# Patient Record
Sex: MALE | Race: WHITE | NOT HISPANIC OR LATINO | Employment: FULL TIME | URBAN - METROPOLITAN AREA
[De-identification: names, ages, dates, MRNs, and addresses within clinical notes are randomized per-mention and may not be internally consistent; named-entity substitution may affect disease eponyms.]

---

## 2017-03-21 ENCOUNTER — HOSPITAL ENCOUNTER (EMERGENCY)
Facility: HOSPITAL | Age: 62
Discharge: HOME | End: 2017-03-21
Attending: EMERGENCY MEDICINE
Payer: COMMERCIAL

## 2017-03-21 VITALS
OXYGEN SATURATION: 98 % | HEIGHT: 68 IN | TEMPERATURE: 98 F | DIASTOLIC BLOOD PRESSURE: 71 MMHG | WEIGHT: 250 LBS | HEART RATE: 74 BPM | SYSTOLIC BLOOD PRESSURE: 123 MMHG | BODY MASS INDEX: 37.89 KG/M2 | RESPIRATION RATE: 18 BRPM

## 2017-03-21 DIAGNOSIS — N20.1 RIGHT URETERAL STONE: Primary | ICD-10-CM

## 2017-03-21 LAB
ALBUMIN SERPL BCP-MCNC: 3.8 G/DL
ALP SERPL-CCNC: 89 U/L
ALT SERPL W/O P-5'-P-CCNC: 107 U/L
ANION GAP SERPL CALC-SCNC: 12 MMOL/L
AST SERPL-CCNC: 82 U/L
BACTERIA #/AREA URNS HPF: ABNORMAL /HPF
BASOPHILS # BLD AUTO: 0.03 K/UL
BASOPHILS NFR BLD: 0.6 %
BILIRUB SERPL-MCNC: 1 MG/DL
BILIRUB UR QL STRIP: NEGATIVE
BUN SERPL-MCNC: 21 MG/DL
CALCIUM SERPL-MCNC: 9.2 MG/DL
CHLORIDE SERPL-SCNC: 105 MMOL/L
CLARITY UR: CLEAR
CO2 SERPL-SCNC: 20 MMOL/L
COLOR UR: YELLOW
CREAT SERPL-MCNC: 1.1 MG/DL
DIFFERENTIAL METHOD: ABNORMAL
EOSINOPHIL # BLD AUTO: 0.1 K/UL
EOSINOPHIL NFR BLD: 1.4 %
ERYTHROCYTE [DISTWIDTH] IN BLOOD BY AUTOMATED COUNT: 13.6 %
EST. GFR  (AFRICAN AMERICAN): >60 ML/MIN/1.73 M^2
EST. GFR  (NON AFRICAN AMERICAN): >60 ML/MIN/1.73 M^2
GLUCOSE SERPL-MCNC: 134 MG/DL
GLUCOSE UR QL STRIP: NEGATIVE
HCT VFR BLD AUTO: 42.6 %
HGB BLD-MCNC: 15.1 G/DL
HGB UR QL STRIP: ABNORMAL
KETONES UR QL STRIP: NEGATIVE
LEUKOCYTE ESTERASE UR QL STRIP: NEGATIVE
LYMPHOCYTES # BLD AUTO: 1.3 K/UL
LYMPHOCYTES NFR BLD: 26.4 %
MCH RBC QN AUTO: 31.5 PG
MCHC RBC AUTO-ENTMCNC: 35.4 %
MCV RBC AUTO: 89 FL
MICROSCOPIC COMMENT: ABNORMAL
MONOCYTES # BLD AUTO: 0.4 K/UL
MONOCYTES NFR BLD: 8.6 %
NEUTROPHILS # BLD AUTO: 3.2 K/UL
NEUTROPHILS NFR BLD: 63 %
NITRITE UR QL STRIP: NEGATIVE
PH UR STRIP: 5 [PH] (ref 5–8)
PLATELET # BLD AUTO: 116 K/UL
PMV BLD AUTO: 11.2 FL
POTASSIUM SERPL-SCNC: 4 MMOL/L
PROT SERPL-MCNC: 7.1 G/DL
PROT UR QL STRIP: NEGATIVE
RBC # BLD AUTO: 4.79 M/UL
RBC #/AREA URNS HPF: 75 /HPF (ref 0–4)
SODIUM SERPL-SCNC: 137 MMOL/L
SP GR UR STRIP: 1.02 (ref 1–1.03)
URN SPEC COLLECT METH UR: ABNORMAL
UROBILINOGEN UR STRIP-ACNC: NEGATIVE EU/DL
WBC # BLD AUTO: 5 K/UL

## 2017-03-21 PROCEDURE — 96375 TX/PRO/DX INJ NEW DRUG ADDON: CPT

## 2017-03-21 PROCEDURE — 80053 COMPREHEN METABOLIC PANEL: CPT

## 2017-03-21 PROCEDURE — 63600175 PHARM REV CODE 636 W HCPCS: Performed by: EMERGENCY MEDICINE

## 2017-03-21 PROCEDURE — 96374 THER/PROPH/DIAG INJ IV PUSH: CPT

## 2017-03-21 PROCEDURE — 99284 EMERGENCY DEPT VISIT MOD MDM: CPT | Mod: 25

## 2017-03-21 PROCEDURE — 81000 URINALYSIS NONAUTO W/SCOPE: CPT

## 2017-03-21 PROCEDURE — 85025 COMPLETE CBC W/AUTO DIFF WBC: CPT

## 2017-03-21 RX ORDER — HYDROCODONE BITARTRATE AND ACETAMINOPHEN 10; 325 MG/1; MG/1
1 TABLET ORAL EVERY 4 HOURS PRN
Qty: 18 TABLET | Refills: 0 | Status: SHIPPED | OUTPATIENT
Start: 2017-03-21 | End: 2017-03-24 | Stop reason: SDUPTHER

## 2017-03-21 RX ORDER — KETOROLAC TROMETHAMINE 30 MG/ML
30 INJECTION, SOLUTION INTRAMUSCULAR; INTRAVENOUS
Status: COMPLETED | OUTPATIENT
Start: 2017-03-21 | End: 2017-03-21

## 2017-03-21 RX ORDER — MORPHINE SULFATE 4 MG/ML
4 INJECTION, SOLUTION INTRAMUSCULAR; INTRAVENOUS
Status: COMPLETED | OUTPATIENT
Start: 2017-03-21 | End: 2017-03-21

## 2017-03-21 RX ORDER — TAMSULOSIN HYDROCHLORIDE 0.4 MG/1
0.4 CAPSULE ORAL DAILY
Qty: 30 CAPSULE | Refills: 0 | Status: SHIPPED | OUTPATIENT
Start: 2017-03-21 | End: 2017-07-12

## 2017-03-21 RX ORDER — ONDANSETRON 2 MG/ML
4 INJECTION INTRAMUSCULAR; INTRAVENOUS
Status: COMPLETED | OUTPATIENT
Start: 2017-03-21 | End: 2017-03-21

## 2017-03-21 RX ORDER — ONDANSETRON 4 MG/1
4 TABLET, FILM COATED ORAL EVERY 8 HOURS PRN
Qty: 12 TABLET | Refills: 0 | Status: SHIPPED | OUTPATIENT
Start: 2017-03-21 | End: 2017-07-12

## 2017-03-21 RX ADMIN — ONDANSETRON 4 MG: 2 INJECTION INTRAMUSCULAR; INTRAVENOUS at 09:03

## 2017-03-21 RX ADMIN — KETOROLAC TROMETHAMINE 30 MG: 30 INJECTION, SOLUTION INTRAMUSCULAR at 09:03

## 2017-03-21 RX ADMIN — MORPHINE SULFATE 4 MG: 4 INJECTION, SOLUTION INTRAMUSCULAR; INTRAVENOUS at 09:03

## 2017-03-21 NOTE — ED PROVIDER NOTES
SCRIBE #1 NOTE: I, Adria Farley, am scribing for, and in the presence of, Chelsey Jang MD. I have scribed the entire note.      History      Chief Complaint   Patient presents with    Flank Pain     since last night        Review of patient's allergies indicates:  No Known Allergies     HPI   HPI    3/21/2017, 8:27 AM   History obtained from the patient and wife      History of Present Illness: Jassno Mayo is a 61 y.o. male patient who presents to the Emergency Department for right flank pain which onset gradually this morning. Sxs are constant and moderate in severity. There are no mitigating or exacerbating factors noted.  Pt denies any fever, N/V/D, dysuria, hematuria, constipation, blood in stool, abd pain, testicle pain, HA, numbness, and all other sxs at this time. No further complaints or concerns at this time.       Arrival mode: Personal vehicle     PCP: Kenneth Jon MD       Past Medical History:  Past Medical History:   Diagnosis Date    Decreased platelet count     Fatty liver     Gout     HTN (hypertension) 2000    Non-A non-B hepatitis 1990    Obesity (BMI 35.0-39.9 without comorbidity)     Prediabetes 2015       Past Surgical History:  Past Surgical History:   Procedure Laterality Date    COLONOSCOPY W/ POLYPECTOMY  10, 14 no polyp    KNEE ARTHROPLASTY  2013    Torn meniscus    LIVER BIOPSY  2010    VASECTOMY           Family History:  Family History   Problem Relation Age of Onset    COPD Mother     Heart attacks under age 50 Mother     Cancer Father      PROSTATE [place]       Social History:  Social History     Social History Main Topics    Smoking status: Never Smoker    Smokeless tobacco: Unknown    Alcohol use No    Drug use: Unknown    Sexual activity: Unknown       ROS   Review of Systems   Constitutional: Negative for fever.   HENT: Negative for sore throat.    Respiratory: Negative for shortness of breath.    Cardiovascular: Negative for chest  "pain.   Gastrointestinal: Negative for abdominal distention, abdominal pain, blood in stool, constipation, diarrhea, nausea and vomiting.   Genitourinary: Positive for flank pain (Right). Negative for dysuria, hematuria and testicular pain.   Musculoskeletal: Negative for back pain.   Skin: Negative for rash.   Neurological: Negative for weakness.   Hematological: Does not bruise/bleed easily.       Physical Exam        Initial Vitals   BP Pulse Resp Temp SpO2   03/21/17 1029 03/21/17 0826 03/21/17 0826 03/21/17 0826 03/21/17 0826   101/66 61 18 97.6 °F (36.4 °C) 98 %          Physical Exam  Nursing Notes and Vital Signs Reviewed.  Constitutional: Patient is in mild distress. Awake and alert. Well-developed and well-nourished.  Head: Atraumatic. Normocephalic.  Eyes: PERRL. EOM intact. Conjunctivae are not pale. No scleral icterus.  ENT: Mucous membranes are moist. Oropharynx is clear and symmetric.    Neck: Supple. Full ROM. No lymphadenopathy.  Cardiovascular: Regular rate. Regular rhythm. No murmurs, rubs, or gallops. Distal pulses are 2+ and symmetric.  Pulmonary/Chest: No respiratory distress. Clear to auscultation bilaterally. No wheezing, rales, or rhonchi.  Abdominal: Soft and non-distended.  There is no tenderness.  No rebound, guarding, or rigidity. Good bowel sounds.  Genitourinary: Right CVA tenderness  Musculoskeletal: Moves all extremities. No obvious deformities. No edema. No calf tenderness.  Skin: Warm and dry.  Neurological:  Alert, awake, and appropriate.  Normal speech.  No acute focal neurological deficits are appreciated.  Psychiatric: Normal affect. Good eye contact. Appropriate in content.    ED Course    Procedures  ED Vital Signs:  Vitals:    03/21/17 0826 03/21/17 1029 03/21/17 1125   BP:  101/66 123/71   Pulse: 61 70 74   Resp: 18 17 18   Temp: 97.6 °F (36.4 °C)  97.7 °F (36.5 °C)   TempSrc: Oral  Oral   SpO2: 98% 96% 98%   Weight: 113.4 kg (250 lb)     Height: 5' 8" (1.727 m)   "       Abnormal Lab Results:  Labs Reviewed   URINALYSIS - Abnormal; Notable for the following:        Result Value    Occult Blood UA 3+ (*)     All other components within normal limits   CBC W/ AUTO DIFFERENTIAL - Abnormal; Notable for the following:     MCH 31.5 (*)     Platelets 116 (*)     All other components within normal limits   COMPREHENSIVE METABOLIC PANEL - Abnormal; Notable for the following:     CO2 20 (*)     Glucose 134 (*)     AST 82 (*)      (*)     All other components within normal limits   URINALYSIS MICROSCOPIC - Abnormal; Notable for the following:     RBC, UA 75 (*)     All other components within normal limits        All Lab Results:  Results for orders placed or performed during the hospital encounter of 03/21/17   Urinalysis Clean Catch   Result Value Ref Range    Specimen UA Urine, Clean Catch     Color, UA Yellow Yellow, Straw, Allyssa    Appearance, UA Clear Clear    pH, UA 5.0 5.0 - 8.0    Specific Gravity, UA 1.025 1.005 - 1.030    Protein, UA Negative Negative    Glucose, UA Negative Negative    Ketones, UA Negative Negative    Bilirubin (UA) Negative Negative    Occult Blood UA 3+ (A) Negative    Nitrite, UA Negative Negative    Urobilinogen, UA Negative <2.0 EU/dL    Leukocytes, UA Negative Negative   CBC auto differential   Result Value Ref Range    WBC 5.00 3.90 - 12.70 K/uL    RBC 4.79 4.60 - 6.20 M/uL    Hemoglobin 15.1 14.0 - 18.0 g/dL    Hematocrit 42.6 40.0 - 54.0 %    MCV 89 82 - 98 fL    MCH 31.5 (H) 27.0 - 31.0 pg    MCHC 35.4 32.0 - 36.0 %    RDW 13.6 11.5 - 14.5 %    Platelets 116 (L) 150 - 350 K/uL    MPV 11.2 9.2 - 12.9 fL    Gran # 3.2 1.8 - 7.7 K/uL    Lymph # 1.3 1.0 - 4.8 K/uL    Mono # 0.4 0.3 - 1.0 K/uL    Eos # 0.1 0.0 - 0.5 K/uL    Baso # 0.03 0.00 - 0.20 K/uL    Gran% 63.0 38.0 - 73.0 %    Lymph% 26.4 18.0 - 48.0 %    Mono% 8.6 4.0 - 15.0 %    Eosinophil% 1.4 0.0 - 8.0 %    Basophil% 0.6 0.0 - 1.9 %    Differential Method Automated    Comprehensive  metabolic panel   Result Value Ref Range    Sodium 137 136 - 145 mmol/L    Potassium 4.0 3.5 - 5.1 mmol/L    Chloride 105 95 - 110 mmol/L    CO2 20 (L) 23 - 29 mmol/L    Glucose 134 (H) 70 - 110 mg/dL    BUN, Bld 21 8 - 23 mg/dL    Creatinine 1.1 0.5 - 1.4 mg/dL    Calcium 9.2 8.7 - 10.5 mg/dL    Total Protein 7.1 6.0 - 8.4 g/dL    Albumin 3.8 3.5 - 5.2 g/dL    Total Bilirubin 1.0 0.1 - 1.0 mg/dL    Alkaline Phosphatase 89 55 - 135 U/L    AST 82 (H) 10 - 40 U/L     (H) 10 - 44 U/L    Anion Gap 12 8 - 16 mmol/L    eGFR if African American >60 >60 mL/min/1.73 m^2    eGFR if non African American >60 >60 mL/min/1.73 m^2   Urinalysis Microscopic   Result Value Ref Range    RBC, UA 75 (H) 0 - 4 /hpf    Bacteria, UA Occasional None-Occ /hpf    Microscopic Comment SEE COMMENT          Imaging Results:  Imaging Results         CT Renal Stone Study ABD Pelvis WO (Final result) Result time:  03/21/17 10:58:09    Final result by Sugar Roche MD (03/21/17 10:58:09)    Impression:     Right hydroureteronephrosis with 4 mm distal right ureteral stone.      All CT scans at this facility use dose modulation, iterative reconstruction and/or weight based dosing when appropriate to reduce radiation dose to as low as reasonably achievable.       Electronically signed by: SUGAR ROCHE MD  Date:     03/21/17  Time:    10:58     Narrative:    CT RENAL STONE STUDY ABD PELVIS WO,     Date:  03/21/17 10:16:17    Technique: Limited noncontrast CT scan of the abdomen and pelvis.No previous for comparison    History:  right flank pain,     Findings:  The lung bases are clear.     The liver, spleen, and pancreas appear grossly normal.     The visible bowel is grossly unremarkable.    The right kidney reveals mild hydroureteronephrosis.  A distal right ureteral stone is seen proximally 3-4 mm in size.  Best seen on axial image 158.    Lumbar degenerative disc disease.                      The Emergency Provider reviewed the  vital signs and test results, which are outlined above.    ED Discussion     11:06 AM: Reassessed pt. Pt states their condition has improved at this time.  Discussed with pt all pertinent ED information and results. Discussed plan of treatment with pt. Gave pt all f/u and return to the ED instructions. All questions and concerns were addressed at this time. Pt understands and agrees to plan as discussed. Pt is stable for discharge.       ED Medication(s):  Medications   morphine injection 4 mg (4 mg Intravenous Given 3/21/17 0946)   ketorolac injection 30 mg (30 mg Intravenous Given 3/21/17 0942)   ondansetron injection 4 mg (4 mg Intravenous Given 3/21/17 0941)       Discharge Medication List as of 3/21/2017 11:07 AM      START taking these medications    Details   hydrocodone-acetaminophen 10-325mg (NORCO)  mg Tab Take 1 tablet by mouth every 4 (four) hours as needed for Pain., Starting 3/21/2017, Until Discontinued, Print      ondansetron (ZOFRAN) 4 MG tablet Take 1 tablet (4 mg total) by mouth every 8 (eight) hours as needed., Starting 3/21/2017, Until Discontinued, Print      tamsulosin (FLOMAX) 0.4 mg Cp24 Take 1 capsule (0.4 mg total) by mouth once daily., Starting 3/21/2017, Until Wed 3/21/18, Print             Follow-up Information     Follow up with Yoni Eubanks IV, MD. Schedule an appointment as soon as possible for a visit in 2 days.    Specialty:  Urology    Why:  Return to the Emergency Room, If symptoms worsen    Contact information:    [address]  [place] [address]  [phone]              Medical Decision Making    Medical Decision Making:   Clinical Tests:   Lab Tests: Reviewed and Ordered  Radiological Study: Reviewed and Ordered           Scribe Attestation:   Scribe #1: I performed the above scribed service and the documentation accurately describes the services I performed. I attest to the accuracy of the note.    Attending:   Physician Attestation Statement for Scribe #1: I,  Chelsey Jang MD, personally performed the services described in this documentation, as scribed by Adria Farley, in my presence, and it is both accurate and complete.          Clinical Impression       ICD-10-CM ICD-9-CM   1. Right ureteral stone N20.1 592.1       Disposition:   Disposition: Discharged  Condition: Stable         Chelsey Jang MD  03/22/17 0642

## 2017-03-21 NOTE — ED NOTES
Pt lying in bed in NAD,VSS,RR equal and unlabored. Bed is low, locked, and call light in reach. Side rails up x 2.

## 2017-03-21 NOTE — ED NOTES
Pt ready for dc per Dr. Jang. Pt in NAD, VSS, AAOx3. Pt educated on use of medications and urine strainer at home.

## 2017-03-21 NOTE — ED AVS SNAPSHOT
[facility] -   [address]  [place] [address]               Jasson Mayo   3/21/2017  8:20 AM   ED    Description:  Male : 1955   Department:  [facility]           Your Care was Coordinated By:     Provider Role From To    Chelsey Jang MD Attending Provider 17 0834 --      Reason for Visit     Flank Pain           Diagnoses this Visit        Comments    Right ureteral stone    -  Primary       ED Disposition     None           To Do List           Follow-up Information     Follow up with Yoni Eubanks IV, MD. Schedule an appointment as soon as possible for a visit in 2 days.    Specialty:  Urology    Why:  Return to the Emergency Room, If symptoms worsen    Contact information:    [address]  [place] [address]  [phone]         These Medications        Disp Refills Start End    hydrocodone-acetaminophen 10-325mg (NORCO)  mg Tab 18 tablet 0 3/21/2017     Take 1 tablet by mouth every 4 (four) hours as needed for Pain. - Oral    Pharmacy: [address] Ph #: [phone]       tamsulosin (FLOMAX) 0.4 mg Cp24 30 capsule 0 3/21/2017 3/21/2018    Take 1 capsule (0.4 mg total) by mouth once daily. - Oral    Pharmacy: [address] Ph #: [phone]       ondansetron (ZOFRAN) 4 MG tablet 12 tablet 0 3/21/2017     Take 1 tablet (4 mg total) by mouth every 8 (eight) hours as needed. - Oral    Pharmacy: [address] A Ph #: [phone]         [facility]     [facility] -  Assistance  Registered nurses in the [facility] provide clinical advisement, health education, appointment booking, and other advisory services.  Call for this free service at [phone].             Medications           Message regarding Medications      Verify the changes and/or additions to your medication regime listed below are the same as discussed with your clinician today.  If any of these changes or additions are incorrect, please notify your healthcare provider.        START taking these NEW medications        Refills    hydrocodone-acetaminophen 10-325mg (NORCO)  mg Tab 0    Sig: Take 1 tablet by mouth every 4 (four) hours as needed for Pain.    Class: Print    Route: Oral    tamsulosin (FLOMAX) 0.4 mg Cp24 0    Sig: Take 1 capsule (0.4 mg total) by mouth once daily.    Class: Print    Route: Oral    ondansetron (ZOFRAN) 4 MG tablet 0    Sig: Take 1 tablet (4 mg total) by mouth every 8 (eight) hours as needed.    Class: Print    Route: Oral      These medications were administered today        Dose Freq    morphine injection 4 mg 4 mg ED 1 Time    Sig: Inject 1 mL (4 mg total) into the vein ED 1 Time.    Class: Normal    Route: Intravenous    ketorolac injection 30 mg 30 mg ED 1 Time    Sig: Inject 30 mg into the vein ED 1 Time.    Class: Normal    Route: Intravenous    Non-formulary Exception Code: Defer to pharmacy    ondansetron injection 4 mg 4 mg ED 1 Time    Sig: Inject 4 mg into the vein ED 1 Time.    Class: Normal    Route: Intravenous           Verify that the below list of medications is an accurate representation of the medications you are currently taking.  If none reported, the list may be blank. If incorrect, please contact your healthcare provider. Carry this list with you in case of emergency.           Current Medications     allopurinol (ZYLOPRIM) 300 MG tablet Take 1 tablet (300 mg total) by mouth once daily.    benazepril (LOTENSIN) 20 MG tablet Take 1 tablet (20 mg total) by mouth once daily.    colchicine (COLCRYS) 0.6 mg tablet Take 1 tablet (0.6 mg total) by mouth once daily.    vitamin E 1000 UNIT capsule Take 1,000 Units by mouth once daily.    hydrocodone-acetaminophen 10-325mg (NORCO)  mg Tab Take 1 tablet by mouth  "every 4 (four) hours as needed for Pain.    ondansetron (ZOFRAN) 4 MG tablet Take 1 tablet (4 mg total) by mouth every 8 (eight) hours as needed.    tamsulosin (FLOMAX) 0.4 mg Cp24 Take 1 capsule (0.4 mg total) by mouth once daily.           Clinical Reference Information           Your Vitals Were     BP Pulse Temp Resp Height Weight    101/66 70 97.6 °F (36.4 °C) (Oral) 17 5' 8" (1.727 m) 113.4 kg (250 lb)    SpO2 BMI             96% 38.01 kg/m2         Allergies as of 3/21/2017     No Known Allergies      Immunizations Administered on Date of Encounter - 3/21/2017     None      ED Micro, Lab, POCT     Start Ordered       Status Ordering Provider    03/21/17 0926 03/21/17 0925  CBC auto differential  STAT      Final result     03/21/17 0926 03/21/17 0925  Comprehensive metabolic panel  STAT      In process     03/21/17 0835 03/21/17 0834  Urinalysis Clean Catch  STAT      Final result     03/21/17 0834 03/21/17 0834  Urinalysis Microscopic  Once      Final result       ED Imaging Orders     Start Ordered       Status Ordering Provider    03/21/17 0926 03/21/17 0925  CT Renal Stone Study ABD Pelvis WO  1 time imaging      Final result       Discharge References/Attachments     KIDNEY STONE W/ COLIC (ENGLISH)    KIDNEY STONES, TREATING: EXPECTANT THERAPY (ENGLISH)    KIDNEY STONES, TREATING: MEDICATIONS (ENGLISH)    KIDNEY STONES, PREVENTING (ENGLISH)      Your Scheduled Appointments     Jun 29, 2017  8:00 AM CDT   Non-Fasting Lab with [facility]ORATORY, [place] [facility]    [address] [address] [phone]               [facility]  complies with applicable Federal civil rights laws and does not discriminate on the basis of race, color, national origin, age, disability, or sex.        Language Assistance Services     ATTENTION: Language assistance services are available, free of charge. Please call [phone].      ATENCIÓN: Si habla " español, tiene a ferro disposición servicios gratuitos de asistencia lingüística. Llame al [phone].     NORMA Ý: N?u b?n nói Ti?ng Vi?t, có các d?ch v? h? tr? ngôn ng? mi?n phí dành cho b?n. G?i s? [phone].

## 2017-03-23 ENCOUNTER — TELEPHONE (OUTPATIENT)
Dept: UROLOGY | Facility: CLINIC | Age: 62
End: 2017-03-23

## 2017-03-23 NOTE — TELEPHONE ENCOUNTER
Patient requesting appointment with Dr. Eubanks for tomorrow because he has not passed his kidney stone and will be running out of pain medication soon. Informed patient that Dr. Eubanks will not return to the office until Monday. Scheduled patient to see Dr. Mendoza at the [facility].

## 2017-03-23 NOTE — TELEPHONE ENCOUNTER
----- Message from Goldie Blankenship sent at 3/23/2017  7:52 AM CDT -----  Contact: PT   Pt calling because he still have not passed the kidney stone,, please call pt back to decide [phone]

## 2017-03-24 ENCOUNTER — OFFICE VISIT (OUTPATIENT)
Dept: UROLOGY | Facility: CLINIC | Age: 62
End: 2017-03-24
Payer: COMMERCIAL

## 2017-03-24 VITALS
HEIGHT: 68 IN | TEMPERATURE: 99 F | WEIGHT: 252 LBS | BODY MASS INDEX: 38.19 KG/M2 | DIASTOLIC BLOOD PRESSURE: 93 MMHG | SYSTOLIC BLOOD PRESSURE: 133 MMHG | HEART RATE: 80 BPM | RESPIRATION RATE: 16 BRPM

## 2017-03-24 DIAGNOSIS — N13.2 HYDRONEPHROSIS WITH OBSTRUCTING CALCULUS: ICD-10-CM

## 2017-03-24 DIAGNOSIS — R10.9 FLANK PAIN: ICD-10-CM

## 2017-03-24 DIAGNOSIS — N20.1 URETERAL STONE: Primary | ICD-10-CM

## 2017-03-24 PROCEDURE — 99999 PR PBB SHADOW E&M-EST. PATIENT-LVL III: CPT | Mod: PBBFAC,,, | Performed by: UROLOGY

## 2017-03-24 PROCEDURE — 3075F SYST BP GE 130 - 139MM HG: CPT | Mod: S$GLB,,, | Performed by: UROLOGY

## 2017-03-24 PROCEDURE — 3080F DIAST BP >= 90 MM HG: CPT | Mod: S$GLB,,, | Performed by: UROLOGY

## 2017-03-24 PROCEDURE — 1160F RVW MEDS BY RX/DR IN RCRD: CPT | Mod: S$GLB,,, | Performed by: UROLOGY

## 2017-03-24 PROCEDURE — 99204 OFFICE O/P NEW MOD 45 MIN: CPT | Mod: S$GLB,,, | Performed by: UROLOGY

## 2017-03-24 RX ORDER — FLUOROMETHOLONE 1 MG/ML
SUSPENSION/ DROPS OPHTHALMIC
COMMUNITY
Start: 2017-03-20 | End: 2017-07-12 | Stop reason: ALTCHOICE

## 2017-03-24 RX ORDER — HYDROCODONE BITARTRATE AND ACETAMINOPHEN 10; 325 MG/1; MG/1
1 TABLET ORAL EVERY 4 HOURS PRN
Qty: 30 TABLET | Refills: 0 | Status: ON HOLD | OUTPATIENT
Start: 2017-03-24 | End: 2017-03-28

## 2017-03-24 NOTE — PROGRESS NOTES
Subjective:       Patient ID: Jasson Mayo is a 61 y.o. male.    Chief Complaint: Nephrolithiasis    HPI     61 year old with acute onset of right flank pain beginning 3 days ago.  The pain was severe.  He was seen in the emergency room.  A CT scan was obtained and I reviewed the images with him and his wife.  There is an obstructing stone in the distal right ureter.  He has no previous kidneys stones.   He denies associated nausea and no fever.  He denies associated gross hematuria.  Nothing makes the pain better except pain meds and they have limited effectiveness.  He says the pain is intermittent.  We discussed treatment options.  Continued observation vs surgery.    Impression    Right hydroureteronephrosis with 4 mm distal right ureteral stone.         Past Medical History:   Diagnosis Date    Decreased platelet count     Fatty liver     Gout     HTN (hypertension) 2000    Non-A non-B hepatitis 1990    Obesity (BMI 35.0-39.9 without comorbidity)     Prediabetes 2015     Past Surgical History:   Procedure Laterality Date    COLONOSCOPY W/ POLYPECTOMY  10, 14 no polyp    KNEE ARTHROPLASTY  2013    Torn meniscus    LIVER BIOPSY  2010    VASECTOMY         Current Outpatient Prescriptions:     allopurinol (ZYLOPRIM) 300 MG tablet, Take 1 tablet (300 mg total) by mouth once daily., Disp: 30 tablet, Rfl: 11    benazepril (LOTENSIN) 20 MG tablet, Take 1 tablet (20 mg total) by mouth once daily., Disp: 90 tablet, Rfl: 3    colchicine (COLCRYS) 0.6 mg tablet, Take 1 tablet (0.6 mg total) by mouth once daily., Disp: 30 tablet, Rfl: 11    fluorometholone 0.1% (FML) 0.1 % DrpS, , Disp: , Rfl:     hydrocodone-acetaminophen 10-325mg (NORCO)  mg Tab, Take 1 tablet by mouth every 4 (four) hours as needed for Pain., Disp: 30 tablet, Rfl: 0    ondansetron (ZOFRAN) 4 MG tablet, Take 1 tablet (4 mg total) by mouth every 8 (eight) hours as needed., Disp: 12 tablet, Rfl: 0    tamsulosin (FLOMAX) 0.4  mg Cp24, Take 1 capsule (0.4 mg total) by mouth once daily., Disp: 30 capsule, Rfl: 0    vitamin E 1000 UNIT capsule, Take 1,000 Units by mouth once daily., Disp: , Rfl:     Review of Systems   Constitutional: Negative for fever.   Eyes: Negative for visual disturbance.   Respiratory: Negative for shortness of breath.    Cardiovascular: Negative for chest pain.   Gastrointestinal: Negative for nausea.   Genitourinary: Positive for flank pain. Negative for dysuria and hematuria.   Musculoskeletal: Negative for gait problem.   Skin: Negative for rash.   Neurological: Negative for seizures.   Psychiatric/Behavioral: Negative for confusion.       Objective:      Physical Exam   Constitutional: He is oriented to person, place, and time. He appears well-developed and well-nourished.   HENT:   Head: Normocephalic and atraumatic.   Eyes: Conjunctivae are normal.   Cardiovascular: Normal rate.    Pulmonary/Chest: Effort normal.   Abdominal: Soft. He exhibits no distension and no mass. There is no tenderness. There is no CVA tenderness.   Musculoskeletal: Normal range of motion.   Neurological: He is alert and oriented to person, place, and time.   Skin: Skin is warm and dry. No rash noted.   Psychiatric: He has a normal mood and affect.   Vitals reviewed.      Assessment:       1. Ureteral stone    2. Hydronephrosis with obstructing calculus    3. Flank pain        Plan:       Ureteral stone    Hydronephrosis with obstructing calculus    Flank pain    Other orders  -     hydrocodone-acetaminophen 10-325mg (NORCO)  mg Tab; Take 1 tablet by mouth every 4 (four) hours as needed for Pain.  Dispense: 30 tablet; Refill: 0      Trial of passage through weekend.  If unable to pass, plan ureteroscopy next week.    Recommendations:   Increase hydration 2 liters fluid per day.      Strain Urine     Take pain medications as needed     Call office for severe pain or fever >101

## 2017-03-25 ENCOUNTER — NURSE TRIAGE (OUTPATIENT)
Dept: ADMINISTRATIVE | Facility: CLINIC | Age: 62
End: 2017-03-25

## 2017-03-25 NOTE — TELEPHONE ENCOUNTER
Reason for Disposition   Last bowel movement (BM) > 4 days ago    Protocols used: [facility]-A-AH

## 2017-03-26 ENCOUNTER — NURSE TRIAGE (OUTPATIENT)
Dept: ADMINISTRATIVE | Facility: CLINIC | Age: 62
End: 2017-03-26

## 2017-03-26 NOTE — TELEPHONE ENCOUNTER
Patient informed of several unsuccessful attempts to reach on-call provider Dr. Mendoza advised patient if symptoms develop to seek medical care at local [facility] of choice. Patient advised to attend scheduled appointment f/u kidney stones and to discuss constipation and decrease appetite at time of visit.

## 2017-03-26 NOTE — TELEPHONE ENCOUNTER
"  Reason for Disposition   Last bowel movement (BM) > 4 days ago     Last BM Monday 3/20/17    Answer Assessment - Initial Assessment Questions  1. STOOL PATTERN OR FREQUENCY: "How often do you pass bowel movements (BMs)?"  (Normal range: tid to q 3 days)  "When was the last BM passed?"        Every day   2. STRAINING: "Do you have to strain to have a BM?"       Yes   3. RECTAL PAIN: "Does your rectum hurt when the stool comes out?" If so, ask: "Do you have hemorrhoids? How bad is the pain?"  (Scale 1-10; or mild, moderate, severe)      No   4. STOOL COMPOSITION: "Are the stools hard?"      Unobserved   5. BLOOD ON STOOLS: "Has there been any blood on the toilet tissue or on the surface of the BM?" If so, ask: "When was the last time?"       No  6. CHRONIC CONSTIPATION: "Is this a new problem for you?"  If no, ask: How long have you had this problem?" (days, weeks, months)       No   7. CHANGES IN DIET: "Have there been any recent changes in your diet?"       No   8. MEDICATIONS: "Have you been taking any new medications?"      Norco  mg (kidney stones)   9. LAXATIVES: "Have you been using any laxatives or enemas?"  If yes, ask "What, how often, and when was the last time?"      OTC, glycerine suppository, fleet's enema   10. CAUSE: "What do you think is causing the constipation?"         Pain medications  11. OTHER SYMPTOMS: "Do you have any other symptoms?" (e.g., abdominal pain, fever, vomiting)        No   12. PREGNANCY: "Is there any chance you are pregnant?" "When was your last menstrual period?"        n/a    Protocols used: [facility]-    "

## 2017-03-26 NOTE — TELEPHONE ENCOUNTER
Call received from Dr. Mendoza \[facility] [place] [facility]\"" to advised patient to purchase/drink 8 oz OTC Magnesium Citrate (saline laxative solution) for constipation symptom relief. [facility] medical staff will call patient on Monday 3/27 to discuss kidney stone.  Patient advised to call [facility] again if symptoms persist or worsen or if home measures are ineffective; patient had no further questions at end of call.

## 2017-03-27 ENCOUNTER — TELEPHONE (OUTPATIENT)
Dept: UROLOGY | Facility: CLINIC | Age: 62
End: 2017-03-27

## 2017-03-27 DIAGNOSIS — N13.30 HYDROURETERONEPHROSIS: ICD-10-CM

## 2017-03-27 DIAGNOSIS — N20.0 KIDNEY STONE: Primary | ICD-10-CM

## 2017-03-27 NOTE — TELEPHONE ENCOUNTER
Spoke to pt's wife and the pt has not passed the kidney stone. I am going to talk to Dr. Mendoza and get his scheduled for surgery for tomorrow. I informed the pt's wife some will be calling her with the details.

## 2017-03-28 PROBLEM — N20.0 KIDNEY STONE: Status: ACTIVE | Noted: 2017-03-28

## 2017-03-29 ENCOUNTER — TELEPHONE (OUTPATIENT)
Dept: UROLOGY | Facility: CLINIC | Age: 62
End: 2017-03-29

## 2017-03-29 NOTE — TELEPHONE ENCOUNTER
Spoke to pt's wife and booked him for a post op apt. His urine is clearing to a yellow color and he has not had to take any more pain medication.

## 2017-03-29 NOTE — TELEPHONE ENCOUNTER
----- Message from Lina Dominguez sent at 3/29/2017 11:25 AM CDT -----  Contact: wife  Wife - Vonnie Mayo [phone] is calling to schedule an appt for patient for this Friday 03 31 17/patient had surgery on Tuesday 03 28 17 and was advised to followup this Friday/please call

## 2017-03-31 ENCOUNTER — OFFICE VISIT (OUTPATIENT)
Dept: UROLOGY | Facility: CLINIC | Age: 62
End: 2017-03-31
Payer: COMMERCIAL

## 2017-03-31 VITALS
HEART RATE: 79 BPM | BODY MASS INDEX: 37.76 KG/M2 | DIASTOLIC BLOOD PRESSURE: 88 MMHG | WEIGHT: 249.13 LBS | SYSTOLIC BLOOD PRESSURE: 127 MMHG | HEIGHT: 68 IN

## 2017-03-31 DIAGNOSIS — N20.1 URETERAL STONE: Primary | ICD-10-CM

## 2017-03-31 PROCEDURE — 3079F DIAST BP 80-89 MM HG: CPT | Mod: S$GLB,,, | Performed by: UROLOGY

## 2017-03-31 PROCEDURE — 99999 PR PBB SHADOW E&M-EST. PATIENT-LVL III: CPT | Mod: PBBFAC,,, | Performed by: UROLOGY

## 2017-03-31 PROCEDURE — 99213 OFFICE O/P EST LOW 20 MIN: CPT | Mod: S$GLB,,, | Performed by: UROLOGY

## 2017-03-31 PROCEDURE — 3074F SYST BP LT 130 MM HG: CPT | Mod: S$GLB,,, | Performed by: UROLOGY

## 2017-03-31 PROCEDURE — 1160F RVW MEDS BY RX/DR IN RCRD: CPT | Mod: S$GLB,,, | Performed by: UROLOGY

## 2017-03-31 NOTE — PROGRESS NOTES
Subjective:       Patient ID: Jasson Mayo is a 61 y.o. male.    Chief Complaint: Nephrolithiasis    HPI     S/p Right ureteroscopy 3 days ago.  He is doing well.  No fever.  Voiding well.  Hematuria has resolved.  Stone analysis is pending.    Stent removed intact.    Review of Systems   Constitutional: Negative for fever.   Genitourinary: Negative for dysuria and hematuria.       Objective:      Physical Exam   Constitutional: He is oriented to person, place, and time. He appears well-developed and well-nourished.   Pulmonary/Chest: Effort normal.   Abdominal: Soft.   Neurological: He is alert and oriented to person, place, and time.   Skin: No rash noted.   Psychiatric: He has a normal mood and affect.   Vitals reviewed.      Assessment:       1. Ureteral stone        Plan:       Ureteral stone      Recommendations:   Increase hydration 2 liters fluid per day.      Low Oxalate diet     Add Citrate (lemon juice daily)

## 2017-03-31 NOTE — MR AVS SNAPSHOT
[facility]  [address]  [facility] [address]  Phone: [phone]                  Jasson Mayo   3/31/2017 10:30 AM   Office Visit    Description:  Male : 1955   Provider:  THEA Mendoza MD   Department:  [facility]           Reason for Visit     Nephrolithiasis                To Do List           Future Appointments        Provider Department Dept Phone    3/31/2017 10:30 AM THEA Mendoza MD [facility] [phone]    2017 8:00 AM LABORATORY, [place] [facility] [phone]      Goals (5 Years of Data)     None      [facility]     [facility] Nurse Care Line -  Assistance  Unless otherwise directed by your provider, please contact [facility], our nurse care line that is available for  assistance.     Registered nurses in the [facility] Center provide: appointment scheduling, clinical advisement, health education, and other advisory services.  Call: [phone] (toll free)               Medications           Message regarding Medications     Verify the changes and/or additions to your medication regime listed below are the same as discussed with your clinician today.  If any of these changes or additions are incorrect, please notify your healthcare provider.             Verify that the below list of medications is an accurate representation of the medications you are currently taking.  If none reported, the list may be blank. If incorrect, please contact your healthcare provider. Carry this list with you in case of emergency.           Current Medications     allopurinol (ZYLOPRIM) 300 MG tablet Take 1 tablet (300 mg total) by mouth once daily.    benazepril (LOTENSIN) 20 MG tablet Take 1 tablet (20 mg total) by mouth once daily.    ciprofloxacin HCl (CIPRO) 500 MG tablet Take 1 tablet (500 mg total) by mouth 2 (two) times daily.    colchicine (COLCRYS) 0.6 mg tablet Take 1 tablet (0.6 mg  "total) by mouth once daily.    fluorometholone 0.1% (FML) 0.1 % DrpS     tamsulosin (FLOMAX) 0.4 mg Cp24 Take 1 capsule (0.4 mg total) by mouth once daily.    vitamin E 1000 UNIT capsule Take 1,000 Units by mouth once daily.    hydrocodone-acetaminophen 10-325mg (NORCO)  mg Tab Take 1 tablet by mouth every 4 (four) hours as needed for Pain.    ondansetron (ZOFRAN) 4 MG tablet Take 1 tablet (4 mg total) by mouth every 8 (eight) hours as needed.           Clinical Reference Information           Your Vitals Were     BP Pulse Height Weight BMI    127/88 (BP Location: Left arm) 79 5' 8" (1.727 m) 113 kg (249 lb 1.9 oz) 37.88 kg/m2      Blood Pressure          Most Recent Value    BP  127/88      Allergies as of 3/31/2017     No Known Allergies      Immunizations Administered on Date of Encounter - 3/31/2017     None      Language Assistance Services     ATTENTION: Language assistance services are available, free of charge. Please call [phone].      ATENCIÓN: Si habla español, tiene a ferro disposición servicios gratuitos de asistencia lingüística. Llame al [phone].     NORMA Ý: N?u b?n nói Ti?ng Vi?t, có các d?ch v? h? tr? ngôn ng? mi?n phí dành cho b?n. G?i s? [phone].         [facility] complies with applicable Federal civil rights laws and does not discriminate on the basis of race, color, national origin, age, disability, or sex.        "

## 2017-04-01 ENCOUNTER — NURSE TRIAGE (OUTPATIENT)
Dept: ADMINISTRATIVE | Facility: CLINIC | Age: 62
End: 2017-04-01

## 2017-04-01 NOTE — TELEPHONE ENCOUNTER
Reason for Disposition   [1] SEVERE back pain (e.g., excruciating, unable to do any normal activities) AND [2] not improved 2 hours after pain medicine   Cough, productive of sputum (phlegm)   Cough with cold symptoms (e.g., runny nose, postnasal drip, throat clearing) (all triage questions negative)    Protocols used: ST BACK PAIN-A-AH,  RESPIRATORY MULTIPLE SYMPTOMS - GUIDELINE [id number], ST COUGH - ACUTE [id number]    Patient's wife called with concerns her 's pain is not improving.  He say Dr. Mendoza yesterday. Advised wife to speak to provider to see if he thinks the patient should go to the ED. [facility]  informed Dr. Mendoza takes his own calls and the call was transferred to them.

## 2017-04-04 ENCOUNTER — TELEPHONE (OUTPATIENT)
Dept: UROLOGY | Facility: CLINIC | Age: 62
End: 2017-04-04

## 2017-04-04 NOTE — TELEPHONE ENCOUNTER
Spoke to pt and informed him of his test results. PT verbalized understanding. I am mailing him information on how to try and prevent stones.

## 2017-04-04 NOTE — TELEPHONE ENCOUNTER
----- Message from THEA Mendoza MD sent at 4/3/2017  5:45 PM CDT -----  Call with stone analysis.  Calcium oxalate.    Recommendations:   Increase hydration 2 liters fluid per day.      Low Oxalate diet     Add Citrate (lemon juice daily)

## 2017-06-29 ENCOUNTER — LAB VISIT (OUTPATIENT)
Dept: LAB | Facility: HOSPITAL | Age: 62
End: 2017-06-29
Attending: INTERNAL MEDICINE
Payer: COMMERCIAL

## 2017-06-29 DIAGNOSIS — M1A.0720 IDIOPATHIC CHRONIC GOUT OF LEFT FOOT WITHOUT TOPHUS: ICD-10-CM

## 2017-06-29 LAB
ALBUMIN SERPL BCP-MCNC: 3.8 G/DL
ALP SERPL-CCNC: 107 U/L
ALT SERPL W/O P-5'-P-CCNC: 160 U/L
ANION GAP SERPL CALC-SCNC: 6 MMOL/L
AST SERPL-CCNC: 102 U/L
BILIRUB SERPL-MCNC: 0.6 MG/DL
BUN SERPL-MCNC: 20 MG/DL
CALCIUM SERPL-MCNC: 9.7 MG/DL
CHLORIDE SERPL-SCNC: 104 MMOL/L
CO2 SERPL-SCNC: 27 MMOL/L
CREAT SERPL-MCNC: 1 MG/DL
EST. GFR  (AFRICAN AMERICAN): >60 ML/MIN/1.73 M^2
EST. GFR  (NON AFRICAN AMERICAN): >60 ML/MIN/1.73 M^2
GLUCOSE SERPL-MCNC: 115 MG/DL
POTASSIUM SERPL-SCNC: 4.6 MMOL/L
PROT SERPL-MCNC: 7.4 G/DL
SODIUM SERPL-SCNC: 137 MMOL/L
URATE SERPL-MCNC: 5.4 MG/DL

## 2017-06-29 PROCEDURE — 80053 COMPREHEN METABOLIC PANEL: CPT

## 2017-06-29 PROCEDURE — 84550 ASSAY OF BLOOD/URIC ACID: CPT

## 2017-06-29 PROCEDURE — 36415 COLL VENOUS BLD VENIPUNCTURE: CPT | Mod: PO

## 2017-07-03 ENCOUNTER — TELEPHONE (OUTPATIENT)
Dept: RHEUMATOLOGY | Facility: CLINIC | Age: 62
End: 2017-07-03

## 2017-07-03 NOTE — TELEPHONE ENCOUNTER
----- Message from Gume Curiel MD sent at 7/3/2017  3:06 PM CDT -----  Labs show stable uric acid levels, but high liver numbers. Both allopurinol and colchicine does not cause liver enzyme abnormality. Would recommend to check with PCP about the liver abnormality. Thanks.

## 2017-07-06 ENCOUNTER — TELEPHONE (OUTPATIENT)
Dept: RHEUMATOLOGY | Facility: CLINIC | Age: 62
End: 2017-07-06

## 2017-07-06 NOTE — TELEPHONE ENCOUNTER
----- Message from Christal Lebron sent at 7/6/2017 10:43 AM CDT -----  Contact: Pt   Pt called and stated he was returning a call to the nurse. He can be reached at [phone].    Thanks,  TF

## 2017-07-12 ENCOUNTER — TELEPHONE (OUTPATIENT)
Dept: INTERNAL MEDICINE | Facility: CLINIC | Age: 62
End: 2017-07-12

## 2017-07-12 ENCOUNTER — OFFICE VISIT (OUTPATIENT)
Dept: INTERNAL MEDICINE | Facility: CLINIC | Age: 62
End: 2017-07-12
Payer: COMMERCIAL

## 2017-07-12 VITALS
WEIGHT: 258.94 LBS | HEIGHT: 68 IN | OXYGEN SATURATION: 97 % | TEMPERATURE: 98 F | DIASTOLIC BLOOD PRESSURE: 83 MMHG | HEART RATE: 69 BPM | SYSTOLIC BLOOD PRESSURE: 126 MMHG | BODY MASS INDEX: 39.24 KG/M2

## 2017-07-12 DIAGNOSIS — R79.89 ELEVATED LFTS: Primary | ICD-10-CM

## 2017-07-12 DIAGNOSIS — D69.6 DECREASED PLATELET COUNT: ICD-10-CM

## 2017-07-12 DIAGNOSIS — I10 ESSENTIAL HYPERTENSION: ICD-10-CM

## 2017-07-12 PROCEDURE — 86803 HEPATITIS C AB TEST: CPT

## 2017-07-12 PROCEDURE — 87340 HEPATITIS B SURFACE AG IA: CPT

## 2017-07-12 PROCEDURE — 99999 PR PBB SHADOW E&M-EST. PATIENT-LVL III: CPT | Mod: PBBFAC,,, | Performed by: FAMILY MEDICINE

## 2017-07-12 PROCEDURE — 99213 OFFICE O/P EST LOW 20 MIN: CPT | Mod: S$GLB,,, | Performed by: FAMILY MEDICINE

## 2017-07-12 RX ORDER — COLCHICINE 0.6 MG/1
0.6 TABLET ORAL DAILY
COMMUNITY
End: 2017-08-03 | Stop reason: SDUPTHER

## 2017-07-12 NOTE — TELEPHONE ENCOUNTER
----- Message from Kat Mosher sent at 7/12/2017  2:53 PM CDT -----  Would like to speak to nurse about medication. Please call back at [phone]. thanks

## 2017-07-12 NOTE — PROGRESS NOTES
Subjective:       Patient ID: Jasson Mayo is a 61 y.o. male.    Chief Complaint: Follow-up (labs)    Follow-up elevated liver functions and hypertension.  Recent CMP was done.  Liver functions were slightly more elevated. Platelet count was decreased. He had a  liver biopsy in the past.  He was diagnosed with non-A non-B hepatitis. Also is 5 foot 8 with a weight of 258 pounds. Denies fever chills jaundice abdominal pain or edema. He does not drink alcohol.  He is avoiding NSAIDs and Tylenol.      Review of Systems   Constitutional: Negative for appetite change, chills, fatigue, fever and unexpected weight change.   Respiratory: Negative for cough, shortness of breath and wheezing.    Cardiovascular: Negative for chest pain, palpitations and leg swelling.   Gastrointestinal: Negative for abdominal distention, abdominal pain and nausea.   Genitourinary: Negative for difficulty urinating.   Neurological: Negative for headaches.       Objective:      Physical Exam   Constitutional: He is oriented to person, place, and time. He appears well-developed and well-nourished. No distress.   Neck: No thyromegaly present.   Cardiovascular: Normal rate, regular rhythm and normal heart sounds.    No murmur heard.  No edema   Pulmonary/Chest: Effort normal and breath sounds normal. No respiratory distress. He has no wheezes.   Abdominal: Soft. Bowel sounds are normal. He exhibits no distension and no mass. There is no tenderness. There is no guarding.   Lymphadenopathy:     He has no cervical adenopathy.   Neurological: He is alert and oriented to person, place, and time.       Lab Visit on 06/29/2017   Component Date Value Ref Range Status    Sodium 06/29/2017 137  136 - 145 mmol/L Final    Potassium 06/29/2017 4.6  3.5 - 5.1 mmol/L Final    Chloride 06/29/2017 104  95 - 110 mmol/L Final    CO2 06/29/2017 27  23 - 29 mmol/L Final    Glucose 06/29/2017 115* 70 - 110 mg/dL Final    BUN, Bld 06/29/2017 20  8 - 23  mg/dL Final    Creatinine 06/29/2017 1.0  0.5 - 1.4 mg/dL Final    Calcium 06/29/2017 9.7  8.7 - 10.5 mg/dL Final    Total Protein 06/29/2017 7.4  6.0 - 8.4 g/dL Final    Albumin 06/29/2017 3.8  3.5 - 5.2 g/dL Final    Total Bilirubin 06/29/2017 0.6  0.1 - 1.0 mg/dL Final    Alkaline Phosphatase 06/29/2017 107  55 - 135 U/L Final    AST 06/29/2017 102* 10 - 40 U/L Final    ALT 06/29/2017 160* 10 - 44 U/L Final    Anion Gap 06/29/2017 6* 8 - 16 mmol/L Final    eGFR if African American 06/29/2017 >60.0  >60 mL/min/1.73 m^2 Final    eGFR if non African American 06/29/2017 >60.0  >60 mL/min/1.73 m^2 Final    Uric Acid 06/29/2017 5.4  3.4 - 7.0 mg/dL Final     Assessment:       1. Elevated LFTs        Plan:     Blood pressure is normal.  Continue lisinopril.  Hep B hep C was ordered.  Ultrasound liver rule out fatty liver.  CT of the abdomen was done several months ago for renal stone  and limited liver view was negative.  Routine follow-up for annual exam is planned.consider hematology evaluation for decreased platelet count.    Elevated LFTs  -     Hepatitis C antibody  -     Hepatitis B surface antigen  -     US Abdomen Complete; Future; Expected date: 07/12/2017

## 2017-07-13 ENCOUNTER — TELEPHONE (OUTPATIENT)
Dept: RADIOLOGY | Facility: HOSPITAL | Age: 62
End: 2017-07-13

## 2017-07-13 LAB
HBV SURFACE AG SERPL QL IA: NEGATIVE
HCV AB SERPL QL IA: NEGATIVE

## 2017-07-14 ENCOUNTER — HOSPITAL ENCOUNTER (OUTPATIENT)
Dept: RADIOLOGY | Facility: HOSPITAL | Age: 62
Discharge: HOME | End: 2017-07-14
Attending: FAMILY MEDICINE
Payer: COMMERCIAL

## 2017-07-14 DIAGNOSIS — R79.89 ELEVATED LFTS: ICD-10-CM

## 2017-07-14 PROCEDURE — 76700 US EXAM ABDOM COMPLETE: CPT | Mod: 26,,, | Performed by: RADIOLOGY

## 2017-07-14 PROCEDURE — 76700 US EXAM ABDOM COMPLETE: CPT | Mod: TC,PO

## 2017-07-17 ENCOUNTER — TELEPHONE (OUTPATIENT)
Dept: INTERNAL MEDICINE | Facility: CLINIC | Age: 62
End: 2017-07-17

## 2017-07-17 NOTE — TELEPHONE ENCOUNTER
----- Message from Kenneth Jon MD sent at 7/17/2017  7:05 AM CDT -----  US with fatty liver  Hep b and hep c neg   Cont as planned and wt reduction will improve fatty liver

## 2017-07-18 ENCOUNTER — TELEPHONE (OUTPATIENT)
Dept: INTERNAL MEDICINE | Facility: CLINIC | Age: 62
End: 2017-07-18

## 2017-07-18 NOTE — TELEPHONE ENCOUNTER
----- Message from Lizbeth Christina sent at 7/18/2017  8:37 AM CDT -----  Pt wife was calling to speak to ciro. Please call at the office at [phone].pt need ultra sound results.

## 2017-07-18 NOTE — TELEPHONE ENCOUNTER
----- Message from Doris Macias sent at 7/18/2017  7:34 AM CDT -----  Contact: [phone]  Patient returning call. Please call back at [phone].       Thanks,  Doris Macias

## 2017-08-01 ENCOUNTER — TELEPHONE (OUTPATIENT)
Dept: RHEUMATOLOGY | Facility: CLINIC | Age: 62
End: 2017-08-01

## 2017-08-01 NOTE — TELEPHONE ENCOUNTER
Spoke with wife and advised her that Dr. TAO's next opening is 10.2.17, offered apt with Jacque Dean PA-C for 8.2.17 and pt declined, scheduled apt with Jacque Dean PA-C for 8.3.17 at 8.30 am. Verbalized understanding.

## 2017-08-01 NOTE — TELEPHONE ENCOUNTER
----- Message from Jackie Sherman sent at 8/1/2017 12:22 PM CDT -----  Contact: Pt's wife- Vonnie Arriaza afternoon,     Pt is requesting an appt due to pain in both hands (first available 10/02/17). Wife stated pt needs to be seen sooner.    Pt can be reached at [phone].    Thank you!

## 2017-08-03 ENCOUNTER — HOSPITAL ENCOUNTER (OUTPATIENT)
Dept: RADIOLOGY | Facility: HOSPITAL | Age: 62
Discharge: HOME | End: 2017-08-03
Attending: PHYSICIAN ASSISTANT
Payer: COMMERCIAL

## 2017-08-03 ENCOUNTER — OFFICE VISIT (OUTPATIENT)
Dept: RHEUMATOLOGY | Facility: CLINIC | Age: 62
End: 2017-08-03
Payer: COMMERCIAL

## 2017-08-03 VITALS
WEIGHT: 258.19 LBS | SYSTOLIC BLOOD PRESSURE: 128 MMHG | DIASTOLIC BLOOD PRESSURE: 83 MMHG | HEART RATE: 61 BPM | BODY MASS INDEX: 39.25 KG/M2

## 2017-08-03 DIAGNOSIS — M1A.0720 IDIOPATHIC CHRONIC GOUT OF LEFT FOOT WITHOUT TOPHUS: ICD-10-CM

## 2017-08-03 DIAGNOSIS — M15.9 PRIMARY OSTEOARTHRITIS INVOLVING MULTIPLE JOINTS: ICD-10-CM

## 2017-08-03 DIAGNOSIS — M15.9 PRIMARY OSTEOARTHRITIS INVOLVING MULTIPLE JOINTS: Primary | ICD-10-CM

## 2017-08-03 PROBLEM — M15.0 PRIMARY OSTEOARTHRITIS INVOLVING MULTIPLE JOINTS: Status: ACTIVE | Noted: 2017-08-03

## 2017-08-03 PROCEDURE — 73130 X-RAY EXAM OF HAND: CPT | Mod: 26,50,, | Performed by: RADIOLOGY

## 2017-08-03 PROCEDURE — 99214 OFFICE O/P EST MOD 30 MIN: CPT | Mod: S$GLB,,, | Performed by: PHYSICIAN ASSISTANT

## 2017-08-03 PROCEDURE — 3008F BODY MASS INDEX DOCD: CPT | Mod: S$GLB,,, | Performed by: PHYSICIAN ASSISTANT

## 2017-08-03 PROCEDURE — 99999 PR PBB SHADOW E&M-EST. PATIENT-LVL III: CPT | Mod: PBBFAC,,, | Performed by: PHYSICIAN ASSISTANT

## 2017-08-03 PROCEDURE — 73130 X-RAY EXAM OF HAND: CPT | Mod: 50,TC,PO

## 2017-08-03 RX ORDER — COLCHICINE 0.6 MG/1
0.6 TABLET ORAL 2 TIMES DAILY
Qty: 60 TABLET | Refills: 3 | Status: SHIPPED | OUTPATIENT
Start: 2017-08-03 | End: 2017-09-25 | Stop reason: SDUPTHER

## 2017-08-03 RX ORDER — MELOXICAM 15 MG/1
15 TABLET ORAL DAILY
Qty: 30 TABLET | Refills: 4 | Status: SHIPPED | OUTPATIENT
Start: 2017-08-03 | End: 2018-03-29

## 2017-08-03 NOTE — PATIENT INSTRUCTIONS
meloxicam antiinflammatory: one pill daily--do this for 2 weeks, then go to every other day or as needed    Colchicine 0.6mg: increase to twice daily, see if this helps, if no difference go back to one daily     Xray hands today     Tumeric: 1000mg/day in vitamin section

## 2017-08-03 NOTE — PROGRESS NOTES
Subjective:       Patient ID: Jasson Mayo is a 62 y.o. male.    Chief Complaint: Hand Pain    Mr. Mayo is a patient of dr. Curiel usually seen for gout in his foot.  He is here however, complaining of pain in his bilateral hands. For gout he is taking allopurinol 300mg/day and colchcine 0.6mg/day and this is well controlled, he has had no flare ups.  Today he complains of 5/10 pain in his bilateral hands mainly his 2,3 mcps. Pain is worse after working with his hands (which he does daily).  He denies significant swelling or tenderness.  No real morning stiffness.        Review of Systems   Constitutional: Negative for chills, fatigue, fever and unexpected weight change.   HENT: Negative for congestion, mouth sores and rhinorrhea.    Eyes: Negative for pain, discharge and redness.   Respiratory: Negative for cough and shortness of breath.    Cardiovascular: Negative for chest pain and leg swelling.   Gastrointestinal: Negative for abdominal pain, diarrhea, nausea and vomiting.   Endocrine: Negative for cold intolerance and heat intolerance.   Genitourinary: Negative for dysuria.   Musculoskeletal: Positive for arthralgias. Negative for joint swelling and myalgias.   Skin: Negative for rash.   Allergic/Immunologic: Negative for immunocompromised state.   Neurological: Negative for weakness and headaches.   Psychiatric/Behavioral: The patient is not nervous/anxious.          Objective:   /83   Pulse 61   Wt 117.1 kg (258 lb 2.5 oz)   BMI 39.25 kg/m²      Physical Exam   Constitutional: He is oriented to person, place, and time and well-developed, well-nourished, and in no distress.   HENT:   Head: Normocephalic and atraumatic.   Eyes: Pupils are equal, round, and reactive to light. Right eye exhibits no discharge.   Neck: Normal range of motion.   Cardiovascular: Normal rate, regular rhythm and normal heart sounds.  Exam reveals no friction rub.    Pulmonary/Chest: Effort normal and breath  sounds normal. No respiratory distress.   Abdominal: Soft. He exhibits no distension. There is no tenderness.   Lymphadenopathy:     He has no cervical adenopathy.   Neurological: He is alert and oriented to person, place, and time.   Skin: No rash noted. No erythema.     Psychiatric: Mood normal.   Musculoskeletal: Normal range of motion. He exhibits no edema or deformity.   jose wrists, mcps, pips dips no synovitis, no tender joints, some slight puffiness to his 2,3 mcps but very slight    No other swollen/tender joints           Results for ANA LEDESMA (MRN [id number]) as of 8/3/2017 08:38   Ref. Range 6/29/2017 07:20   Sodium Latest Ref Range: 136 - 145 mmol/L 137   Potassium Latest Ref Range: 3.5 - 5.1 mmol/L 4.6   Chloride Latest Ref Range: 95 - 110 mmol/L 104   CO2 Latest Ref Range: 23 - 29 mmol/L 27   Anion Gap Latest Ref Range: 8 - 16 mmol/L 6 (L)   BUN, Bld Latest Ref Range: 8 - 23 mg/dL 20   Creatinine Latest Ref Range: 0.5 - 1.4 mg/dL 1.0   eGFR if non African American Latest Ref Range: >60 mL/min/1.73 m^2 >60.0   eGFR if African American Latest Ref Range: >60 mL/min/1.73 m^2 >60.0   Glucose Latest Ref Range: 70 - 110 mg/dL 115 (H)   Calcium Latest Ref Range: 8.7 - 10.5 mg/dL 9.7   Alkaline Phosphatase Latest Ref Range: 55 - 135 U/L 107   Total Protein Latest Ref Range: 6.0 - 8.4 g/dL 7.4   Albumin Latest Ref Range: 3.5 - 5.2 g/dL 3.8   Uric Acid Latest Ref Range: 3.4 - 7.0 mg/dL 5.4   Total Bilirubin Latest Ref Range: 0.1 - 1.0 mg/dL 0.6   AST Latest Ref Range: 10 - 40 U/L 102 (H)   ALT Latest Ref Range: 10 - 44 U/L 160 (H)     Assessment:       1. Primary osteoarthritis involving multiple joints    2. Idiopathic chronic gout of left foot without tophus        Impression:  OA: jose hands, mainly affecting mcps, do not think this is inflammatory, no synovitis, nontender joints, no am stiffness, but can't rule out cppd component  Chronic gout: well controlled on allopurinol 300mg d and colchicine  0.6mg/day     Plan:       Add meloxicam 15mg/day for now, after 1-2 weeks go to as needed   Increase colchicine to bid, can always go back down to daily if no improvement from this   Xray bilateral hands today, eval for any djd or calcium deposition  rec 1000mg tumeric daily     Will see back in 8 weeks to recheck

## 2017-09-22 NOTE — PROGRESS NOTES
Subjective:       Patient ID: Jasson Mayo is a 62 y.o. male.    Chief Complaint: hand pain    Mr. Mayo is a patient of Dr. Curiel usually seen for gout in his foot.  He has had increased pain in his bilateral hands lately.  We added mobic 15 mg/day and increased colchicine to bid last visit.  For gout he is taking allopurinol 300mg/day and colchcine and this is well controlled, he has had no flare ups.      Today he complains of 3/10 pain mainly in his 2 mcps. 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  He denies significant swelling or tenderness.  No real morning stiffness.  Xrays shows loss of joint space in his jose 2 mcps and jose 1 cmcs.  No swelling or tenderness today.       Review of Systems   Constitutional: Negative for chills, fatigue, fever and unexpected weight change.   HENT: Negative for congestion, mouth sores and rhinorrhea.    Eyes: Negative for pain, discharge and redness.   Respiratory: Negative for cough and shortness of breath.    Cardiovascular: Negative for chest pain and leg swelling.   Gastrointestinal: Negative for abdominal pain, diarrhea, nausea and vomiting.   Endocrine: Negative for cold intolerance and heat intolerance.   Genitourinary: Negative for dysuria.   Musculoskeletal: Positive for arthralgias. Negative for joint swelling and myalgias.   Skin: Negative for rash.   Allergic/Immunologic: Negative for immunocompromised state.   Neurological: Negative for weakness and headaches.   Psychiatric/Behavioral: The patient is not nervous/anxious.          Objective:   There were no vitals taken for this visit.     Physical Exam   Constitutional: He is oriented to person, place, and time and well-developed, well-nourished, and in no distress.   HENT:   Head: Normocephalic and atraumatic.   Eyes: Pupils are equal, round, and reactive to light. Right eye exhibits no discharge.   Neck: Normal range of motion.    Cardiovascular: Normal rate, regular rhythm and normal heart sounds.  Exam reveals no friction rub.    Pulmonary/Chest: Effort normal and breath sounds normal. No respiratory distress.   Abdominal: Soft. He exhibits no distension. There is no tenderness.   Lymphadenopathy:     He has no cervical adenopathy.   Neurological: He is alert and oriented to person, place, and time.   Skin: No rash noted. No erythema.     Psychiatric: Mood normal.   Musculoskeletal: Normal range of motion. He exhibits no edema or deformity.   jose wrists, mcps, pips dips no synovitis, no tender joints,   Good rom to all joints  Pain in jose 2mcps with extension           Results for ANA LEDESMA (MRN [id number]) as of 8/3/2017 08:38   Ref. Range 6/29/2017 07:20   Sodium Latest Ref Range: 136 - 145 mmol/L 137   Potassium Latest Ref Range: 3.5 - 5.1 mmol/L 4.6   Chloride Latest Ref Range: 95 - 110 mmol/L 104   CO2 Latest Ref Range: 23 - 29 mmol/L 27   Anion Gap Latest Ref Range: 8 - 16 mmol/L 6 (L)   BUN, Bld Latest Ref Range: 8 - 23 mg/dL 20   Creatinine Latest Ref Range: 0.5 - 1.4 mg/dL 1.0   eGFR if non African American Latest Ref Range: >60 mL/min/1.73 m^2 >60.0   eGFR if African American Latest Ref Range: >60 mL/min/1.73 m^2 >60.0   Glucose Latest Ref Range: 70 - 110 mg/dL 115 (H)   Calcium Latest Ref Range: 8.7 - 10.5 mg/dL 9.7   Alkaline Phosphatase Latest Ref Range: 55 - 135 U/L 107   Total Protein Latest Ref Range: 6.0 - 8.4 g/dL 7.4   Albumin Latest Ref Range: 3.5 - 5.2 g/dL 3.8   Uric Acid Latest Ref Range: 3.4 - 7.0 mg/dL 5.4   Total Bilirubin Latest Ref Range: 0.1 - 1.0 mg/dL 0.6   AST Latest Ref Range: 10 - 40 U/L 102 (H)   ALT Latest Ref Range: 10 - 44 U/L 160 (H)     Xray hands 8/2017:   There is no radiographic evidence of acute osseous, articular, or soft tissue abnormality. Moderate to severe joint space loss with associated marginal productive changes in keeping with osteoarthritis noted at the bilateral 2nd MCPs,  slightly worse on the right.  There is mild associated subcortical cystic degenerative change.  Mild scattered osteoarthritis noted throughout the DIP joints bilaterally as well as the bilateral 1st CMC's.   No erosive osseous changes demonstrated.  Assessment:       1. Primary osteoarthritis involving multiple joints    2. Idiopathic chronic gout of left foot without tophus        Impression:  OA: jose hands, mainly jose 2 mcps and jose 1 cmcs, slightly better with mobic, tumeric, still pain increases after lots of manual work  Chronic gout: well controlled on allopurinol 300mg d and colchicine 0.6mg/day     Plan:       Cont mobic prn  Discussed injections into his joints if needed in the future  Discussed voltaren gel, he will hold off for now  Continue tumeric daily  Go back down to colchicine once daily--doesn't appear to be helping, doubt any cppd component    rtc in 6 mos with uric acid and cmp

## 2017-09-25 ENCOUNTER — OFFICE VISIT (OUTPATIENT)
Dept: RHEUMATOLOGY | Facility: CLINIC | Age: 62
End: 2017-09-25
Payer: COMMERCIAL

## 2017-09-25 VITALS
HEART RATE: 60 BPM | WEIGHT: 259.94 LBS | BODY MASS INDEX: 39.52 KG/M2 | DIASTOLIC BLOOD PRESSURE: 92 MMHG | SYSTOLIC BLOOD PRESSURE: 138 MMHG

## 2017-09-25 DIAGNOSIS — M15.9 PRIMARY OSTEOARTHRITIS INVOLVING MULTIPLE JOINTS: Primary | ICD-10-CM

## 2017-09-25 DIAGNOSIS — M1A.0720 IDIOPATHIC CHRONIC GOUT OF LEFT FOOT WITHOUT TOPHUS: ICD-10-CM

## 2017-09-25 PROCEDURE — 3080F DIAST BP >= 90 MM HG: CPT | Mod: S$GLB,,, | Performed by: PHYSICIAN ASSISTANT

## 2017-09-25 PROCEDURE — 3008F BODY MASS INDEX DOCD: CPT | Mod: S$GLB,,, | Performed by: PHYSICIAN ASSISTANT

## 2017-09-25 PROCEDURE — 99999 PR PBB SHADOW E&M-EST. PATIENT-LVL III: CPT | Mod: PBBFAC,,, | Performed by: PHYSICIAN ASSISTANT

## 2017-09-25 PROCEDURE — 3075F SYST BP GE 130 - 139MM HG: CPT | Mod: S$GLB,,, | Performed by: PHYSICIAN ASSISTANT

## 2017-09-25 PROCEDURE — 99214 OFFICE O/P EST MOD 30 MIN: CPT | Mod: S$GLB,,, | Performed by: PHYSICIAN ASSISTANT

## 2017-09-25 RX ORDER — COLCHICINE 0.6 MG/1
0.6 TABLET ORAL DAILY
Qty: 60 TABLET | Refills: 3
Start: 2017-09-25 | End: 2018-01-31 | Stop reason: SDUPTHER

## 2017-09-25 NOTE — PATIENT INSTRUCTIONS
meloxicam as needed     Go back to colchicine once daily    Cont tumeric for another month then can see what you think, if helping continue, if not ok to stop    Continue allpurinol

## 2017-09-29 ENCOUNTER — TELEPHONE (OUTPATIENT)
Dept: RHEUMATOLOGY | Facility: CLINIC | Age: 62
End: 2017-09-29

## 2017-09-29 NOTE — TELEPHONE ENCOUNTER
----- Message from Doris Macias sent at 9/29/2017  9:58 AM CDT -----  Contact: spouse   Patient would like to cancel request for refill. Please call back at [phone] if needed.      Thanks,  Doris Macias

## 2017-09-29 NOTE — TELEPHONE ENCOUNTER
----- Message from Miguel Cardoza sent at 9/29/2017  9:49 AM CDT -----  Contact: [phone]  Would like to consult with nurse about refill on Allopurinol 300mg 1 tab each day. Questions 60 day supply. Please call bk at [phone]. Thx. dorothy Vyas  [facility] - [place], [place] - [address]  [address]  [address] [address]  Phone: [phone] Fax: [phone]

## 2017-11-02 ENCOUNTER — OFFICE VISIT (OUTPATIENT)
Dept: INTERNAL MEDICINE | Facility: CLINIC | Age: 62
End: 2017-11-02
Payer: COMMERCIAL

## 2017-11-02 VITALS
SYSTOLIC BLOOD PRESSURE: 135 MMHG | HEART RATE: 64 BPM | DIASTOLIC BLOOD PRESSURE: 88 MMHG | OXYGEN SATURATION: 96 % | BODY MASS INDEX: 39.34 KG/M2 | WEIGHT: 259.56 LBS | HEIGHT: 68 IN | TEMPERATURE: 98 F

## 2017-11-02 DIAGNOSIS — K76.0 FATTY LIVER: ICD-10-CM

## 2017-11-02 DIAGNOSIS — I10 ESSENTIAL HYPERTENSION: ICD-10-CM

## 2017-11-02 DIAGNOSIS — D69.6 DECREASED PLATELET COUNT: ICD-10-CM

## 2017-11-02 DIAGNOSIS — R73.9 HYPERGLYCEMIA: ICD-10-CM

## 2017-11-02 DIAGNOSIS — R79.89 ELEVATED LFTS: ICD-10-CM

## 2017-11-02 DIAGNOSIS — Z28.39 IMMUNIZATION DEFICIENCY: ICD-10-CM

## 2017-11-02 DIAGNOSIS — K63.5 POLYP OF COLON, UNSPECIFIED PART OF COLON, UNSPECIFIED TYPE: ICD-10-CM

## 2017-11-02 DIAGNOSIS — Z12.5 SCREENING FOR PROSTATE CANCER: ICD-10-CM

## 2017-11-02 DIAGNOSIS — Z00.00 ANNUAL PHYSICAL EXAM: Primary | ICD-10-CM

## 2017-11-02 LAB
BASOPHILS # BLD AUTO: 0.07 K/UL
BASOPHILS NFR BLD: 1.5 %
BILIRUB UR QL STRIP: NEGATIVE
CLARITY UR REFRACT.AUTO: CLEAR
COLOR UR AUTO: YELLOW
COMPLEXED PSA SERPL-MCNC: 0.19 NG/ML
DIFFERENTIAL METHOD: ABNORMAL
EOSINOPHIL # BLD AUTO: 0.2 K/UL
EOSINOPHIL NFR BLD: 4.9 %
ERYTHROCYTE [DISTWIDTH] IN BLOOD BY AUTOMATED COUNT: 13.7 %
GLUCOSE UR QL STRIP: NEGATIVE
HCT VFR BLD AUTO: 43.1 %
HGB BLD-MCNC: 14.8 G/DL
HGB UR QL STRIP: NEGATIVE
IMM GRANULOCYTES # BLD AUTO: 0.01 K/UL
IMM GRANULOCYTES NFR BLD AUTO: 0.2 %
KETONES UR QL STRIP: NEGATIVE
LEUKOCYTE ESTERASE UR QL STRIP: NEGATIVE
LYMPHOCYTES # BLD AUTO: 1.7 K/UL
LYMPHOCYTES NFR BLD: 37.8 %
MCH RBC QN AUTO: 31.8 PG
MCHC RBC AUTO-ENTMCNC: 34.3 G/DL
MCV RBC AUTO: 93 FL
MONOCYTES # BLD AUTO: 0.6 K/UL
MONOCYTES NFR BLD: 12.2 %
NEUTROPHILS # BLD AUTO: 2 K/UL
NEUTROPHILS NFR BLD: 43.4 %
NITRITE UR QL STRIP: NEGATIVE
NRBC BLD-RTO: 0 /100 WBC
PH UR STRIP: 5 [PH] (ref 5–8)
PLATELET # BLD AUTO: 148 K/UL
PMV BLD AUTO: 11.9 FL
PROT UR QL STRIP: NEGATIVE
RBC # BLD AUTO: 4.65 M/UL
SP GR UR STRIP: 1.02 (ref 1–1.03)
TSH SERPL DL<=0.005 MIU/L-ACNC: 2.98 UIU/ML
URN SPEC COLLECT METH UR: NORMAL
UROBILINOGEN UR STRIP-ACNC: NEGATIVE EU/DL
WBC # BLD AUTO: 4.52 K/UL

## 2017-11-02 PROCEDURE — 84153 ASSAY OF PSA TOTAL: CPT

## 2017-11-02 PROCEDURE — 81003 URINALYSIS AUTO W/O SCOPE: CPT

## 2017-11-02 PROCEDURE — 99396 PREV VISIT EST AGE 40-64: CPT | Mod: 25,S$GLB,, | Performed by: FAMILY MEDICINE

## 2017-11-02 PROCEDURE — 85025 COMPLETE CBC W/AUTO DIFF WBC: CPT

## 2017-11-02 PROCEDURE — 90471 IMMUNIZATION ADMIN: CPT | Mod: S$GLB,,, | Performed by: FAMILY MEDICINE

## 2017-11-02 PROCEDURE — 99999 PR PBB SHADOW E&M-EST. PATIENT-LVL III: CPT | Mod: PBBFAC,,, | Performed by: FAMILY MEDICINE

## 2017-11-02 PROCEDURE — 84443 ASSAY THYROID STIM HORMONE: CPT

## 2017-11-02 PROCEDURE — 90686 IIV4 VACC NO PRSV 0.5 ML IM: CPT | Mod: S$GLB,,, | Performed by: FAMILY MEDICINE

## 2017-11-02 RX ORDER — BENAZEPRIL HYDROCHLORIDE 20 MG/1
20 TABLET ORAL DAILY
Qty: 90 TABLET | Refills: 3 | Status: SHIPPED | OUTPATIENT
Start: 2017-11-02 | End: 2018-11-05 | Stop reason: SDUPTHER

## 2017-11-02 NOTE — PROGRESS NOTES
Subjective:       Patient ID: Jasson Mayo is a 62 y.o. male.    Chief Complaint: Annual Exam    Annual exam.  Medical history includes hypertension thrombocytopenia hyperglycemia non-A non- B  Fatty liver hepatitis chronic gout and osteoarthritis.  He is scheduled for follow-up colonoscopy.  He had several small polyps removed 4-5 yrs ago  He denies headaches chest pain palpitations or shortness of breath  He takes an occasional meloxicam for osteoarthritis.      Review of Systems   Constitutional: Negative for activity change, appetite change, fatigue and unexpected weight change.   HENT: Negative for congestion, ear pain, hearing loss, sneezing, sore throat, tinnitus and trouble swallowing.    Eyes: Negative for pain, redness and visual disturbance.   Respiratory: Negative for cough, chest tightness, shortness of breath and wheezing.    Cardiovascular: Negative for chest pain, palpitations and leg swelling.   Gastrointestinal: Negative for abdominal distention, abdominal pain, blood in stool, constipation, diarrhea, nausea, rectal pain and vomiting.   Endocrine: Negative for polydipsia and polyuria.   Genitourinary: Negative for difficulty urinating, dysuria, frequency, hematuria and urgency.   Musculoskeletal: Positive for arthralgias. Negative for back pain, joint swelling, myalgias, neck pain and neck stiffness.   Skin: Negative for rash and wound.   Neurological: Negative for dizziness, tremors, syncope, light-headedness, numbness and headaches.   Hematological: Negative for adenopathy. Does not bruise/bleed easily.   Psychiatric/Behavioral: Negative for agitation, confusion, dysphoric mood and sleep disturbance. The patient is not nervous/anxious.        Objective:      Physical Exam   Constitutional: He is oriented to person, place, and time. He appears well-developed and well-nourished.   HENT:   Right Ear: External ear normal.   Left Ear: External ear normal.   Nose: Nose normal.   Mouth/Throat:  Oropharynx is clear and moist.   Eyes: Conjunctivae and EOM are normal. Pupils are equal, round, and reactive to light.   Neck: Normal range of motion. Neck supple. No JVD present. No thyromegaly present.   Cardiovascular: Normal rate, regular rhythm, normal heart sounds and intact distal pulses.  Exam reveals no gallop and no friction rub.    No murmur heard.  Pulmonary/Chest: Effort normal and breath sounds normal. No respiratory distress. He has no wheezes. He has no rales.   Abdominal: Soft. Bowel sounds are normal. He exhibits no distension and no mass. There is no tenderness.   Musculoskeletal: Normal range of motion. He exhibits no edema or tenderness.   Lymphadenopathy:     He has no cervical adenopathy.   Neurological: He is alert and oriented to person, place, and time. He has normal reflexes. He displays normal reflexes. No cranial nerve deficit. He exhibits normal muscle tone. Coordination normal.   Skin: Skin is warm and dry. No rash noted.   Psychiatric: He has a normal mood and affect. His behavior is normal. Judgment and thought content normal.       Office Visit on 07/12/2017   Component Date Value Ref Range Status    Hepatitis C Ab 07/13/2017 Negative   Final    Hepatitis B Surface Ag 07/13/2017 Negative   Final     Assessment:       1. Decreased platelet count    2. Essential hypertension    3. Hyperglycemia    4. Fatty liver    5. Elevated LFTs    6. Screening for prostate cancer    7. Immunization deficiency        Plan:     Blood pressure is 135/88.  Refill medication.  Lab was ordered.  Health maintenance reviewed.  Flu vaccines given today.  Follow-up 6 months.    Decreased platelet count    Essential hypertension  -     Comprehensive metabolic panel; Future; Expected date: 11/02/2017  -     Lipid panel; Future; Expected date: 11/02/2017  -     benazepril (LOTENSIN) 20 MG tablet; Take 1 tablet (20 mg total) by mouth once daily.  Dispense: 90 tablet; Refill: 3  -     CBC auto differential  -      Urinalysis  -     TSH    Hyperglycemia    Fatty liver    Elevated LFTs    Screening for prostate cancer  -     PSA, Screening    Immunization deficiency  -     Influenza - Quadrivalent (3 years & older) (PF)

## 2017-12-21 DIAGNOSIS — M1A.0720 IDIOPATHIC CHRONIC GOUT OF LEFT FOOT WITHOUT TOPHUS: ICD-10-CM

## 2017-12-21 RX ORDER — ALLOPURINOL 300 MG/1
300 TABLET ORAL DAILY
Qty: 30 TABLET | Refills: 11 | Status: SHIPPED | OUTPATIENT
Start: 2017-12-21 | End: 2018-10-04 | Stop reason: SDUPTHER

## 2018-01-31 NOTE — TELEPHONE ENCOUNTER
----- Message from Christal Lebron sent at 1/31/2018  1:27 PM CST -----  Contact: Pt  Pt called and stated he needed to speak to the nurse. He stated he needs a refill:    1. What is the name of the medication you are requesting? Colcrys  2. What is the dose? .6 mg   3. How do you take the medication? Orally, topically, etc? Orally   4. How often do you take this medication? Once a day   5. Do you need a 30 day or 90 day supply? 30 day   6. How many refills are you requesting?   7. What is your preferred pharmacy and location of the pharmacy?   [facility] - [facility] [address] [place]  [address] [place]  [address] [place] [id number]  Phone: [phone] Fax: [phone]    8. Who can we contact with further questions? He can be reached at [phone].    Thanks,  TF

## 2018-02-01 RX ORDER — COLCHICINE 0.6 MG/1
0.6 TABLET ORAL DAILY
Qty: 60 TABLET | Refills: 3 | Status: SHIPPED | OUTPATIENT
Start: 2018-02-01 | End: 2018-05-01

## 2018-03-29 ENCOUNTER — OFFICE VISIT (OUTPATIENT)
Dept: RHEUMATOLOGY | Facility: CLINIC | Age: 63
End: 2018-03-29
Payer: COMMERCIAL

## 2018-03-29 ENCOUNTER — LAB VISIT (OUTPATIENT)
Dept: LAB | Facility: HOSPITAL | Age: 63
End: 2018-03-29
Attending: PHYSICIAN ASSISTANT
Payer: COMMERCIAL

## 2018-03-29 VITALS
BODY MASS INDEX: 39.4 KG/M2 | DIASTOLIC BLOOD PRESSURE: 85 MMHG | HEART RATE: 62 BPM | WEIGHT: 259.94 LBS | HEIGHT: 68 IN | SYSTOLIC BLOOD PRESSURE: 131 MMHG

## 2018-03-29 DIAGNOSIS — M1A.0720 IDIOPATHIC CHRONIC GOUT OF LEFT FOOT WITHOUT TOPHUS: ICD-10-CM

## 2018-03-29 DIAGNOSIS — M15.9 PRIMARY OSTEOARTHRITIS INVOLVING MULTIPLE JOINTS: ICD-10-CM

## 2018-03-29 DIAGNOSIS — M19.042 PRIMARY OSTEOARTHRITIS OF BOTH HANDS: Primary | ICD-10-CM

## 2018-03-29 DIAGNOSIS — M19.041 PRIMARY OSTEOARTHRITIS OF BOTH HANDS: Primary | ICD-10-CM

## 2018-03-29 LAB
ALBUMIN SERPL BCP-MCNC: 3.6 G/DL
ALP SERPL-CCNC: 100 U/L
ALT SERPL W/O P-5'-P-CCNC: 90 U/L
ANION GAP SERPL CALC-SCNC: 8 MMOL/L
AST SERPL-CCNC: 62 U/L
BILIRUB SERPL-MCNC: 0.7 MG/DL
BUN SERPL-MCNC: 15 MG/DL
CALCIUM SERPL-MCNC: 9.5 MG/DL
CHLORIDE SERPL-SCNC: 105 MMOL/L
CO2 SERPL-SCNC: 27 MMOL/L
CREAT SERPL-MCNC: 0.9 MG/DL
EST. GFR  (AFRICAN AMERICAN): >60 ML/MIN/1.73 M^2
EST. GFR  (NON AFRICAN AMERICAN): >60 ML/MIN/1.73 M^2
GLUCOSE SERPL-MCNC: 121 MG/DL
POTASSIUM SERPL-SCNC: 4.4 MMOL/L
PROT SERPL-MCNC: 6.8 G/DL
SODIUM SERPL-SCNC: 140 MMOL/L
URATE SERPL-MCNC: 5 MG/DL

## 2018-03-29 PROCEDURE — 3075F SYST BP GE 130 - 139MM HG: CPT | Mod: CPTII,S$GLB,, | Performed by: INTERNAL MEDICINE

## 2018-03-29 PROCEDURE — 99999 PR PBB SHADOW E&M-EST. PATIENT-LVL III: CPT | Mod: PBBFAC,,, | Performed by: INTERNAL MEDICINE

## 2018-03-29 PROCEDURE — 99214 OFFICE O/P EST MOD 30 MIN: CPT | Mod: 25,S$GLB,, | Performed by: INTERNAL MEDICINE

## 2018-03-29 PROCEDURE — 3079F DIAST BP 80-89 MM HG: CPT | Mod: CPTII,S$GLB,, | Performed by: INTERNAL MEDICINE

## 2018-03-29 PROCEDURE — 36415 COLL VENOUS BLD VENIPUNCTURE: CPT | Mod: PO

## 2018-03-29 PROCEDURE — 84550 ASSAY OF BLOOD/URIC ACID: CPT | Mod: PO

## 2018-03-29 PROCEDURE — 80053 COMPREHEN METABOLIC PANEL: CPT | Mod: PO

## 2018-03-29 RX ORDER — TRIAMCINOLONE ACETONIDE 40 MG/ML
20 INJECTION, SUSPENSION INTRA-ARTICULAR; INTRAMUSCULAR ONCE
Status: DISCONTINUED | OUTPATIENT
Start: 2018-03-29 | End: 2018-09-24

## 2018-03-29 NOTE — ASSESSMENT & PLAN NOTE
He has primary osteoarthritis over bilateral second MCP joints with x-rays showing severe degenerative disease and even possible cyst/erosion in both.  He does not have any secondary osteoarthritis features but her history or laboratory investigations.  Other MCP joints are completely normal.  Examination failed to show any evidence of synovitis over dose to MCPs.  But it had tenderness.  Since he is not responding to medical therapy I will inject these to MCPs with half cc of Kenalog and watch for improvement.  If no improvement I will consider oral prednisone therapy-5 mg when necessary daily prednisone as needed.  I do not have any suspicion for rheumatoid arthritis or other systemic arthritides at this time.

## 2018-03-29 NOTE — PROGRESS NOTES
RHEUMATOLOGY CLINIC FOLLOW UP VISIT  Chief complaints:-  My fingers hurt.    HPI:-  Jasson Nesbitt a 62 y.o. pleasant male comes in for a follow up visit with above chief complaints.  He follows up in the rheumatology clinic for osteoarthritis and gout.  He reports no bowel movement SINCE last visit.  He is on allopurinol with adequate control and colchicine once daily for gout prophylaxis.  His main problems today hard pain over bilateral second MCP joints associated with stiffness without any significant swelling.  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  He denies any pain or other joints of hands or feet.  No pain over knee joints today.  No adverse effects from medications.    Review of Systems   Constitutional: Negative for chills and fever.   HENT: Negative for congestion and sore throat.    Eyes: Negative for blurred vision and redness.   Respiratory: Negative for cough and shortness of breath.    Cardiovascular: Negative for chest pain and leg swelling.   Gastrointestinal: Negative for abdominal pain.   Genitourinary: Negative for dysuria.   Musculoskeletal: Positive for back pain, joint pain, myalgias and neck pain. Negative for falls.   Skin: Negative for rash.   Neurological: Negative for headaches.   Endo/Heme/Allergies: Does not bruise/bleed easily.   Psychiatric/Behavioral: Negative for memory loss. The patient does not have insomnia.        Past Medical History:   Diagnosis Date    Decreased platelet count     Fatty liver     Gout     HTN (hypertension) 2000    Non-A non-B hepatitis 1990    Obesity (BMI 35.0-39.9 without comorbidity)     Prediabetes 2015       Past Surgical History:   Procedure Laterality Date    COLONOSCOPY W/ POLYPECTOMY  10, 14 no polyp    KNEE ARTHROSCOPY Right     Meniscus repair    LIVER BIOPSY  2010    VASECTOMY          Social History  "  Substance Use Topics    Smoking status: Never Smoker    Smokeless tobacco: Former User    Alcohol use No       Family History   Problem Relation Age of Onset    COPD Mother     Heart attacks under age 50 Mother     Cancer Father      PROSTATE CA       Review of patient's allergies indicates:  No Known Allergies        Physical examination:-    Vitals:    03/29/18 0800   BP: 131/85   Pulse: 62   Weight: 117.9 kg (259 lb 14.8 oz)   Height: 5' 8" (1.727 m)   PainSc:   2   PainLoc: Hand       Physical Exam   Constitutional: He is oriented to person, place, and time and well-developed, well-nourished, and in no distress. No distress.   HENT:   Head: Normocephalic and atraumatic.   Mouth/Throat: Oropharynx is clear and moist.   Eyes: Conjunctivae and EOM are normal. Pupils are equal, round, and reactive to light. Right eye exhibits no discharge. Left eye exhibits no discharge. No scleral icterus.   Neck: Normal range of motion. Neck supple.   Cardiovascular: Normal rate and intact distal pulses.    Pulmonary/Chest: Effort normal. No respiratory distress. He exhibits no tenderness.   Abdominal: Soft. There is no tenderness.   Musculoskeletal:   Tenderness present over the right and left second MCP joint without any significant evidence of synovitis.  No synovitis or tenderness over the small joints of hands or feet.  Good range of motion in large joints.   Lymphadenopathy:     He has no cervical adenopathy.   Neurological: He is alert and oriented to person, place, and time. Gait normal.   Skin: Skin is warm. No rash noted. He is not diaphoretic. No erythema. No pallor.   Psychiatric: Mood, memory, affect and judgment normal.     Radiographs:-  Independent visualization of images done.  Personally reviewed the x-ray images.  Both x-ray images show significant degenerative disease or bilateral first carpometacarpal joint and second MCP joints without any involvement of other MCPs or PIPs.  MCP showed a possible cyst " near the articular surface.    Medication List with Changes/Refills   Current Medications    ALLOPURINOL (ZYLOPRIM) 300 MG TABLET    Take 1 tablet (300 mg total) by mouth once daily.    BENAZEPRIL (LOTENSIN) 20 MG TABLET    Take 1 tablet (20 mg total) by mouth once daily.    COLCHICINE 0.6 MG TABLET    Take 1 tablet (0.6 mg total) by mouth once daily.    VITAMIN E 1000 UNIT CAPSULE    Take 1,000 Units by mouth once daily.   Discontinued Medications    MELOXICAM (MOBIC) 15 MG TABLET    Take 1 tablet (15 mg total) by mouth once daily.       Assessment/Plans:-  # Primary osteoarthritis of both second MCP joints:-  He has primary osteoarthritis over bilateral second MCP joints with x-rays showing severe degenerative disease and even possible cyst/erosion in both.  He does not have any secondary osteoarthritis features but her history or laboratory investigations.  Other MCP joints are completely normal.  Examination failed to show any evidence of synovitis over dose to MCPs.  But it had tenderness.  Since he is not responding to medical therapy I will inject these to MCPs with half cc of Kenalog and watch for improvement.  If no improvement I will consider oral prednisone therapy-5 mg when necessary daily prednisone as needed.  I do not have any suspicion for rheumatoid arthritis or other systemic arthritides at this time.  PROCEDURE NOTE:-  Name of the procedure: Injection of bilateral second MCP joints with kenalog .   Patient consent:   Indication, risks(including skin depigmentation, fat atrophy, infection, bleeding, nerve or tendon injury) and alternative to the procedure were explained to to the patient. Patient was given opportunity to ask questions . Then patient gave a verbal consent for the procedure.   Pertinent lab values: None indicated  Type of anesthesia: 2% Lidocaine   Description of procedure : Using sterile technique, the skin over MCP joints were cleaned with alcohol swab and then with chlorhexidine  solution. After applying cold spray , the needle was inserted into the skin and into the joint space without any resistance and then 20 mg kenalog was injected into the joint space without any resistance.   Complication: None  Estimated blood loss: None  Disposition: Patient tolerated the procedure without any complains and the site was dressed.There were no complications.  Discharge instructions of icing and stretching given.     # Follow-up in about 6 months (around 9/29/2018).      Disclaimer: This note was prepared using voice recognition system and is likely to have sound alike errors and is not proof read.  Please call me with any questions.

## 2018-04-05 ENCOUNTER — TELEPHONE (OUTPATIENT)
Dept: RHEUMATOLOGY | Facility: CLINIC | Age: 63
End: 2018-04-05

## 2018-04-05 NOTE — TELEPHONE ENCOUNTER
----- Message from Yanni Mccarthy sent at 4/5/2018  9:03 AM CDT -----  Contact: Pt   Pt request call back to give his one week update. States Dr. TAO requested one week update.  [phone] (home)

## 2018-04-18 ENCOUNTER — PATIENT OUTREACH (OUTPATIENT)
Dept: ADMINISTRATIVE | Facility: HOSPITAL | Age: 63
End: 2018-04-18

## 2018-05-01 ENCOUNTER — TELEPHONE (OUTPATIENT)
Dept: RHEUMATOLOGY | Facility: CLINIC | Age: 63
End: 2018-05-01

## 2018-05-01 RX ORDER — COLCHICINE 0.6 MG/1
0.6 CAPSULE ORAL DAILY
Qty: 90 CAPSULE | Refills: 2 | Status: SHIPPED | OUTPATIENT
Start: 2018-05-01 | End: 2018-10-04 | Stop reason: SDUPTHER

## 2018-05-01 NOTE — TELEPHONE ENCOUNTER
Patient called stating that [employer] stating that they will not cover Cholcrys 0.6 mg would not be covered. They are requesting Cholchicine instead.

## 2018-05-02 ENCOUNTER — OFFICE VISIT (OUTPATIENT)
Dept: INTERNAL MEDICINE | Facility: CLINIC | Age: 63
End: 2018-05-02
Payer: COMMERCIAL

## 2018-05-02 VITALS
HEART RATE: 59 BPM | BODY MASS INDEX: 38.5 KG/M2 | WEIGHT: 254.06 LBS | SYSTOLIC BLOOD PRESSURE: 118 MMHG | HEIGHT: 68 IN | OXYGEN SATURATION: 97 % | TEMPERATURE: 97 F | DIASTOLIC BLOOD PRESSURE: 84 MMHG

## 2018-05-02 DIAGNOSIS — R73.9 HYPERGLYCEMIA: ICD-10-CM

## 2018-05-02 DIAGNOSIS — D69.6 DECREASED PLATELET COUNT: ICD-10-CM

## 2018-05-02 DIAGNOSIS — I10 ESSENTIAL HYPERTENSION: Primary | ICD-10-CM

## 2018-05-02 LAB
BASOPHILS # BLD AUTO: 0.06 K/UL
BASOPHILS NFR BLD: 1.2 %
CHOLEST SERPL-MCNC: 142 MG/DL
CHOLEST/HDLC SERPL: 4.3 {RATIO}
DIFFERENTIAL METHOD: ABNORMAL
EOSINOPHIL # BLD AUTO: 0.2 K/UL
EOSINOPHIL NFR BLD: 4.3 %
ERYTHROCYTE [DISTWIDTH] IN BLOOD BY AUTOMATED COUNT: 13.6 %
GLUCOSE SERPL-MCNC: 110 MG/DL
HCT VFR BLD AUTO: 44 %
HDLC SERPL-MCNC: 33 MG/DL
HDLC SERPL: 23.2 %
HGB BLD-MCNC: 15 G/DL
IMM GRANULOCYTES # BLD AUTO: 0.01 K/UL
IMM GRANULOCYTES NFR BLD AUTO: 0.2 %
LDLC SERPL CALC-MCNC: 85.4 MG/DL
LYMPHOCYTES # BLD AUTO: 1.7 K/UL
LYMPHOCYTES NFR BLD: 34.3 %
MCH RBC QN AUTO: 32 PG
MCHC RBC AUTO-ENTMCNC: 34.1 G/DL
MCV RBC AUTO: 94 FL
MONOCYTES # BLD AUTO: 0.5 K/UL
MONOCYTES NFR BLD: 10.6 %
NEUTROPHILS # BLD AUTO: 2.5 K/UL
NEUTROPHILS NFR BLD: 49.4 %
NONHDLC SERPL-MCNC: 109 MG/DL
NRBC BLD-RTO: 0 /100 WBC
PLATELET # BLD AUTO: 164 K/UL
PMV BLD AUTO: 11.5 FL
RBC # BLD AUTO: 4.69 M/UL
TRIGL SERPL-MCNC: 118 MG/DL
WBC # BLD AUTO: 5.08 K/UL

## 2018-05-02 PROCEDURE — 99213 OFFICE O/P EST LOW 20 MIN: CPT | Mod: S$GLB,,, | Performed by: FAMILY MEDICINE

## 2018-05-02 PROCEDURE — 85025 COMPLETE CBC W/AUTO DIFF WBC: CPT

## 2018-05-02 PROCEDURE — 3079F DIAST BP 80-89 MM HG: CPT | Mod: CPTII,S$GLB,, | Performed by: FAMILY MEDICINE

## 2018-05-02 PROCEDURE — 99999 PR PBB SHADOW E&M-EST. PATIENT-LVL III: CPT | Mod: PBBFAC,,, | Performed by: FAMILY MEDICINE

## 2018-05-02 PROCEDURE — 80061 LIPID PANEL: CPT

## 2018-05-02 PROCEDURE — 83036 HEMOGLOBIN GLYCOSYLATED A1C: CPT

## 2018-05-02 PROCEDURE — 82947 ASSAY GLUCOSE BLOOD QUANT: CPT

## 2018-05-02 PROCEDURE — 3074F SYST BP LT 130 MM HG: CPT | Mod: CPTII,S$GLB,, | Performed by: FAMILY MEDICINE

## 2018-05-03 LAB
ESTIMATED AVG GLUCOSE: 105 MG/DL
HBA1C MFR BLD HPLC: 5.3 %

## 2018-09-24 ENCOUNTER — OFFICE VISIT (OUTPATIENT)
Dept: INTERNAL MEDICINE | Facility: CLINIC | Age: 63
End: 2018-09-24
Payer: COMMERCIAL

## 2018-09-24 ENCOUNTER — APPOINTMENT (OUTPATIENT)
Dept: RADIOLOGY | Facility: HOSPITAL | Age: 63
End: 2018-09-24
Attending: FAMILY MEDICINE
Payer: COMMERCIAL

## 2018-09-24 VITALS
DIASTOLIC BLOOD PRESSURE: 78 MMHG | TEMPERATURE: 97 F | SYSTOLIC BLOOD PRESSURE: 118 MMHG | HEIGHT: 68 IN | BODY MASS INDEX: 38.34 KG/M2 | HEART RATE: 76 BPM | OXYGEN SATURATION: 95 % | WEIGHT: 253 LBS

## 2018-09-24 DIAGNOSIS — G89.29 CHRONIC PAIN OF LEFT ANKLE: ICD-10-CM

## 2018-09-24 DIAGNOSIS — M25.572 CHRONIC PAIN OF LEFT ANKLE: ICD-10-CM

## 2018-09-24 DIAGNOSIS — R05.9 COUGH: Primary | ICD-10-CM

## 2018-09-24 DIAGNOSIS — M54.32 SCIATICA OF LEFT SIDE: ICD-10-CM

## 2018-09-24 DIAGNOSIS — I10 ESSENTIAL HYPERTENSION: ICD-10-CM

## 2018-09-24 PROCEDURE — 3074F SYST BP LT 130 MM HG: CPT | Mod: CPTII,S$GLB,, | Performed by: FAMILY MEDICINE

## 2018-09-24 PROCEDURE — 73610 X-RAY EXAM OF ANKLE: CPT | Mod: 26,LT,, | Performed by: RADIOLOGY

## 2018-09-24 PROCEDURE — 3008F BODY MASS INDEX DOCD: CPT | Mod: CPTII,S$GLB,, | Performed by: FAMILY MEDICINE

## 2018-09-24 PROCEDURE — 99214 OFFICE O/P EST MOD 30 MIN: CPT | Mod: S$GLB,,, | Performed by: FAMILY MEDICINE

## 2018-09-24 PROCEDURE — 99999 PR PBB SHADOW E&M-EST. PATIENT-LVL III: CPT | Mod: PBBFAC,,, | Performed by: FAMILY MEDICINE

## 2018-09-24 PROCEDURE — 73610 X-RAY EXAM OF ANKLE: CPT | Mod: TC,FY,PO,LT

## 2018-09-24 PROCEDURE — 3078F DIAST BP <80 MM HG: CPT | Mod: CPTII,S$GLB,, | Performed by: FAMILY MEDICINE

## 2018-09-24 RX ORDER — METHYLPREDNISOLONE 4 MG/1
TABLET ORAL
Qty: 1 PACKAGE | Refills: 0 | Status: SHIPPED | OUTPATIENT
Start: 2018-09-24 | End: 2018-10-04

## 2018-09-24 NOTE — PROGRESS NOTES
Subjective:       Patient ID: Jasson Mayo is a 63 y.o. male.    Chief Complaint: Cough and Leg Pain    Ten days duration of postnasal drip and cough.  Cough has persisted.  He denies fever chills.  Denies pleuritic chest pain. Had about 1 month duration of pain in the left lateral thigh with prolonged standing and improved with lying down.  Initially had area of numbness associated with this.  He also has hypertension on lisinopril.  Denies headache chest pain palpitations shortness of breath.  Had several months duration of left ankle pain.  Worse with prolonged standing.      Review of Systems   Constitutional: Negative for chills and fever.   HENT: Negative for congestion, postnasal drip, sinus pressure and sore throat.    Respiratory: Positive for cough. Negative for chest tightness, shortness of breath and wheezing.    Cardiovascular: Negative for chest pain, palpitations and leg swelling.        Denies lightheadedness   Musculoskeletal: Negative for back pain, gait problem and joint swelling.        He has degenerative joint disease of the hands receiving occasional cortisone injection.  He has left ankle pain.  He has pain a left thigh.       Objective:      Physical Exam   Constitutional: He appears well-developed and well-nourished. No distress.   Cardiovascular: Normal rate and regular rhythm. Exam reveals no gallop.   No murmur heard.  Pulmonary/Chest: Effort normal and breath sounds normal. No respiratory distress. He has no wheezes. He has no rales.   Musculoskeletal:   No ankle deformity or swelling. Pain with range of motion.  No lumbar tenderness.  Straight leg raise is negative.  No hip tenderness.  Range of motion hip with no discomfort   Skin: He is not diaphoretic.       Office Visit on 05/02/2018   Component Date Value Ref Range Status    WBC 05/02/2018 5.08  3.90 - 12.70 K/uL Final    RBC 05/02/2018 4.69  4.60 - 6.20 M/uL Final    Hemoglobin 05/02/2018 15.0  14.0 - 18.0 g/dL Final     Hematocrit 05/02/2018 44.0  40.0 - 54.0 % Final    MCV 05/02/2018 94  82 - 98 fL Final    MCH 05/02/2018 32.0* 27.0 - 31.0 pg Final    MCHC 05/02/2018 34.1  32.0 - 36.0 g/dL Final    RDW 05/02/2018 13.6  11.5 - 14.5 % Final    Platelets 05/02/2018 164  150 - 350 K/uL Final    MPV 05/02/2018 11.5  9.2 - 12.9 fL Final    Immature Granulocytes 05/02/2018 0.2  0.0 - 0.5 % Final    Gran # (ANC) 05/02/2018 2.5  1.8 - 7.7 K/uL Final    Immature Grans (Abs) 05/02/2018 0.01  0.00 - 0.04 K/uL Final    Lymph # 05/02/2018 1.7  1.0 - 4.8 K/uL Final    Mono # 05/02/2018 0.5  0.3 - 1.0 K/uL Final    Eos # 05/02/2018 0.2  0.0 - 0.5 K/uL Final    Baso # 05/02/2018 0.06  0.00 - 0.20 K/uL Final    nRBC 05/02/2018 0  0 /100 WBC Final    Gran% 05/02/2018 49.4  38.0 - 73.0 % Final    Lymph% 05/02/2018 34.3  18.0 - 48.0 % Final    Mono% 05/02/2018 10.6  4.0 - 15.0 % Final    Eosinophil% 05/02/2018 4.3  0.0 - 8.0 % Final    Basophil% 05/02/2018 1.2  0.0 - 1.9 % Final    Differential Method 05/02/2018 Automated   Final    Glucose, Fasting 05/02/2018 110  70 - 110 mg/dL Final    Hemoglobin A1C 05/02/2018 5.3  4.0 - 5.6 % Final    Estimated Avg Glucose 05/02/2018 105  68 - 131 mg/dL Final    Cholesterol 05/02/2018 142  120 - 199 mg/dL Final    Triglycerides 05/02/2018 118  30 - 150 mg/dL Final    HDL 05/02/2018 33* 40 - 75 mg/dL Final    LDL Cholesterol 05/02/2018 85.4  63.0 - 159.0 mg/dL Final    HDL/Chol Ratio 05/02/2018 23.2  20.0 - 50.0 % Final    Total Cholesterol/HDL Ratio 05/02/2018 4.3  2.0 - 5.0 Final    Non-HDL Cholesterol 05/02/2018 109  mg/dL Final     Assessment:       1. Chronic pain of left ankle    2. Cough        Plan:   Blood pressure is controlled continue current medications.  Cough secondary to resolving ups by toward infection.  Medrol Dosepak for possible sciatica and degenerative arthritis of the ankle as well as persisting cough.  X-ray ankle.  Follow-up in 2 weeks.  Possible meralgia  paresthetica.      Chronic pain of left ankle  -     X-Ray Ankle Complete Left; Future; Expected date: 09/24/2018    Cough    Other orders  -     methylPREDNISolone (MEDROL DOSEPACK) 4 mg tablet; use as directed  Dispense: 1 Package; Refill: 0

## 2018-09-25 ENCOUNTER — PATIENT OUTREACH (OUTPATIENT)
Dept: ADMINISTRATIVE | Facility: HOSPITAL | Age: 63
End: 2018-09-25

## 2018-09-25 NOTE — PROGRESS NOTES
Health Maintenance reviewed. [facility] COLONOSCOPY 2017   Curettage Text: The wound bed was treated with curettage after the biopsy was performed.

## 2018-10-04 ENCOUNTER — OFFICE VISIT (OUTPATIENT)
Dept: RHEUMATOLOGY | Facility: CLINIC | Age: 63
End: 2018-10-04
Payer: COMMERCIAL

## 2018-10-04 ENCOUNTER — LAB VISIT (OUTPATIENT)
Dept: LAB | Facility: HOSPITAL | Age: 63
End: 2018-10-04
Attending: INTERNAL MEDICINE
Payer: COMMERCIAL

## 2018-10-04 VITALS
DIASTOLIC BLOOD PRESSURE: 86 MMHG | WEIGHT: 222.25 LBS | SYSTOLIC BLOOD PRESSURE: 147 MMHG | HEART RATE: 65 BPM | HEIGHT: 68 IN | BODY MASS INDEX: 33.68 KG/M2

## 2018-10-04 DIAGNOSIS — M19.041 PRIMARY OSTEOARTHRITIS OF BOTH HANDS: Primary | ICD-10-CM

## 2018-10-04 DIAGNOSIS — M1A.0720 IDIOPATHIC CHRONIC GOUT OF LEFT FOOT WITHOUT TOPHUS: ICD-10-CM

## 2018-10-04 DIAGNOSIS — M19.042 PRIMARY OSTEOARTHRITIS OF BOTH HANDS: Primary | ICD-10-CM

## 2018-10-04 LAB
ALBUMIN SERPL BCP-MCNC: 3.6 G/DL
ALP SERPL-CCNC: 103 U/L
ALT SERPL W/O P-5'-P-CCNC: 101 U/L
ANION GAP SERPL CALC-SCNC: 10 MMOL/L
AST SERPL-CCNC: 65 U/L
BILIRUB SERPL-MCNC: 1 MG/DL
BUN SERPL-MCNC: 19 MG/DL
CALCIUM SERPL-MCNC: 9.4 MG/DL
CHLORIDE SERPL-SCNC: 105 MMOL/L
CO2 SERPL-SCNC: 25 MMOL/L
CREAT SERPL-MCNC: 0.9 MG/DL
EST. GFR  (AFRICAN AMERICAN): >60 ML/MIN/1.73 M^2
EST. GFR  (NON AFRICAN AMERICAN): >60 ML/MIN/1.73 M^2
GLUCOSE SERPL-MCNC: 116 MG/DL
POTASSIUM SERPL-SCNC: 4.3 MMOL/L
PROT SERPL-MCNC: 6.8 G/DL
SODIUM SERPL-SCNC: 140 MMOL/L
URATE SERPL-MCNC: 5.3 MG/DL

## 2018-10-04 PROCEDURE — 20600 DRAIN/INJ JOINT/BURSA W/O US: CPT | Mod: 50,S$GLB,, | Performed by: INTERNAL MEDICINE

## 2018-10-04 PROCEDURE — 80053 COMPREHEN METABOLIC PANEL: CPT | Mod: PO

## 2018-10-04 PROCEDURE — 3008F BODY MASS INDEX DOCD: CPT | Mod: CPTII,S$GLB,, | Performed by: INTERNAL MEDICINE

## 2018-10-04 PROCEDURE — 99999 PR PBB SHADOW E&M-EST. PATIENT-LVL III: CPT | Mod: PBBFAC,,, | Performed by: INTERNAL MEDICINE

## 2018-10-04 PROCEDURE — 99214 OFFICE O/P EST MOD 30 MIN: CPT | Mod: 25,S$GLB,, | Performed by: INTERNAL MEDICINE

## 2018-10-04 PROCEDURE — 84550 ASSAY OF BLOOD/URIC ACID: CPT | Mod: PO

## 2018-10-04 PROCEDURE — 3077F SYST BP >= 140 MM HG: CPT | Mod: CPTII,S$GLB,, | Performed by: INTERNAL MEDICINE

## 2018-10-04 PROCEDURE — 3079F DIAST BP 80-89 MM HG: CPT | Mod: CPTII,S$GLB,, | Performed by: INTERNAL MEDICINE

## 2018-10-04 PROCEDURE — 36415 COLL VENOUS BLD VENIPUNCTURE: CPT | Mod: PO

## 2018-10-04 RX ORDER — ALLOPURINOL 100 MG/1
200 TABLET ORAL DAILY
Qty: 180 TABLET | Refills: 2 | Status: SHIPPED | OUTPATIENT
Start: 2018-10-04 | End: 2019-03-26

## 2018-10-04 RX ORDER — TRIAMCINOLONE ACETONIDE 40 MG/ML
20 INJECTION, SUSPENSION INTRA-ARTICULAR; INTRAMUSCULAR ONCE
Status: COMPLETED | OUTPATIENT
Start: 2018-10-04 | End: 2018-10-04

## 2018-10-04 RX ORDER — COLCHICINE 0.6 MG/1
0.6 CAPSULE ORAL DAILY
Qty: 90 CAPSULE | Refills: 2 | Status: SHIPPED | OUTPATIENT
Start: 2018-10-04 | End: 2019-10-01 | Stop reason: SDUPTHER

## 2018-10-04 RX ORDER — TRIAMCINOLONE ACETONIDE 40 MG/ML
20 INJECTION, SUSPENSION INTRA-ARTICULAR; INTRAMUSCULAR ONCE
Status: DISCONTINUED | OUTPATIENT
Start: 2018-10-04 | End: 2018-10-04

## 2018-10-04 RX ADMIN — TRIAMCINOLONE ACETONIDE 20 MG: 40 INJECTION, SUSPENSION INTRA-ARTICULAR; INTRAMUSCULAR at 09:10

## 2018-10-04 NOTE — PROGRESS NOTES
RHEUMATOLOGY CLINIC FOLLOW UP VISIT  Chief complaints:-  My fingers hurt.    HPI:-  Jasson Nesbitt a 63 y.o. pleasant male comes in for a follow up visit with above chief complaints.  He follows up in the rheumatology clinic for osteoarthritis and gout.  He reports no gout flares  SINCE last visit.  He is on allopurinol with adequate control and colchicine once daily for gout prophylaxis.  His main problems today hard pain over bilateral second MCP joints associated with stiffness without any significant swelling.  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  He denies any pain or other joints of hands or feet.  No pain over knee joints today.  No adverse effects from medications. The injection given in last visit gave pain relief for 5 months.     Review of Systems   Constitutional: Negative for chills and fever.   HENT: Negative for congestion and sore throat.    Eyes: Negative for blurred vision and redness.   Respiratory: Negative for cough and shortness of breath.    Cardiovascular: Negative for chest pain and leg swelling.   Gastrointestinal: Negative for abdominal pain.   Genitourinary: Negative for dysuria.   Musculoskeletal: Positive for back pain, joint pain and neck pain. Negative for falls and myalgias.   Skin: Negative for rash.   Neurological: Negative for headaches.   Endo/Heme/Allergies: Does not bruise/bleed easily.   Psychiatric/Behavioral: Negative for memory loss. The patient does not have insomnia.        Past Medical History:   Diagnosis Date    Decreased platelet count     Fatty liver     Gout     HTN (hypertension) 2000    Non-A non-B hepatitis 1990    Obesity (BMI 35.0-39.9 without comorbidity)     Prediabetes 2015       Past Surgical History:   Procedure Laterality Date    COLONOSCOPY W/ POLYPECTOMY  10, 14 no polyp    CYSTOSCOPY, RETROGRADE, URETEROSCOPY,  "STENT PLACEMENT Right 3/28/2017    [facility]    KNEE ARTHROSCOPY Right     Meniscus repair    LITHOTRIPSY-LASER Right 3/28/2017    [facility]    LIVER BIOPSY  2010    VASECTOMY          Social History     Tobacco Use    Smoking status: Never Smoker    Smokeless tobacco: Former User   Substance Use Topics    Alcohol use: No    Drug use: Not on file       Family History   Problem Relation Age of Onset    COPD Mother     Heart attacks under age 50 Mother     Cancer Father         PROSTATE CA       Review of patient's allergies indicates:  No Known Allergies        Physical examination:-    Vitals:    10/04/18 0748   BP: (!) 147/86   Pulse: 65   Weight: 100.8 kg (222 lb 3.6 oz)   Height: 5' 8" (1.727 m)   PainSc:   2       Physical Exam   Constitutional: He is oriented to person, place, and time and well-developed, well-nourished, and in no distress. No distress.   HENT:   Head: Normocephalic and atraumatic.   Mouth/Throat: Oropharynx is clear and moist.   Eyes: Conjunctivae and EOM are normal. Pupils are equal, round, and reactive to light. Right eye exhibits no discharge. Left eye exhibits no discharge. No scleral icterus.   Neck: Normal range of motion. Neck supple.   Cardiovascular: Normal rate and intact distal pulses.   Pulmonary/Chest: Effort normal. No respiratory distress. He exhibits no tenderness.   Abdominal: Soft. There is no tenderness.   Musculoskeletal:   Tenderness present over the right and left second MCP joint without any significant evidence of synovitis.  No synovitis or tenderness over the small joints of hands or feet.  Good range of motion in large joints.   Lymphadenopathy:     He has no cervical adenopathy.   Neurological: He is alert and oriented to person, place, and time. Gait normal.   Skin: Skin is warm. No rash noted. He is not diaphoretic. No erythema. No pallor.   Psychiatric: Mood and affect normal. "     Radiographs:-  Independent visualization of images done.  Personally reviewed the x-ray images.  Both x-ray images show significant degenerative disease or bilateral first carpometacarpal joint and second MCP joints without any involvement of other MCPs or PIPs.  MCP showed a possible cyst near the articular surface.       Medication List           Accurate as of 10/4/18  9:38 AM. If you have any questions, ask your nurse or doctor.               CHANGE how you take these medications    allopurinol 100 MG tablet  Commonly known as:  ZYLOPRIM  Take 2 tablets (200 mg total) by mouth once daily.  What changed:    · medication strength  · how much to take  Changed by:  Gume Curiel MD        CONTINUE taking these medications    benazepril 20 MG tablet  Commonly known as:  LOTENSIN  Take 1 tablet (20 mg total) by mouth once daily.     colchicine 0.6 mg Cap  Commonly known as:  MITIGARE  Take 1 capsule (0.6 mg total) by mouth once daily.     vitamin E 1000 UNIT capsule        STOP taking these medications    methylPREDNISolone 4 mg tablet  Commonly known as:  MEDROL DOSEPACK  Stopped by:  Gume Curiel MD           Where to Get Your Medications      These medications were sent to [facility] [address]  [address] [address]    Phone:  [phone]   · allopurinol 100 MG tablet  · colchicine 0.6 mg Cap         Assessment/Plans:-  # Primary osteoarthritis of both second MCP joints:-  Primary osteoarthritis over bilateral second MCP joints with x-rays showing severe degenerative disease and even possible cyst/erosion in both.  He does not have any secondary osteoarthritis features but her history or laboratory investigations.  Other MCP joints are completely normal.  Examination failed to show any evidence of synovitis over other MCPs. Since he sahows more than 5 month of relief from the last injection, I will repeat them again  today. I do not have any suspicion for rheumatoid arthritis or other systemic arthritides at this time.  PROCEDURE NOTE:-  Name of the procedure: Injection of bilateral second MCP joints with kenalog .   Patient consent:   Indication, risks(including skin depigmentation, fat atrophy, infection, bleeding, nerve or tendon injury) and alternative to the procedure were explained to to the patient. Patient was given opportunity to ask questions . Then patient gave a verbal consent for the procedure.   Pertinent lab values: None indicated  Type of anesthesia: 2% Lidocaine   Description of procedure : Using sterile technique, the skin over MCP joints were cleaned with alcohol swab and then with chlorhexidine solution. After applying cold spray , the needle was inserted into the skin and into the joint space without any resistance and then 20 mg kenalog was injected into the joint space without any resistance.   Complication: None  Estimated blood loss: None  Disposition: Patient tolerated the procedure without any complains and the site was dressed.There were no complications.  Discharge instructions of icing and stretching given.     # Follow-up in about 6 months (around 4/4/2019).      Disclaimer: This note was prepared using voice recognition system and is likely to have sound alike errors and is not proof read.  Please call me with any questions.

## 2018-10-09 ENCOUNTER — OFFICE VISIT (OUTPATIENT)
Dept: INTERNAL MEDICINE | Facility: CLINIC | Age: 63
End: 2018-10-09
Payer: COMMERCIAL

## 2018-10-09 ENCOUNTER — APPOINTMENT (OUTPATIENT)
Dept: RADIOLOGY | Facility: HOSPITAL | Age: 63
End: 2018-10-09
Attending: FAMILY MEDICINE
Payer: COMMERCIAL

## 2018-10-09 VITALS
BODY MASS INDEX: 37.89 KG/M2 | OXYGEN SATURATION: 98 % | HEIGHT: 68 IN | TEMPERATURE: 97 F | WEIGHT: 250 LBS | SYSTOLIC BLOOD PRESSURE: 140 MMHG | HEART RATE: 56 BPM | DIASTOLIC BLOOD PRESSURE: 84 MMHG

## 2018-10-09 DIAGNOSIS — G57.12 MERALGIA PARESTHETICA OF LEFT SIDE: Primary | ICD-10-CM

## 2018-10-09 DIAGNOSIS — R05.9 COUGH: ICD-10-CM

## 2018-10-09 DIAGNOSIS — M19.072 OSTEOARTHRITIS OF LEFT ANKLE AND FOOT: ICD-10-CM

## 2018-10-09 DIAGNOSIS — M54.50 CHRONIC MIDLINE LOW BACK PAIN WITHOUT SCIATICA: ICD-10-CM

## 2018-10-09 DIAGNOSIS — I10 ESSENTIAL HYPERTENSION: ICD-10-CM

## 2018-10-09 DIAGNOSIS — G89.29 CHRONIC MIDLINE LOW BACK PAIN WITHOUT SCIATICA: ICD-10-CM

## 2018-10-09 PROCEDURE — 3008F BODY MASS INDEX DOCD: CPT | Mod: CPTII,S$GLB,, | Performed by: FAMILY MEDICINE

## 2018-10-09 PROCEDURE — 71046 X-RAY EXAM CHEST 2 VIEWS: CPT | Mod: 26,,, | Performed by: RADIOLOGY

## 2018-10-09 PROCEDURE — 3079F DIAST BP 80-89 MM HG: CPT | Mod: CPTII,S$GLB,, | Performed by: FAMILY MEDICINE

## 2018-10-09 PROCEDURE — 90471 IMMUNIZATION ADMIN: CPT | Mod: S$GLB,,, | Performed by: FAMILY MEDICINE

## 2018-10-09 PROCEDURE — 90686 IIV4 VACC NO PRSV 0.5 ML IM: CPT | Mod: S$GLB,,, | Performed by: FAMILY MEDICINE

## 2018-10-09 PROCEDURE — 3077F SYST BP >= 140 MM HG: CPT | Mod: CPTII,S$GLB,, | Performed by: FAMILY MEDICINE

## 2018-10-09 PROCEDURE — 71046 X-RAY EXAM CHEST 2 VIEWS: CPT | Mod: TC,FY,PO

## 2018-10-09 PROCEDURE — 99999 PR PBB SHADOW E&M-EST. PATIENT-LVL V: CPT | Mod: PBBFAC,,, | Performed by: FAMILY MEDICINE

## 2018-10-09 PROCEDURE — 99214 OFFICE O/P EST MOD 30 MIN: CPT | Mod: 25,S$GLB,, | Performed by: FAMILY MEDICINE

## 2018-10-09 NOTE — PROGRESS NOTES
Subjective:       Patient ID: Jasson Mayo is a 63 y.o. male.    Chief Complaint: Follow-up    HPI  Review of Systems    Objective:      Physical Exam    Lab Visit on 10/04/2018   Component Date Value Ref Range Status    Sodium 10/04/2018 140  136 - 145 mmol/L Final    Potassium 10/04/2018 4.3  3.5 - 5.1 mmol/L Final    Chloride 10/04/2018 105  95 - 110 mmol/L Final    CO2 10/04/2018 25  23 - 29 mmol/L Final    Glucose 10/04/2018 116* 70 - 110 mg/dL Final    BUN, Bld 10/04/2018 19  8 - 23 mg/dL Final    Creatinine 10/04/2018 0.9  0.5 - 1.4 mg/dL Final    Calcium 10/04/2018 9.4  8.7 - 10.5 mg/dL Final    Total Protein 10/04/2018 6.8  6.0 - 8.4 g/dL Final    Albumin 10/04/2018 3.6  3.5 - 5.2 g/dL Final    Total Bilirubin 10/04/2018 1.0  0.1 - 1.0 mg/dL Final    Alkaline Phosphatase 10/04/2018 103  55 - 135 U/L Final    AST 10/04/2018 65* 10 - 40 U/L Final    ALT 10/04/2018 101* 10 - 44 U/L Final    Anion Gap 10/04/2018 10  8 - 16 mmol/L Final    eGFR if African American 10/04/2018 >60  >60 mL/min/1.73 m^2 Final    eGFR if non African American 10/04/2018 >60  >60 mL/min/1.73 m^2 Final    Uric Acid 10/04/2018 5.3  3.4 - 7.0 mg/dL Final     Assessment:       1. Meralgia paresthetica of left side    2. Osteoarthritis of left ankle and foot    3. Cough    4. Essential hypertension    5. Chronic midline low back pain without sciatica        Plan:         Meralgia paresthetica of left side  -     MRI Lumbar Spine Without Contrast; Future; Expected date: 10/09/2018    Osteoarthritis of left ankle and foot  -     Ambulatory referral to Podiatry    Cough  -     X-Ray Chest PA And Lateral; Future; Expected date: 10/09/2018    Essential hypertension    Chronic midline low back pain without sciatica    Other orders  -     Influenza - Quadrivalent (3 years & older) (PF)

## 2018-10-09 NOTE — PROGRESS NOTES
Subjective:       Patient ID: Jasson Mayo is a 63 y.o. male.    Chief Complaint: Follow-up    Follow-up low back pain, left ankle pain , numbness left thigh, hypertension, persistent cough.  Low back pain has been present for several years.  It is worse with prolonged sitting.  He more recently has had numbness of left thigh intermittently.  He has ongoing left ankle pain worse with increased activity.  He recently had injections of his fingers due to degenerative joint disease. He denies headache chest pain palpitations shortness of breath.  Cough initially started with postnasal drip is improved.  Cough has lessened but not resolved.  Denies fever chills or pleuritic component      Review of Systems   Constitutional: Negative for chills, fatigue, fever and unexpected weight change.   HENT: Negative for congestion, postnasal drip, sinus pressure and sore throat.    Respiratory: Positive for cough. Negative for chest tightness, shortness of breath and wheezing.    Cardiovascular: Negative for chest pain, palpitations and leg swelling.        Denies lightheadedness   Musculoskeletal: Positive for arthralgias and back pain.   Neurological: Positive for numbness. Negative for weakness.       Objective:      Physical Exam   Constitutional: He is oriented to person, place, and time. He appears well-developed and well-nourished. No distress.   Cardiovascular: Normal rate and regular rhythm. Exam reveals no gallop.   No murmur heard.  Pulmonary/Chest: Effort normal. No respiratory distress. He has no wheezes. He has no rales.   Musculoskeletal:   No lumbar tenderness.  Straight leg raise is negative   Neurological: He is alert and oriented to person, place, and time. He exhibits normal muscle tone. Coordination normal.   Skin: He is not diaphoretic.       Lab Visit on 10/04/2018   Component Date Value Ref Range Status    Sodium 10/04/2018 140  136 - 145 mmol/L Final    Potassium 10/04/2018 4.3  3.5 - 5.1  mmol/L Final    Chloride 10/04/2018 105  95 - 110 mmol/L Final    CO2 10/04/2018 25  23 - 29 mmol/L Final    Glucose 10/04/2018 116* 70 - 110 mg/dL Final    BUN, Bld 10/04/2018 19  8 - 23 mg/dL Final    Creatinine 10/04/2018 0.9  0.5 - 1.4 mg/dL Final    Calcium 10/04/2018 9.4  8.7 - 10.5 mg/dL Final    Total Protein 10/04/2018 6.8  6.0 - 8.4 g/dL Final    Albumin 10/04/2018 3.6  3.5 - 5.2 g/dL Final    Total Bilirubin 10/04/2018 1.0  0.1 - 1.0 mg/dL Final    Alkaline Phosphatase 10/04/2018 103  55 - 135 U/L Final    AST 10/04/2018 65* 10 - 40 U/L Final    ALT 10/04/2018 101* 10 - 44 U/L Final    Anion Gap 10/04/2018 10  8 - 16 mmol/L Final    eGFR if African American 10/04/2018 >60  >60 mL/min/1.73 m^2 Final    eGFR if non African American 10/04/2018 >60  >60 mL/min/1.73 m^2 Final    Uric Acid 10/04/2018 5.3  3.4 - 7.0 mg/dL Final     Assessment:       1. Meralgia paresthetica of left side    2. Osteoarthritis of left ankle and foot    3. Cough    4. Essential hypertension    5. Chronic midline low back pain without sciatica        Plan:   MRI of the lumbar spine, but dried referral for ankle pain, chest x-ray for persisting cough, blood pressure 140/84 in continue current medications.  Follow up 1 month      Meralgia paresthetica of left side  -     MRI Lumbar Spine Without Contrast; Future; Expected date: 10/09/2018    Osteoarthritis of left ankle and foot  -     Ambulatory referral to Podiatry    Cough  -     X-Ray Chest PA And Lateral; Future; Expected date: 10/09/2018    Essential hypertension    Chronic midline low back pain without sciatica    Other orders  -     Influenza - Quadrivalent (3 years & older) (PF)

## 2018-10-15 ENCOUNTER — OFFICE VISIT (OUTPATIENT)
Dept: PODIATRY | Facility: CLINIC | Age: 63
End: 2018-10-15
Payer: COMMERCIAL

## 2018-10-15 VITALS
DIASTOLIC BLOOD PRESSURE: 75 MMHG | WEIGHT: 247.81 LBS | HEIGHT: 68 IN | SYSTOLIC BLOOD PRESSURE: 122 MMHG | BODY MASS INDEX: 37.56 KG/M2 | RESPIRATION RATE: 16 BRPM | HEART RATE: 65 BPM

## 2018-10-15 DIAGNOSIS — M25.572 PAIN IN LEFT ANKLE AND JOINTS OF LEFT FOOT: ICD-10-CM

## 2018-10-15 DIAGNOSIS — M19.072 OSTEOARTHRITIS OF LEFT ANKLE AND FOOT: ICD-10-CM

## 2018-10-15 DIAGNOSIS — M24.572 CONTRACTURE, LEFT ANKLE: ICD-10-CM

## 2018-10-15 DIAGNOSIS — M67.88 PERONEAL TENDINOSIS, LEFT: Primary | ICD-10-CM

## 2018-10-15 PROCEDURE — 99999 PR PBB SHADOW E&M-EST. PATIENT-LVL III: CPT | Mod: PBBFAC,,, | Performed by: PODIATRIST

## 2018-10-15 PROCEDURE — 3074F SYST BP LT 130 MM HG: CPT | Mod: CPTII,S$GLB,, | Performed by: PODIATRIST

## 2018-10-15 PROCEDURE — 99214 OFFICE O/P EST MOD 30 MIN: CPT | Mod: S$GLB,,, | Performed by: PODIATRIST

## 2018-10-15 PROCEDURE — 3008F BODY MASS INDEX DOCD: CPT | Mod: CPTII,S$GLB,, | Performed by: PODIATRIST

## 2018-10-15 PROCEDURE — 3078F DIAST BP <80 MM HG: CPT | Mod: CPTII,S$GLB,, | Performed by: PODIATRIST

## 2018-10-15 RX ORDER — NABUMETONE 750 MG/1
750 TABLET, FILM COATED ORAL 2 TIMES DAILY
Qty: 60 TABLET | Refills: 1 | Status: SHIPPED | OUTPATIENT
Start: 2018-10-15 | End: 2018-11-14

## 2018-10-15 NOTE — LETTER
October 15, 2018      Kenneth Jon MD  [address] [place] [address] [address] [address]           [facility]  [place] [address]  Phone: [phone]  Fax: [phone]          Patient: Jasson Mayo   MR Number: [id number]   YOB: 1955   Date of Visit: 10/15/2018       Dear Dr. Kenneth Jon:    Thank you for referring Jasson Mayo to me for evaluation. Attached you will find relevant portions of my assessment and plan of care.    If you have questions, please do not hesitate to call me. I look forward to following Jasson Mayo along with you.    Sincerely,    Jae Mathew, BAILEY    Enclosure  CC:  No Recipients    If you would like to receive this communication electronically, please contact [email or web address] or [phone] to request more information on [facility] access.    For providers and/or their staff who would like to refer a patient to [facility], please contact us through our one-stop-shop provider referral line, [facility] , at [phone].    If you feel you have received this communication in error or would no longer like to receive these types of communications, please e-mail [email or web address]

## 2018-10-15 NOTE — PATIENT INSTRUCTIONS
Please take the prescribed medication as directed, for 7 days, then after day 7, then twice a day, only as needed.

## 2018-10-15 NOTE — PROGRESS NOTES
Subjective:       Patient ID: Jasson Mayo is a 63 y.o. male.    Chief Complaint: Ankle Pain (c/o left ankle pain while walking, pain level 4/10, non-diabetic Pt, wears tennis shoes with socks, PCP Dr. Jon.)    HPI: Jasson Maoy presents to the office today with complaints of moderate pains at the lateral aspect of the left foot.  Patient rates the pain at approximately 7/10.  States severely antalgic gait pattern.  States minimal edema. States weakness with gait. The patient states the pain does radiate up the outside of the ankle/lower leg at times. States no recent trauma. States occasional NSAID type medications for alleviation. States prior xray evaluation.  The pains been present now for the past several the several weeks. Kenneth Jon MD is the primary care provider.  Prolonged walking and standing does exacerbate the symptoms.    Review of patient's allergies indicates:  No Known Allergies    Past Medical History:   Diagnosis Date    Decreased platelet count     Fatty liver     Gout     HTN (hypertension) 2000    Non-A non-B hepatitis 1990    Obesity (BMI 35.0-39.9 without comorbidity)     Prediabetes 2015       Family History   Problem Relation Age of Onset    COPD Mother     Heart attacks under age 50 Mother     Cancer Father         PROSTATE CA       Social History     Socioeconomic History    Marital status:      Spouse name: Not on file    Number of children: 1    Years of education: Not on file    Highest education level: Not on file   Social Needs    Financial resource strain: Not on file    Food insecurity - worry: Not on file    Food insecurity - inability: Not on file    Transportation needs - medical: Not on file    Transportation needs - non-medical: Not on file   Occupational History    Occupation: [employer]     Employer: [employer]     Comment: Jimmy [employer]   Tobacco Use    Smoking status: Never Smoker    Smokeless tobacco: Former  "User   Substance and Sexual Activity    Alcohol use: No    Drug use: Not on file    Sexual activity: Not on file   Other Topics Concern    Not on file   Social History Narrative    Not on file       Past Surgical History:   Procedure Laterality Date    COLONOSCOPY W/ POLYPECTOMY  10, 14 no polyp    CYSTOSCOPY, RETROGRADE, URETEROSCOPY, STENT PLACEMENT Right 3/28/2017    [facility] at [facility]    KNEE ARTHROSCOPY Right     Meniscus repair    LITHOTRIPSY-LASER Right 3/28/2017    [facility] at [facility] OR    LIVER BIOPSY  2010    VASECTOMY         Review of Systems   Constitutional: Negative for chills, fatigue and fever.   HENT: Negative for hearing loss.    Eyes: Negative for photophobia and visual disturbance.   Respiratory: Negative for cough, chest tightness, shortness of breath and wheezing.    Cardiovascular: Negative for chest pain and palpitations.   Gastrointestinal: Negative for constipation, diarrhea, nausea and vomiting.   Endocrine: Negative for cold intolerance and heat intolerance.   Genitourinary: Negative for flank pain.   Musculoskeletal: Positive for arthralgias, back pain and gait problem. Negative for neck pain and neck stiffness.   Skin: Negative for wound.   Neurological: Negative for light-headedness, numbness and headaches.   Psychiatric/Behavioral: Negative for sleep disturbance.          Objective:   /75 (BP Location: Right arm, Patient Position: Sitting, BP Method: Large (Automatic))   Pulse 65   Resp 16   Ht 5' 8" (1.727 m)   Wt 112.4 kg (247 lb 12.8 oz)   BMI 37.68 kg/m²      X-Ray Ankle Complete Left   Order: [id number]   Status:  Final result   Visible to patient:  Yes (Patient Portal) Next appt:  10/29/2018 at 10:30 AM in Podiatry (Jae Mathew DPM) Dx:  Chronic pain of left ankle   Details     Reading Physician Reading Date Result Priority   Renzo Bob MD 9/24/2018       Narrative     EXAMINATION:  XR ANKLE " COMPLETE 3 VIEW LEFT    CLINICAL HISTORY:  Pain in left ankle and joints of left foot    TECHNIQUE:  AP, lateral and oblique views of the left ankle were performed.    COMPARISON:  None    FINDINGS:  No acute fracture.  Ankle mortise intact.  Degenerative changes of the tibiotalar and midfoot are noted.  Dorsal and plantar calcaneal enthesophyte formation noted.  There is thickening of the Achilles tendon approximately 3.5 cm proximal to it's insertion.  Correlate for Achilles tendinosis/tendinitis.      Impression       As above      Electronically signed by: Renzo Bob MD  Date: 09/24/2018  Time: 16:02             Last Resulted: 09/24/18 16:02               LOWER EXTREMITY PHYSICAL EXAMINATION  ORTHOPEDIC:  Moderate discomfort along the course of the left peroneal tendon. The minimal edema is noted along the course. no retromalleolar edema noted. No subluxing peroneals are noted. Mild discomfort to palpation lateral malleolus.  Mild discomfort palpation of the lateral ankle ligaments.  No pain to palpation of the 5th metatarsal base.  There is no discomfort to palpation of 5th metatarsal proximal metaphysis and diaphysis.  There is no discomfort to the sinus tarsi. There is no to the ATFL and no pain to the CFL. Gait pattern is antalgic. MMT with isolation of peroneal tendon, is weak as compared to contralateral.    DERMATOLOGY: Skin is supple, dry and intact. No ecchymosis is noted. No hypertrophic skin formation. No erythema or cellulitis is noted.    NEUROLOGY: Sensation to light touch is intact. Proprioception is intact. Sensation to pin prick is intact. Deep tendon reflexes of the lower extremity are WNL.    VASCULAR: On the left foot, the dorsalis pedis pulse is 2/4 and the posterior tibial pulse is 1/4. Capillary refill time is less than 3 seconds. Hair growth is sparse, but present on the dorsum of the foot and at the digits. No rubor is present. Proximal to distal temperature is warm to warm.       Physical Exam    Assessment:     1. Peroneal tendinosis, left    2. Contracture, left ankle    3. Osteoarthritis of left ankle and foot    4. Pain in left ankle and joints of left foot        Plan:     Peroneal tendinosis, left  -     nabumetone (RELAFEN) 750 MG tablet; Take 1 tablet (750 mg total) by mouth 2 (two) times daily.  Dispense: 60 tablet; Refill: 1    Contracture, left ankle    Osteoarthritis of left ankle and foot  -     nabumetone (RELAFEN) 750 MG tablet; Take 1 tablet (750 mg total) by mouth 2 (two) times daily.  Dispense: 60 tablet; Refill: 1    Pain in left ankle and joints of left foot  -     nabumetone (RELAFEN) 750 MG tablet; Take 1 tablet (750 mg total) by mouth 2 (two) times daily.  Dispense: 60 tablet; Refill: 1        Thorough discussion is had with the patient today, concerning the diagnosis, its etiology, and the treatment algorithm at present.  XRAYS are reviewed in detail with the patient. All questions and concerns regarding findings and its/their implications are outlined and discussed.  Orders are written for appropriate NSAID.  Please take for 7-10 days, then only as needed.  Did discuss proper and supportive shoe gear in detail and at length with the patient.  These are shoes with firm and robust arch support; medial counter.  Shoes which only bend at the metatarsophalangeal joint and which are rigid in the midfoot and hindfoot. Patient urged to purchase running type or cross training type shoes gear which are designed for pronation control.    CAM Walker (Walking Boot), short/tall is dispensed to the patient. The CAM Walker is appropriately fitted and customized to the patient's lower extremity physique by the LPN/MA. Patient to ambulate with the CAM Walker at all times. The patient should not sleep with the device or shower with the device, or drive with the device (if dispensed for right ankle/foot pathology).            Future Appointments   Date Time Provider Department Center    11/5/2018  7:00 AM Kenneth Jon MD [facility] PRIMARY [place]   4/4/2019  7:45 AM LABORATORY, [place] [facility] LAB [place]   4/4/2019  8:15 AM Gume Curiel MD [facility] RHEUM [place]

## 2018-10-17 PROBLEM — Z86.010 HISTORY OF COLON POLYPS: Status: ACTIVE | Noted: 2017-11-28

## 2018-10-17 PROBLEM — Z86.0100 HISTORY OF COLON POLYPS: Status: ACTIVE | Noted: 2017-11-28

## 2018-10-29 ENCOUNTER — OFFICE VISIT (OUTPATIENT)
Dept: PODIATRY | Facility: CLINIC | Age: 63
End: 2018-10-29
Payer: COMMERCIAL

## 2018-10-29 VITALS
HEIGHT: 68 IN | SYSTOLIC BLOOD PRESSURE: 132 MMHG | HEART RATE: 70 BPM | RESPIRATION RATE: 16 BRPM | DIASTOLIC BLOOD PRESSURE: 94 MMHG | BODY MASS INDEX: 37.21 KG/M2 | WEIGHT: 245.56 LBS

## 2018-10-29 DIAGNOSIS — M24.572 CONTRACTURE, LEFT ANKLE: ICD-10-CM

## 2018-10-29 DIAGNOSIS — M19.072 OSTEOARTHRITIS OF LEFT ANKLE AND FOOT: ICD-10-CM

## 2018-10-29 DIAGNOSIS — M67.88 PERONEAL TENDINOSIS, LEFT: Primary | ICD-10-CM

## 2018-10-29 DIAGNOSIS — M25.572 PAIN IN LEFT ANKLE AND JOINTS OF LEFT FOOT: ICD-10-CM

## 2018-10-29 PROCEDURE — 3075F SYST BP GE 130 - 139MM HG: CPT | Mod: CPTII,S$GLB,, | Performed by: PODIATRIST

## 2018-10-29 PROCEDURE — 3080F DIAST BP >= 90 MM HG: CPT | Mod: CPTII,S$GLB,, | Performed by: PODIATRIST

## 2018-10-29 PROCEDURE — 99999 PR PBB SHADOW E&M-EST. PATIENT-LVL III: CPT | Mod: PBBFAC,,, | Performed by: PODIATRIST

## 2018-10-29 PROCEDURE — 99213 OFFICE O/P EST LOW 20 MIN: CPT | Mod: S$GLB,,, | Performed by: PODIATRIST

## 2018-10-29 PROCEDURE — 3008F BODY MASS INDEX DOCD: CPT | Mod: CPTII,S$GLB,, | Performed by: PODIATRIST

## 2018-10-29 NOTE — PROGRESS NOTES
Subjective:       Patient ID: Jasson Mayo is a 63 y.o. male.    Chief Complaint: Foot Pain (no c/o pain, wears tennis shoes with socks, non-diabetic Pt, PCP Dr. Jon.)      HPI: Jasson Mayo presents to the office today, for follow-up concerning left posterior heel/foot. At the last evaluation, the patient was prescribed an oral NSAID. At that time, pains were approx. 8/10 and were described as sharp and stabbing-like in nature. Pains to the posterior aspect of the ankle joint, were mostly with walking and standing. Today, pains are approx. 1/10. States resolved post-static dyskinesia to this area and the plantar heel as well. Does not limp with gait. States walking and standing causes and/or exacerbates the symptoms. Patient's Primary Care Provider is Kenneth Jon MD.  He states that he stopped wearing the walking boot several days ago, due to his pains being resolved.    Review of patient's allergies indicates:  No Known Allergies    Past Medical History:   Diagnosis Date    Decreased platelet count     Fatty liver     Gout     HTN (hypertension) 2000    Non-A non-B hepatitis 1990    Obesity (BMI 35.0-39.9 without comorbidity)     Prediabetes 2015       Family History   Problem Relation Age of Onset    COPD Mother     Heart attacks under age 50 Mother     Cancer Father         PROSTATE [place]       Social History     Socioeconomic History    Marital status:      Spouse name: Not on file    Number of children: 1    Years of education: Not on file    Highest education level: Not on file   Social Needs    Financial resource strain: Not on file    Food insecurity - worry: Not on file    Food insecurity - inability: Not on file    Transportation needs - medical: Not on file    Transportation needs - non-medical: Not on file   Occupational History    Occupation: [employer]     Employer: [employer]     Comment: Jimmy pandey   Tobacco Use    Smoking status: Never  "Smoker    Smokeless tobacco: Former User   Substance and Sexual Activity    Alcohol use: No    Drug use: Not on file    Sexual activity: Not on file   Other Topics Concern    Not on file   Social History Narrative    Not on file       Past Surgical History:   Procedure Laterality Date    COLONOSCOPY W/ POLYPECTOMY  10, 14 no polyp    CYSTOSCOPY, RETROGRADE, URETEROSCOPY, STENT PLACEMENT Right 3/28/2017    [facility]    KNEE ARTHROSCOPY Right     Meniscus repair    LITHOTRIPSY-LASER Right 3/28/2017    [facility]    LIVER BIOPSY  2010    VASECTOMY         Review of Systems   Constitutional: Negative for chills, fatigue and fever.   HENT: Negative for hearing loss.    Eyes: Negative for photophobia and visual disturbance.   Respiratory: Negative for cough, chest tightness, shortness of breath and wheezing.    Cardiovascular: Negative for chest pain and palpitations.   Gastrointestinal: Negative for constipation, diarrhea, nausea and vomiting.   Endocrine: Negative for cold intolerance and heat intolerance.   Genitourinary: Negative for flank pain.   Musculoskeletal: Negative for gait problem, neck pain and neck stiffness.   Skin: Negative for wound.   Neurological: Negative for light-headedness, numbness and headaches.   Psychiatric/Behavioral: Negative for sleep disturbance.          Objective:   BP (!) 132/94 (BP Location: Right arm, Patient Position: Sitting, BP Method: Large (Automatic))   Pulse 70   Resp 16   Ht 5' 8" (1.727 m)   Wt 111.4 kg (245 lb 9.5 oz)   BMI 37.34 kg/m²     X-Ray Chest PA And Lateral  Narrative: EXAMINATION:  XR CHEST PA AND LATERAL    CLINICAL HISTORY:  Cough    TECHNIQUE:  PA and lateral views of the chest were performed.    COMPARISON:  None    FINDINGS:  Cardiac silhouette and mediastinal contours are normal.  Lungs are clear.  Osseous structures are intact.  Impression: No acute cardiopulmonary " process.    Electronically signed by: Renzo Bob MD  Date:    10/09/2018  Time:    08:04       LOWER EXTREMITY PHYSICAL EXAMINATION  ORTHOPEDIC: There is a moderately sized palpable retro-calcaneal spur and/or a Martinez's deformity noted on the left foot. There is no tenderness to palpation of the achilles tendon insertion on to the posterior superior calcaneus. Upon medial to lateral compression of the heel bone at the distal most insertion of the achilles tendon, there is no discomfort. There is no tenderness to palpation of the plantar medial calcaneal tubercle at the origin of the plantar fascia. Upon palpation of the achilles tendon, there are no defects and/or fusiform swelling noted. There is no tenderness to palpation along the course of the achilles insertion to its insertion on the calcaneus. MMT in the sagittal plane with dorsiflexion and plantarflexion is 5/5, as compared to prior. Ankle ROM is not painful and/or creptiant. Equinus is noted. Slight discomfort palpation the sinus tarsi and anterolateral ankle gutter.  No discomfort along the peroneal tendon.  Gait pattern is improved at present.     DERMATOLOGY: There is no noted erythema or cellulitis noted. No ecchymosis is noted. Skin is supple, dry and intact. There is no palpable bursa noted at the achilles tendon insertion.  Skin is moist.  No ulcerations.  No calluses.     NEUROLOGY: Proprioception is intact, bilateral. Sensation to light touch is intact. Negative Tinel's Sign and negative Valleix sign. No neurological sensations with compression of the area of Johansen's Nerve in the area of the Abductor Hallucis muscle belly.    VASCULAR: On the left foot, the dorsalis pedis pulse is 2/4 and the posterior tibial pulse is 1/4. Capillary refill time is less than 3 seconds. Hair growth is present on the dorsum of the foot and at the digits. No rubor is present. Proximal to distal temperature is warm to warm.    Physical Exam    Assessment:      1. Peroneal tendinosis, left    2. Contracture, left ankle    3. Osteoarthritis of left ankle and foot    4. Pain in left ankle and joints of left foot          Plan:     Peroneal tendinosis, left    Contracture, left ankle    Osteoarthritis of left ankle and foot    Pain in left ankle and joints of left foot        Thorough discussion is had with the patient today, concerning the diagnosis, its etiology, and the treatment algorithm at present.  Improved left Achilles tendinitis and retrocalcaneal bursitis post NSAID therapy and immobilization for the past 2 weeks with walking boot.  He has since discontinued the walking boot.  He may stop the NSAIDs take them only as needed going forward.  Did discuss proper and supportive shoe gear in detail and at length with the patient.  These are shoes with firm and robust arch support; medial counter.  Shoes which only bend at the metatarsophalangeal joint and which are rigid in the midfoot and hindfoot. Patient urged to purchase running type or cross training type shoes gear which are designed for pronation control.  Follow up as needed or in the event of symptomology.          Future Appointments   Date Time Provider Department Center   10/29/2018 10:30 AM Jae Mathew DPM [facility] POD [facility]   11/5/2018  7:00 AM Kenneth Jon MD [facility] PRIMARY [place]   4/4/2019  7:45 AM LABORATORY, [facility] LAB [facility]   4/4/2019  8:15 AM Gume Curiel MD [facility] RHEUM [facility]

## 2018-11-05 ENCOUNTER — TELEPHONE (OUTPATIENT)
Dept: INTERNAL MEDICINE | Facility: CLINIC | Age: 63
End: 2018-11-05

## 2018-11-05 ENCOUNTER — OFFICE VISIT (OUTPATIENT)
Dept: INTERNAL MEDICINE | Facility: CLINIC | Age: 63
End: 2018-11-05
Payer: COMMERCIAL

## 2018-11-05 VITALS
SYSTOLIC BLOOD PRESSURE: 122 MMHG | BODY MASS INDEX: 37.21 KG/M2 | DIASTOLIC BLOOD PRESSURE: 84 MMHG | WEIGHT: 244.69 LBS | OXYGEN SATURATION: 97 % | TEMPERATURE: 99 F | HEART RATE: 67 BPM

## 2018-11-05 DIAGNOSIS — M25.572 CHRONIC PAIN OF LEFT ANKLE: ICD-10-CM

## 2018-11-05 DIAGNOSIS — Z00.00 ANNUAL PHYSICAL EXAM: Primary | ICD-10-CM

## 2018-11-05 DIAGNOSIS — I10 ESSENTIAL HYPERTENSION: ICD-10-CM

## 2018-11-05 DIAGNOSIS — M54.32 SCIATICA OF LEFT SIDE: ICD-10-CM

## 2018-11-05 DIAGNOSIS — Z12.5 SCREENING FOR PROSTATE CANCER: ICD-10-CM

## 2018-11-05 DIAGNOSIS — G89.29 CHRONIC PAIN OF LEFT ANKLE: ICD-10-CM

## 2018-11-05 DIAGNOSIS — M1A.0720 IDIOPATHIC CHRONIC GOUT OF LEFT FOOT WITHOUT TOPHUS: ICD-10-CM

## 2018-11-05 DIAGNOSIS — M1A.9XX0 CHRONIC GOUT WITHOUT TOPHUS, UNSPECIFIED CAUSE, UNSPECIFIED SITE: ICD-10-CM

## 2018-11-05 PROCEDURE — 3079F DIAST BP 80-89 MM HG: CPT | Mod: CPTII,S$GLB,, | Performed by: FAMILY MEDICINE

## 2018-11-05 PROCEDURE — 80061 LIPID PANEL: CPT

## 2018-11-05 PROCEDURE — 84153 ASSAY OF PSA TOTAL: CPT

## 2018-11-05 PROCEDURE — 84443 ASSAY THYROID STIM HORMONE: CPT

## 2018-11-05 PROCEDURE — 3074F SYST BP LT 130 MM HG: CPT | Mod: CPTII,S$GLB,, | Performed by: FAMILY MEDICINE

## 2018-11-05 PROCEDURE — 99999 PR PBB SHADOW E&M-EST. PATIENT-LVL III: CPT | Mod: PBBFAC,,, | Performed by: FAMILY MEDICINE

## 2018-11-05 PROCEDURE — 84550 ASSAY OF BLOOD/URIC ACID: CPT

## 2018-11-05 PROCEDURE — 81003 URINALYSIS AUTO W/O SCOPE: CPT

## 2018-11-05 PROCEDURE — 36415 COLL VENOUS BLD VENIPUNCTURE: CPT

## 2018-11-05 PROCEDURE — 99214 OFFICE O/P EST MOD 30 MIN: CPT | Mod: 25,S$GLB,, | Performed by: FAMILY MEDICINE

## 2018-11-05 PROCEDURE — 80053 COMPREHEN METABOLIC PANEL: CPT

## 2018-11-05 PROCEDURE — 3008F BODY MASS INDEX DOCD: CPT | Mod: CPTII,S$GLB,, | Performed by: FAMILY MEDICINE

## 2018-11-05 RX ORDER — BENAZEPRIL HYDROCHLORIDE 20 MG/1
20 TABLET ORAL DAILY
Qty: 90 TABLET | Refills: 3 | Status: SHIPPED | OUTPATIENT
Start: 2018-11-05 | End: 2019-11-06 | Stop reason: SDUPTHER

## 2018-11-05 NOTE — PROGRESS NOTES
Subjective:       Patient ID: Jasson Mayo is a 63 y.o. male.    Chief Complaint: Annual Exam    Annual exam.  Follow-up hypertension low back pain left leg sciatica.  Podiatry evaluation for ankle pain was done and he is improved.  Is also followed by Rheumatology regards gout.  He denies headache chest pain palpitations shortness of breath or edema. He would like external referral for MRI of the lumbar spine      Review of Systems   Constitutional: Negative for activity change, appetite change, fatigue and unexpected weight change.   HENT: Negative for congestion, ear pain, hearing loss, sneezing, sore throat, tinnitus and trouble swallowing.    Eyes: Negative for pain, redness and visual disturbance.   Respiratory: Negative for cough, chest tightness, shortness of breath and wheezing.    Cardiovascular: Negative for chest pain, palpitations and leg swelling.   Gastrointestinal: Negative for abdominal distention, abdominal pain, blood in stool, constipation, diarrhea, nausea, rectal pain and vomiting.   Endocrine: Negative for polydipsia and polyuria.   Genitourinary: Negative for difficulty urinating, dysuria, frequency, hematuria and urgency.   Musculoskeletal: Positive for back pain. Negative for arthralgias, joint swelling, myalgias, neck pain and neck stiffness.        Occasional muscle cramps especially the left leg.   Skin: Negative for rash and wound.   Neurological: Negative for dizziness, tremors, syncope, light-headedness, numbness and headaches.        Left leg sciatica.   Hematological: Negative for adenopathy. Does not bruise/bleed easily.   Psychiatric/Behavioral: Negative for agitation, confusion, dysphoric mood and sleep disturbance. The patient is not nervous/anxious.        Objective:      Physical Exam   Constitutional: He is oriented to person, place, and time. He appears well-developed and well-nourished.   HENT:   Right Ear: External ear normal.   Left Ear: External ear normal.    Nose: Nose normal.   Mouth/Throat: Oropharynx is clear and moist.   Eyes: Conjunctivae and EOM are normal. Pupils are equal, round, and reactive to light.   Neck: Normal range of motion. Neck supple. No JVD present. No thyromegaly present.   Cardiovascular: Normal rate, regular rhythm, normal heart sounds and intact distal pulses. Exam reveals no gallop and no friction rub.   No murmur heard.  2+carotid femoral popliteal pulses   Pulmonary/Chest: Effort normal and breath sounds normal. No respiratory distress. He has no wheezes. He has no rales.   Abdominal: Soft. Bowel sounds are normal. He exhibits no distension and no mass. There is no tenderness.   Musculoskeletal: Normal range of motion. He exhibits no edema or tenderness.   Lymphadenopathy:     He has no cervical adenopathy.   Neurological: He is alert and oriented to person, place, and time. He has normal reflexes. He displays normal reflexes. No cranial nerve deficit. He exhibits normal muscle tone. Coordination normal.   Straight leg raise is negative bilaterally   Skin: Skin is warm and dry. No rash noted.   Psychiatric: He has a normal mood and affect. His behavior is normal. Judgment and thought content normal.       Lab Visit on 10/04/2018   Component Date Value Ref Range Status    Sodium 10/04/2018 140  136 - 145 mmol/L Final    Potassium 10/04/2018 4.3  3.5 - 5.1 mmol/L Final    Chloride 10/04/2018 105  95 - 110 mmol/L Final    CO2 10/04/2018 25  23 - 29 mmol/L Final    Glucose 10/04/2018 116* 70 - 110 mg/dL Final    BUN, Bld 10/04/2018 19  8 - 23 mg/dL Final    Creatinine 10/04/2018 0.9  0.5 - 1.4 mg/dL Final    Calcium 10/04/2018 9.4  8.7 - 10.5 mg/dL Final    Total Protein 10/04/2018 6.8  6.0 - 8.4 g/dL Final    Albumin 10/04/2018 3.6  3.5 - 5.2 g/dL Final    Total Bilirubin 10/04/2018 1.0  0.1 - 1.0 mg/dL Final    Alkaline Phosphatase 10/04/2018 103  55 - 135 U/L Final    AST 10/04/2018 65* 10 - 40 U/L Final    ALT 10/04/2018 101*  10 - 44 U/L Final    Anion Gap 10/04/2018 10  8 - 16 mmol/L Final    eGFR if African American 10/04/2018 >60  >60 mL/min/1.73 m^2 Final    eGFR if non African American 10/04/2018 >60  >60 mL/min/1.73 m^2 Final    Uric Acid 10/04/2018 5.3  3.4 - 7.0 mg/dL Final     Assessment:       1. Sciatica of left side    2. Essential hypertension    3. Idiopathic chronic gout of left foot without tophus    4. Annual physical exam    5. Screening for prostate cancer    6. Chronic pain of left ankle    7. Chronic gout without tophus, unspecified cause, unspecified site        Plan:   Blood pressure is controlled 122/84.  Lab was ordered.  MRI of the lumbar spine.  Refill benazepril.  Disposition regards back pain pending MRI.  Routine follow-up in 6 months.  Immunizations up-to-date.      Sciatica of left side  -     MRI Lumbar Spine Without Contrast; Future; Expected date: 11/05/2018    Essential hypertension  -     benazepril (LOTENSIN) 20 MG tablet; Take 1 tablet (20 mg total) by mouth once daily.  Dispense: 90 tablet; Refill: 3  -     CBC auto differential  -     Urinalysis  -     Comprehensive metabolic panel  -     Lipid panel  -     TSH    Idiopathic chronic gout of left foot without tophus    Annual physical exam    Screening for prostate cancer  -     PSA, Screening    Chronic pain of left ankle    Chronic gout without tophus, unspecified cause, unspecified site  -     Uric acid

## 2018-11-05 NOTE — TELEPHONE ENCOUNTER
----- Message from Samantha Wolf sent at 11/5/2018 10:53 AM CST -----  Contact: [facility]  Requesting call back regarding getting an order for pt. Fax number is [phone]. Please call back at [phone].    Thanks,  Samantha Wolf

## 2018-11-05 NOTE — TELEPHONE ENCOUNTER
Mac from [facility] called in regards to getting the patient MRI orders faxed over. Mac was advised that I would fax over the orders on today. She verbally understood the information given.

## 2018-11-06 LAB
ALBUMIN SERPL BCP-MCNC: 3.9 G/DL
ALP SERPL-CCNC: 107 U/L
ALT SERPL W/O P-5'-P-CCNC: 114 U/L
ANION GAP SERPL CALC-SCNC: 8 MMOL/L
AST SERPL-CCNC: 85 U/L
BILIRUB SERPL-MCNC: 0.4 MG/DL
BILIRUB UR QL STRIP: NEGATIVE
BUN SERPL-MCNC: 13 MG/DL
CALCIUM SERPL-MCNC: 9.9 MG/DL
CHLORIDE SERPL-SCNC: 103 MMOL/L
CHOLEST SERPL-MCNC: 167 MG/DL
CHOLEST/HDLC SERPL: 3.8 {RATIO}
CLARITY UR REFRACT.AUTO: CLEAR
CO2 SERPL-SCNC: 26 MMOL/L
COLOR UR AUTO: YELLOW
COMPLEXED PSA SERPL-MCNC: 0.22 NG/ML
CREAT SERPL-MCNC: 0.8 MG/DL
EST. GFR  (AFRICAN AMERICAN): >60 ML/MIN/1.73 M^2
EST. GFR  (NON AFRICAN AMERICAN): >60 ML/MIN/1.73 M^2
GLUCOSE SERPL-MCNC: 111 MG/DL
GLUCOSE UR QL STRIP: NEGATIVE
HDLC SERPL-MCNC: 44 MG/DL
HDLC SERPL: 26.3 %
HGB UR QL STRIP: NEGATIVE
KETONES UR QL STRIP: NEGATIVE
LDLC SERPL CALC-MCNC: 102.4 MG/DL
LEUKOCYTE ESTERASE UR QL STRIP: NEGATIVE
NITRITE UR QL STRIP: NEGATIVE
NONHDLC SERPL-MCNC: 123 MG/DL
PH UR STRIP: 5 [PH] (ref 5–8)
POTASSIUM SERPL-SCNC: 4.2 MMOL/L
PROT SERPL-MCNC: 7.5 G/DL
PROT UR QL STRIP: NEGATIVE
SODIUM SERPL-SCNC: 137 MMOL/L
SP GR UR STRIP: 1.01 (ref 1–1.03)
TRIGL SERPL-MCNC: 103 MG/DL
TSH SERPL DL<=0.005 MIU/L-ACNC: 2.38 UIU/ML
URATE SERPL-MCNC: 3.6 MG/DL
URN SPEC COLLECT METH UR: NORMAL

## 2018-11-08 ENCOUNTER — HOSPITAL ENCOUNTER (OUTPATIENT)
Dept: RADIOLOGY | Facility: HOSPITAL | Age: 63
Discharge: HOME | End: 2018-11-08
Attending: FAMILY MEDICINE
Payer: COMMERCIAL

## 2018-11-08 PROCEDURE — 72148 MRI LUMBAR SPINE W/O DYE: CPT | Mod: TC,PO

## 2018-11-08 PROCEDURE — 72148 MRI LUMBAR SPINE W/O DYE: CPT | Mod: 26,,, | Performed by: RADIOLOGY

## 2018-11-13 DIAGNOSIS — M54.32 SCIATICA OF LEFT SIDE: Primary | ICD-10-CM

## 2018-12-20 ENCOUNTER — OFFICE VISIT (OUTPATIENT)
Dept: PAIN MEDICINE | Facility: CLINIC | Age: 63
End: 2018-12-20
Payer: COMMERCIAL

## 2018-12-20 VITALS
HEART RATE: 65 BPM | RESPIRATION RATE: 18 BRPM | BODY MASS INDEX: 36.98 KG/M2 | HEIGHT: 68 IN | SYSTOLIC BLOOD PRESSURE: 114 MMHG | DIASTOLIC BLOOD PRESSURE: 81 MMHG | WEIGHT: 244 LBS

## 2018-12-20 DIAGNOSIS — M54.16 LUMBAR RADICULOPATHY: Primary | ICD-10-CM

## 2018-12-20 PROCEDURE — 3008F BODY MASS INDEX DOCD: CPT | Mod: CPTII,S$GLB,, | Performed by: PAIN MEDICINE

## 2018-12-20 PROCEDURE — 99999 PR PBB SHADOW E&M-EST. PATIENT-LVL III: CPT | Mod: PBBFAC,,, | Performed by: PAIN MEDICINE

## 2018-12-20 PROCEDURE — 3079F DIAST BP 80-89 MM HG: CPT | Mod: CPTII,S$GLB,, | Performed by: PAIN MEDICINE

## 2018-12-20 PROCEDURE — 99204 OFFICE O/P NEW MOD 45 MIN: CPT | Mod: S$GLB,,, | Performed by: PAIN MEDICINE

## 2018-12-20 PROCEDURE — 3074F SYST BP LT 130 MM HG: CPT | Mod: CPTII,S$GLB,, | Performed by: PAIN MEDICINE

## 2018-12-20 RX ORDER — GABAPENTIN 300 MG/1
CAPSULE ORAL
Qty: 90 CAPSULE | Refills: 3 | Status: SHIPPED | OUTPATIENT
Start: 2018-12-20 | End: 2019-04-01 | Stop reason: SDUPTHER

## 2018-12-20 NOTE — PATIENT INSTRUCTIONS
- start gabapentin using the following schedule: take 300mg at bedtime for 1 week, then take 300mg at breakfast and bedtime for 1 week, then take 3 times per day  - start physical therapy

## 2018-12-20 NOTE — PROGRESS NOTES
Chief Pain Complaint:  Low-back Pain      History of Present Illness:   This patient is a 63 y.o. male who presents today complaining of the above noted pain/s. The patient describes the pain as follows.  Mr. Mayo has been having low back pain and left leg pain for approximately last 6 months which has been intermittent.  He denies having an inciting event and has never had surgery on his low back.  Currently rates his pain as a 2/10.  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  He will then have a burning sensation in the left lateral thigh which does not go below the knee.  If he sits down this relieves of symptoms.  Symptoms are aggravated by standing for long periods of time. He denies having weakness however he does report numbness in that left lateral thigh.  He denies having bowel bladder difficulties.  He has not participate in physical therapy nor has he had surgery on hisLumbar spine.  He has seen by Rheumatology and he receives steroid injections into his hands.    Previous Therapy:  Medications:None  Injections: None  Surgeries: None  Physical Therapy: None    Past Surgical History:   Procedure Laterality Date    COLONOSCOPY W/ POLYPECTOMY  10, 14 no polyp    CYSTOSCOPY, RETROGRADE, URETEROSCOPY, STENT PLACEMENT Right 3/28/2017    [facility]    KNEE ARTHROSCOPY Right     Meniscus repair    LITHOTRIPSY-LASER Right 3/28/2017    [facility] OR    LIVER BIOPSY  2010    VASECTOMY         Imaging / Labs / Studies (reviewed on 12/20/2018):    Results for orders placed in visit on 11/05/18   MRI Lumbar Spine Without Contrast    Narrative EXAMINATION:  MRI LUMBAR SPINE WITHOUT CONTRAST    CLINICAL HISTORY:  Low back pain, >6wks conservative tx, persistent-progressive sx, surgical candidate; Sciatica, left side    TECHNIQUE:  Multiplanar, multisequence MR images were acquired from the  thoracolumbar junction to the sacrum without the administration of contrast.    COMPARISON:  None.    FINDINGS:  Alignment: Normal.    Vertebrae: Degenerative endplate changes noted surrounding L4-5 and L5-S1.  Vertebral body heights are well maintained.  No evidence of fracture or marrow replacement process.    Discs: Moderate disc height loss noted at L4-5.  There is mild disc height loss at L3-4 and L5-S1.    Cord: Normal.  Conus terminates at L1-2    Degenerative findings:    T12-L1:  No spinal canal or neuroforaminal stenosis.    L1-L2:  No spinal canal or neuroforaminal stenosis.    L2-L3: Mild generalized disc bulge.  Mild-to-moderate left neural foraminal narrowing.  No spinal canal stenosis.    L3-L4: Mild generalized disc bulge.  Mild bilateral facet arthropathy.  Small annular fissure noted in the right paracentral region.  Mild right worse than left neural foraminal narrowing.  Mild crowding of the right lateral recess, otherwise no definite spinal canal stenosis.    L4-L5: Generalized disc bulge with mild bilateral facet hypertrophy and ligamentum flavum thickening.  Mild spinal canal stenosis with moderate bilateral neural foraminal narrowing.    L5-S1: Mild generalized disc bulge, slightly asymmetric to left.  Moderate bilateral facet hypertrophy.  Moderate bilateral neural foraminal narrowing.  No spinal canal stenosis.    Paraspinal muscles & soft tissues: Unremarkable.      Impression Multilevel degenerative disc disease and facet arthropathy contributing to multilevel neural foraminal narrowing as above.  There is also mild spinal canal stenosis at L4-5.     Review of Systems:  Review of Systems   Constitutional: Negative for fever.   Eyes: Negative for blurred vision.   Respiratory: Negative for cough and wheezing.    Cardiovascular: Negative for chest pain and orthopnea.   Gastrointestinal: Negative for constipation, diarrhea, nausea and vomiting.   Genitourinary: Negative for dysuria.  "  Musculoskeletal: Positive for back pain.        Left leg pain   Skin: Negative for itching and rash.   Neurological: Negative for focal weakness.   Endo/Heme/Allergies: Does not bruise/bleed easily.       Physical Exam:  /81 (BP Location: Right arm, Patient Position: Sitting, BP Method: Medium (Automatic))   Pulse 65   Resp 18   Ht 5' 8" (1.727 m)   Wt 110.7 kg (244 lb)   BMI 37.10 kg/m²  (reviewed on 12/20/2018)\  General    Constitutional: He is oriented to person, place, and time. He appears well-developed and well-nourished.   HENT:   Head: Normocephalic and atraumatic.   Eyes: EOM are normal.   Neck: Neck supple.   Pulmonary/Chest: Effort normal.   Abdominal: He exhibits no distension.   Neurological: He is alert and oriented to person, place, and time. No cranial nerve deficit.   Psychiatric: He has a normal mood and affect.     General Musculoskeletal Exam   Gait: normal     Back (L-Spine & T-Spine) / Neck (C-Spine) Exam     Back (L-Spine & T-Spine) Range of Motion   Lateral bend right: normal   Lateral bend left: normal   Rotation right: normal   Rotation left: normal     Spinal Sensation   Right Side Sensation  L-Spine Level: normal  Left Side Sensation  L-Spine Level: normal    Comments:  Negative straight leg raise bilaterally for radicular symptoms, negative Rhoda's bilaterally negative PSIS tenderness      Muscle Strength   Right Lower Extremity   Hip Flexion: 5/5   Quadriceps:  5/5   Anterior tibial:  5/5/5  Left Lower Extremity   Hip Flexion: 5/5   Quadriceps:  5/5   Anterior tibial:  5/5/5     Reflexes     Left Side  Ankle Clonus:  absent    Right Side   Ankle Clonus:  absent      Assessment  Lumbar Radiculopathy  Lumbar Spondylosis    1. 63 y.o. year old patient with PMH of   Past Medical History:   Diagnosis Date    Decreased platelet count     Fatty liver     Gout     HTN (hypertension) 2000    Non-A non-B hepatitis 1990    Obesity (BMI 35.0-39.9 without comorbidity)     " Prediabetes 2015      presenting with pain located lumbar spine, left lateral thigh  2. Pain Generators / Etiology: Lumbar Radiculopathy and Lumbar Spondylosis  3. Failed Meds (E- Effective, NE- Not Effective)  4. Physical Therapy - None  5. Psychological comorbidities - None  6. Anticoagulants / Antiplatelets: None     PLAN:  1. Medications:  Prescribed gabapentin 300 mg with titration schedule to reach 3 times daily dosing  2. PT - provide a referral for physical therapy  3. Psychological - none  4. Labs - obtain  none; reviewed labs from 11/05/2018, creatinine 0.8  5. Imaging - obtain  none; reviewed lumbar MRI with patient and wife today in clinic  6. Interventions - schedule none  7. Referrals - none  8. Records - none  9. Follow up visit - follow up in clinic in 6 weeks  10. Patient Questions - answered all of the patient's questions regarding diagnosis, therapy, and treatment    JOSE ELIAS Elkins MD  Interventional Pain  [facility] [place]

## 2018-12-20 NOTE — LETTER
December 20, 2018      Kenneth Jon MD  [address] [place] [address] [address] [place] [id number]           [facility]  [id number] [place] [address]  [address] [place] [address]  Phone: [phone]  Fax: [phone]          Patient: Jasson Mayo   MR Number: [id number]   YOB: 1955   Date of Visit: 12/20/2018       Dear Dr. Kenneth Jon:    Thank you for referring Jasson Mayo to me for evaluation. Attached you will find relevant portions of my assessment and plan of care.    If you have questions, please do not hesitate to call me. I look forward to following Jasson Mayo along with you.    Sincerely,    Dennis Elkins MD    Enclosure  CC:  No Recipients    If you would like to receive this communication electronically, please contact [email or web address] or [phone] to request more information on [employer] Link access.    For providers and/or their staff who would like to refer a patient to [facility], please contact us through our one-stop-shop provider referral line, [facility], at [phone].    If you feel you have received this communication in error or would no longer like to receive these types of communications, please e-mail [email or web address]

## 2018-12-21 ENCOUNTER — TELEPHONE (OUTPATIENT)
Dept: PAIN MEDICINE | Facility: CLINIC | Age: 63
End: 2018-12-21

## 2018-12-21 NOTE — TELEPHONE ENCOUNTER
----- Message from Daniela Boyce sent at 12/21/2018  3:29 PM CST -----  Contact: [facility]   States she's calling to get the providers NPI # and can be reached at [phone]//thanks/dbw

## 2018-12-21 NOTE — TELEPHONE ENCOUNTER
----- Message from Cathi Flaherty sent at 12/21/2018 12:39 PM CST -----  Contact: pt   Call pt regarding getting physical therapy in [place] instead of [place].    [phone] ([place])

## 2018-12-21 NOTE — TELEPHONE ENCOUNTER
----- Message from Daniela Boyce sent at 12/21/2018  3:29 PM CST -----  Contact: [facility]   States she's calling to get the providers NPI # and can be reached at [phone]//thanks/dbw

## 2018-12-21 NOTE — TELEPHONE ENCOUNTER
----- Message from Daniela Boyce sent at 12/21/2018  3:29 PM CST -----  Contact: [facility]   States she's calling to get the providers NPI # and can be reached at [phone]//thanks/dbw

## 2018-12-26 NOTE — TELEPHONE ENCOUNTER
Contacted patient; patient would like PT orders faxed to [facility] in [place]. Will fax orders once Dr. Elkins returns on Friday 12/28/18.

## 2019-02-07 ENCOUNTER — OFFICE VISIT (OUTPATIENT)
Dept: PAIN MEDICINE | Facility: CLINIC | Age: 64
End: 2019-02-07
Payer: COMMERCIAL

## 2019-02-07 VITALS
SYSTOLIC BLOOD PRESSURE: 131 MMHG | BODY MASS INDEX: 36.99 KG/M2 | HEART RATE: 57 BPM | RESPIRATION RATE: 16 BRPM | HEIGHT: 68 IN | WEIGHT: 244.06 LBS | DIASTOLIC BLOOD PRESSURE: 87 MMHG

## 2019-02-07 DIAGNOSIS — M54.16 LUMBAR RADICULOPATHY: Primary | ICD-10-CM

## 2019-02-07 PROCEDURE — 3075F SYST BP GE 130 - 139MM HG: CPT | Mod: CPTII,S$GLB,, | Performed by: PAIN MEDICINE

## 2019-02-07 PROCEDURE — 3008F PR BODY MASS INDEX (BMI) DOCUMENTED: ICD-10-PCS | Mod: CPTII,S$GLB,, | Performed by: PAIN MEDICINE

## 2019-02-07 PROCEDURE — 3008F BODY MASS INDEX DOCD: CPT | Mod: CPTII,S$GLB,, | Performed by: PAIN MEDICINE

## 2019-02-07 PROCEDURE — 3075F PR MOST RECENT SYSTOLIC BLOOD PRESS GE 130-139MM HG: ICD-10-PCS | Mod: CPTII,S$GLB,, | Performed by: PAIN MEDICINE

## 2019-02-07 PROCEDURE — 99999 PR PBB SHADOW E&M-EST. PATIENT-LVL IV: CPT | Mod: PBBFAC,,, | Performed by: PAIN MEDICINE

## 2019-02-07 PROCEDURE — 3079F DIAST BP 80-89 MM HG: CPT | Mod: CPTII,S$GLB,, | Performed by: PAIN MEDICINE

## 2019-02-07 PROCEDURE — 99999 PR PBB SHADOW E&M-EST. PATIENT-LVL IV: ICD-10-PCS | Mod: PBBFAC,,, | Performed by: PAIN MEDICINE

## 2019-02-07 PROCEDURE — 3079F PR MOST RECENT DIASTOLIC BLOOD PRESSURE 80-89 MM HG: ICD-10-PCS | Mod: CPTII,S$GLB,, | Performed by: PAIN MEDICINE

## 2019-02-07 PROCEDURE — 99213 OFFICE O/P EST LOW 20 MIN: CPT | Mod: S$GLB,,, | Performed by: PAIN MEDICINE

## 2019-02-07 PROCEDURE — 99213 PR OFFICE/OUTPT VISIT, EST, LEVL III, 20-29 MIN: ICD-10-PCS | Mod: S$GLB,,, | Performed by: PAIN MEDICINE

## 2019-02-07 RX ORDER — NABUMETONE 750 MG/1
TABLET, FILM COATED ORAL
COMMUNITY
Start: 2018-11-21 | End: 2019-05-06

## 2019-02-07 RX ORDER — COLCHICINE 0.6 MG/1
TABLET ORAL
COMMUNITY
Start: 2017-05-10 | End: 2019-10-01 | Stop reason: SDUPTHER

## 2019-02-07 NOTE — H&P (VIEW-ONLY)
Chief Pain Complaint:  Chief Complaint   Patient presents with    Back Pain     f/u back pain      History of Present Illness:   Mr. Mayo return to clinic for follow-up.  He was last seen on December 20, 2018 and at that time he was started on gabapentin in given a prescription for physical therapy.  Since then he does not feel like the gabapentin physical therapy have done much for him.  He continues to exercise at the gym and therefore he has stopped attending physical therapy as this has been fairly expensive.  He reports that if he stands or sits for long time his symptoms are worse with radiation into the left lateral and anterior thigh.  He reports that when he works for long periods of time his symptoms also worsen.  When he is able to sit down her be off of his feet his symptoms seemed to subside.  Currently his pain is rated 3/10 and is described as an aching sensation is back which radiates into his leg.  He denies having bowel bladder difficulty.    Initial HPI:  This patient is a 63 y.o. male who presents today complaining of the above noted pain/s. The patient describes the pain as follows.  Mr. Mayo has been having low back pain and left leg pain for approximately last 6 months which has been intermittent.  He denies having an inciting event and has never had surgery on his low back.  Currently rates his pain as a 2/10.  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  He will then have a burning sensation in the left lateral thigh which does not go below the knee.  If he sits down this relieves of symptoms.  Symptoms are aggravated by standing for long periods of time. He denies having weakness however he does report numbness in that left lateral thigh.  He denies having bowel bladder difficulties.  He has not participate in physical therapy nor has he had surgery on hisLumbar spine.  He has seen by Rheumatology and he receives steroid  injections into his hands.    Previous Therapy:  Medications:  Gabapentin  Injections: None  Surgeries: None  Physical Therapy:  Recently complete    Past Surgical History:   Procedure Laterality Date    COLONOSCOPY W/ POLYPECTOMY  10, 14 no polyp    CYSTOSCOPY, RETROGRADE, URETEROSCOPY, STENT PLACEMENT Right 3/28/2017    [facility]    KNEE ARTHROSCOPY Right     Meniscus repair    LITHOTRIPSY-LASER Right 3/28/2017    [facility] OR    LIVER BIOPSY  2010    VASECTOMY         Imaging / Labs / Studies (reviewed on 2/7/2019):    Results for orders placed in visit on 11/05/18   MRI Lumbar Spine Without Contrast    Narrative EXAMINATION:  MRI LUMBAR SPINE WITHOUT CONTRAST    CLINICAL HISTORY:  Low back pain, >6wks conservative tx, persistent-progressive sx, surgical candidate; Sciatica, left side    TECHNIQUE:  Multiplanar, multisequence MR images were acquired from the thoracolumbar junction to the sacrum without the administration of contrast.    COMPARISON:  None.    FINDINGS:  Alignment: Normal.    Vertebrae: Degenerative endplate changes noted surrounding L4-5 and L5-S1.  Vertebral body heights are well maintained.  No evidence of fracture or marrow replacement process.    Discs: Moderate disc height loss noted at L4-5.  There is mild disc height loss at L3-4 and L5-S1.    Cord: Normal.  Conus terminates at L1-2    Degenerative findings:    T12-L1:  No spinal canal or neuroforaminal stenosis.    L1-L2:  No spinal canal or neuroforaminal stenosis.    L2-L3: Mild generalized disc bulge.  Mild-to-moderate left neural foraminal narrowing.  No spinal canal stenosis.    L3-L4: Mild generalized disc bulge.  Mild bilateral facet arthropathy.  Small annular fissure noted in the right paracentral region.  Mild right worse than left neural foraminal narrowing.  Mild crowding of the right lateral recess, otherwise no definite spinal canal stenosis.    L4-L5:  Generalized disc bulge with mild bilateral facet hypertrophy and ligamentum flavum thickening.  Mild spinal canal stenosis with moderate bilateral neural foraminal narrowing.    L5-S1: Mild generalized disc bulge, slightly asymmetric to left.  Moderate bilateral facet hypertrophy.  Moderate bilateral neural foraminal narrowing.  No spinal canal stenosis.    Paraspinal muscles & soft tissues: Unremarkable.      Impression Multilevel degenerative disc disease and facet arthropathy contributing to multilevel neural foraminal narrowing as above.  There is also mild spinal canal stenosis at L4-5.     Review of Systems:  Review of Systems   Constitutional: Negative for fever.   Eyes: Negative for blurred vision.   Respiratory: Negative for cough and wheezing.    Cardiovascular: Negative for chest pain and orthopnea.   Gastrointestinal: Negative for constipation, diarrhea, nausea and vomiting.   Genitourinary: Negative for dysuria.   Musculoskeletal: Positive for back pain.        Left leg pain   Skin: Negative for itching and rash.   Neurological: Negative for focal weakness.   Endo/Heme/Allergies: Does not bruise/bleed easily.       Physical Exam:  There were no vitals taken for this visit. (reviewed on 2/7/2019)\  General    Constitutional: He is oriented to person, place, and time. He appears well-developed and well-nourished.   HENT:   Head: Normocephalic and atraumatic.   Eyes: EOM are normal.   Neck: Neck supple.   Pulmonary/Chest: Effort normal.   Abdominal: He exhibits no distension.   Neurological: He is alert and oriented to person, place, and time. No cranial nerve deficit.   Psychiatric: He has a normal mood and affect.     General Musculoskeletal Exam   Gait: normal     Back (L-Spine & T-Spine) / Neck (C-Spine) Exam     Back (L-Spine & T-Spine) Range of Motion   Lateral bend right: normal   Lateral bend left: normal   Rotation right: normal   Rotation left: normal     Spinal Sensation   Right Side  Sensation  L-Spine Level: normal  Left Side Sensation  L-Spine Level: normal    Comments:  Negative straight leg raise bilaterally for radicular symptoms      Muscle Strength   Right Lower Extremity   Hip Flexion: 5/5   Quadriceps:  5/5   Anterior tibial:  5/5/5  Left Lower Extremity   Hip Flexion: 5/5   Quadriceps:  5/5   Anterior tibial:  5/5/5     Reflexes     Left Side  Ankle Clonus:  absent    Right Side   Ankle Clonus:  absent      Assessment  Lumbar Radiculopathy  Lumbar Spondylosis    1. 63 y.o. year old patient with PMH of   Past Medical History:   Diagnosis Date    Decreased platelet count     Fatty liver     Gout     HTN (hypertension) 2000    Non-A non-B hepatitis 1990    Obesity (BMI 35.0-39.9 without comorbidity)     Prediabetes 2015      presenting with pain located lumbar spine, left lateral thigh  2. Pain Generators / Etiology: Lumbar Radiculopathy and Lumbar Spondylosis  3. Failed Meds (E- Effective, NE- Not Effective): gabapentin - mildly effective  4. Physical Therapy - Recently Completed and None  5. Psychological comorbidities - None  6. Anticoagulants / Antiplatelets: None     PLAN:  1. Medications:  Will increase gabapentin from 300 mg 3 times daily to 600 mg 3 times daily  2. PT - the patient has completed 6 weeks physical therapy with minimal benefit; he continues to exercise at the gym  3. Psychological - none  4. Labs - obtain  none; reviewed labs from 11/05/2018, creatinine 0.8  5. Imaging - obtain  none; reviewed lumbar MRI with patient  6. Interventions - schedule L5/S1 interlaminar epidural steroid injection  7. Referrals - none  8. Records - none  9. Follow up visit - follow up in clinic in 6 weeks  10. Patient Questions - answered all of the patient's questions regarding diagnosis, therapy, and treatment    JOSE ELIAS Elkins MD  Interventional Pain  [facility] [place]

## 2019-02-07 NOTE — PATIENT INSTRUCTIONS
- will increase gabapentin to 600mg three times daily using the following schedule: take 300mg at breakfast, 300mg at lunch and 600mg at bedtime for 1 week; then take 600mg at breakfast, 300mg at lunch and 600mg at bedtime for 1 week; then take 600mg three times daily  -continue exercising at the gym  -will schedule for L5/S1 interlaminar epidural steroid injection  -follow up in clinic in 6 weeks

## 2019-02-07 NOTE — PROGRESS NOTES
Chief Pain Complaint:  Chief Complaint   Patient presents with    Back Pain     f/u back pain      History of Present Illness:   Mr. Mayo return to clinic for follow-up.  He was last seen on December 20, 2018 and at that time he was started on gabapentin in given a prescription for physical therapy.  Since then he does not feel like the gabapentin physical therapy have done much for him.  He continues to exercise at the gym and therefore he has stopped attending physical therapy as this has been fairly expensive.  He reports that if he stands or sits for long time his symptoms are worse with radiation into the left lateral and anterior thigh.  He reports that when he works for long periods of time his symptoms also worsen.  When he is able to sit down her be off of his feet his symptoms seemed to subside.  Currently his pain is rated 3/10 and is described as an aching sensation is back which radiates into his leg.  He denies having bowel bladder difficulty.    Initial HPI:  This patient is a 63 y.o. male who presents today complaining of the above noted pain/s. The patient describes the pain as follows.  Mr. Mayo has been having low back pain and left leg pain for approximately last 6 months which has been intermittent.  He denies having an inciting event and has never had surgery on his low back.  Currently rates his pain as a 2/10.  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  He will then have a burning sensation in the left lateral thigh which does not go below the knee.  If he sits down this relieves of symptoms.  Symptoms are aggravated by standing for long periods of time. He denies having weakness however he does report numbness in that left lateral thigh.  He denies having bowel bladder difficulties.  He has not participate in physical therapy nor has he had surgery on hisLumbar spine.  He has seen by Rheumatology and he receives steroid  injections into his hands.    Previous Therapy:  Medications:  Gabapentin  Injections: None  Surgeries: None  Physical Therapy:  Recently complete    Past Surgical History:   Procedure Laterality Date    COLONOSCOPY W/ POLYPECTOMY  10, 14 no polyp    CYSTOSCOPY, RETROGRADE, URETEROSCOPY, STENT PLACEMENT Right 3/28/2017    [facility] at [facility] OR    KNEE ARTHROSCOPY Right     Meniscus repair    LITHOTRIPSY-LASER Right 3/28/2017    [facility] at [facility] OR    LIVER BIOPSY  2010    VASECTOMY         Imaging / Labs / Studies (reviewed on 2/7/2019):    Results for orders placed in visit on 11/05/18   MRI Lumbar Spine Without Contrast    Narrative EXAMINATION:  MRI LUMBAR SPINE WITHOUT CONTRAST    CLINICAL HISTORY:  Low back pain, >6wks conservative tx, persistent-progressive sx, surgical candidate; Sciatica, left side    TECHNIQUE:  Multiplanar, multisequence MR images were acquired from the thoracolumbar junction to the sacrum without the administration of contrast.    COMPARISON:  None.    FINDINGS:  Alignment: Normal.    Vertebrae: Degenerative endplate changes noted surrounding L4-5 and L5-S1.  Vertebral body heights are well maintained.  No evidence of fracture or marrow replacement process.    Discs: Moderate disc height loss noted at L4-5.  There is mild disc height loss at L3-4 and L5-S1.    Cord: Normal.  Conus terminates at L1-2    Degenerative findings:    T12-L1:  No spinal canal or neuroforaminal stenosis.    L1-L2:  No spinal canal or neuroforaminal stenosis.    L2-L3: Mild generalized disc bulge.  Mild-to-moderate left neural foraminal narrowing.  No spinal canal stenosis.    L3-L4: Mild generalized disc bulge.  Mild bilateral facet arthropathy.  Small annular fissure noted in the right paracentral region.  Mild right worse than left neural foraminal narrowing.  Mild crowding of the right lateral recess, otherwise no definite spinal canal stenosis.    L4-L5:  Generalized disc bulge with mild bilateral facet hypertrophy and ligamentum flavum thickening.  Mild spinal canal stenosis with moderate bilateral neural foraminal narrowing.    L5-S1: Mild generalized disc bulge, slightly asymmetric to left.  Moderate bilateral facet hypertrophy.  Moderate bilateral neural foraminal narrowing.  No spinal canal stenosis.    Paraspinal muscles & soft tissues: Unremarkable.      Impression Multilevel degenerative disc disease and facet arthropathy contributing to multilevel neural foraminal narrowing as above.  There is also mild spinal canal stenosis at L4-5.     Review of Systems:  Review of Systems   Constitutional: Negative for fever.   Eyes: Negative for blurred vision.   Respiratory: Negative for cough and wheezing.    Cardiovascular: Negative for chest pain and orthopnea.   Gastrointestinal: Negative for constipation, diarrhea, nausea and vomiting.   Genitourinary: Negative for dysuria.   Musculoskeletal: Positive for back pain.        Left leg pain   Skin: Negative for itching and rash.   Neurological: Negative for focal weakness.   Endo/Heme/Allergies: Does not bruise/bleed easily.       Physical Exam:  There were no vitals taken for this visit. (reviewed on 2/7/2019)\  General    Constitutional: He is oriented to person, place, and time. He appears well-developed and well-nourished.   HENT:   Head: Normocephalic and atraumatic.   Eyes: EOM are normal.   Neck: Neck supple.   Pulmonary/Chest: Effort normal.   Abdominal: He exhibits no distension.   Neurological: He is alert and oriented to person, place, and time. No cranial nerve deficit.   Psychiatric: He has a normal mood and affect.     General Musculoskeletal Exam   Gait: normal     Back (L-Spine & T-Spine) / Neck (C-Spine) Exam     Back (L-Spine & T-Spine) Range of Motion   Lateral bend right: normal   Lateral bend left: normal   Rotation right: normal   Rotation left: normal     Spinal Sensation   Right Side  Sensation  L-Spine Level: normal  Left Side Sensation  L-Spine Level: normal    Comments:  Negative straight leg raise bilaterally for radicular symptoms      Muscle Strength   Right Lower Extremity   Hip Flexion: 5/5   Quadriceps:  5/5   Anterior tibial:  5/5/5  Left Lower Extremity   Hip Flexion: 5/5   Quadriceps:  5/5   Anterior tibial:  5/5/5     Reflexes     Left Side  Ankle Clonus:  absent    Right Side   Ankle Clonus:  absent      Assessment  Lumbar Radiculopathy  Lumbar Spondylosis    1. 63 y.o. year old patient with PMH of   Past Medical History:   Diagnosis Date    Decreased platelet count     Fatty liver     Gout     HTN (hypertension) 2000    Non-A non-B hepatitis 1990    Obesity (BMI 35.0-39.9 without comorbidity)     Prediabetes 2015      presenting with pain located lumbar spine, left lateral thigh  2. Pain Generators / Etiology: Lumbar Radiculopathy and Lumbar Spondylosis  3. Failed Meds (E- Effective, NE- Not Effective): gabapentin - mildly effective  4. Physical Therapy - Recently Completed and None  5. Psychological comorbidities - None  6. Anticoagulants / Antiplatelets: None     PLAN:  1. Medications:  Will increase gabapentin from 300 mg 3 times daily to 600 mg 3 times daily  2. PT - the patient has completed 6 weeks physical therapy with minimal benefit; he continues to exercise at the gym  3. Psychological - none  4. Labs - obtain  none; reviewed labs from 11/05/2018, creatinine 0.8  5. Imaging - obtain  none; reviewed lumbar MRI with patient  6. Interventions - schedule L5/S1 interlaminar epidural steroid injection  7. Referrals - none  8. Records - none  9. Follow up visit - follow up in clinic in 6 weeks  10. Patient Questions - answered all of the patient's questions regarding diagnosis, therapy, and treatment    JOSE ELIAS Elkins MD  Interventional Pain  [facility] [place]

## 2019-02-15 ENCOUNTER — HOSPITAL ENCOUNTER (OUTPATIENT)
Facility: HOSPITAL | Age: 64
Discharge: HOME | End: 2019-02-15
Attending: PAIN MEDICINE | Admitting: PAIN MEDICINE
Payer: COMMERCIAL

## 2019-02-15 VITALS
BODY MASS INDEX: 35.46 KG/M2 | HEIGHT: 68 IN | OXYGEN SATURATION: 95 % | RESPIRATION RATE: 16 BRPM | DIASTOLIC BLOOD PRESSURE: 86 MMHG | HEART RATE: 70 BPM | TEMPERATURE: 98 F | SYSTOLIC BLOOD PRESSURE: 118 MMHG | WEIGHT: 234 LBS

## 2019-02-15 DIAGNOSIS — M54.50 CHRONIC MIDLINE LOW BACK PAIN WITHOUT SCIATICA: Primary | ICD-10-CM

## 2019-02-15 DIAGNOSIS — M54.16 LUMBAR RADICULOPATHY: ICD-10-CM

## 2019-02-15 DIAGNOSIS — G89.29 CHRONIC MIDLINE LOW BACK PAIN WITHOUT SCIATICA: Primary | ICD-10-CM

## 2019-02-15 PROCEDURE — 63600175 PHARM REV CODE 636 W HCPCS: Performed by: PAIN MEDICINE

## 2019-02-15 PROCEDURE — 25000003 PHARM REV CODE 250

## 2019-02-15 PROCEDURE — 62323 NJX INTERLAMINAR LMBR/SAC: CPT | Mod: ,,, | Performed by: PAIN MEDICINE

## 2019-02-15 PROCEDURE — 62323 PR INJ LUMBAR/SACRAL, W/IMAGING GUIDANCE: ICD-10-PCS | Mod: ,,, | Performed by: PAIN MEDICINE

## 2019-02-15 PROCEDURE — 63600175 PHARM REV CODE 636 W HCPCS

## 2019-02-15 RX ORDER — DEXAMETHASONE SODIUM PHOSPHATE 10 MG/ML
INJECTION INTRAMUSCULAR; INTRAVENOUS
Status: DISCONTINUED | OUTPATIENT
Start: 2019-02-15 | End: 2019-02-15 | Stop reason: HOSPADM

## 2019-02-15 NOTE — OP NOTE
PROCEDURE: Lumbar epidural steroid injection under fluoroscopic guidance    LEVEL: L5/S1  SIDE: Midline     PROCEDURE DATE: 2/15/2019    PREOPERATIVE DIAGNOSIS: Lumbar radiculopathy  POSTOPERATIVE DIAGNOSIS: Lumbar radiculopathy    PROVIDER: JOSE ELIAS Elkins MD  Assistant(s): None    ANESTHESIA: Local, No Sedation    >> 0 mg of VERSED    >> 0 mcg of FENTANYL    INDICATION: The patient has low back pain and radiculopathy symptoms unresponsive to conservative treatments. Fluoroscopy was used to optimize visualization of needle placement and to maximize safety.        PROCEDURE DESCRIPTION / TECHNIQUE:   The patient was seen and identified in the preoperative area. Risks, benefits, complications, and alternatives were discussed with the patient. The patient agreed to proceed with the procedure and signed the consent. The site and side of the procedure was identified and marked. An IV was not placed for this procedure.    The patient was taken to the procedural suite. The patient was positioned in prone orientation on procedure table and a pillow was placed under the abdomen to reduce lumbar lordosis. A time out was performed prior to any intervention. The procedure, site, side, and allergies were stated and agreed to by all present. The lumbosacral area was widely prepped with ChloraPrep. The procedural site was draped in usual sterile fashion. Vital signs were closely monitored throughout this procedure. Conscious sedation was not used for this procedure.    Using anterior-posterior fluoroscopy, the above noted INTERLAMINAR SPACE was identified and the skin over this site of intended entry was marked and then infiltrated with 3-4 mL of PF 1% LIDOCAINE subcutaneously using a 1.5 inch 27 gauge needle. A 20-gauge Tuohy epidural needle was then inserted and advanced toward the epidural space incrementally under fluoroscopic guidance. A Loss of Resistance technique was used as the epidural needle was advanced. Once loss  of resistance was realized and after negative aspiration of blood and spinal fluid, correct needle position within the epidural space was verified with injection of 0.5 to 1 mL of contrast dye (Omnipaque 240). Appropriate epidural spread was seen on imaging. Again, after negative aspiration for blood and spinal fluid a 5 mL mixture containing 1 mL of Dexamethasone (10 mg/mL) and 2 mL of preservative free Normal Saline and 2ml of 1% preservative free lidocaine was then injected. No pain or paresthesias were noted with injection. There was low resistance during the injection. Washout of epidurogram was seen on fluoroscopy following injection of the above solution. The stylet was replaced and the Tuohy needle was withdrawn intact. The skin was cleansed, and bandages were applied.    Description of Findings: Not applicable    Prosthetic devices, grafts, tissues, or devices implanted: None    Specimen Removed: No    Estimated Blood Loss: minimal    COMPLICATIONS: None    DISPOSITION / PLANS: The patient was transferred to the recovery area in a stable condition for observation. The patient was reexamined prior to discharge. There was no evidence of acute neurologic injury following the procedure.  Patient was discharged from the recovery room after meeting discharge criteria. Home discharge instructions were given to the patient by the staff.

## 2019-02-15 NOTE — INTERVAL H&P NOTE
The patient has been examined and the H&P has been reviewed:    I concur with the findings and no changes have occurred since H&P was written.    Anesthesia/Surgery risks, benefits and alternative options discussed and understood by patient/family.          Active Hospital Problems    Diagnosis  POA    Lumbar radiculopathy [M54.16]  Yes     Priority: High      Resolved Hospital Problems   No resolved problems to display.

## 2019-02-15 NOTE — PLAN OF CARE
Problem: Adult Inpatient Plan of Care  Goal: Plan of Care Review  Outcome: Outcome(s) achieved Date Met: 02/15/19  Patient discharged home in stable condition via wheelchair with ride. Verbalized understanding of discharge instructions. Patient voiced no complaints at this time. Patient stood at side of bed, walked steps with no new motor or sensory deficits. Neurologically intact.

## 2019-03-12 ENCOUNTER — TELEPHONE (OUTPATIENT)
Dept: PAIN MEDICINE | Facility: CLINIC | Age: 64
End: 2019-03-12

## 2019-03-12 NOTE — TELEPHONE ENCOUNTER
Contacted patient; patient would like to know if he should continue taking gabapentin as scheduled. Informed patient that per Dr. Elkins notes, he should be taken gabapentin 600mg TID. Pt verbalized understanding.     ----- Message from Ewa Noel sent at 3/12/2019 10:50 AM CDT -----  Contact: pateint  Type:  Needs Medical Advice    Who Called: Patient  Symptoms (please be specific):    How long has patient had these symptoms:    Pharmacy name and phone #:    Would the patient rather a call back or a response via [facility]? call  Best Call Back Number: [phone]  Additional Information: Patient has a question regarding  Gabapentin.

## 2019-03-25 ENCOUNTER — LAB VISIT (OUTPATIENT)
Dept: LAB | Facility: HOSPITAL | Age: 64
End: 2019-03-25
Attending: INTERNAL MEDICINE
Payer: COMMERCIAL

## 2019-03-25 DIAGNOSIS — M1A.0720 IDIOPATHIC CHRONIC GOUT OF LEFT FOOT WITHOUT TOPHUS: ICD-10-CM

## 2019-03-25 LAB
ALBUMIN SERPL BCP-MCNC: 3.3 G/DL (ref 3.5–5.2)
ALP SERPL-CCNC: 109 U/L (ref 55–135)
ALT SERPL W/O P-5'-P-CCNC: 37 U/L (ref 10–44)
ANION GAP SERPL CALC-SCNC: 9 MMOL/L (ref 8–16)
AST SERPL-CCNC: 35 U/L (ref 10–40)
BILIRUB SERPL-MCNC: 0.2 MG/DL (ref 0.1–1)
BUN SERPL-MCNC: 23 MG/DL (ref 8–23)
CALCIUM SERPL-MCNC: 9.4 MG/DL (ref 8.7–10.5)
CHLORIDE SERPL-SCNC: 106 MMOL/L (ref 95–110)
CO2 SERPL-SCNC: 26 MMOL/L (ref 23–29)
CREAT SERPL-MCNC: 0.9 MG/DL (ref 0.5–1.4)
EST. GFR  (AFRICAN AMERICAN): >60 ML/MIN/1.73 M^2
EST. GFR  (NON AFRICAN AMERICAN): >60 ML/MIN/1.73 M^2
GLUCOSE SERPL-MCNC: 112 MG/DL (ref 70–110)
POTASSIUM SERPL-SCNC: 4.1 MMOL/L (ref 3.5–5.1)
PROT SERPL-MCNC: 6.8 G/DL (ref 6–8.4)
SODIUM SERPL-SCNC: 141 MMOL/L (ref 136–145)
URATE SERPL-MCNC: 5.3 MG/DL (ref 3.4–7)

## 2019-03-25 PROCEDURE — 84550 ASSAY OF BLOOD/URIC ACID: CPT

## 2019-03-25 PROCEDURE — 36415 COLL VENOUS BLD VENIPUNCTURE: CPT | Mod: PO

## 2019-03-25 PROCEDURE — 80053 COMPREHEN METABOLIC PANEL: CPT

## 2019-03-26 ENCOUNTER — OFFICE VISIT (OUTPATIENT)
Dept: RHEUMATOLOGY | Facility: CLINIC | Age: 64
End: 2019-03-26
Payer: COMMERCIAL

## 2019-03-26 VITALS
SYSTOLIC BLOOD PRESSURE: 124 MMHG | DIASTOLIC BLOOD PRESSURE: 81 MMHG | WEIGHT: 241.88 LBS | HEIGHT: 68 IN | HEART RATE: 57 BPM | BODY MASS INDEX: 36.66 KG/M2

## 2019-03-26 DIAGNOSIS — M19.042 PRIMARY OSTEOARTHRITIS OF BOTH HANDS: Primary | ICD-10-CM

## 2019-03-26 DIAGNOSIS — M1A.0720 IDIOPATHIC CHRONIC GOUT OF LEFT FOOT WITHOUT TOPHUS: ICD-10-CM

## 2019-03-26 DIAGNOSIS — M19.041 PRIMARY OSTEOARTHRITIS OF BOTH HANDS: Primary | ICD-10-CM

## 2019-03-26 PROCEDURE — 3079F PR MOST RECENT DIASTOLIC BLOOD PRESSURE 80-89 MM HG: ICD-10-PCS | Mod: CPTII,S$GLB,, | Performed by: INTERNAL MEDICINE

## 2019-03-26 PROCEDURE — 99999 PR PBB SHADOW E&M-EST. PATIENT-LVL III: ICD-10-PCS | Mod: PBBFAC,,, | Performed by: INTERNAL MEDICINE

## 2019-03-26 PROCEDURE — 99214 PR OFFICE/OUTPT VISIT, EST, LEVL IV, 30-39 MIN: ICD-10-PCS | Mod: 25,S$GLB,, | Performed by: INTERNAL MEDICINE

## 2019-03-26 PROCEDURE — 3074F SYST BP LT 130 MM HG: CPT | Mod: CPTII,S$GLB,, | Performed by: INTERNAL MEDICINE

## 2019-03-26 PROCEDURE — 3008F BODY MASS INDEX DOCD: CPT | Mod: CPTII,S$GLB,, | Performed by: INTERNAL MEDICINE

## 2019-03-26 PROCEDURE — 20600 PR DRAIN/INJECT SMALL JOINT/BURSA: ICD-10-PCS | Mod: RT,S$GLB,, | Performed by: INTERNAL MEDICINE

## 2019-03-26 PROCEDURE — 3079F DIAST BP 80-89 MM HG: CPT | Mod: CPTII,S$GLB,, | Performed by: INTERNAL MEDICINE

## 2019-03-26 PROCEDURE — 99214 OFFICE O/P EST MOD 30 MIN: CPT | Mod: 25,S$GLB,, | Performed by: INTERNAL MEDICINE

## 2019-03-26 PROCEDURE — 3074F PR MOST RECENT SYSTOLIC BLOOD PRESSURE < 130 MM HG: ICD-10-PCS | Mod: CPTII,S$GLB,, | Performed by: INTERNAL MEDICINE

## 2019-03-26 PROCEDURE — 20600 DRAIN/INJ JOINT/BURSA W/O US: CPT | Mod: RT,S$GLB,, | Performed by: INTERNAL MEDICINE

## 2019-03-26 PROCEDURE — 3008F PR BODY MASS INDEX (BMI) DOCUMENTED: ICD-10-PCS | Mod: CPTII,S$GLB,, | Performed by: INTERNAL MEDICINE

## 2019-03-26 PROCEDURE — 99999 PR PBB SHADOW E&M-EST. PATIENT-LVL III: CPT | Mod: PBBFAC,,, | Performed by: INTERNAL MEDICINE

## 2019-03-26 RX ORDER — ALLOPURINOL 100 MG/1
100 TABLET ORAL DAILY
Qty: 90 TABLET | Refills: 2 | Status: SHIPPED | OUTPATIENT
Start: 2019-03-26 | End: 2019-10-01 | Stop reason: SDUPTHER

## 2019-03-26 RX ORDER — TRIAMCINOLONE ACETONIDE 40 MG/ML
20 INJECTION, SUSPENSION INTRA-ARTICULAR; INTRAMUSCULAR ONCE
Status: COMPLETED | OUTPATIENT
Start: 2019-03-26 | End: 2019-03-26

## 2019-03-26 RX ORDER — LOTEPREDNOL ETABONATE AND TOBRAMYCIN 5; 3 MG/ML; MG/ML
SUSPENSION/ DROPS OPHTHALMIC
COMMUNITY
Start: 2019-03-19 | End: 2019-05-06

## 2019-03-26 RX ADMIN — TRIAMCINOLONE ACETONIDE 20 MG: 40 INJECTION, SUSPENSION INTRA-ARTICULAR; INTRAMUSCULAR at 09:03

## 2019-03-26 NOTE — PROGRESS NOTES
RHEUMATOLOGY CLINIC FOLLOW UP VISIT  Chief complaints:-  My right finger hurts.    HPI:-  Jasson Nesbitt a 63 y.o. pleasant male comes in for a follow up visit with above chief complaints.  He follows up in the rheumatology clinic for osteoarthritis and gout.  He reports no gout flares  SINCE last visit.  He is on allopurinol  And daily colchicine with adequate control.  His main problems today hard pain over right second MCP joint associated with stiffness without any significant swelling.  Left second MCP does not hurt today. He works with the [employer] where he does squeezing and twisting motion all day long.  He denies any pain or other joints of hands or feet.  No pain over knee joints today.  No adverse effects from medications. The injection given in last visit gave pain relief for 5 months.     Review of Systems   Constitutional: Negative for chills and fever.   HENT: Negative for congestion and sore throat.    Eyes: Negative for blurred vision and redness.   Respiratory: Negative for cough and shortness of breath.    Cardiovascular: Negative for chest pain and leg swelling.   Gastrointestinal: Negative for abdominal pain.   Genitourinary: Negative for dysuria.   Musculoskeletal: Positive for back pain, joint pain and neck pain. Negative for falls and myalgias.   Skin: Negative for rash.   Neurological: Negative for headaches.   Endo/Heme/Allergies: Does not bruise/bleed easily.   Psychiatric/Behavioral: Negative for memory loss. The patient does not have insomnia.        Past Medical History:   Diagnosis Date    Decreased platelet count     Fatty liver     Gout     HTN (hypertension) 2000    Non-A non-B hepatitis 1990    Obesity (BMI 35.0-39.9 without comorbidity)     Prediabetes 2015       Past Surgical History:   Procedure Laterality Date    COLONOSCOPY W/ POLYPECTOMY  10, 14 no polyp    CYSTOSCOPY, RETROGRADE,  "URETEROSCOPY, STENT PLACEMENT Right 3/28/2017    Performed by [facility] Mendoza [facility]    KNEE ARTHROSCOPY Right     Meniscus repair    LITHOTRIPSY-LASER Right 3/28/2017    [facility]    LIVER BIOPSY  2010    VASECTOMY          Social History     Tobacco Use    Smoking status: Never Smoker    Smokeless tobacco: Former User   Substance Use Topics    Alcohol use: No    Drug use: Not on file       Family History   Problem Relation Age of Onset    COPD Mother     Heart attacks under age 50 Mother     Cancer Father         PROSTATE CA       Review of patient's allergies indicates:  No Known Allergies        Physical examination:-    Vitals:    03/26/19 0801   BP: 124/81   Pulse: (!) 57   Weight: 109.7 kg (241 lb 13.5 oz)   Height: 5' 8" (1.727 m)   PainSc:   2   PainLoc: Finger       Physical Exam   Constitutional: He is oriented to person, place, and time and well-developed, well-nourished, and in no distress. No distress.   HENT:   Head: Normocephalic and atraumatic.   Mouth/Throat: Oropharynx is clear and moist.   Eyes: Pupils are equal, round, and reactive to light. Conjunctivae and EOM are normal. Right eye exhibits no discharge. Left eye exhibits no discharge. No scleral icterus.   Neck: Normal range of motion. Neck supple.   Cardiovascular: Normal rate and intact distal pulses.   Pulmonary/Chest: Effort normal. No respiratory distress. He exhibits no tenderness.   Abdominal: Soft. There is no tenderness.   Musculoskeletal:   Tenderness present over the right second MCP joint without any significant evidence of synovitis.  No synovitis or tenderness over the small joints of hands or feet.  Good range of motion in large joints.   Lymphadenopathy:     He has no cervical adenopathy.   Neurological: He is alert and oriented to person, place, and time. Gait normal.   Skin: Skin is warm. No rash noted. He is not diaphoretic. No erythema. No pallor.   Psychiatric: Mood " and affect normal.   Nursing note and vitals reviewed.    Radiographs:-  Independent visualization of images done.  Personally reviewed the x-ray images.  Both x-ray images show significant degenerative disease or bilateral first carpometacarpal joint and second MCP joints without any involvement of other MCPs or PIPs.  MCP showed a possible cyst near the articular surface.    Medication List with Changes/Refills   Current Medications    ALLOPURINOL (ZYLOPRIM) 100 MG TABLET    Take 2 tablets (200 mg total) by mouth once daily.    BENAZEPRIL (LOTENSIN) 20 MG TABLET    Take 1 tablet (20 mg total) by mouth once daily.    COLCHICINE (COLCRYS) 0.6 MG TABLET        COLCHICINE (MITIGARE) 0.6 MG CAP    Take 1 capsule (0.6 mg total) by mouth once daily.    GABAPENTIN (NEURONTIN) 300 MG CAPSULE    Take 1 capsule at bedtime for one week, then take 1 capsule at breakfast and bedtime for 1 week, then take 1 capsule three times daily.    NABUMETONE (RELAFEN) 750 MG TABLET        VITAMIN E 1000 UNIT CAPSULE    Take 1,000 Units by mouth once daily.    ZYLET 0.3-0.5 % DRPS           Assessment/Plans:-  # Primary osteoarthritis of second MCP joint:-  Primary osteoarthritis over bilateral second MCP joints with x-rays showing severe degenerative disease and even possible cyst/erosion in both.  He does not have any secondary osteoarthritis features but her history or laboratory investigations.  Other MCP joints are completely normal.  Examination failed to show any evidence of synovitis over other MCPs. Since he shows more than 5 month of relief from the last injection, I will repeat it again today. I do not have any suspicion for rheumatoid arthritis or other systemic arthritides at this time.    # Idiopathic chronic gout of left foot without tophus:-  Stable without any flares. Uric acid at goal. Reduce allopurinol further to 100 mg once daily. Continue daily colchicine since it also helps with his osteoarthritis.   .   PROCEDURE  NOTE:-  Name of the procedure: Injection of right second MCP joint with kenalog .   Patient consent:   Indication, risks(including skin depigmentation, fat atrophy, infection, bleeding, nerve or tendon injury) and alternative to the procedure were explained to to the patient. Patient was given opportunity to ask questions . Then patient gave a verbal consent for the procedure.   Pertinent lab values: None indicated  Type of anesthesia: 2% Lidocaine   Description of procedure : Using sterile technique, the skin over right second MCP joint was cleaned with alcohol swab and then with chlorhexidine solution. After applying cold spray , the needle was inserted into the skin and into the joint space without any resistance and then 20 mg kenalog was injected into the joint space without any resistance.   Complication: None  Estimated blood loss: None  Disposition: Patient tolerated the procedure without any complains and the site was dressed.There were no complications.  Discharge instructions of icing and stretching given.     # Follow up in about 6 months (around 9/26/2019).      Disclaimer: This note was prepared using voice recognition system and is likely to have sound alike errors and is not proof read.  Please call me with any questions.

## 2019-04-01 ENCOUNTER — TELEPHONE (OUTPATIENT)
Dept: PAIN MEDICINE | Facility: CLINIC | Age: 64
End: 2019-04-01

## 2019-04-01 RX ORDER — GABAPENTIN 300 MG/1
CAPSULE ORAL
Qty: 90 CAPSULE | Refills: 0 | Status: SHIPPED | OUTPATIENT
Start: 2019-04-01 | End: 2019-07-23 | Stop reason: DRUGHIGH

## 2019-04-01 NOTE — TELEPHONE ENCOUNTER
Contacted patient; spoke to patient's wife. Informed her that refill was sent to [facility] however this is only a one time fill for 30 days. All other refills will come from Dr. Elkins.     ----- Message from Maribeth Davis MD sent at 4/1/2019 11:31 AM CDT -----  Contact: pt wife  Done. Please let the patient know one refill only from me. All others from Dr Elkins.   ----- Message -----  From: Teresita Gupta LPN  Sent: 4/1/2019  11:16 AM  To: MD Carla Young!     ' patient is completely out of his gabapentin! Can you please refill this medication?     Patient is currently taking 600mg TID    Preferred pharmacy: [facility] [address] [place]      ----- Message -----  From: Jazmyne Arzate  Sent: 4/1/2019  10:28 AM  To: Brittni Collins Staff    Type:  RX Refill Request    Who Called: ANA LEDESMA  Refill or New Rx: refill   RX Name and Strength:  gabapentin 300 MG   How is the patient currently taking it? (ex. 1XDay): 2 tab 3 times daily   Is this a 30 day or 90 day RX:  30 day   Preferred Pharmacy with phone number:     [facility] [address] [place]  [address] [place]  [address] [place] [address]  Phone: [phone] Fax: [phone]    Local or Mail Order: local   Ordering Provider: burns   Would the patient rather a call back or a response via [facility]? Call   Best Call Back Number: [phone]  Additional Information: caller is requesting a call back from the nurse in regards to the pt being out of his med gabapentin (NEURONTIN) 300 MG and them know if the Dr wants the pt to keep thanking this me the pt has an apt on 04/04/2019

## 2019-04-01 NOTE — TELEPHONE ENCOUNTER
Contacted patient; patient is requesting refill on gabapentin. Informed patient that Dr. Elkins is out of clinic until Thursday however, I will reach out to  to see if she is able to write prescription. Patient verbalized understanding.     ----- Message from Jazmyne Arzate sent at 4/1/2019 10:28 AM CDT -----  Contact: pt wife  Type:  RX Refill Request    Who Called: ANA LEDESMA  Refill or New Rx: refill   RX Name and Strength:  gabapentin 300 MG   How is the patient currently taking it? (ex. 1XDay): 2 tab 3 times daily   Is this a 30 day or 90 day RX:  30 day   Preferred Pharmacy with phone number:     [facility] - [facility] [address] [place]  [address] [place]  [address] [place] [address]  Phone: [phone] Fax: [phone]    Local or Mail Order: local   Ordering Provider: burns   Would the patient rather a call back or a response via [facility]? Call   Best Call Back Number: [phone]  Additional Information: caller is requesting a call back from the nurse in regards to the pt being out of his med gabapentin (NEURONTIN) 300 MG and them know if the Dr wants the pt to keep thanking this me the pt has an apt on 04/04/2019

## 2019-04-04 ENCOUNTER — OFFICE VISIT (OUTPATIENT)
Dept: PAIN MEDICINE | Facility: CLINIC | Age: 64
End: 2019-04-04
Payer: COMMERCIAL

## 2019-04-04 VITALS
SYSTOLIC BLOOD PRESSURE: 120 MMHG | HEIGHT: 68 IN | DIASTOLIC BLOOD PRESSURE: 82 MMHG | BODY MASS INDEX: 36.53 KG/M2 | RESPIRATION RATE: 16 BRPM | HEART RATE: 57 BPM | WEIGHT: 241 LBS

## 2019-04-04 DIAGNOSIS — M54.50 CHRONIC MIDLINE LOW BACK PAIN WITHOUT SCIATICA: Primary | ICD-10-CM

## 2019-04-04 DIAGNOSIS — G89.29 CHRONIC MIDLINE LOW BACK PAIN WITHOUT SCIATICA: Primary | ICD-10-CM

## 2019-04-04 DIAGNOSIS — M54.32 SCIATICA OF LEFT SIDE: ICD-10-CM

## 2019-04-04 PROCEDURE — 99213 OFFICE O/P EST LOW 20 MIN: CPT | Mod: S$GLB,,, | Performed by: PAIN MEDICINE

## 2019-04-04 PROCEDURE — 99213 PR OFFICE/OUTPT VISIT, EST, LEVL III, 20-29 MIN: ICD-10-PCS | Mod: S$GLB,,, | Performed by: PAIN MEDICINE

## 2019-04-04 PROCEDURE — 3079F PR MOST RECENT DIASTOLIC BLOOD PRESSURE 80-89 MM HG: ICD-10-PCS | Mod: CPTII,S$GLB,, | Performed by: PAIN MEDICINE

## 2019-04-04 PROCEDURE — 3008F PR BODY MASS INDEX (BMI) DOCUMENTED: ICD-10-PCS | Mod: CPTII,S$GLB,, | Performed by: PAIN MEDICINE

## 2019-04-04 PROCEDURE — 3074F PR MOST RECENT SYSTOLIC BLOOD PRESSURE < 130 MM HG: ICD-10-PCS | Mod: CPTII,S$GLB,, | Performed by: PAIN MEDICINE

## 2019-04-04 PROCEDURE — 3074F SYST BP LT 130 MM HG: CPT | Mod: CPTII,S$GLB,, | Performed by: PAIN MEDICINE

## 2019-04-04 PROCEDURE — 3008F BODY MASS INDEX DOCD: CPT | Mod: CPTII,S$GLB,, | Performed by: PAIN MEDICINE

## 2019-04-04 PROCEDURE — 99999 PR PBB SHADOW E&M-EST. PATIENT-LVL III: CPT | Mod: PBBFAC,,, | Performed by: PAIN MEDICINE

## 2019-04-04 PROCEDURE — 3079F DIAST BP 80-89 MM HG: CPT | Mod: CPTII,S$GLB,, | Performed by: PAIN MEDICINE

## 2019-04-04 PROCEDURE — 99999 PR PBB SHADOW E&M-EST. PATIENT-LVL III: ICD-10-PCS | Mod: PBBFAC,,, | Performed by: PAIN MEDICINE

## 2019-04-04 RX ORDER — GABAPENTIN 600 MG/1
600 TABLET ORAL 3 TIMES DAILY
Qty: 90 TABLET | Refills: 3 | Status: SHIPPED | OUTPATIENT
Start: 2019-04-04 | End: 2019-05-04

## 2019-04-04 NOTE — PATIENT INSTRUCTIONS
-refill gabapentin 600 mg 3 times daily  -call clinic in 3 weeks to see how symptoms are  -follow up in clinic in 8 weeks

## 2019-04-04 NOTE — PROGRESS NOTES
Chief Pain Complaint:  Chief Complaint   Patient presents with    Low-back Pain     History of Present Illness:   Mr. Mayo return to clinic for follow-up.  He was last seen on February 15, 2020 underwent L5/S1 interlaminar epidural steroid injection. Since then he reports having 75% symptomatic relief and had excellent relief for approximately 3 weeks.  He occasionally has shooting pains in the left lateral thigh in the L3 distribution however he reports the symptoms have gotten better since the injection.  He has been taking gabapentin and increase to 600 3 times daily however he ran out of this medication 2-3 days ago.  We will refill this prescription for him today.  He denies having weakness in the legs just occasional numbness in that left lateral thigh.  He denies having bowel bladder difficulties.  He reports his symptoms are worse when he 1st wakes up in the morning and improves throughout the day and can be worsened by standing for long periods.    Initial HPI:  This patient is a 63 y.o. male who presents today complaining of the above noted pain/s. The patient describes the pain as follows.  Mr. Mayo has been having low back pain and left leg pain for approximately last 6 months which has been intermittent.  He denies having an inciting event and has never had surgery on his low back.  Currently rates his pain as a 2/10.  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  He will then have a burning sensation in the left lateral thigh which does not go below the knee.  If he sits down this relieves of symptoms.  Symptoms are aggravated by standing for long periods of time. He denies having weakness however he does report numbness in that left lateral thigh.  He denies having bowel bladder difficulties.  He has not participate in physical therapy nor has he had surgery on hisLumbar spine.  He has seen by Rheumatology and he receives steroid injections  into his hands.    Previous Therapy:  Medications:  Gabapentin  Injections: Lumbar ADRIANNA  Surgeries: None  Physical Therapy:  Recently complete    Past Surgical History:   Procedure Laterality Date    COLONOSCOPY W/ POLYPECTOMY  10, 14 no polyp    CYSTOSCOPY, RETROGRADE, URETEROSCOPY, STENT PLACEMENT Right 3/28/2017    [facility]    KNEE ARTHROSCOPY Right     Meniscus repair    LITHOTRIPSY-LASER Right 3/28/2017    [facility] OR    LIVER BIOPSY  2010    VASECTOMY         Imaging / Labs / Studies (reviewed on 4/4/2019):    Results for orders placed in visit on 11/05/18   MRI Lumbar Spine Without Contrast    Narrative EXAMINATION:  MRI LUMBAR SPINE WITHOUT CONTRAST    CLINICAL HISTORY:  Low back pain, >6wks conservative tx, persistent-progressive sx, surgical candidate; Sciatica, left side    TECHNIQUE:  Multiplanar, multisequence MR images were acquired from the thoracolumbar junction to the sacrum without the administration of contrast.    COMPARISON:  None.    FINDINGS:  Alignment: Normal.    Vertebrae: Degenerative endplate changes noted surrounding L4-5 and L5-S1.  Vertebral body heights are well maintained.  No evidence of fracture or marrow replacement process.    Discs: Moderate disc height loss noted at L4-5.  There is mild disc height loss at L3-4 and L5-S1.    Cord: Normal.  Conus terminates at L1-2    Degenerative findings:    T12-L1:  No spinal canal or neuroforaminal stenosis.    L1-L2:  No spinal canal or neuroforaminal stenosis.    L2-L3: Mild generalized disc bulge.  Mild-to-moderate left neural foraminal narrowing.  No spinal canal stenosis.    L3-L4: Mild generalized disc bulge.  Mild bilateral facet arthropathy.  Small annular fissure noted in the right paracentral region.  Mild right worse than left neural foraminal narrowing.  Mild crowding of the right lateral recess, otherwise no definite spinal canal stenosis.    L4-L5:  "Generalized disc bulge with mild bilateral facet hypertrophy and ligamentum flavum thickening.  Mild spinal canal stenosis with moderate bilateral neural foraminal narrowing.    L5-S1: Mild generalized disc bulge, slightly asymmetric to left.  Moderate bilateral facet hypertrophy.  Moderate bilateral neural foraminal narrowing.  No spinal canal stenosis.    Paraspinal muscles & soft tissues: Unremarkable.      Impression Multilevel degenerative disc disease and facet arthropathy contributing to multilevel neural foraminal narrowing as above.  There is also mild spinal canal stenosis at L4-5.     Review of Systems:  Review of Systems   Constitutional: Negative for fever.   Eyes: Negative for blurred vision.   Respiratory: Negative for cough and wheezing.    Cardiovascular: Negative for chest pain and orthopnea.   Gastrointestinal: Negative for constipation, diarrhea, nausea and vomiting.   Genitourinary: Negative for dysuria.   Musculoskeletal: Positive for back pain.        Left leg pain   Skin: Negative for itching and rash.   Neurological: Negative for focal weakness.   Endo/Heme/Allergies: Does not bruise/bleed easily.       Physical Exam:  /82 (BP Location: Right arm, Patient Position: Sitting, BP Method: Medium (Automatic))   Pulse (!) 57   Resp 16   Ht 5' 8" (1.727 m)   Wt 109.3 kg (241 lb)   BMI 36.64 kg/m²  (reviewed on 4/4/2019)\  General    Constitutional: He is oriented to person, place, and time. He appears well-developed and well-nourished.   HENT:   Head: Normocephalic and atraumatic.   Eyes: EOM are normal.   Neck: Neck supple.   Pulmonary/Chest: Effort normal.   Abdominal: He exhibits no distension.   Neurological: He is alert and oriented to person, place, and time. No cranial nerve deficit.   Psychiatric: He has a normal mood and affect.     General Musculoskeletal Exam   Gait: normal     Back (L-Spine & T-Spine) / Neck (C-Spine) Exam     Back (L-Spine & T-Spine) Range of Motion "   Lateral bend right: normal   Lateral bend left: normal   Rotation right: normal   Rotation left: normal     Spinal Sensation   Right Side Sensation  L-Spine Level: normal  Left Side Sensation  L-Spine Level: normal    Comments:  Negative straight leg raise bilaterally for radicular symptoms      Muscle Strength   Right Lower Extremity   Hip Flexion: 5/5   Quadriceps:  5/5   Hamstrin/5   Anterior tibial:  5/5/5  Left Lower Extremity   Hip Flexion: 5/5   Quadriceps:  5/5   Hamstrin/5   Anterior tibial:  5/5/5     Reflexes     Left Side  Ankle Clonus:  absent    Right Side   Ankle Clonus:  absent      Assessment  Lumbar Radiculopathy  Lumbar Spondylosis    1. 63 y.o. year old patient with PMH of   Past Medical History:   Diagnosis Date    Decreased platelet count     Fatty liver     Gout     HTN (hypertension)     Non-A non-B hepatitis     Obesity (BMI 35.0-39.9 without comorbidity)     Prediabetes       presenting with pain located lumbar spine, left lateral thigh  2. Pain Generators / Etiology: Lumbar Radiculopathy and Lumbar Spondylosis  3. Failed Meds (E- Effective, NE- Not Effective): gabapentin - mildly effective  4. Physical Therapy - Recently Completed and None  5. Psychological comorbidities - None  6. Anticoagulants / Antiplatelets: None     PLAN:  1. Medications:  The refilled gabapentin today 600 mg 3 times daily  2. PT - the patient has completed 6 weeks physical therapy with minimal benefit; he continues to exercise at the gym  3. Psychological - none  4. Labs - obtain  none; reviewed labs from 2018, creatinine 0.8  5. Imaging - obtain  none; reviewed lumbar MRI with patient  6. Interventions - schedule none; consider left L3/4 TFESI in the future  7. Referrals - none  8. Records - none  9. Follow up visit - follow up in clinic in 8 weeks  10. Patient Questions - answered all of the patient's questions regarding diagnosis, therapy, and treatment    JOSE ELIAS Elkins  MD  Interventional Pain  [facility] [place]

## 2019-04-22 ENCOUNTER — PATIENT OUTREACH (OUTPATIENT)
Dept: ADMINISTRATIVE | Facility: HOSPITAL | Age: 64
End: 2019-04-22

## 2019-04-23 ENCOUNTER — TELEPHONE (OUTPATIENT)
Dept: RHEUMATOLOGY | Facility: CLINIC | Age: 64
End: 2019-04-23

## 2019-04-23 NOTE — TELEPHONE ENCOUNTER
Called patient regarding appointment on 9.26.19 at 4.45 pm needing to be rescheduled due to Dr. Curiel being out of clinic that day. Rescheduled appointment to 10.2.19 at 8.45 am. Pt verbalized understanding. Will mail apt slip.

## 2019-05-06 ENCOUNTER — OFFICE VISIT (OUTPATIENT)
Dept: INTERNAL MEDICINE | Facility: CLINIC | Age: 64
End: 2019-05-06
Payer: COMMERCIAL

## 2019-05-06 VITALS
SYSTOLIC BLOOD PRESSURE: 128 MMHG | OXYGEN SATURATION: 97 % | HEART RATE: 61 BPM | BODY MASS INDEX: 36.97 KG/M2 | TEMPERATURE: 98 F | DIASTOLIC BLOOD PRESSURE: 84 MMHG | WEIGHT: 243.19 LBS

## 2019-05-06 DIAGNOSIS — D69.6 THROMBOCYTOPENIA: ICD-10-CM

## 2019-05-06 DIAGNOSIS — I10 ESSENTIAL HYPERTENSION: Primary | ICD-10-CM

## 2019-05-06 DIAGNOSIS — R73.9 HYPERGLYCEMIA: ICD-10-CM

## 2019-05-06 LAB
BASOPHILS # BLD AUTO: 0.05 K/UL (ref 0–0.2)
BASOPHILS NFR BLD: 0.8 % (ref 0–1.9)
DIFFERENTIAL METHOD: ABNORMAL
EOSINOPHIL # BLD AUTO: 0.3 K/UL (ref 0–0.5)
EOSINOPHIL NFR BLD: 5 % (ref 0–8)
ERYTHROCYTE [DISTWIDTH] IN BLOOD BY AUTOMATED COUNT: 13.4 % (ref 11.5–14.5)
ESTIMATED AVG GLUCOSE: 111 MG/DL (ref 68–131)
HBA1C MFR BLD HPLC: 5.5 % (ref 4–5.6)
HCT VFR BLD AUTO: 44 % (ref 40–54)
HGB BLD-MCNC: 14.9 G/DL (ref 14–18)
IMM GRANULOCYTES # BLD AUTO: 0.01 K/UL (ref 0–0.04)
IMM GRANULOCYTES NFR BLD AUTO: 0.2 % (ref 0–0.5)
LYMPHOCYTES # BLD AUTO: 2.1 K/UL (ref 1–4.8)
LYMPHOCYTES NFR BLD: 34.1 % (ref 18–48)
MCH RBC QN AUTO: 32.3 PG (ref 27–31)
MCHC RBC AUTO-ENTMCNC: 33.9 G/DL (ref 32–36)
MCV RBC AUTO: 95 FL (ref 82–98)
MONOCYTES # BLD AUTO: 0.7 K/UL (ref 0.3–1)
MONOCYTES NFR BLD: 10.8 % (ref 4–15)
NEUTROPHILS # BLD AUTO: 3 K/UL (ref 1.8–7.7)
NEUTROPHILS NFR BLD: 49.1 % (ref 38–73)
NRBC BLD-RTO: 0 /100 WBC
PLATELET # BLD AUTO: 113 K/UL (ref 150–350)
PMV BLD AUTO: 12 FL (ref 9.2–12.9)
RBC # BLD AUTO: 4.62 M/UL (ref 4.6–6.2)
WBC # BLD AUTO: 6.01 K/UL (ref 3.9–12.7)

## 2019-05-06 PROCEDURE — 99213 OFFICE O/P EST LOW 20 MIN: CPT | Mod: S$GLB,,, | Performed by: FAMILY MEDICINE

## 2019-05-06 PROCEDURE — 3074F SYST BP LT 130 MM HG: CPT | Mod: CPTII,S$GLB,, | Performed by: FAMILY MEDICINE

## 2019-05-06 PROCEDURE — 3079F DIAST BP 80-89 MM HG: CPT | Mod: CPTII,S$GLB,, | Performed by: FAMILY MEDICINE

## 2019-05-06 PROCEDURE — 99499 UNLISTED E&M SERVICE: CPT | Mod: S$GLB,,, | Performed by: FAMILY MEDICINE

## 2019-05-06 PROCEDURE — 3008F PR BODY MASS INDEX (BMI) DOCUMENTED: ICD-10-PCS | Mod: CPTII,S$GLB,, | Performed by: FAMILY MEDICINE

## 2019-05-06 PROCEDURE — 85025 COMPLETE CBC W/AUTO DIFF WBC: CPT

## 2019-05-06 PROCEDURE — 83036 HEMOGLOBIN GLYCOSYLATED A1C: CPT

## 2019-05-06 PROCEDURE — 3008F BODY MASS INDEX DOCD: CPT | Mod: CPTII,S$GLB,, | Performed by: FAMILY MEDICINE

## 2019-05-06 PROCEDURE — 3074F PR MOST RECENT SYSTOLIC BLOOD PRESSURE < 130 MM HG: ICD-10-PCS | Mod: CPTII,S$GLB,, | Performed by: FAMILY MEDICINE

## 2019-05-06 PROCEDURE — 3079F PR MOST RECENT DIASTOLIC BLOOD PRESSURE 80-89 MM HG: ICD-10-PCS | Mod: CPTII,S$GLB,, | Performed by: FAMILY MEDICINE

## 2019-05-06 PROCEDURE — 99999 PR PBB SHADOW E&M-EST. PATIENT-LVL III: CPT | Mod: PBBFAC,,, | Performed by: FAMILY MEDICINE

## 2019-05-06 PROCEDURE — 99999 PR PBB SHADOW E&M-EST. PATIENT-LVL III: ICD-10-PCS | Mod: PBBFAC,,, | Performed by: FAMILY MEDICINE

## 2019-05-06 PROCEDURE — 99499 RISK ADDL DX/OHS AUDIT: ICD-10-PCS | Mod: S$GLB,,, | Performed by: FAMILY MEDICINE

## 2019-05-06 PROCEDURE — 99213 PR OFFICE/OUTPT VISIT, EST, LEVL III, 20-29 MIN: ICD-10-PCS | Mod: S$GLB,,, | Performed by: FAMILY MEDICINE

## 2019-05-06 NOTE — PROGRESS NOTES
Subjective:       Patient ID: Jasson Mayo is a 63 y.o. male.    Chief Complaint: 6 mth check up    Follow-up hypertension thrombocytopenia hyperglycemia elevated liver functions.  He is followed by Rheumatology regards scalp.  He is followed by pain management regards low back pain. Currently is on gabapentin 3 times a day.  He denies headache chest pain palpitations shortness of breath edema. Recent CMP was done.  Glucose mildly elevated and liver functions were now normal.    Review of Systems   Constitutional: Negative for appetite change, fatigue and unexpected weight change.   Respiratory: Negative for cough, shortness of breath and wheezing.    Cardiovascular: Negative for chest pain, palpitations and leg swelling.   Gastrointestinal: Negative for abdominal pain.   Genitourinary: Negative for difficulty urinating.   Musculoskeletal: Positive for back pain.   Neurological: Negative for headaches.       Objective:      Physical Exam   Constitutional: He is oriented to person, place, and time. He appears well-developed and well-nourished. No distress.   Neck: Neck supple. No thyromegaly present.   Cardiovascular: Normal rate, regular rhythm and normal heart sounds.   No murmur heard.  Pulmonary/Chest: Effort normal and breath sounds normal. No respiratory distress. He has no wheezes.   Abdominal: Soft. Bowel sounds are normal. He exhibits no mass. There is no tenderness.   Lymphadenopathy:     He has no cervical adenopathy.   Neurological: He is alert and oriented to person, place, and time.       Lab Visit on 03/25/2019   Component Date Value Ref Range Status    Sodium 03/25/2019 141  136 - 145 mmol/L Final    Potassium 03/25/2019 4.1  3.5 - 5.1 mmol/L Final    Chloride 03/25/2019 106  95 - 110 mmol/L Final    CO2 03/25/2019 26  23 - 29 mmol/L Final    Glucose 03/25/2019 112* 70 - 110 mg/dL Final    BUN, Bld 03/25/2019 23  8 - 23 mg/dL Final    Creatinine 03/25/2019 0.9  0.5 - 1.4 mg/dL Final     Calcium 03/25/2019 9.4  8.7 - 10.5 mg/dL Final    Total Protein 03/25/2019 6.8  6.0 - 8.4 g/dL Final    Albumin 03/25/2019 3.3* 3.5 - 5.2 g/dL Final    Total Bilirubin 03/25/2019 0.2  0.1 - 1.0 mg/dL Final    Alkaline Phosphatase 03/25/2019 109  55 - 135 U/L Final    AST 03/25/2019 35  10 - 40 U/L Final    ALT 03/25/2019 37  10 - 44 U/L Final    Anion Gap 03/25/2019 9  8 - 16 mmol/L Final    eGFR if African American 03/25/2019 >60.0  >60 mL/min/1.73 m^2 Final    eGFR if non African American 03/25/2019 >60.0  >60 mL/min/1.73 m^2 Final    Uric Acid 03/25/2019 5.3  3.4 - 7.0 mg/dL Final     Assessment:       1. Hyperglycemia    2. Thrombocytopenia        Plan:     Blood pressure is controlled 128/84.  Continue medications.  CBC A1c ordered.  Discuss avoiding sugar and decreased white starches.  Follow-up in 6 months.  Will need pneumococcal immunization on return.  Discussed need for shingles immunization at the pharmacy  Hyperglycemia  -     Hemoglobin A1c    Thrombocytopenia  -     CBC auto differential

## 2019-06-06 ENCOUNTER — OFFICE VISIT (OUTPATIENT)
Dept: PAIN MEDICINE | Facility: CLINIC | Age: 64
End: 2019-06-06
Payer: COMMERCIAL

## 2019-06-06 VITALS
WEIGHT: 244.94 LBS | SYSTOLIC BLOOD PRESSURE: 129 MMHG | HEIGHT: 68 IN | BODY MASS INDEX: 37.12 KG/M2 | HEART RATE: 73 BPM | DIASTOLIC BLOOD PRESSURE: 87 MMHG

## 2019-06-06 DIAGNOSIS — M54.16 LUMBAR RADICULOPATHY: Primary | ICD-10-CM

## 2019-06-06 PROCEDURE — 3074F PR MOST RECENT SYSTOLIC BLOOD PRESSURE < 130 MM HG: ICD-10-PCS | Mod: CPTII,S$GLB,, | Performed by: PAIN MEDICINE

## 2019-06-06 PROCEDURE — 99999 PR PBB SHADOW E&M-EST. PATIENT-LVL III: ICD-10-PCS | Mod: PBBFAC,,, | Performed by: PAIN MEDICINE

## 2019-06-06 PROCEDURE — 99999 PR PBB SHADOW E&M-EST. PATIENT-LVL III: CPT | Mod: PBBFAC,,, | Performed by: PAIN MEDICINE

## 2019-06-06 PROCEDURE — 3008F PR BODY MASS INDEX (BMI) DOCUMENTED: ICD-10-PCS | Mod: CPTII,S$GLB,, | Performed by: PAIN MEDICINE

## 2019-06-06 PROCEDURE — 3008F BODY MASS INDEX DOCD: CPT | Mod: CPTII,S$GLB,, | Performed by: PAIN MEDICINE

## 2019-06-06 PROCEDURE — 99214 PR OFFICE/OUTPT VISIT, EST, LEVL IV, 30-39 MIN: ICD-10-PCS | Mod: S$GLB,,, | Performed by: PAIN MEDICINE

## 2019-06-06 PROCEDURE — 3079F DIAST BP 80-89 MM HG: CPT | Mod: CPTII,S$GLB,, | Performed by: PAIN MEDICINE

## 2019-06-06 PROCEDURE — 3074F SYST BP LT 130 MM HG: CPT | Mod: CPTII,S$GLB,, | Performed by: PAIN MEDICINE

## 2019-06-06 PROCEDURE — 3079F PR MOST RECENT DIASTOLIC BLOOD PRESSURE 80-89 MM HG: ICD-10-PCS | Mod: CPTII,S$GLB,, | Performed by: PAIN MEDICINE

## 2019-06-06 PROCEDURE — 99214 OFFICE O/P EST MOD 30 MIN: CPT | Mod: S$GLB,,, | Performed by: PAIN MEDICINE

## 2019-06-06 RX ORDER — GABAPENTIN 300 MG/1
300 CAPSULE ORAL 3 TIMES DAILY
Qty: 90 CAPSULE | Refills: 3 | Status: SHIPPED | OUTPATIENT
Start: 2019-06-06 | End: 2019-07-06

## 2019-06-06 NOTE — PROGRESS NOTES
Chief Pain Complaint:  Low back and left leg pain    History of Present Illness:   Mr. Mayo return to clinic for follow-up.  He underwent interlaminar epidural steroid injection in February 2019 reports having 95% ongoing relief however occasionally does have burning sensation is left lateral lower extremity.  Today rates his pain as a 2/10 and reports that his pain fluctuates throughout the day.  He continues to take gabapentin 300 mg 3 times daily which does provide some benefit.  He denies having numbness and weakness in the lower extremity.  He reports that his sister had a spinal cord stimulator trial recently which provided great benefit for her some much of our visit today was spent discussing possible spinal cord stimulator trial for him.  His symptoms are worse mostly with activity such as leaning forward and are improved with rest.  He denies having bowel bladder difficulty    Initial HPI:  This patient is a 63 y.o. male who presents today complaining of the above noted pain/s. The patient describes the pain as follows.  Mr. Mayo has been having low back pain and left leg pain for approximately last 6 months which has been intermittent.  He denies having an inciting event and has never had surgery on his low back.  Currently rates his pain as a 2/10.  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  He will then have a burning sensation in the left lateral thigh which does not go below the knee.  If he sits down this relieves of symptoms.  Symptoms are aggravated by standing for long periods of time. He denies having weakness however he does report numbness in that left lateral thigh.  He denies having bowel bladder difficulties.  He has not participate in physical therapy nor has he had surgery on hisLumbar spine.  He has seen by Rheumatology and he receives steroid injections into his hands.    Previous Therapy:  Medications:   Gabapentin  Injections: Lumbar ADRIANNA  Surgeries: None  Physical Therapy:  Completed in the past    Past Surgical History:   Procedure Laterality Date    COLONOSCOPY W/ POLYPECTOMY  10, 14 no polyp    CYSTOSCOPY, RETROGRADE, URETEROSCOPY, STENT PLACEMENT Right 3/28/2017    Per[facility]    KNEE ARTHROSCOPY Right     Meniscus repair    LITHOTRIPSY-LASER Right 3/28/2017    [facility] at [facility] OR    LIVER BIOPSY  2010    VASECTOMY         Imaging / Labs / Studies (reviewed on 6/6/2019):    Results for orders placed in visit on 11/05/18   MRI Lumbar Spine Without Contrast    Narrative EXAMINATION:  MRI LUMBAR SPINE WITHOUT CONTRAST    CLINICAL HISTORY:  Low back pain, >6wks conservative tx, persistent-progressive sx, surgical candidate; Sciatica, left side    TECHNIQUE:  Multiplanar, multisequence MR images were acquired from the thoracolumbar junction to the sacrum without the administration of contrast.    COMPARISON:  None.    FINDINGS:  Alignment: Normal.    Vertebrae: Degenerative endplate changes noted surrounding L4-5 and L5-S1.  Vertebral body heights are well maintained.  No evidence of fracture or marrow replacement process.    Discs: Moderate disc height loss noted at L4-5.  There is mild disc height loss at L3-4 and L5-S1.    Cord: Normal.  Conus terminates at L1-2    Degenerative findings:    T12-L1:  No spinal canal or neuroforaminal stenosis.    L1-L2:  No spinal canal or neuroforaminal stenosis.    L2-L3: Mild generalized disc bulge.  Mild-to-moderate left neural foraminal narrowing.  No spinal canal stenosis.    L3-L4: Mild generalized disc bulge.  Mild bilateral facet arthropathy.  Small annular fissure noted in the right paracentral region.  Mild right worse than left neural foraminal narrowing.  Mild crowding of the right lateral recess, otherwise no definite spinal canal stenosis.    L4-L5: Generalized disc bulge with mild bilateral facet  "hypertrophy and ligamentum flavum thickening.  Mild spinal canal stenosis with moderate bilateral neural foraminal narrowing.    L5-S1: Mild generalized disc bulge, slightly asymmetric to left.  Moderate bilateral facet hypertrophy.  Moderate bilateral neural foraminal narrowing.  No spinal canal stenosis.    Paraspinal muscles & soft tissues: Unremarkable.      Impression Multilevel degenerative disc disease and facet arthropathy contributing to multilevel neural foraminal narrowing as above.  There is also mild spinal canal stenosis at L4-5.     Review of Systems:  Review of Systems   Constitutional: Negative for fever.   Eyes: Negative for blurred vision.   Respiratory: Negative for cough and wheezing.    Cardiovascular: Negative for chest pain and orthopnea.   Gastrointestinal: Negative for constipation, diarrhea, nausea and vomiting.   Genitourinary: Negative for dysuria.   Musculoskeletal: Positive for back pain.        Left leg pain   Skin: Negative for itching and rash.   Neurological: Negative for focal weakness.   Endo/Heme/Allergies: Does not bruise/bleed easily.       Physical Exam:  /87 (BP Location: Left arm, Patient Position: Sitting, BP Method: Large (Automatic))   Pulse 73   Ht 5' 8" (1.727 m)   Wt 111.1 kg (244 lb 14.9 oz)   BMI 37.24 kg/m²  (reviewed on 6/6/2019)\  General    Constitutional: He is oriented to person, place, and time. He appears well-developed and well-nourished.   HENT:   Head: Normocephalic and atraumatic.   Eyes: EOM are normal.   Neck: Neck supple.   Pulmonary/Chest: Effort normal.   Abdominal: He exhibits no distension.   Neurological: He is alert and oriented to person, place, and time. No cranial nerve deficit.   Psychiatric: He has a normal mood and affect.     General Musculoskeletal Exam   Gait: normal     Back (L-Spine & T-Spine) / Neck (C-Spine) Exam     Back (L-Spine & T-Spine) Range of Motion   Lateral bend right: normal   Lateral bend left: normal "   Rotation right: normal   Rotation left: normal     Spinal Sensation   Right Side Sensation  L-Spine Level: normal  Left Side Sensation  L-Spine Level: normal    Comments:  Negative straight leg raise bilaterally for radicular symptoms      Muscle Strength   Right Lower Extremity   Hip Flexion: 5/5   Quadriceps:  5/5   Hamstrin/5   Anterior tibial:  5/5/5  Left Lower Extremity   Hip Flexion: 5/5   Quadriceps:  5/5   Hamstrin/5   Anterior tibial:  5/5/5     Reflexes     Left Side  Ankle Clonus:  absent    Right Side   Ankle Clonus:  absent      Assessment  Lumbar Radiculopathy  Lumbar Spondylosis    1. 63 y.o. year old patient with PMH of   Past Medical History:   Diagnosis Date    Decreased platelet count     Fatty liver     Gout     HTN (hypertension)     Non-A non-B hepatitis     Obesity (BMI 35.0-39.9 without comorbidity)     Prediabetes       presenting with pain located lumbar spine, left lateral thigh  2. Pain Generators / Etiology: Lumbar Radiculopathy and Lumbar Spondylosis  3. Failed Meds (E- Effective, NE- Not Effective): gabapentin - mildly effective  4. Physical Therapy - Recently Completed and None  5. Psychological comorbidities - None  6. Anticoagulants / Antiplatelets: None     PLAN:  1. Medications:  refilled gabapentin today 300 mg 3 times daily  2. PT - the patient has completed 6 weeks physical therapy with minimal benefit; he continues to exercise at the gym  3. Psychological - none  4. Labs - obtain  none; reviewed labs from 2018, creatinine 0.8  5. Imaging - obtain  none; reviewed lumbar MRI with patient  6. Interventions - schedule none; consider spinal cord stimulator trial in the future  7. Referrals - none  8. Records - none  9. Follow up visit - follow up in clinic in 4 weeks  10. Patient Questions - answered all of the patient's questions regarding diagnosis, therapy, and treatment    JOSE ELIAS Elkins MD  Interventional Pain  [facility] [place]  [place]

## 2019-06-06 NOTE — PATIENT INSTRUCTIONS
-refilled gabapentin today  -will have a representative about spinal cord stimulation reach IN TCU  -follow up in clinic in 4 weeks

## 2019-07-23 ENCOUNTER — OFFICE VISIT (OUTPATIENT)
Dept: PAIN MEDICINE | Facility: CLINIC | Age: 64
End: 2019-07-23
Payer: MEDICARE

## 2019-07-23 VITALS
DIASTOLIC BLOOD PRESSURE: 84 MMHG | WEIGHT: 244 LBS | RESPIRATION RATE: 18 BRPM | SYSTOLIC BLOOD PRESSURE: 126 MMHG | HEIGHT: 68 IN | BODY MASS INDEX: 36.98 KG/M2 | HEART RATE: 62 BPM

## 2019-07-23 DIAGNOSIS — G89.29 CHRONIC MIDLINE LOW BACK PAIN WITHOUT SCIATICA: ICD-10-CM

## 2019-07-23 DIAGNOSIS — M51.36 DDD (DEGENERATIVE DISC DISEASE), LUMBAR: ICD-10-CM

## 2019-07-23 DIAGNOSIS — M54.50 CHRONIC MIDLINE LOW BACK PAIN WITHOUT SCIATICA: ICD-10-CM

## 2019-07-23 DIAGNOSIS — M54.16 LUMBAR RADICULOPATHY: Primary | ICD-10-CM

## 2019-07-23 DIAGNOSIS — M54.32 SCIATICA OF LEFT SIDE: ICD-10-CM

## 2019-07-23 PROCEDURE — 3079F PR MOST RECENT DIASTOLIC BLOOD PRESSURE 80-89 MM HG: ICD-10-PCS | Mod: CPTII,S$GLB,, | Performed by: PHYSICIAN ASSISTANT

## 2019-07-23 PROCEDURE — 99999 PR PBB SHADOW E&M-EST. PATIENT-LVL III: CPT | Mod: PBBFAC,,, | Performed by: PHYSICIAN ASSISTANT

## 2019-07-23 PROCEDURE — 3008F PR BODY MASS INDEX (BMI) DOCUMENTED: ICD-10-PCS | Mod: CPTII,S$GLB,, | Performed by: PHYSICIAN ASSISTANT

## 2019-07-23 PROCEDURE — 3079F DIAST BP 80-89 MM HG: CPT | Mod: CPTII,S$GLB,, | Performed by: PHYSICIAN ASSISTANT

## 2019-07-23 PROCEDURE — 99214 OFFICE O/P EST MOD 30 MIN: CPT | Mod: S$GLB,,, | Performed by: PHYSICIAN ASSISTANT

## 2019-07-23 PROCEDURE — 99214 PR OFFICE/OUTPT VISIT, EST, LEVL IV, 30-39 MIN: ICD-10-PCS | Mod: S$GLB,,, | Performed by: PHYSICIAN ASSISTANT

## 2019-07-23 PROCEDURE — 3008F BODY MASS INDEX DOCD: CPT | Mod: CPTII,S$GLB,, | Performed by: PHYSICIAN ASSISTANT

## 2019-07-23 PROCEDURE — 3074F PR MOST RECENT SYSTOLIC BLOOD PRESSURE < 130 MM HG: ICD-10-PCS | Mod: CPTII,S$GLB,, | Performed by: PHYSICIAN ASSISTANT

## 2019-07-23 PROCEDURE — 99999 PR PBB SHADOW E&M-EST. PATIENT-LVL III: ICD-10-PCS | Mod: PBBFAC,,, | Performed by: PHYSICIAN ASSISTANT

## 2019-07-23 PROCEDURE — 3074F SYST BP LT 130 MM HG: CPT | Mod: CPTII,S$GLB,, | Performed by: PHYSICIAN ASSISTANT

## 2019-07-23 RX ORDER — GABAPENTIN 400 MG/1
400 CAPSULE ORAL 3 TIMES DAILY
Qty: 90 CAPSULE | Refills: 1 | Status: SHIPPED | OUTPATIENT
Start: 2019-07-23 | End: 2019-09-19 | Stop reason: SDUPTHER

## 2019-07-23 NOTE — PROGRESS NOTES
Chief Pain Complaint:  Low back and left leg pain    Interval History: Since last visit on 6/6/2019, no major changes.  He reports pain 4/10 today. He feels the injection only lasted a few weeks, then pain returned suddenly.    Interval History: Mr. Mayo return to clinic for follow-up.  He underwent interlaminar epidural steroid injection in February 2019 reports having 95% ongoing relief however occasionally does have burning sensation is left lateral lower extremity.  Today rates his pain as a 2/10 and reports that his pain fluctuates throughout the day.  He continues to take gabapentin 300 mg 3 times daily which does provide some benefit.  He denies having numbness and weakness in the lower extremity.  He reports that his sister had a spinal cord stimulator trial recently which provided great benefit for her some much of our visit today was spent discussing possible spinal cord stimulator trial for him.  His symptoms are worse mostly with activity such as leaning forward and are improved with rest.  He denies having bowel bladder difficulty     History of Present Illness:   This patient is a 63 y.o. male who presents today complaining of the above noted pain/s. The patient describes the pain as follows.  Mr. Mayo has been having low back pain and left leg pain for approximately last 6 months which has been intermittent.  He denies having an inciting event and has never had surgery on his low back.  Currently rates his pain as a 2/10.  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  He will then have a burning sensation in the left lateral thigh which does not go below the knee.  If he sits down this relieves of symptoms.  Symptoms are aggravated by standing for long periods of time. He denies having weakness however he does report numbness in that left lateral thigh.  He denies having bowel bladder difficulties.  He has not participate in physical therapy  nor has he had surgery on hisLumbar spine.  He has seen by Rheumatology and he receives steroid injections into his hands.    Previous Therapy:  Medications:  Gabapentin  Injections: L5/S1 IL ADRIANNA on 2-15-19 with ~95% pain relief with about 3-4 weeks relief with quickly returning pain  Surgeries: No spinal surgeries  Physical Therapy:  Completed in the past    Past Surgical History:   Procedure Laterality Date    COLONOSCOPY W/ POLYPECTOMY  10, 14 no polyp    CYSTOSCOPY, RETROGRADE, URETEROSCOPY, STENT PLACEMENT Right 3/28/2017    Performed by [facility] at [facility] OR    KNEE ARTHROSCOPY Right     Meniscus repair    LITHOTRIPSY-LASER Right 3/28/2017    Per[facility] at [facility] OR    LIVER BIOPSY  2010    VASECTOMY         Imaging / Labs / Studies (reviewed on 7/23/2019):    Results for orders placed in visit on 11/05/18   MRI Lumbar Spine Without Contrast    Narrative COMPARISON:  None.  FINDINGS:  Alignment: Normal.  Vertebrae: Degenerative endplate changes noted surrounding L4-5 and L5-S1.  Vertebral body heights are well maintained.  No evidence of fracture or marrow replacement process.  Discs: Moderate disc height loss noted at L4-5.  There is mild disc height loss at L3-4 and L5-S1.  Cord: Normal.  Conus terminates at L1-2  Degenerative findings:  T12-L1:  No spinal canal or neuroforaminal stenosis.  L1-L2:  No spinal canal or neuroforaminal stenosis.  L2-L3: Mild generalized disc bulge.  Mild-to-moderate left neural foraminal narrowing.  No spinal canal stenosis.  L3-L4: Mild generalized disc bulge.  Mild bilateral facet arthropathy.  Small annular fissure noted in the right paracentral region.  Mild right worse than left neural foraminal narrowing.  Mild crowding of the right lateral recess, otherwise no definite spinal canal stenosis.  L4-L5: Generalized disc bulge with mild bilateral facet hypertrophy and ligamentum flavum thickening.  Mild spinal canal stenosis with moderate  "bilateral neural foraminal narrowing.  L5-S1: Mild generalized disc bulge, slightly asymmetric to left.  Moderate bilateral facet hypertrophy.  Moderate bilateral neural foraminal narrowing.  No spinal canal stenosis.  Paraspinal muscles & soft tissues: Unremarkable.    Impression Multilevel degenerative disc disease and facet arthropathy contributing to multilevel neural foraminal narrowing as above.  There is also mild spinal canal stenosis at L4-5.       Review of Systems:  Review of Systems   Constitutional: Negative for fever.   Eyes: Negative for blurred vision.   Respiratory: Negative for cough and wheezing.    Cardiovascular: Negative for chest pain and orthopnea.   Gastrointestinal: Negative for constipation, diarrhea, nausea and vomiting.   Genitourinary: Negative for dysuria.   Musculoskeletal: Positive for back pain.        Left leg pain   Skin: Negative for itching and rash.   Neurological: Negative for focal weakness.   Endo/Heme/Allergies: Does not bruise/bleed easily.       Physical Exam:  Vitals:  /84 (BP Location: Right arm, Patient Position: Sitting, BP Method: Medium (Automatic))   Pulse 62   Resp 18   Ht 5' 8" (1.727 m)   Wt 110.7 kg (244 lb)   BMI 37.10 kg/m²   (reviewed on 7/23/2019)    General: alert and oriented, in no apparent distress.  Gait: normal gait.  Skin: no rashes, no discoloration, no obvious lesions  HEENT: normocephalic, atraumatic. Pupils equal and round.  Cardiovascular: no significant peripheral edema present.  Respiratory: without use of accessory muscles of respiration.    Musculoskeletal - Lumbar Spine:  - Pain on flexion of lumbar spine: Present  - Pain on extension of lumbar spine: Absent         - Lumbar facet loading: Absent   - TTP over the lumbar facet joints: Absent  - TTP over the lumbar paraspinals: Absent  - Straight Leg Raise: Negative  Neuro - Lower Extremities:  - RLE Strength:     >> 5/5 strength with right hip flexion/ extension    >> 5/5 strength " with right knee flexion/ extension    >> 5/5 strength in right ankle with plantar and dorsiflexion  - LLE Strength:     >> 5/5 strength with left hip flexion/ extension    >> 5/5 strength with knee flexion extension on the left     >> 5/5 strength in left ankle with plantar and dorsiflexion  - Ankle Clonus:  Absent bilaterally      Psych:  Mood and affect is appropriate      Assessment  1. 63 y.o. year old patient with PMH of   Past Medical History:   Diagnosis Date    Decreased platelet count     Fatty liver     Gout     HTN (hypertension) 2000    Non-A non-B hepatitis 1990    Obesity (BMI 35.0-39.9 without comorbidity)     Prediabetes 2015      presenting with pain located lumbar spine, left lateral thigh. Diagnoses include:    ICD-10-CM ICD-9-CM   1. Lumbar radiculopathy M54.16 724.4   2. Chronic midline low back pain without sciatica M54.5 724.2    G89.29 338.29   3. Sciatica of left side M54.32 724.3   4. DDD (degenerative disc disease), lumbar M51.36 722.52      2. Pain Generators / Etiology: Lumbar Radiculopathy and Lumbar Spondylosis  3. Failed Meds (E- Effective, NE- Not Effective): gabapentin - mildly effective  4. Physical Therapy - Recently Completed and None  5. Psychological comorbidities - None  6. Anticoagulants / Antiplatelets: None       Plan:  1. Interventional:   - Consider spinal cord stimulator trial.  He will discuss with his wife and send message on [facility].   - Consider repeat lumbar ADRIANNA.  - Consider referral to [facility] [place] for surgical opinion.     2. Pharmacologic: Increase gabapentin 300 mg to 400mg TID.    3. Rehabilitative: Encouraged regular exercise; he continues to exercise at the gym. Patient has completed 6 weeks physical therapy with minimal benefit.    4. Diagnostic: None for now.    5. Follow up: PRN    - I discussed the risks, benefits, and alternatives to potential treatment options. All questions and concerns were fully addressed today in clinic. Dr. Elkins was consulted  regarding the patient plan and agrees.

## 2019-08-23 ENCOUNTER — TELEPHONE (OUTPATIENT)
Dept: PAIN MEDICINE | Facility: CLINIC | Age: 64
End: 2019-08-23

## 2019-08-23 NOTE — TELEPHONE ENCOUNTER
Scheduled appt for PA-C on next Friday at the [facility] per his request. No further concerns.

## 2019-08-30 ENCOUNTER — OFFICE VISIT (OUTPATIENT)
Dept: PAIN MEDICINE | Facility: CLINIC | Age: 64
End: 2019-08-30
Payer: MEDICARE

## 2019-08-30 VITALS
BODY MASS INDEX: 37.89 KG/M2 | DIASTOLIC BLOOD PRESSURE: 71 MMHG | HEART RATE: 62 BPM | RESPIRATION RATE: 18 BRPM | HEIGHT: 68 IN | WEIGHT: 250 LBS | SYSTOLIC BLOOD PRESSURE: 103 MMHG

## 2019-08-30 DIAGNOSIS — M54.16 LUMBAR RADICULOPATHY: Primary | ICD-10-CM

## 2019-08-30 DIAGNOSIS — M51.36 DDD (DEGENERATIVE DISC DISEASE), LUMBAR: ICD-10-CM

## 2019-08-30 DIAGNOSIS — M54.14 THORACIC RADICULOPATHY: ICD-10-CM

## 2019-08-30 DIAGNOSIS — M54.32 SCIATICA OF LEFT SIDE: ICD-10-CM

## 2019-08-30 DIAGNOSIS — M54.50 CHRONIC MIDLINE LOW BACK PAIN WITHOUT SCIATICA: ICD-10-CM

## 2019-08-30 DIAGNOSIS — G89.29 CHRONIC MIDLINE LOW BACK PAIN WITHOUT SCIATICA: ICD-10-CM

## 2019-08-30 PROCEDURE — 3008F BODY MASS INDEX DOCD: CPT | Mod: CPTII,S$GLB,, | Performed by: PHYSICIAN ASSISTANT

## 2019-08-30 PROCEDURE — 3074F SYST BP LT 130 MM HG: CPT | Mod: CPTII,S$GLB,, | Performed by: PHYSICIAN ASSISTANT

## 2019-08-30 PROCEDURE — 1101F PR PT FALLS ASSESS DOC 0-1 FALLS W/OUT INJ PAST YR: ICD-10-PCS | Mod: CPTII,S$GLB,, | Performed by: PHYSICIAN ASSISTANT

## 2019-08-30 PROCEDURE — 3008F PR BODY MASS INDEX (BMI) DOCUMENTED: ICD-10-PCS | Mod: CPTII,S$GLB,, | Performed by: PHYSICIAN ASSISTANT

## 2019-08-30 PROCEDURE — 1101F PT FALLS ASSESS-DOCD LE1/YR: CPT | Mod: CPTII,S$GLB,, | Performed by: PHYSICIAN ASSISTANT

## 2019-08-30 PROCEDURE — 99214 PR OFFICE/OUTPT VISIT, EST, LEVL IV, 30-39 MIN: ICD-10-PCS | Mod: S$GLB,,, | Performed by: PHYSICIAN ASSISTANT

## 2019-08-30 PROCEDURE — 99999 PR PBB SHADOW E&M-EST. PATIENT-LVL IV: ICD-10-PCS | Mod: PBBFAC,,, | Performed by: PHYSICIAN ASSISTANT

## 2019-08-30 PROCEDURE — 3074F PR MOST RECENT SYSTOLIC BLOOD PRESSURE < 130 MM HG: ICD-10-PCS | Mod: CPTII,S$GLB,, | Performed by: PHYSICIAN ASSISTANT

## 2019-08-30 PROCEDURE — 99214 OFFICE O/P EST MOD 30 MIN: CPT | Mod: S$GLB,,, | Performed by: PHYSICIAN ASSISTANT

## 2019-08-30 PROCEDURE — 99999 PR PBB SHADOW E&M-EST. PATIENT-LVL IV: CPT | Mod: PBBFAC,,, | Performed by: PHYSICIAN ASSISTANT

## 2019-08-30 PROCEDURE — 3078F PR MOST RECENT DIASTOLIC BLOOD PRESSURE < 80 MM HG: ICD-10-PCS | Mod: CPTII,S$GLB,, | Performed by: PHYSICIAN ASSISTANT

## 2019-08-30 PROCEDURE — 3078F DIAST BP <80 MM HG: CPT | Mod: CPTII,S$GLB,, | Performed by: PHYSICIAN ASSISTANT

## 2019-08-30 NOTE — PROGRESS NOTES
Chief Pain Complaint:  Low back and left leg pain    Interval History: Since last visit on 7/23/2019, he has decided he wants to move forward with SCS trial.  His pain is consistent.     Interval History: Since last visit on 6/6/2019, no major changes.  He reports pain 4/10 today. He feels the injection only lasted a few weeks, then pain returned suddenly.    Interval History: Mr. Mayo return to clinic for follow-up.  He underwent interlaminar epidural steroid injection in February 2019 reports having 95% ongoing relief however occasionally does have burning sensation is left lateral lower extremity.  Today rates his pain as a 2/10 and reports that his pain fluctuates throughout the day.  He continues to take gabapentin 300 mg 3 times daily which does provide some benefit.  He denies having numbness and weakness in the lower extremity.  He reports that his sister had a spinal cord stimulator trial recently which provided great benefit for her some much of our visit today was spent discussing possible spinal cord stimulator trial for him.  His symptoms are worse mostly with activity such as leaning forward and are improved with rest.  He denies having bowel bladder difficulty    History of Present Illness:   This patient is a 64 y.o. male who presents today complaining of the above noted pain/s. The patient describes the pain as follows.  Mr. Mayo has been having low back pain and left leg pain for approximately last 6 months which has been intermittent.  He denies having an inciting event and has never had surgery on his low back.  Currently rates his pain as a 2/10.  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  He will then have a burning sensation in the left lateral thigh which does not go below the knee.  If he sits down this relieves of symptoms.  Symptoms are aggravated by standing for long periods of time. He denies having weakness however he does  report numbness in that left lateral thigh.  He denies having bowel bladder difficulties.  He has not participate in physical therapy nor has he had surgery on hisLumbar spine.  He has seen by Rheumatology and he receives steroid injections into his hands.    Previous Therapy:  Medications:  Gabapentin  Injections: L5/S1 IL ADRIANNA on 2-15-19 with ~95% pain relief with about 3-4 weeks relief with quickly returning pain  Surgeries: No spinal surgeries  Physical Therapy:  Completed in the past    Past Surgical History:   Procedure Laterality Date    COLONOSCOPY W/ POLYPECTOMY  10, 14 no polyp    CYSTOSCOPY, RETROGRADE, URETEROSCOPY, STENT PLACEMENT Right 3/28/2017    Performed [facility] at [facility]    KNEE ARTHROSCOPY Right     Meniscus repair    LITHOTRIPSY-LASER Right 3/28/2017    Per[facility] at [facility] OR    LIVER BIOPSY  2010    VASECTOMY         Imaging / Labs / Studies (reviewed on 8/30/2019):    Results for orders placed in visit on 11/05/18   MRI Lumbar Spine Without Contrast    Narrative COMPARISON:  None.  FINDINGS:  Alignment: Normal.  Vertebrae: Degenerative endplate changes noted surrounding L4-5 and L5-S1.  Vertebral body heights are well maintained.  No evidence of fracture or marrow replacement process.  Discs: Moderate disc height loss noted at L4-5.  There is mild disc height loss at L3-4 and L5-S1.  Cord: Normal.  Conus terminates at L1-2  Degenerative findings:  T12-L1:  No spinal canal or neuroforaminal stenosis.  L1-L2:  No spinal canal or neuroforaminal stenosis.  L2-L3: Mild generalized disc bulge.  Mild-to-moderate left neural foraminal narrowing.  No spinal canal stenosis.  L3-L4: Mild generalized disc bulge.  Mild bilateral facet arthropathy.  Small annular fissure noted in the right paracentral region.  Mild right worse than left neural foraminal narrowing.  Mild crowding of the right lateral recess, otherwise no definite spinal canal stenosis.  L4-L5:  "Generalized disc bulge with mild bilateral facet hypertrophy and ligamentum flavum thickening.  Mild spinal canal stenosis with moderate bilateral neural foraminal narrowing.  L5-S1: Mild generalized disc bulge, slightly asymmetric to left.  Moderate bilateral facet hypertrophy.  Moderate bilateral neural foraminal narrowing.  No spinal canal stenosis.  Paraspinal muscles & soft tissues: Unremarkable.    Impression Multilevel degenerative disc disease and facet arthropathy contributing to multilevel neural foraminal narrowing as above.  There is also mild spinal canal stenosis at L4-5.       Review of Systems:  Constitutional: Negative for fever.   Eyes: Negative for blurred vision.   Respiratory: Negative for cough and wheezing.    Cardiovascular: Negative for chest pain and orthopnea.   Gastrointestinal: Negative for constipation, diarrhea, nausea and vomiting.   Genitourinary: Negative for dysuria.   Musculoskeletal: Positive for back pain.        Left leg pain   Skin: Negative for itching and rash.   Neurological: Negative for focal weakness.   Endo/Heme/Allergies: Does not bruise/bleed easily.         Physical Exam:  Vitals:  /71 (BP Location: Left arm, Patient Position: Sitting, BP Method: Medium (Automatic))   Pulse 62   Resp 18   Ht 5' 8" (1.727 m)   Wt 113.4 kg (250 lb)   BMI 38.01 kg/m²   (reviewed on 8/30/2019)    General: alert and oriented, in no apparent distress.  Gait: normal gait.  Skin: no rashes, no discoloration, no obvious lesions  HEENT: normocephalic, atraumatic. Pupils equal and round.  Cardiovascular: no significant peripheral edema present.  Respiratory: without use of accessory muscles of respiration.    Musculoskeletal - Lumbar Spine:  - Pain on flexion of lumbar spine: Present  - Pain on extension of lumbar spine: Absent         - Lumbar facet loading: Absent   - TTP over the lumbar facet joints: Absent  - TTP over the lumbar paraspinals: Absent  - Straight Leg Raise: " Equivocal    Neuro - Lower Extremities:  - RLE Strength:     >> 5/5 strength with right hip flexion/ extension    >> 5/5 strength with right knee flexion/ extension    >> 5/5 strength in right ankle with plantar and dorsiflexion  - LLE Strength:     >> 5/5 strength with left hip flexion/ extension    >> 5/5 strength with knee flexion extension on the left     >> 5/5 strength in left ankle with plantar and dorsiflexion  - Ankle Clonus:  Absent bilaterally      Psych:  Mood and affect is appropriate        Assessment  1. 64 y.o. year old patient with PMH of   Past Medical History:   Diagnosis Date    Decreased platelet count     Fatty liver     Gout     HTN (hypertension) 2000    Non-A non-B hepatitis 1990    Obesity (BMI 35.0-39.9 without comorbidity)     Prediabetes 2015      presenting with pain located lumbar spine, left lateral thigh. Diagnoses include:    ICD-10-CM ICD-9-CM   1. Lumbar radiculopathy M54.16 724.4   2. Chronic midline low back pain without sciatica M54.5 724.2    G89.29 338.29   3. Sciatica of left side M54.32 724.3   4. DDD (degenerative disc disease), lumbar M51.36 722.52      2. Pain Generators / Etiology: Lumbar Radiculopathy and Lumbar Spondylosis  3. Failed Meds (E- Effective, NE- Not Effective): gabapentin - mildly effective  4. Physical Therapy - Recently Completed and None  5. Psychological comorbidities - None  6. Anticoagulants / Antiplatelets: None       Plan:  1. Interventional:   - Schedule spinal cord stimulator trial.  Will order thoracic MRI and send for psych eval to [facility] for clearance.   - Consider referral to [facility] for surgical opinion if no relief with trial.     2. Pharmacologic: Continue gabapentin 400mg TID.    3. Rehabilitative: Encouraged regular exercise; he continues to exercise at the gym. Patient has completed 6 weeks physical therapy with minimal benefit.    4. Diagnostic: Order thoracic MRI for SCS trial.     5. Follow up: PRN    - I discussed the  risks, benefits, and alternatives to potential treatment options. All questions and concerns were fully addressed today in clinic. Dr. Elkins was consulted regarding the patient plan and agrees.

## 2019-09-06 ENCOUNTER — HOSPITAL ENCOUNTER (OUTPATIENT)
Dept: RADIOLOGY | Facility: HOSPITAL | Age: 64
Discharge: HOME | End: 2019-09-06
Attending: PHYSICIAN ASSISTANT
Payer: MEDICARE

## 2019-09-06 DIAGNOSIS — M54.14 THORACIC RADICULOPATHY: ICD-10-CM

## 2019-09-06 PROCEDURE — 72146 MRI CHEST SPINE W/O DYE: CPT | Mod: TC

## 2019-09-20 RX ORDER — GABAPENTIN 400 MG/1
400 CAPSULE ORAL 3 TIMES DAILY
Qty: 90 CAPSULE | Refills: 1 | Status: SHIPPED | OUTPATIENT
Start: 2019-09-20 | End: 2019-11-18 | Stop reason: SDUPTHER

## 2019-09-26 DIAGNOSIS — M54.16 LUMBAR RADICULOPATHY: Primary | ICD-10-CM

## 2019-09-30 ENCOUNTER — TELEPHONE (OUTPATIENT)
Dept: PAIN MEDICINE | Facility: CLINIC | Age: 64
End: 2019-09-30

## 2019-09-30 DIAGNOSIS — M54.14 THORACIC RADICULOPATHY: ICD-10-CM

## 2019-09-30 DIAGNOSIS — M54.50 CHRONIC MIDLINE LOW BACK PAIN WITHOUT SCIATICA: ICD-10-CM

## 2019-09-30 DIAGNOSIS — M54.16 LUMBAR RADICULOPATHY: Primary | ICD-10-CM

## 2019-09-30 DIAGNOSIS — M54.32 SCIATICA OF LEFT SIDE: ICD-10-CM

## 2019-09-30 DIAGNOSIS — G89.29 CHRONIC MIDLINE LOW BACK PAIN WITHOUT SCIATICA: ICD-10-CM

## 2019-09-30 DIAGNOSIS — M51.36 DDD (DEGENERATIVE DISC DISEASE), LUMBAR: ICD-10-CM

## 2019-09-30 DIAGNOSIS — Z01.818 PRE-OP EVALUATION: ICD-10-CM

## 2019-09-30 DIAGNOSIS — I10 ESSENTIAL HYPERTENSION: ICD-10-CM

## 2019-09-30 RX ORDER — CEPHALEXIN 500 MG/1
500 CAPSULE ORAL EVERY 12 HOURS
Qty: 10 CAPSULE | Refills: 0 | Status: SHIPPED | OUTPATIENT
Start: 2019-09-30 | End: 2019-09-30 | Stop reason: SDUPTHER

## 2019-09-30 RX ORDER — CEPHALEXIN 500 MG/1
500 CAPSULE ORAL EVERY 12 HOURS
Qty: 12 CAPSULE | Refills: 0 | Status: SHIPPED | OUTPATIENT
Start: 2019-09-30 | End: 2019-10-03

## 2019-09-30 NOTE — TELEPHONE ENCOUNTER
Contacted patient; spinal cord stimulator trial scheduled with Dr. Elkins.  Instructions given verbally and also mailed to listed address.  Patient also scheduled with PCP and Hematology for clearance.  All tests (labwork, urinalyses, EKG, X-Ray)  have been scheduled as well

## 2019-10-01 ENCOUNTER — OFFICE VISIT (OUTPATIENT)
Dept: INTERNAL MEDICINE | Facility: CLINIC | Age: 64
End: 2019-10-01
Payer: MEDICARE

## 2019-10-01 VITALS
SYSTOLIC BLOOD PRESSURE: 108 MMHG | DIASTOLIC BLOOD PRESSURE: 78 MMHG | WEIGHT: 252.44 LBS | OXYGEN SATURATION: 95 % | TEMPERATURE: 98 F | HEART RATE: 72 BPM | HEIGHT: 68 IN | BODY MASS INDEX: 38.26 KG/M2

## 2019-10-01 DIAGNOSIS — G57.12 MERALGIA PARESTHETICA OF LEFT SIDE: ICD-10-CM

## 2019-10-01 DIAGNOSIS — M54.32 SCIATICA OF LEFT SIDE: Primary | ICD-10-CM

## 2019-10-01 DIAGNOSIS — M54.16 LUMBAR RADICULOPATHY: ICD-10-CM

## 2019-10-01 DIAGNOSIS — M1A.0720 IDIOPATHIC CHRONIC GOUT OF LEFT FOOT WITHOUT TOPHUS: ICD-10-CM

## 2019-10-01 PROCEDURE — 99999 PR PBB SHADOW E&M-EST. PATIENT-LVL III: ICD-10-PCS | Mod: PBBFAC,,, | Performed by: FAMILY MEDICINE

## 2019-10-01 PROCEDURE — 3008F PR BODY MASS INDEX (BMI) DOCUMENTED: ICD-10-PCS | Mod: CPTII,S$GLB,, | Performed by: FAMILY MEDICINE

## 2019-10-01 PROCEDURE — 99213 OFFICE O/P EST LOW 20 MIN: CPT | Mod: S$GLB,,, | Performed by: FAMILY MEDICINE

## 2019-10-01 PROCEDURE — 1101F PT FALLS ASSESS-DOCD LE1/YR: CPT | Mod: CPTII,S$GLB,, | Performed by: FAMILY MEDICINE

## 2019-10-01 PROCEDURE — 99213 PR OFFICE/OUTPT VISIT, EST, LEVL III, 20-29 MIN: ICD-10-PCS | Mod: S$GLB,,, | Performed by: FAMILY MEDICINE

## 2019-10-01 PROCEDURE — 3008F BODY MASS INDEX DOCD: CPT | Mod: CPTII,S$GLB,, | Performed by: FAMILY MEDICINE

## 2019-10-01 PROCEDURE — 3078F DIAST BP <80 MM HG: CPT | Mod: CPTII,S$GLB,, | Performed by: FAMILY MEDICINE

## 2019-10-01 PROCEDURE — 3078F PR MOST RECENT DIASTOLIC BLOOD PRESSURE < 80 MM HG: ICD-10-PCS | Mod: CPTII,S$GLB,, | Performed by: FAMILY MEDICINE

## 2019-10-01 PROCEDURE — 1101F PR PT FALLS ASSESS DOC 0-1 FALLS W/OUT INJ PAST YR: ICD-10-PCS | Mod: CPTII,S$GLB,, | Performed by: FAMILY MEDICINE

## 2019-10-01 PROCEDURE — 99999 PR PBB SHADOW E&M-EST. PATIENT-LVL III: CPT | Mod: PBBFAC,,, | Performed by: FAMILY MEDICINE

## 2019-10-01 PROCEDURE — 3074F SYST BP LT 130 MM HG: CPT | Mod: CPTII,S$GLB,, | Performed by: FAMILY MEDICINE

## 2019-10-01 PROCEDURE — 3074F PR MOST RECENT SYSTOLIC BLOOD PRESSURE < 130 MM HG: ICD-10-PCS | Mod: CPTII,S$GLB,, | Performed by: FAMILY MEDICINE

## 2019-10-01 RX ORDER — COLCHICINE 0.6 MG/1
0.6 CAPSULE ORAL DAILY
Qty: 90 CAPSULE | Refills: 2 | Status: SHIPPED | OUTPATIENT
Start: 2019-10-01 | End: 2020-03-27

## 2019-10-01 RX ORDER — ALLOPURINOL 100 MG/1
100 TABLET ORAL DAILY
Qty: 90 TABLET | Refills: 2 | Status: SHIPPED | OUTPATIENT
Start: 2019-10-01 | End: 2020-06-02

## 2019-10-01 NOTE — PROGRESS NOTES
Subjective:       Patient ID: Jasson Mayo is a 64 y.o. male.    Chief Complaint: Pre-op Exam    He is scheduled for a spinal cord stimulator.  He has left leg sciatica with left meralgia paresthetica.  MRI was done.  He is getting preop lab including EKG chest x-ray.  He is scheduled to see Hematology for thrombocytopenia.  Scheduled to see Rheumatology.  He reports he had a psychiatry interview.  He has requested neurosurgical evaluation in addition.    Review of Systems   Musculoskeletal: Positive for back pain.   Neurological:        Left leg sciatica with burning left upper outer thigh.  He has intermittent swelling of the left ankle.  He denies bowel or bladder incontinence.       Objective:      Physical Exam   Constitutional: He appears well-developed and well-nourished. No distress.   Neurological:   He is walking  with a slight limp of the left leg.  He reports pain left hip with lifting his leg.  His straight leg raise is negative.       Office Visit on 05/06/2019   Component Date Value Ref Range Status    WBC 05/06/2019 6.01  3.90 - 12.70 K/uL Final    RBC 05/06/2019 4.62  4.60 - 6.20 M/uL Final    Hemoglobin 05/06/2019 14.9  14.0 - 18.0 g/dL Final    Hematocrit 05/06/2019 44.0  40.0 - 54.0 % Final    Mean Corpuscular Volume 05/06/2019 95  82 - 98 fL Final    Mean Corpuscular Hemoglobin 05/06/2019 32.3* 27.0 - 31.0 pg Final    Mean Corpuscular Hemoglobin Conc 05/06/2019 33.9  32.0 - 36.0 g/dL Final    RDW 05/06/2019 13.4  11.5 - 14.5 % Final    Platelets 05/06/2019 113* 150 - 350 K/uL Final    MPV 05/06/2019 12.0  9.2 - 12.9 fL Final    Immature Granulocytes 05/06/2019 0.2  0.0 - 0.5 % Final    Gran # (ANC) 05/06/2019 3.0  1.8 - 7.7 K/uL Final    Immature Grans (Abs) 05/06/2019 0.01  0.00 - 0.04 K/uL Final    Lymph # 05/06/2019 2.1  1.0 - 4.8 K/uL Final    Mono # 05/06/2019 0.7  0.3 - 1.0 K/uL Final    Eos # 05/06/2019 0.3  0.0 - 0.5 K/uL Final    Baso # 05/06/2019 0.05  0.00 -  0.20 K/uL Final    nRBC 05/06/2019 0  0 /100 WBC Final    Gran% 05/06/2019 49.1  38.0 - 73.0 % Final    Lymph% 05/06/2019 34.1  18.0 - 48.0 % Final    Mono% 05/06/2019 10.8  4.0 - 15.0 % Final    Eosinophil% 05/06/2019 5.0  0.0 - 8.0 % Final    Basophil% 05/06/2019 0.8  0.0 - 1.9 % Final    Differential Method 05/06/2019 Automated   Final    Hemoglobin A1C 05/06/2019 5.5  4.0 - 5.6 % Final    Estimated Avg Glucose 05/06/2019 111  68 - 131 mg/dL Final     Assessment:       1. Sciatica of left side        Plan:   Recommend proceeding with preop evaluation.  External referral as he requested for neurosurgery evaluation.      Sciatica of left side  -     Ambulatory referral to Neurosurgery

## 2019-10-02 ENCOUNTER — HOSPITAL ENCOUNTER (OUTPATIENT)
Dept: RADIOLOGY | Facility: HOSPITAL | Age: 64
Discharge: HOME | End: 2019-10-02
Attending: PAIN MEDICINE
Payer: MEDICARE

## 2019-10-02 ENCOUNTER — OFFICE VISIT (OUTPATIENT)
Dept: RHEUMATOLOGY | Facility: CLINIC | Age: 64
End: 2019-10-02
Payer: MEDICARE

## 2019-10-02 ENCOUNTER — CLINICAL SUPPORT (OUTPATIENT)
Dept: CARDIOLOGY | Facility: CLINIC | Age: 64
End: 2019-10-02
Payer: MEDICARE

## 2019-10-02 VITALS
BODY MASS INDEX: 38.22 KG/M2 | SYSTOLIC BLOOD PRESSURE: 132 MMHG | HEART RATE: 62 BPM | WEIGHT: 252.19 LBS | DIASTOLIC BLOOD PRESSURE: 88 MMHG | HEIGHT: 68 IN

## 2019-10-02 DIAGNOSIS — M54.32 SCIATICA OF LEFT SIDE: ICD-10-CM

## 2019-10-02 DIAGNOSIS — G89.29 CHRONIC LEFT-SIDED LOW BACK PAIN WITH LEFT-SIDED SCIATICA: ICD-10-CM

## 2019-10-02 DIAGNOSIS — I10 ESSENTIAL HYPERTENSION: ICD-10-CM

## 2019-10-02 DIAGNOSIS — Z01.818 PRE-OP EVALUATION: ICD-10-CM

## 2019-10-02 DIAGNOSIS — M54.50 CHRONIC MIDLINE LOW BACK PAIN WITHOUT SCIATICA: ICD-10-CM

## 2019-10-02 DIAGNOSIS — M54.16 LUMBAR RADICULOPATHY: ICD-10-CM

## 2019-10-02 DIAGNOSIS — M19.041 PRIMARY OSTEOARTHRITIS OF BOTH HANDS: ICD-10-CM

## 2019-10-02 DIAGNOSIS — M19.072 PRIMARY OSTEOARTHRITIS OF LEFT ANKLE: ICD-10-CM

## 2019-10-02 DIAGNOSIS — G89.29 CHRONIC MIDLINE LOW BACK PAIN WITHOUT SCIATICA: ICD-10-CM

## 2019-10-02 DIAGNOSIS — M19.042 PRIMARY OSTEOARTHRITIS OF BOTH HANDS: ICD-10-CM

## 2019-10-02 DIAGNOSIS — M1A.0720 IDIOPATHIC CHRONIC GOUT OF LEFT FOOT WITHOUT TOPHUS: Primary | ICD-10-CM

## 2019-10-02 DIAGNOSIS — M54.42 CHRONIC LEFT-SIDED LOW BACK PAIN WITH LEFT-SIDED SCIATICA: ICD-10-CM

## 2019-10-02 DIAGNOSIS — M51.36 DDD (DEGENERATIVE DISC DISEASE), LUMBAR: ICD-10-CM

## 2019-10-02 DIAGNOSIS — M54.14 THORACIC RADICULOPATHY: ICD-10-CM

## 2019-10-02 PROCEDURE — 3008F PR BODY MASS INDEX (BMI) DOCUMENTED: ICD-10-PCS | Mod: CPTII,S$GLB,, | Performed by: INTERNAL MEDICINE

## 2019-10-02 PROCEDURE — 3079F PR MOST RECENT DIASTOLIC BLOOD PRESSURE 80-89 MM HG: ICD-10-PCS | Mod: CPTII,S$GLB,, | Performed by: INTERNAL MEDICINE

## 2019-10-02 PROCEDURE — 99214 PR OFFICE/OUTPT VISIT, EST, LEVL IV, 30-39 MIN: ICD-10-PCS | Mod: 25,S$GLB,, | Performed by: INTERNAL MEDICINE

## 2019-10-02 PROCEDURE — 20600 DRAIN/INJ JOINT/BURSA W/O US: CPT | Mod: 50,S$GLB,, | Performed by: INTERNAL MEDICINE

## 2019-10-02 PROCEDURE — 3008F BODY MASS INDEX DOCD: CPT | Mod: CPTII,S$GLB,, | Performed by: INTERNAL MEDICINE

## 2019-10-02 PROCEDURE — 71046 X-RAY EXAM CHEST 2 VIEWS: CPT | Mod: TC

## 2019-10-02 PROCEDURE — 3079F DIAST BP 80-89 MM HG: CPT | Mod: CPTII,S$GLB,, | Performed by: INTERNAL MEDICINE

## 2019-10-02 PROCEDURE — 99214 OFFICE O/P EST MOD 30 MIN: CPT | Mod: 25,S$GLB,, | Performed by: INTERNAL MEDICINE

## 2019-10-02 PROCEDURE — 99999 PR PBB SHADOW E&M-EST. PATIENT-LVL III: ICD-10-PCS | Mod: PBBFAC,,, | Performed by: INTERNAL MEDICINE

## 2019-10-02 PROCEDURE — 71046 XR CHEST PA AND LATERAL PRE-OP: ICD-10-PCS | Mod: 26,,, | Performed by: RADIOLOGY

## 2019-10-02 PROCEDURE — 1101F PR PT FALLS ASSESS DOC 0-1 FALLS W/OUT INJ PAST YR: ICD-10-PCS | Mod: CPTII,S$GLB,, | Performed by: INTERNAL MEDICINE

## 2019-10-02 PROCEDURE — 1101F PT FALLS ASSESS-DOCD LE1/YR: CPT | Mod: CPTII,S$GLB,, | Performed by: INTERNAL MEDICINE

## 2019-10-02 PROCEDURE — 99999 PR PBB SHADOW E&M-EST. PATIENT-LVL III: CPT | Mod: PBBFAC,,, | Performed by: INTERNAL MEDICINE

## 2019-10-02 PROCEDURE — 71046 X-RAY EXAM CHEST 2 VIEWS: CPT | Mod: 26,,, | Performed by: RADIOLOGY

## 2019-10-02 PROCEDURE — 3075F PR MOST RECENT SYSTOLIC BLOOD PRESS GE 130-139MM HG: ICD-10-PCS | Mod: CPTII,S$GLB,, | Performed by: INTERNAL MEDICINE

## 2019-10-02 PROCEDURE — 93010 ELECTROCARDIOGRAM REPORT: CPT | Mod: S$GLB,,, | Performed by: INTERNAL MEDICINE

## 2019-10-02 PROCEDURE — 93005 EKG 12-LEAD: ICD-10-PCS | Mod: S$GLB,,, | Performed by: PAIN MEDICINE

## 2019-10-02 PROCEDURE — 93010 EKG 12-LEAD: ICD-10-PCS | Mod: S$GLB,,, | Performed by: INTERNAL MEDICINE

## 2019-10-02 PROCEDURE — 3075F SYST BP GE 130 - 139MM HG: CPT | Mod: CPTII,S$GLB,, | Performed by: INTERNAL MEDICINE

## 2019-10-02 PROCEDURE — 20600 PR DRAIN/INJECT SMALL JOINT/BURSA: ICD-10-PCS | Mod: 50,S$GLB,, | Performed by: INTERNAL MEDICINE

## 2019-10-02 PROCEDURE — 93005 ELECTROCARDIOGRAM TRACING: CPT | Mod: S$GLB,,, | Performed by: PAIN MEDICINE

## 2019-10-02 RX ORDER — TRIAMCINOLONE ACETONIDE 40 MG/ML
20 INJECTION, SUSPENSION INTRA-ARTICULAR; INTRAMUSCULAR ONCE
Status: COMPLETED | OUTPATIENT
Start: 2019-10-02 | End: 2019-10-02

## 2019-10-02 RX ADMIN — TRIAMCINOLONE ACETONIDE 20 MG: 40 INJECTION, SUSPENSION INTRA-ARTICULAR; INTRAMUSCULAR at 08:10

## 2019-10-02 NOTE — PROGRESS NOTES
RHEUMATOLOGY CLINIC FOLLOW UP VISIT  Chief complaints:-  My fingers hurt.    HPI:-  Jasson Nesbitt a 64 y.o. pleasant male comes in for a follow up visit with above chief complaints.  He follows up in the rheumatology clinic for osteoarthritis and gout.  He reports no gout flares  SINCE last visit.  He is on allopurinol  And daily colchicine with adequate control.  He complains of recurrence of pain over bilateral 2nd MCP joints in the last couple of weeks.  The pain is worsened by activity and does not relieve with resting.  Morning stiffness over those to joints lasts for an hour.  He denies any pain over any other small joint in his hands.  He also has similar activity-related pain over left ankle.  Right ankle does not have any problem.  He has similar activity-related pain in the left hip joint.  He also has severe lumbago and left-sided sciatica.  He has been advised to undergo spinal cord stimulator trial.  He is planning to discuss with the neurosurgeon before undergoing the procedure.   He denies any pain or other joints of hands or feet.  No pain over knee joints today.  No adverse effects from medications. The injection given in last visit gave pain relief for 5 months.     Review of Systems   Constitutional: Negative for chills and fever.   HENT: Negative for congestion and sore throat.    Eyes: Negative for blurred vision and redness.   Respiratory: Negative for cough and shortness of breath.    Cardiovascular: Negative for chest pain and leg swelling.   Gastrointestinal: Negative for abdominal pain.   Genitourinary: Negative for dysuria.   Musculoskeletal: Positive for back pain, joint pain and neck pain. Negative for falls and myalgias.   Skin: Negative for rash.   Neurological: Negative for headaches.   Endo/Heme/Allergies: Does not bruise/bleed easily.   Psychiatric/Behavioral: Negative for memory loss. The patient does not have  "insomnia.        Past Medical History:   Diagnosis Date    Decreased platelet count     Fatty liver     Gout     HTN (hypertension) 2000    Non-A non-B hepatitis 1990    Obesity (BMI 35.0-39.9 without comorbidity)     Prediabetes 2015       Past Surgical History:   Procedure Laterality Date    COLONOSCOPY W/ POLYPECTOMY  10, 14 no polyp    KNEE ARTHROSCOPY Right     Meniscus repair    LIVER BIOPSY  2010    VASECTOMY          Social History     Tobacco Use    Smoking status: Never Smoker    Smokeless tobacco: Former User   Substance Use Topics    Alcohol use: No    Drug use: Not on file       Family History   Problem Relation Age of Onset    COPD Mother     Heart attacks under age 50 Mother     Cancer Father         PROSTATE CA       Review of patient's allergies indicates:  No Known Allergies        Physical examination:-    Vitals:    10/02/19 0805   BP: 132/88   Pulse: 62   Weight: 114.4 kg (252 lb 3.3 oz)   Height: 5' 8" (1.727 m)   PainSc:   5       Physical Exam   Constitutional: He is oriented to person, place, and time and well-developed, well-nourished, and in no distress. No distress.   HENT:   Head: Normocephalic and atraumatic.   Mouth/Throat: Oropharynx is clear and moist.   Eyes: Pupils are equal, round, and reactive to light. Conjunctivae and EOM are normal. Right eye exhibits no discharge. Left eye exhibits no discharge. No scleral icterus.   Neck: Normal range of motion. Neck supple.   Cardiovascular: Normal rate and intact distal pulses.   Pulmonary/Chest: Effort normal. No respiratory distress. He exhibits no tenderness.   Abdominal: Soft. There is no tenderness.   Musculoskeletal:   Tenderness present over the right second MCP joint without any significant evidence of synovitis.  No synovitis or tenderness over the small joints of hands or feet.  Good range of motion in large joints.   Lymphadenopathy:     He has no cervical adenopathy.   Neurological: He is alert and oriented to " person, place, and time. Gait normal.   Skin: Skin is warm. No rash noted. He is not diaphoretic. No erythema. No pallor.   Psychiatric: Mood and affect normal.   Nursing note and vitals reviewed.    Radiographs:-  Independent visualization of images done.  Personally reviewed the x-ray images.  Both x-ray images show significant degenerative disease or bilateral first carpometacarpal joint and second MCP joints without any involvement of other MCPs or PIPs.  MCP showed a possible cyst near the articular surface.    Medication List with Changes/Refills   Current Medications    ALLOPURINOL (ZYLOPRIM) 100 MG TABLET    Take 1 tablet (100 mg total) by mouth once daily.    BENAZEPRIL (LOTENSIN) 20 MG TABLET    Take 1 tablet (20 mg total) by mouth once daily.    CEPHALEXIN (KEFLEX) 500 MG CAPSULE    Take 1 capsule (500 mg total) by mouth every 12 (twelve) hours. for 6 days    COLCHICINE (MITIGARE) 0.6 MG CAP    Take 1 capsule (0.6 mg total) by mouth once daily.    GABAPENTIN (NEURONTIN) 400 MG CAPSULE    TAKE 1 CAPSULE (400 MG TOTAL) BY MOUTH 3 (THREE) TIMES DAILY.    VITAMIN E 1000 UNIT CAPSULE    Take 1,000 Units by mouth once daily.     Assessment/Plans:-  1. Idiopathic chronic gout of left foot without tophus    2. Primary osteoarthritis of both hands    3. Chronic left-sided low back pain with left-sided sciatica    4. Primary osteoarthritis of left ankle      Problem List Items Addressed This Visit        Orthopedic    Idiopathic chronic gout of left foot without tophus - Primary    Current Assessment & Plan     No flare-up since last visit.  Stable.  Uric acid at goal.  Continue allopurinol.         Primary osteoarthritis of both second MCP joints    Current Assessment & Plan     He has primary osteoarthritis over bilateral second MCP joints with x-rays showing severe degenerative disease and even possible cyst/erosion in both.  He does not have any secondary osteoarthritis features but her history or laboratory  investigations.  Other MCP joints are completely normal.  Examination failed to show any evidence of synovitis over dose to MCPs.  But it had tenderness.   Inject BILATERAL 2ND MCP JOINTS WITH half cc of Kenalog and watch for improvement.  If no improvement I will consider oral prednisone therapy-5 mg when necessary daily prednisone as needed.  I do not have any suspicion for rheumatoid arthritis or other systemic arthritides at this time.         Relevant Medications    triamcinolone acetonide injection 20 mg (Completed)    triamcinolone acetonide injection 20 mg (Completed)    Chronic left-sided low back pain with left-sided sciatica    Current Assessment & Plan     Worsening lumbago with sciatica.  Advised to follow up with pain specialist.         Primary osteoarthritis of left ankle    Current Assessment & Plan     Mild.  No synovitis.  Inject in future if needed.               PROCEDURE NOTE:-  Name of the procedure: Injection of bilateral second MCP joint with kenalog .   Patient consent:   Indication, risks(including skin depigmentation, fat atrophy, infection, bleeding, nerve or tendon injury) and alternative to the procedure were explained to to the patient. Patient was given opportunity to ask questions . Then patient gave a verbal consent for the procedure.   Pertinent lab values: None indicated  Type of anesthesia: 2% Lidocaine   Description of procedure : Using sterile technique, the skin over bilateral second MCP joints were cleaned with alcohol swab and then with chlorhexidine solution. After applying cold spray , the needle was inserted into the skin and into the joint space without any resistance and then 20 mg kenalog was injected into the joint space without any resistance.   Complication: None  Estimated blood loss: None  Disposition: Patient tolerated the procedure without any complains and the site was dressed.There were no complications.  Discharge instructions of icing and stretching  given.     # Follow up in about 6 months (around 4/2/2020).  Disclaimer: This note was prepared using voice recognition system and is likely to have sound alike errors .  Please call me with any questions  .

## 2019-10-03 ENCOUNTER — OFFICE VISIT (OUTPATIENT)
Dept: HEMATOLOGY/ONCOLOGY | Facility: CLINIC | Age: 64
End: 2019-10-03
Payer: MEDICARE

## 2019-10-03 VITALS
TEMPERATURE: 98 F | HEART RATE: 67 BPM | OXYGEN SATURATION: 96 % | HEIGHT: 68 IN | WEIGHT: 252.19 LBS | RESPIRATION RATE: 18 BRPM | BODY MASS INDEX: 38.22 KG/M2 | DIASTOLIC BLOOD PRESSURE: 85 MMHG | SYSTOLIC BLOOD PRESSURE: 138 MMHG

## 2019-10-03 DIAGNOSIS — D69.6 THROMBOCYTOPENIA: Primary | ICD-10-CM

## 2019-10-03 PROCEDURE — 1101F PR PT FALLS ASSESS DOC 0-1 FALLS W/OUT INJ PAST YR: ICD-10-PCS | Mod: CPTII,S$GLB,, | Performed by: INTERNAL MEDICINE

## 2019-10-03 PROCEDURE — 1101F PT FALLS ASSESS-DOCD LE1/YR: CPT | Mod: CPTII,S$GLB,, | Performed by: INTERNAL MEDICINE

## 2019-10-03 PROCEDURE — 99204 PR OFFICE/OUTPT VISIT, NEW, LEVL IV, 45-59 MIN: ICD-10-PCS | Mod: S$GLB,,, | Performed by: INTERNAL MEDICINE

## 2019-10-03 PROCEDURE — 3008F BODY MASS INDEX DOCD: CPT | Mod: CPTII,S$GLB,, | Performed by: INTERNAL MEDICINE

## 2019-10-03 PROCEDURE — 99999 PR PBB SHADOW E&M-EST. PATIENT-LVL III: ICD-10-PCS | Mod: PBBFAC,,, | Performed by: INTERNAL MEDICINE

## 2019-10-03 PROCEDURE — 3008F PR BODY MASS INDEX (BMI) DOCUMENTED: ICD-10-PCS | Mod: CPTII,S$GLB,, | Performed by: INTERNAL MEDICINE

## 2019-10-03 PROCEDURE — 3075F PR MOST RECENT SYSTOLIC BLOOD PRESS GE 130-139MM HG: ICD-10-PCS | Mod: CPTII,S$GLB,, | Performed by: INTERNAL MEDICINE

## 2019-10-03 PROCEDURE — 99204 OFFICE O/P NEW MOD 45 MIN: CPT | Mod: S$GLB,,, | Performed by: INTERNAL MEDICINE

## 2019-10-03 PROCEDURE — 3079F PR MOST RECENT DIASTOLIC BLOOD PRESSURE 80-89 MM HG: ICD-10-PCS | Mod: CPTII,S$GLB,, | Performed by: INTERNAL MEDICINE

## 2019-10-03 PROCEDURE — 3075F SYST BP GE 130 - 139MM HG: CPT | Mod: CPTII,S$GLB,, | Performed by: INTERNAL MEDICINE

## 2019-10-03 PROCEDURE — 3079F DIAST BP 80-89 MM HG: CPT | Mod: CPTII,S$GLB,, | Performed by: INTERNAL MEDICINE

## 2019-10-03 PROCEDURE — 99999 PR PBB SHADOW E&M-EST. PATIENT-LVL III: CPT | Mod: PBBFAC,,, | Performed by: INTERNAL MEDICINE

## 2019-10-03 NOTE — ASSESSMENT & PLAN NOTE
He has primary osteoarthritis over bilateral second MCP joints with x-rays showing severe degenerative disease and even possible cyst/erosion in both.  He does not have any secondary osteoarthritis features but her history or laboratory investigations.  Other MCP joints are completely normal.  Examination failed to show any evidence of synovitis over dose to MCPs.  But it had tenderness.   Inject BILATERAL 2ND MCP JOINTS WITH half cc of Kenalog and watch for improvement.  If no improvement I will consider oral prednisone therapy-5 mg when necessary daily prednisone as needed.  I do not have any suspicion for rheumatoid arthritis or other systemic arthritides at this time.

## 2019-10-03 NOTE — LETTER
October 3, 2019      Dennis Elkins MD  [address] [place] [address] [address] [place] [id number]           [facility]  [address] [address]  [place] [address]  Phone: [phone]  Fax: [phone]          Patient: Jasson Mayo   MR Number: [id number]   YOB: 1955   Date of Visit: 10/3/2019       Dear Dr. Dennis Elkins:    Thank you for referring Jasson Mayo to me for evaluation. Attached you will find relevant portions of my assessment and plan of care.    If you have questions, please do not hesitate to call me. I look forward to following Jasson Mayo along with you.    Sincerely,    Frank Rinaldi MD    Enclosure  CC:  No Recipients    If you would like to receive this communication electronically, please contact [email or web address] or [phone] to request more information on [employer] access.    For providers and/or their staff who would like to refer a patient to [facility], please contact us through our one-stop-shop provider referral line, [facility], at [phone].    If you feel you have received this communication in error or would no longer like to receive these types of communications, please e-mail [email or web address]

## 2019-10-03 NOTE — PROGRESS NOTES
Subjective:       Patient ID: Jasson Mayo is a 64 y.o. male.    Chief Complaint: Thrombocytopenia [D69.6]  HPI: We have an opportunity to see Mr. Jasson Mayo in Hematology Oncology clinic at [facility] on 10/03/2019.  Mr. Jasson Mayo is a 64 y.o. gentleman with diffuse fatty liver, degenerative arthritis with radiculopathy, gout on colchicine and allopurinol presents for evaluation of thrombocytopenia.  Denies having had problems with bleeding or bruising.     No history exists.     Past Medical History:   Diagnosis Date    Decreased platelet count     Fatty liver     Gout     HTN (hypertension) 2000    Non-A non-B hepatitis 1990    Obesity (BMI 35.0-39.9 without comorbidity)     Prediabetes 2015     Family History   Problem Relation Age of Onset    COPD Mother     Heart attacks under age 50 Mother     Cancer Father         PROSTATE CA     Social History     Socioeconomic History    Marital status:      Spouse name: Not on file    Number of children: 1    Years of education: Not on file    Highest education level: Not on file   Occupational History    Occupation: [employer]     Employer: [employer]     Comment: [employer]   Social Needs    Financial resource strain: Not on file    Food insecurity:     Worry: Not on file     Inability: Not on file    Transportation needs:     Medical: Not on file     Non-medical: Not on file   Tobacco Use    Smoking status: Never Smoker    Smokeless tobacco: Former User   Substance and Sexual Activity    Alcohol use: No    Drug use: Not on file    Sexual activity: Not on file   Lifestyle    Physical activity:     Days per week: Not on file     Minutes per session: Not on file    Stress: Not on file   Relationships    Social connections:     Talks on phone: Not on file     Gets together: Not on file     Attends Hoahaoism service: Not on file     Active member of club or organization: Not on file      Attends meetings of clubs or organizations: Not on file     Relationship status: Not on file   Other Topics Concern    Not on file   Social History Narrative    Not on file     Past Surgical History:   Procedure Laterality Date    COLONOSCOPY W/ POLYPECTOMY  10, 14 no polyp    KNEE ARTHROSCOPY Right     Meniscus repair    LIVER BIOPSY  2010    VASECTOMY       Current Outpatient Medications   Medication Sig Dispense Refill    allopurinol (ZYLOPRIM) 100 MG tablet Take 1 tablet (100 mg total) by mouth once daily. 90 tablet 2    benazepril (LOTENSIN) 20 MG tablet Take 1 tablet (20 mg total) by mouth once daily. 90 tablet 3    colchicine (MITIGARE) 0.6 mg Cap Take 1 capsule (0.6 mg total) by mouth once daily. 90 capsule 2    gabapentin (NEURONTIN) 400 MG capsule TAKE 1 CAPSULE (400 MG TOTAL) BY MOUTH 3 (THREE) TIMES DAILY. 90 capsule 1    vitamin E 1000 UNIT capsule Take 1,000 Units by mouth once daily.       No current facility-administered medications for this visit.        Labs:  Lab Results   Component Value Date    WBC 4.69 10/02/2019    HGB 15.3 10/02/2019    HCT 44.3 10/02/2019    MCV 90 10/02/2019     (L) 10/02/2019     BMP  Lab Results   Component Value Date     10/02/2019    K 4.5 10/02/2019     10/02/2019    CO2 25 10/02/2019    BUN 20 10/02/2019    CREATININE 0.8 10/02/2019    CALCIUM 9.6 10/02/2019    ANIONGAP 8 10/02/2019    ESTGFRAFRICA >60 10/02/2019    EGFRNONAA >60 10/02/2019     Lab Results   Component Value Date     (H) 10/02/2019    AST 81 (H) 10/02/2019    ALKPHOS 99 10/02/2019    BILITOT 0.9 10/02/2019       No results found for: IRON, TIBC, FERRITIN, SATURATEDIRO  No results found for: OWIMWKFR30  No results found for: FOLATE  Lab Results   Component Value Date    TSH 2.385 11/05/2018       I have reviewed the radiology reports and examined the scan/xray images.    Review of Systems   Constitutional: Negative.    HENT: Negative.    Eyes: Negative.     Respiratory: Negative.    Cardiovascular: Negative.    Gastrointestinal: Negative.    Endocrine: Negative.    Genitourinary: Negative.    Musculoskeletal: Negative.    Skin: Negative.    Allergic/Immunologic: Negative.    Neurological: Negative.    Hematological: Negative.    Psychiatric/Behavioral: Negative.      ECOG SCORE    0 - Fully active-able to carry on all pre-disease performance without restriction            Objective:     Vitals:    10/03/19 1003   BP: 138/85   Pulse: 67   Resp: 18   Temp: 98.1 °F (36.7 °C)   Body mass index is 38.35 kg/m².  Physical Exam   Constitutional: He is oriented to person, place, and time. He appears well-developed and well-nourished.   HENT:   Head: Normocephalic and atraumatic.   Eyes: Conjunctivae and EOM are normal.   Neck: Normal range of motion. Neck supple.   Cardiovascular: Normal rate and regular rhythm.   Pulmonary/Chest: Effort normal and breath sounds normal.   Abdominal: Soft. Bowel sounds are normal.   Musculoskeletal: Normal range of motion.   Neurological: He is alert and oriented to person, place, and time.   Skin: Skin is warm and dry.   Psychiatric: He has a normal mood and affect. His behavior is normal. Judgment and thought content normal.   Nursing note and vitals reviewed.        Assessment:      1. Thrombocytopenia           Plan:     Thrombocytopenia    Likely 2nd to fatty liver and colchicine.    Thrombocytopenia is mild.  Can undergo procedures.    Will monitor every 3 months.  Check SPEP/DAVIN.  If thrombocytopenia worsens, will evaluate with bone marrow biopsy.

## 2019-10-07 ENCOUNTER — TELEPHONE (OUTPATIENT)
Dept: PAIN MEDICINE | Facility: CLINIC | Age: 64
End: 2019-10-07

## 2019-10-07 NOTE — TELEPHONE ENCOUNTER
Contacted pt. Pt states he is having a test done this Thursday and have a second opinion consult with another doctor. Instructed pt to keep me updated to cancel if needed. Pt states he will call back this Thursday afternoon. All questions answered.///lp

## 2019-10-07 NOTE — TELEPHONE ENCOUNTER
----- Message from Cathi Flaherty sent at 10/7/2019  2:35 PM CDT -----  Contact: pt   Type:  Patient Returning Call    Who Called:pt   Who Left Message for Patient: Ludy   Does the patient know what this is regarding?:  Yes   Would the patient rather a call back or a response via [facility]? Call back   Best Call Back Number: [phone] (home) [phone] (work)  Additional Information:

## 2019-10-31 NOTE — PROGRESS NOTES
Attempted to contact patient about scheduling a Colonoscopy. Left patient a message to give office a call back.   Subjective:       Patient ID: Jasson Mayo is a 62 y.o. male.    Chief Complaint: Follow-up    Follow-up hypertension hyperglycemia thrombocytopenia.  Recent rheumatology evaluation was reviewed.  He denies polyuria polydipsia.  He denies bruising.  He denies headache chest pain palpitations or shortness of breath.      Review of Systems   Constitutional: Negative for appetite change, fatigue and unexpected weight change.   Respiratory: Negative for cough, shortness of breath and wheezing.    Cardiovascular: Negative for chest pain, palpitations and leg swelling.   Gastrointestinal: Negative for abdominal pain.   Genitourinary: Negative for difficulty urinating.   Musculoskeletal: Positive for arthralgias.        Degenerative arthritis of his hands.  Improvement symptomatically after rheumatological injections   Neurological: Negative for headaches.       Objective:      Physical Exam   Constitutional: He is oriented to person, place, and time. He appears well-developed and well-nourished. No distress.   Neck: Neck supple. No thyromegaly present.   Cardiovascular: Normal rate, regular rhythm and normal heart sounds.    No murmur heard.  Pulmonary/Chest: Effort normal and breath sounds normal. No respiratory distress. He has no wheezes.   Abdominal: Soft. Bowel sounds are normal. He exhibits no mass. There is no tenderness.   Lymphadenopathy:     He has no cervical adenopathy.   Neurological: He is alert and oriented to person, place, and time.       Lab Visit on 03/29/2018   Component Date Value Ref Range Status    Sodium 03/29/2018 140  136 - 145 mmol/L Final    Potassium 03/29/2018 4.4  3.5 - 5.1 mmol/L Final    Chloride 03/29/2018 105  95 - 110 mmol/L Final    CO2 03/29/2018 27  23 - 29 mmol/L Final    Glucose 03/29/2018 121* 70 - 110 mg/dL Final    BUN, Bld 03/29/2018 15  8 - 23 mg/dL Final    Creatinine 03/29/2018 0.9  0.5 - 1.4 mg/dL Final    Calcium 03/29/2018 9.5  8.7 - 10.5 mg/dL Final     Total Protein 03/29/2018 6.8  6.0 - 8.4 g/dL Final    Albumin 03/29/2018 3.6  3.5 - 5.2 g/dL Final    Total Bilirubin 03/29/2018 0.7  0.1 - 1.0 mg/dL Final    Alkaline Phosphatase 03/29/2018 100  55 - 135 U/L Final    AST 03/29/2018 62* 10 - 40 U/L Final    ALT 03/29/2018 90* 10 - 44 U/L Final    Anion Gap 03/29/2018 8  8 - 16 mmol/L Final    eGFR if African American 03/29/2018 >60  >60 mL/min/1.73 m^2 Final    eGFR if non African American 03/29/2018 >60  >60 mL/min/1.73 m^2 Final    Uric Acid 03/29/2018 5.0  3.4 - 7.0 mg/dL Final     Assessment:       1. Essential hypertension    2. Decreased platelet count    3. Hyperglycemia        Plan:     Blood pressures controlled on medication.  Discussed diet and exercise regards hyperglycemia.  Lab was repeated.  Follow-up in 6 months with annual exam    Essential hypertension    Decreased platelet count  -     CBC auto differential    Hyperglycemia  -     Glucose, fasting  -     Hemoglobin A1c  -     Lipid panel

## 2019-11-06 ENCOUNTER — OFFICE VISIT (OUTPATIENT)
Dept: INTERNAL MEDICINE | Facility: CLINIC | Age: 64
End: 2019-11-06
Payer: MEDICARE

## 2019-11-06 VITALS
SYSTOLIC BLOOD PRESSURE: 114 MMHG | BODY MASS INDEX: 37.62 KG/M2 | WEIGHT: 248.25 LBS | DIASTOLIC BLOOD PRESSURE: 80 MMHG | HEART RATE: 60 BPM | TEMPERATURE: 98 F | OXYGEN SATURATION: 96 % | HEIGHT: 68 IN

## 2019-11-06 DIAGNOSIS — M54.16 LUMBAR RADICULOPATHY: ICD-10-CM

## 2019-11-06 DIAGNOSIS — R73.9 HYPERGLYCEMIA: ICD-10-CM

## 2019-11-06 DIAGNOSIS — Z28.39 IMMUNIZATION DEFICIENCY: ICD-10-CM

## 2019-11-06 DIAGNOSIS — Z12.5 SCREENING FOR PROSTATE CANCER: ICD-10-CM

## 2019-11-06 DIAGNOSIS — I10 ESSENTIAL HYPERTENSION: Primary | ICD-10-CM

## 2019-11-06 LAB
ALBUMIN SERPL BCP-MCNC: 3.9 G/DL (ref 3.5–5.2)
ALP SERPL-CCNC: 92 U/L (ref 55–135)
ALT SERPL W/O P-5'-P-CCNC: 74 U/L (ref 10–44)
ANION GAP SERPL CALC-SCNC: 7 MMOL/L (ref 8–16)
AST SERPL-CCNC: 56 U/L (ref 10–40)
BILIRUB SERPL-MCNC: 1.1 MG/DL (ref 0.1–1)
BUN SERPL-MCNC: 19 MG/DL (ref 8–23)
CALCIUM SERPL-MCNC: 9.5 MG/DL (ref 8.7–10.5)
CHLORIDE SERPL-SCNC: 103 MMOL/L (ref 95–110)
CHOLEST SERPL-MCNC: 159 MG/DL (ref 120–199)
CHOLEST/HDLC SERPL: 4.2 {RATIO} (ref 2–5)
CO2 SERPL-SCNC: 28 MMOL/L (ref 23–29)
CREAT SERPL-MCNC: 0.9 MG/DL (ref 0.5–1.4)
EST. GFR  (AFRICAN AMERICAN): >60 ML/MIN/1.73 M^2
EST. GFR  (NON AFRICAN AMERICAN): >60 ML/MIN/1.73 M^2
GLUCOSE SERPL-MCNC: 105 MG/DL (ref 70–110)
HDLC SERPL-MCNC: 38 MG/DL (ref 40–75)
HDLC SERPL: 23.9 % (ref 20–50)
LDLC SERPL CALC-MCNC: 102 MG/DL (ref 63–159)
NONHDLC SERPL-MCNC: 121 MG/DL
POTASSIUM SERPL-SCNC: 4.3 MMOL/L (ref 3.5–5.1)
PROT SERPL-MCNC: 7.1 G/DL (ref 6–8.4)
SODIUM SERPL-SCNC: 138 MMOL/L (ref 136–145)
TRIGL SERPL-MCNC: 95 MG/DL (ref 30–150)

## 2019-11-06 PROCEDURE — 80053 COMPREHEN METABOLIC PANEL: CPT

## 2019-11-06 PROCEDURE — G0009 PNEUMOCOCCAL CONJUGATE VACCINE 13-VALENT LESS THAN 5YO & GREATER THAN: ICD-10-PCS | Mod: S$GLB,,, | Performed by: FAMILY MEDICINE

## 2019-11-06 PROCEDURE — 3008F BODY MASS INDEX DOCD: CPT | Mod: CPTII,S$GLB,, | Performed by: FAMILY MEDICINE

## 2019-11-06 PROCEDURE — G0008 ADMIN INFLUENZA VIRUS VAC: HCPCS | Mod: S$GLB,,, | Performed by: FAMILY MEDICINE

## 2019-11-06 PROCEDURE — 3079F DIAST BP 80-89 MM HG: CPT | Mod: CPTII,S$GLB,, | Performed by: FAMILY MEDICINE

## 2019-11-06 PROCEDURE — 83036 HEMOGLOBIN GLYCOSYLATED A1C: CPT

## 2019-11-06 PROCEDURE — 3074F PR MOST RECENT SYSTOLIC BLOOD PRESSURE < 130 MM HG: ICD-10-PCS | Mod: CPTII,S$GLB,, | Performed by: FAMILY MEDICINE

## 2019-11-06 PROCEDURE — 99214 PR OFFICE/OUTPT VISIT, EST, LEVL IV, 30-39 MIN: ICD-10-PCS | Mod: 25,S$GLB,, | Performed by: FAMILY MEDICINE

## 2019-11-06 PROCEDURE — 99999 PR PBB SHADOW E&M-EST. PATIENT-LVL III: ICD-10-PCS | Mod: PBBFAC,,, | Performed by: FAMILY MEDICINE

## 2019-11-06 PROCEDURE — 90662 IIV NO PRSV INCREASED AG IM: CPT | Mod: S$GLB,,, | Performed by: FAMILY MEDICINE

## 2019-11-06 PROCEDURE — 3079F PR MOST RECENT DIASTOLIC BLOOD PRESSURE 80-89 MM HG: ICD-10-PCS | Mod: CPTII,S$GLB,, | Performed by: FAMILY MEDICINE

## 2019-11-06 PROCEDURE — 90670 PCV13 VACCINE IM: CPT | Mod: S$GLB,,, | Performed by: FAMILY MEDICINE

## 2019-11-06 PROCEDURE — 1101F PR PT FALLS ASSESS DOC 0-1 FALLS W/OUT INJ PAST YR: ICD-10-PCS | Mod: CPTII,S$GLB,, | Performed by: FAMILY MEDICINE

## 2019-11-06 PROCEDURE — G0009 ADMIN PNEUMOCOCCAL VACCINE: HCPCS | Mod: S$GLB,,, | Performed by: FAMILY MEDICINE

## 2019-11-06 PROCEDURE — 1101F PT FALLS ASSESS-DOCD LE1/YR: CPT | Mod: CPTII,S$GLB,, | Performed by: FAMILY MEDICINE

## 2019-11-06 PROCEDURE — G0008 FLU VACCINE - HIGH DOSE (65+) PRESERVATIVE FREE IM: ICD-10-PCS | Mod: S$GLB,,, | Performed by: FAMILY MEDICINE

## 2019-11-06 PROCEDURE — 90670 PNEUMOCOCCAL CONJUGATE VACCINE 13-VALENT LESS THAN 5YO & GREATER THAN: ICD-10-PCS | Mod: S$GLB,,, | Performed by: FAMILY MEDICINE

## 2019-11-06 PROCEDURE — 99499 RISK ADDL DX/OHS AUDIT: ICD-10-PCS | Mod: S$GLB,,, | Performed by: FAMILY MEDICINE

## 2019-11-06 PROCEDURE — 99499 UNLISTED E&M SERVICE: CPT | Mod: S$GLB,,, | Performed by: FAMILY MEDICINE

## 2019-11-06 PROCEDURE — 84153 ASSAY OF PSA TOTAL: CPT

## 2019-11-06 PROCEDURE — 3074F SYST BP LT 130 MM HG: CPT | Mod: CPTII,S$GLB,, | Performed by: FAMILY MEDICINE

## 2019-11-06 PROCEDURE — 90662 FLU VACCINE - HIGH DOSE (65+) PRESERVATIVE FREE IM: ICD-10-PCS | Mod: S$GLB,,, | Performed by: FAMILY MEDICINE

## 2019-11-06 PROCEDURE — 99214 OFFICE O/P EST MOD 30 MIN: CPT | Mod: 25,S$GLB,, | Performed by: FAMILY MEDICINE

## 2019-11-06 PROCEDURE — 80061 LIPID PANEL: CPT

## 2019-11-06 PROCEDURE — 3008F PR BODY MASS INDEX (BMI) DOCUMENTED: ICD-10-PCS | Mod: CPTII,S$GLB,, | Performed by: FAMILY MEDICINE

## 2019-11-06 PROCEDURE — 99999 PR PBB SHADOW E&M-EST. PATIENT-LVL III: CPT | Mod: PBBFAC,,, | Performed by: FAMILY MEDICINE

## 2019-11-06 RX ORDER — BENAZEPRIL HYDROCHLORIDE 20 MG/1
20 TABLET ORAL DAILY
Qty: 90 TABLET | Refills: 3 | Status: SHIPPED | OUTPATIENT
Start: 2019-11-06 | End: 2020-09-29 | Stop reason: SDUPTHER

## 2019-11-06 NOTE — PROGRESS NOTES
Patient, Jasson Mayo (MRN #[id number]), presented with a recorded BMI of 37.74 kg/m^2 and a documented comorbidity(s):  - Hypertension  to which the severe obesity is a contributing factor. This is consistent with the definition of severe obesity (BMI 35.0-39.9) with comorbidity (ICD-10 E66.01, Z68.35). The patient's severe obesity was monitored, evaluated, addressed and/or treated. This addendum to the medical record is made on 11/06/2019.

## 2019-11-06 NOTE — PROGRESS NOTES
Subjective:       Patient ID: Jasson Mayo is a 64 y.o. male.    Chief Complaint: Follow-up    Follow-up hypertension hyperglycemia elevated liver functions thrombocytopenia.  He has a history fatty liver with non a non b hepatitis.  He has chronic low back pain with left-sided sciatica.  He had a recent neurosurgical evaluation.  Surgery was not indicated.  He is not yet interested in a nerve stimulator.  He denies headache chest pain palpitations shortness of breath or edema.    Review of Systems   Constitutional: Negative for activity change, appetite change, fatigue and unexpected weight change.   Respiratory: Negative for cough, chest tightness, shortness of breath and wheezing.    Cardiovascular: Negative for chest pain, palpitations and leg swelling.        Denies lightheadedness   Gastrointestinal: Negative for abdominal pain.        Denies bowel incontinence   Genitourinary: Negative for difficulty urinating.        Denies bladder incontinence   Musculoskeletal: Positive for back pain.   Neurological: Positive for numbness. Negative for facial asymmetry, weakness, light-headedness and headaches.        Intermittent numbness left thigh       Objective:      Physical Exam   Constitutional: He is oriented to person, place, and time. He appears well-developed and well-nourished. No distress.   Neck: Neck supple. No JVD present. No tracheal deviation present. No thyromegaly present.   Cardiovascular: Normal rate, regular rhythm and normal heart sounds. Exam reveals no gallop.   No murmur heard.  Pulmonary/Chest: Effort normal and breath sounds normal. No respiratory distress. He has no wheezes. He has no rales.   Abdominal: Soft. Bowel sounds are normal. He exhibits no mass. There is no tenderness.   Lymphadenopathy:     He has no cervical adenopathy.   Neurological: He is alert and oriented to person, place, and time.   Skin: Skin is warm and dry. He is not diaphoretic.       Lab Visit on 10/02/2019    Component Date Value Ref Range Status    WBC 10/02/2019 4.69  3.90 - 12.70 K/uL Final    RBC 10/02/2019 4.91  4.60 - 6.20 M/uL Final    Hemoglobin 10/02/2019 15.3  14.0 - 18.0 g/dL Final    Hematocrit 10/02/2019 44.3  40.0 - 54.0 % Final    Mean Corpuscular Volume 10/02/2019 90  82 - 98 fL Final    Mean Corpuscular Hemoglobin 10/02/2019 31.2* 27.0 - 31.0 pg Final    Mean Corpuscular Hemoglobin Conc 10/02/2019 34.5  32.0 - 36.0 g/dL Final    RDW 10/02/2019 13.3  11.5 - 14.5 % Final    Platelets 10/02/2019 128* 150 - 350 K/uL Final    MPV 10/02/2019 10.3  9.2 - 12.9 fL Final    Immature Granulocytes 10/02/2019 0.2  0.0 - 0.5 % Final    Gran # (ANC) 10/02/2019 2.2  1.8 - 7.7 K/uL Final    Immature Grans (Abs) 10/02/2019 0.01  0.00 - 0.04 K/uL Final    Lymph # 10/02/2019 1.7  1.0 - 4.8 K/uL Final    Mono # 10/02/2019 0.5  0.3 - 1.0 K/uL Final    Eos # 10/02/2019 0.2  0.0 - 0.5 K/uL Final    Baso # 10/02/2019 0.05  0.00 - 0.20 K/uL Final    nRBC 10/02/2019 0  0 /100 WBC Final    Gran% 10/02/2019 46.9  38.0 - 73.0 % Final    Lymph% 10/02/2019 36.0  18.0 - 48.0 % Final    Mono% 10/02/2019 11.1  4.0 - 15.0 % Final    Eosinophil% 10/02/2019 4.9  0.0 - 8.0 % Final    Basophil% 10/02/2019 1.1  0.0 - 1.9 % Final    Differential Method 10/02/2019 Automated   Final    Sodium 10/02/2019 138  136 - 145 mmol/L Final    Potassium 10/02/2019 4.5  3.5 - 5.1 mmol/L Final    Chloride 10/02/2019 105  95 - 110 mmol/L Final    CO2 10/02/2019 25  23 - 29 mmol/L Final    Glucose 10/02/2019 117* 70 - 110 mg/dL Final    BUN, Bld 10/02/2019 20  8 - 23 mg/dL Final    Creatinine 10/02/2019 0.8  0.5 - 1.4 mg/dL Final    Calcium 10/02/2019 9.6  8.7 - 10.5 mg/dL Final    Total Protein 10/02/2019 7.3  6.0 - 8.4 g/dL Final    Albumin 10/02/2019 3.7  3.5 - 5.2 g/dL Final    Total Bilirubin 10/02/2019 0.9  0.1 - 1.0 mg/dL Final    Alkaline Phosphatase 10/02/2019 99  55 - 135 U/L Final    AST 10/02/2019 81* 10 - 40  U/L Final    ALT 10/02/2019 127* 10 - 44 U/L Final    Anion Gap 10/02/2019 8  8 - 16 mmol/L Final    eGFR if African American 10/02/2019 >60  >60 mL/min/1.73 m^2 Final    eGFR if non African American 10/02/2019 >60  >60 mL/min/1.73 m^2 Final     Assessment:       1. Hyperglycemia    2. Essential hypertension    3. Screening for prostate cancer        Plan:     Lab was ordered to include CMP aic lipids PSA.  Recent CBC reviewed with stable thrombocytopenia.  Blood pressure is controlled 114/80.  Refill lisinopril.  Health maintenance reviewed.  Prevnar 13 flu VAX given today.  Recommend shingles immunization after 2 weeks at the pharmacy.  Discussed weight reduction.  Follow-up in 3 months    Hyperglycemia  -     Comprehensive metabolic panel  -     Hemoglobin A1c    Essential hypertension  -     Lipid panel  -     benazepril (LOTENSIN) 20 MG tablet; Take 1 tablet (20 mg total) by mouth once daily.  Dispense: 90 tablet; Refill: 3    Screening for prostate cancer  -     PSA, Screening    Other orders  -     Influenza - High Dose (65+) (PF) (IM)  -     (In Office Administered) Pneumococcal Conjugate Vaccine (13 Valent) (IM)

## 2019-11-07 LAB
COMPLEXED PSA SERPL-MCNC: 0.19 NG/ML (ref 0–4)
ESTIMATED AVG GLUCOSE: 111 MG/DL (ref 68–131)
HBA1C MFR BLD HPLC: 5.5 % (ref 4–5.6)

## 2019-11-19 RX ORDER — GABAPENTIN 400 MG/1
400 CAPSULE ORAL 3 TIMES DAILY
Qty: 90 CAPSULE | Refills: 1 | Status: SHIPPED | OUTPATIENT
Start: 2019-11-19 | End: 2020-05-27

## 2019-12-02 ENCOUNTER — TELEPHONE (OUTPATIENT)
Dept: FAMILY MEDICINE | Facility: CLINIC | Age: 64
End: 2019-12-02

## 2019-12-02 ENCOUNTER — OFFICE VISIT (OUTPATIENT)
Dept: FAMILY MEDICINE | Facility: CLINIC | Age: 64
End: 2019-12-02
Payer: MEDICARE

## 2019-12-02 ENCOUNTER — HOSPITAL ENCOUNTER (OUTPATIENT)
Dept: RADIOLOGY | Facility: HOSPITAL | Age: 64
Discharge: HOME | End: 2019-12-02
Attending: NURSE PRACTITIONER
Payer: MEDICARE

## 2019-12-02 VITALS
SYSTOLIC BLOOD PRESSURE: 114 MMHG | BODY MASS INDEX: 38.65 KG/M2 | WEIGHT: 255 LBS | TEMPERATURE: 98 F | HEART RATE: 84 BPM | HEIGHT: 68 IN | DIASTOLIC BLOOD PRESSURE: 79 MMHG

## 2019-12-02 DIAGNOSIS — R05.9 COUGH: ICD-10-CM

## 2019-12-02 DIAGNOSIS — J06.9 UPPER RESPIRATORY TRACT INFECTION, UNSPECIFIED TYPE: Primary | ICD-10-CM

## 2019-12-02 PROCEDURE — 3078F DIAST BP <80 MM HG: CPT | Mod: CPTII,S$GLB,, | Performed by: NURSE PRACTITIONER

## 2019-12-02 PROCEDURE — 1101F PT FALLS ASSESS-DOCD LE1/YR: CPT | Mod: CPTII,S$GLB,, | Performed by: NURSE PRACTITIONER

## 2019-12-02 PROCEDURE — 3074F SYST BP LT 130 MM HG: CPT | Mod: CPTII,S$GLB,, | Performed by: NURSE PRACTITIONER

## 2019-12-02 PROCEDURE — 71046 X-RAY EXAM CHEST 2 VIEWS: CPT | Mod: 26,,, | Performed by: RADIOLOGY

## 2019-12-02 PROCEDURE — 71046 X-RAY EXAM CHEST 2 VIEWS: CPT | Mod: TC,PO

## 2019-12-02 PROCEDURE — 99999 PR PBB SHADOW E&M-EST. PATIENT-LVL IV: CPT | Mod: PBBFAC,,, | Performed by: NURSE PRACTITIONER

## 2019-12-02 PROCEDURE — 3008F BODY MASS INDEX DOCD: CPT | Mod: CPTII,S$GLB,, | Performed by: NURSE PRACTITIONER

## 2019-12-02 PROCEDURE — 3008F PR BODY MASS INDEX (BMI) DOCUMENTED: ICD-10-PCS | Mod: CPTII,S$GLB,, | Performed by: NURSE PRACTITIONER

## 2019-12-02 PROCEDURE — 99213 PR OFFICE/OUTPT VISIT, EST, LEVL III, 20-29 MIN: ICD-10-PCS | Mod: S$GLB,,, | Performed by: NURSE PRACTITIONER

## 2019-12-02 PROCEDURE — 99999 PR PBB SHADOW E&M-EST. PATIENT-LVL IV: ICD-10-PCS | Mod: PBBFAC,,, | Performed by: NURSE PRACTITIONER

## 2019-12-02 PROCEDURE — 3078F PR MOST RECENT DIASTOLIC BLOOD PRESSURE < 80 MM HG: ICD-10-PCS | Mod: CPTII,S$GLB,, | Performed by: NURSE PRACTITIONER

## 2019-12-02 PROCEDURE — 3074F PR MOST RECENT SYSTOLIC BLOOD PRESSURE < 130 MM HG: ICD-10-PCS | Mod: CPTII,S$GLB,, | Performed by: NURSE PRACTITIONER

## 2019-12-02 PROCEDURE — 1101F PR PT FALLS ASSESS DOC 0-1 FALLS W/OUT INJ PAST YR: ICD-10-PCS | Mod: CPTII,S$GLB,, | Performed by: NURSE PRACTITIONER

## 2019-12-02 PROCEDURE — 71046 XR CHEST PA AND LATERAL: ICD-10-PCS | Mod: 26,,, | Performed by: RADIOLOGY

## 2019-12-02 PROCEDURE — 99213 OFFICE O/P EST LOW 20 MIN: CPT | Mod: S$GLB,,, | Performed by: NURSE PRACTITIONER

## 2019-12-02 RX ORDER — AZITHROMYCIN 250 MG/1
TABLET, FILM COATED ORAL
Qty: 6 TABLET | Refills: 0 | Status: SHIPPED | OUTPATIENT
Start: 2019-12-02 | End: 2019-12-07

## 2019-12-02 RX ORDER — PROMETHAZINE HYDROCHLORIDE AND DEXTROMETHORPHAN HYDROBROMIDE 6.25; 15 MG/5ML; MG/5ML
5 SYRUP ORAL 2 TIMES DAILY PRN
Qty: 118 ML | Refills: 0 | Status: SHIPPED | OUTPATIENT
Start: 2019-12-02 | End: 2019-12-12

## 2019-12-02 RX ORDER — FLUTICASONE PROPIONATE 50 MCG
SPRAY, SUSPENSION (ML) NASAL
COMMUNITY
Start: 2019-11-29 | End: 2020-05-27

## 2019-12-02 NOTE — PROGRESS NOTES
Subjective:       Patient ID: Jasson Mayo is a 64 y.o. male.    Chief Complaint: Cough (soreness)    Cough   This is a new problem. The current episode started 1 to 4 weeks ago. The problem has been unchanged. The problem occurs every few minutes. The cough is productive of sputum. Associated symptoms include nasal congestion and postnasal drip. Pertinent negatives include no chest pain, chills, ear congestion, ear pain, fever, headaches, heartburn, hemoptysis, myalgias, rash, rhinorrhea, sore throat, shortness of breath, sweats, weight loss or wheezing. Nothing aggravates the symptoms. Treatments tried: Went to Urgent care on 11/29/19; prescribed flonase, zyrtec D, steroid inj. The treatment provided no relief. There is no history of asthma, bronchiectasis, bronchitis, COPD, emphysema, environmental allergies or pneumonia.     Past Medical History:   Diagnosis Date    Decreased platelet count     Fatty liver     Gout     HTN (hypertension) 2000    Non-A non-B hepatitis 1990    Obesity (BMI 35.0-39.9 without comorbidity)     Prediabetes 2015     Social History     Socioeconomic History    Marital status:      Spouse name: Not on file    Number of children: 1    Years of education: Not on file    Highest education level: Not on file   Occupational History    Occupation: [employer]     Employer: [employer]     Comment: [employer]   Social Needs    Financial resource strain: Not on file    Food insecurity:     Worry: Not on file     Inability: Not on file    Transportation needs:     Medical: Not on file     Non-medical: Not on file   Tobacco Use    Smoking status: Never Smoker    Smokeless tobacco: Former User   Substance and Sexual Activity    Alcohol use: No    Drug use: Not on file    Sexual activity: Not on file   Lifestyle    Physical activity:     Days per week: Not on file     Minutes per session: Not on file    Stress: Not on file   Relationships    Social  connections:     Talks on phone: Not on file     Gets together: Not on file     Attends Shinto service: Not on file     Active member of club or organization: Not on file     Attends meetings of clubs or organizations: Not on file     Relationship status: Not on file   Other Topics Concern    Not on file   Social History Narrative    Not on file     Past Surgical History:   Procedure Laterality Date    COLONOSCOPY W/ POLYPECTOMY  10, 14 no polyp    KNEE ARTHROSCOPY Right     Meniscus repair    LIVER BIOPSY  2010    VASECTOMY         Review of Systems   Constitutional: Negative.  Negative for chills, fever and weight loss.   HENT: Positive for postnasal drip and sinus pressure. Negative for ear pain, rhinorrhea and sore throat.    Eyes: Negative.    Respiratory: Positive for cough. Negative for hemoptysis, shortness of breath and wheezing.    Cardiovascular: Negative.  Negative for chest pain.   Gastrointestinal: Negative.  Negative for heartburn.   Endocrine: Negative.    Genitourinary: Negative.    Musculoskeletal: Negative.  Negative for myalgias.   Skin: Negative.  Negative for rash.   Allergic/Immunologic: Negative.  Negative for environmental allergies.   Neurological: Negative.  Negative for headaches.   Psychiatric/Behavioral: Negative.        Objective:      Physical Exam   Constitutional: He is oriented to person, place, and time. He appears well-developed and well-nourished.   HENT:   Head: Normocephalic.   Right Ear: External ear normal.   Left Ear: External ear normal.   Mouth/Throat: Oropharynx is clear and moist.   Eyes: Pupils are equal, round, and reactive to light. Conjunctivae are normal.   Neck: Normal range of motion. Neck supple.   Cardiovascular: Normal rate, regular rhythm and normal heart sounds.   Pulmonary/Chest: Effort normal and breath sounds normal.   Abdominal: Soft. Bowel sounds are normal.   Musculoskeletal: Normal range of motion.   Neurological: He is alert and oriented to  person, place, and time.   Skin: Skin is warm and dry. Capillary refill takes 2 to 3 seconds.   Psychiatric: He has a normal mood and affect. His behavior is normal. Judgment and thought content normal.   Nursing note and vitals reviewed.      Assessment:       1. Upper respiratory tract infection, unspecified type   2.      Cough   Plan:           Jasson was seen today for cough.    Diagnoses and all orders for this visit:    Upper respiratory tract infection, unspecified type  Cough  -     X-Ray Chest PA And Lateral; Future  -     promethazine-dextromethorphan (PROMETHAZINE-DM) 6.25-15 mg/5 mL Syrp; Take 5 mLs by mouth 2 (two) times daily as needed.  -     azithromycin (Z-ALONDRA) 250 MG tablet; Take 2 tablets by mouth on day 1; Take 1 tablet by mouth on days 2-5  Report to ER if symptoms worsen

## 2019-12-02 NOTE — TELEPHONE ENCOUNTER
----- Message from Samantha Wolf sent at 12/2/2019  4:14 PM CST -----  Contact: spouse-ladarius  Requesting call back due to pt medication is not at pharmacy. Pt has a bad cough and is requesting prescription before end of business day. Please call back at [phone].      Thanks,  Samantha Wolf

## 2019-12-02 NOTE — TELEPHONE ENCOUNTER
----- Message from Mayra Gaivria sent at 12/2/2019  3:25 PM CST -----  Type:  Pharmacy Calling to Clarify an RX    Name of Caller:  Angela  Pharmacy Name:  [facility]  Prescription Name:   Promethazine DM  What do they need to clarify?:  Pt is at their pharmacy//they have not received the script  Best Call Back Number:  [phone]  Additional Information:  Pt saw Ms Garcia this afternoon and they have not received his script yet//pt is waiting at the pharmacy//please call//thanks/tone

## 2020-01-15 ENCOUNTER — LAB VISIT (OUTPATIENT)
Dept: LAB | Facility: HOSPITAL | Age: 65
End: 2020-01-15
Attending: INTERNAL MEDICINE
Payer: MEDICARE

## 2020-01-15 ENCOUNTER — OFFICE VISIT (OUTPATIENT)
Dept: HEMATOLOGY/ONCOLOGY | Facility: CLINIC | Age: 65
End: 2020-01-15
Payer: MEDICARE

## 2020-01-15 VITALS
TEMPERATURE: 98 F | WEIGHT: 252.63 LBS | SYSTOLIC BLOOD PRESSURE: 115 MMHG | DIASTOLIC BLOOD PRESSURE: 84 MMHG | OXYGEN SATURATION: 95 % | HEART RATE: 67 BPM | BODY MASS INDEX: 38.41 KG/M2

## 2020-01-15 DIAGNOSIS — D69.6 THROMBOCYTOPENIA: Primary | ICD-10-CM

## 2020-01-15 DIAGNOSIS — D69.6 THROMBOCYTOPENIA: ICD-10-CM

## 2020-01-15 LAB
BASOPHILS # BLD AUTO: ABNORMAL K/UL (ref 0–0.2)
BASOPHILS NFR BLD: 2 % (ref 0–1.9)
DIFFERENTIAL METHOD: ABNORMAL
EOSINOPHIL # BLD AUTO: ABNORMAL K/UL (ref 0–0.5)
EOSINOPHIL NFR BLD: 4 % (ref 0–8)
ERYTHROCYTE [DISTWIDTH] IN BLOOD BY AUTOMATED COUNT: 13 % (ref 11.5–14.5)
HCT VFR BLD AUTO: 45.1 % (ref 40–54)
HGB BLD-MCNC: 15.5 G/DL (ref 14–18)
LYMPHOCYTES # BLD AUTO: ABNORMAL K/UL (ref 1–4.8)
LYMPHOCYTES NFR BLD: 34 % (ref 18–48)
MCH RBC QN AUTO: 31.3 PG (ref 27–31)
MCHC RBC AUTO-ENTMCNC: 34.4 G/DL (ref 32–36)
MCV RBC AUTO: 91 FL (ref 82–98)
MONOCYTES # BLD AUTO: ABNORMAL K/UL (ref 0.3–1)
MONOCYTES NFR BLD: 10 % (ref 4–15)
NEUTROPHILS NFR BLD: 49 % (ref 38–73)
NEUTS BAND NFR BLD MANUAL: 1 %
PLATELET # BLD AUTO: 132 K/UL (ref 150–350)
PLATELET BLD QL SMEAR: ABNORMAL
PMV BLD AUTO: 10.7 FL (ref 9.2–12.9)
RBC # BLD AUTO: 4.96 M/UL (ref 4.6–6.2)
WBC # BLD AUTO: 5.7 K/UL (ref 3.9–12.7)

## 2020-01-15 PROCEDURE — 99214 PR OFFICE/OUTPT VISIT, EST, LEVL IV, 30-39 MIN: ICD-10-PCS | Mod: S$GLB,,, | Performed by: INTERNAL MEDICINE

## 2020-01-15 PROCEDURE — 86334 IMMUNOFIX E-PHORESIS SERUM: CPT | Mod: 26,,, | Performed by: PATHOLOGY

## 2020-01-15 PROCEDURE — 86334 IMMUNOFIX E-PHORESIS SERUM: CPT

## 2020-01-15 PROCEDURE — 36415 COLL VENOUS BLD VENIPUNCTURE: CPT | Mod: PO

## 2020-01-15 PROCEDURE — 3074F PR MOST RECENT SYSTOLIC BLOOD PRESSURE < 130 MM HG: ICD-10-PCS | Mod: CPTII,S$GLB,, | Performed by: INTERNAL MEDICINE

## 2020-01-15 PROCEDURE — 99999 PR PBB SHADOW E&M-EST. PATIENT-LVL II: CPT | Mod: PBBFAC,,, | Performed by: INTERNAL MEDICINE

## 2020-01-15 PROCEDURE — 85007 BL SMEAR W/DIFF WBC COUNT: CPT | Mod: PO

## 2020-01-15 PROCEDURE — 3008F BODY MASS INDEX DOCD: CPT | Mod: CPTII,S$GLB,, | Performed by: INTERNAL MEDICINE

## 2020-01-15 PROCEDURE — 85027 COMPLETE CBC AUTOMATED: CPT | Mod: PO

## 2020-01-15 PROCEDURE — 83520 IMMUNOASSAY QUANT NOS NONAB: CPT | Mod: 59

## 2020-01-15 PROCEDURE — 99214 OFFICE O/P EST MOD 30 MIN: CPT | Mod: S$GLB,,, | Performed by: INTERNAL MEDICINE

## 2020-01-15 PROCEDURE — 84165 PROTEIN E-PHORESIS SERUM: CPT | Mod: 26,,, | Performed by: PATHOLOGY

## 2020-01-15 PROCEDURE — 99999 PR PBB SHADOW E&M-EST. PATIENT-LVL II: ICD-10-PCS | Mod: PBBFAC,,, | Performed by: INTERNAL MEDICINE

## 2020-01-15 PROCEDURE — 3074F SYST BP LT 130 MM HG: CPT | Mod: CPTII,S$GLB,, | Performed by: INTERNAL MEDICINE

## 2020-01-15 PROCEDURE — 3079F PR MOST RECENT DIASTOLIC BLOOD PRESSURE 80-89 MM HG: ICD-10-PCS | Mod: CPTII,S$GLB,, | Performed by: INTERNAL MEDICINE

## 2020-01-15 PROCEDURE — 3008F PR BODY MASS INDEX (BMI) DOCUMENTED: ICD-10-PCS | Mod: CPTII,S$GLB,, | Performed by: INTERNAL MEDICINE

## 2020-01-15 PROCEDURE — 84165 PROTEIN E-PHORESIS SERUM: CPT

## 2020-01-15 PROCEDURE — 86334 PATHOLOGIST INTERPRETATION IFE: ICD-10-PCS | Mod: 26,,, | Performed by: PATHOLOGY

## 2020-01-15 PROCEDURE — 84165 PATHOLOGIST INTERPRETATION SPE: ICD-10-PCS | Mod: 26,,, | Performed by: PATHOLOGY

## 2020-01-15 PROCEDURE — 3079F DIAST BP 80-89 MM HG: CPT | Mod: CPTII,S$GLB,, | Performed by: INTERNAL MEDICINE

## 2020-01-15 PROCEDURE — 1101F PR PT FALLS ASSESS DOC 0-1 FALLS W/OUT INJ PAST YR: ICD-10-PCS | Mod: CPTII,S$GLB,, | Performed by: INTERNAL MEDICINE

## 2020-01-15 PROCEDURE — 1101F PT FALLS ASSESS-DOCD LE1/YR: CPT | Mod: CPTII,S$GLB,, | Performed by: INTERNAL MEDICINE

## 2020-01-15 PROCEDURE — 80053 COMPREHEN METABOLIC PANEL: CPT

## 2020-01-15 NOTE — PROGRESS NOTES
Subjective:       Patient ID: Jasson Mayo is a 64 y.o. male.    Chief Complaint: Thrombocytopenia [D69.6]  HPI: We have an opportunity to see Mr. Jasson Mayo in Hematology Oncology clinic at [facility] on 01/14/2020.  Mr. Jasson Mayo is a 64 y.o. gentleman with diffuse fatty liver, degenerative arthritis with radiculopathy, gout on colchicine and allopurinol presents for evaluation of thrombocytopenia.  Denies having had problems with bleeding or bruising.     No history exists.     Past Medical History:   Diagnosis Date    Decreased platelet count     Fatty liver     Gout     HTN (hypertension) 2000    Non-A non-B hepatitis 1990    Obesity (BMI 35.0-39.9 without comorbidity)     Prediabetes 2015     Family History   Problem Relation Age of Onset    COPD Mother     Heart attacks under age 50 Mother     Cancer Father         PROSTATE CA     Social History     Socioeconomic History    Marital status:      Spouse name: Not on file    Number of children: 1    Years of education: Not on file    Highest education level: Not on file   Occupational History    Occupation: [employer]     Employer: [employer]     Comment: Jimmy pandey   Social Needs    Financial resource strain: Not on file    Food insecurity:     Worry: Not on file     Inability: Not on file    Transportation needs:     Medical: Not on file     Non-medical: Not on file   Tobacco Use    Smoking status: Never Smoker    Smokeless tobacco: Former User   Substance and Sexual Activity    Alcohol use: No    Drug use: Not on file    Sexual activity: Not on file   Lifestyle    Physical activity:     Days per week: Not on file     Minutes per session: Not on file    Stress: Not on file   Relationships    Social connections:     Talks on phone: Not on file     Gets together: Not on file     Attends Congregational service: Not on file     Active member of club or organization: Not on file      Attends meetings of clubs or organizations: Not on file     Relationship status: Not on file   Other Topics Concern    Not on file   Social History Narrative    Not on file     Past Surgical History:   Procedure Laterality Date    COLONOSCOPY W/ POLYPECTOMY  10, 14 no polyp    KNEE ARTHROSCOPY Right     Meniscus repair    LIVER BIOPSY  2010    VASECTOMY       Current Outpatient Medications   Medication Sig Dispense Refill    allopurinol (ZYLOPRIM) 100 MG tablet Take 1 tablet (100 mg total) by mouth once daily. 90 tablet 2    benazepril (LOTENSIN) 20 MG tablet Take 1 tablet (20 mg total) by mouth once daily. 90 tablet 3    colchicine (MITIGARE) 0.6 mg Cap Take 1 capsule (0.6 mg total) by mouth once daily. 90 capsule 2    fluticasone propionate (FLONASE) 50 mcg/actuation nasal spray       gabapentin (NEURONTIN) 400 MG capsule TAKE 1 CAPSULE (400 MG TOTAL) BY MOUTH 3 (THREE) TIMES DAILY. 90 capsule 1    vitamin E 1000 UNIT capsule Take 1,000 Units by mouth once daily.       No current facility-administered medications for this visit.        Labs:  Lab Results   Component Value Date    WBC 4.69 10/02/2019    HGB 15.3 10/02/2019    HCT 44.3 10/02/2019    MCV 90 10/02/2019     (L) 10/02/2019     BMP  Lab Results   Component Value Date     11/06/2019    K 4.3 11/06/2019     11/06/2019    CO2 28 11/06/2019    BUN 19 11/06/2019    CREATININE 0.9 11/06/2019    CALCIUM 9.5 11/06/2019    ANIONGAP 7 (L) 11/06/2019    ESTGFRAFRICA >60.0 11/06/2019    EGFRNONAA >60.0 11/06/2019     Lab Results   Component Value Date    ALT 74 (H) 11/06/2019    AST 56 (H) 11/06/2019    ALKPHOS 92 11/06/2019    BILITOT 1.1 (H) 11/06/2019       No results found for: IRON, TIBC, FERRITIN, SATURATEDIRO  No results found for: [id number]  No results found for: FOLATE  Lab Results   Component Value Date    TSH 2.385 11/05/2018       I have reviewed the radiology reports and examined the scan/xray images.    Review of  Systems   Constitutional: Negative.    HENT: Negative.    Eyes: Negative.    Respiratory: Negative.    Cardiovascular: Negative.    Gastrointestinal: Negative.    Endocrine: Negative.    Genitourinary: Negative.    Musculoskeletal: Negative.    Skin: Negative.    Allergic/Immunologic: Negative.    Neurological: Negative.    Hematological: Negative.    Psychiatric/Behavioral: Negative.      ECOG SCORE    0 - Fully active-able to carry on all pre-disease performance without restriction            Objective:     Vitals:    01/15/20 1316   BP: 115/84   Pulse: 67   Temp: 98.1 °F (36.7 °C)   Body mass index is 38.41 kg/m².  Physical Exam   Constitutional: He is oriented to person, place, and time. He appears well-developed and well-nourished.   HENT:   Head: Normocephalic and atraumatic.   Eyes: Conjunctivae and EOM are normal.   Neck: Normal range of motion. Neck supple.   Cardiovascular: Normal rate and regular rhythm.   Pulmonary/Chest: Effort normal and breath sounds normal.   Abdominal: Soft. Bowel sounds are normal.   Musculoskeletal: Normal range of motion.   Neurological: He is alert and oriented to person, place, and time.   Skin: Skin is warm and dry.   Psychiatric: He has a normal mood and affect. His behavior is normal. Judgment and thought content normal.   Nursing note and vitals reviewed.        Assessment:      1. Thrombocytopenia           Plan:     Thrombocytopenia  Likely due to diffuse fatty liver.  Advised weight loss.  -     CBC auto differential; Future; Expected date: 07/15/2020  -     Comprehensive metabolic panel; Future; Expected date: 07/15/2020

## 2020-01-16 LAB
ALBUMIN SERPL BCP-MCNC: 4 G/DL (ref 3.5–5.2)
ALBUMIN SERPL ELPH-MCNC: 4.14 G/DL (ref 3.35–5.55)
ALP SERPL-CCNC: 95 U/L (ref 55–135)
ALPHA1 GLOB SERPL ELPH-MCNC: 0.2 G/DL (ref 0.17–0.41)
ALPHA2 GLOB SERPL ELPH-MCNC: 0.71 G/DL (ref 0.43–0.99)
ALT SERPL W/O P-5'-P-CCNC: 165 U/L (ref 10–44)
ANION GAP SERPL CALC-SCNC: 5 MMOL/L (ref 8–16)
AST SERPL-CCNC: 128 U/L (ref 10–40)
B-GLOBULIN SERPL ELPH-MCNC: 0.8 G/DL (ref 0.5–1.1)
BILIRUB SERPL-MCNC: 0.6 MG/DL (ref 0.1–1)
BUN SERPL-MCNC: 21 MG/DL (ref 8–23)
CALCIUM SERPL-MCNC: 9.9 MG/DL (ref 8.7–10.5)
CHLORIDE SERPL-SCNC: 103 MMOL/L (ref 95–110)
CO2 SERPL-SCNC: 28 MMOL/L (ref 23–29)
CREAT SERPL-MCNC: 0.9 MG/DL (ref 0.5–1.4)
EST. GFR  (AFRICAN AMERICAN): >60 ML/MIN/1.73 M^2
EST. GFR  (NON AFRICAN AMERICAN): >60 ML/MIN/1.73 M^2
GAMMA GLOB SERPL ELPH-MCNC: 1.16 G/DL (ref 0.67–1.58)
GLUCOSE SERPL-MCNC: 99 MG/DL (ref 70–110)
INTERPRETATION SERPL IFE-IMP: NORMAL
KAPPA LC SER QL IA: 2.12 MG/DL (ref 0.33–1.94)
KAPPA LC/LAMBDA SER IA: 0.97 (ref 0.26–1.65)
LAMBDA LC SER QL IA: 2.18 MG/DL (ref 0.57–2.63)
PATHOLOGIST INTERPRETATION IFE: NORMAL
PATHOLOGIST INTERPRETATION SPE: NORMAL
POTASSIUM SERPL-SCNC: 4.5 MMOL/L (ref 3.5–5.1)
PROT SERPL-MCNC: 7 G/DL (ref 6–8.4)
PROT SERPL-MCNC: 7.2 G/DL (ref 6–8.4)
SODIUM SERPL-SCNC: 136 MMOL/L (ref 136–145)

## 2020-01-23 ENCOUNTER — PATIENT OUTREACH (OUTPATIENT)
Dept: ADMINISTRATIVE | Facility: HOSPITAL | Age: 65
End: 2020-01-23

## 2020-02-06 ENCOUNTER — OFFICE VISIT (OUTPATIENT)
Dept: INTERNAL MEDICINE | Facility: CLINIC | Age: 65
End: 2020-02-06
Payer: MEDICARE

## 2020-02-06 VITALS
HEART RATE: 62 BPM | BODY MASS INDEX: 37.97 KG/M2 | SYSTOLIC BLOOD PRESSURE: 116 MMHG | OXYGEN SATURATION: 96 % | WEIGHT: 250.56 LBS | TEMPERATURE: 98 F | DIASTOLIC BLOOD PRESSURE: 70 MMHG | HEIGHT: 68 IN

## 2020-02-06 DIAGNOSIS — Z28.39 IMMUNIZATION DEFICIENCY: ICD-10-CM

## 2020-02-06 DIAGNOSIS — I10 ESSENTIAL HYPERTENSION: Primary | ICD-10-CM

## 2020-02-06 DIAGNOSIS — M54.16 LUMBAR RADICULOPATHY: ICD-10-CM

## 2020-02-06 DIAGNOSIS — D69.6 THROMBOCYTOPENIA: ICD-10-CM

## 2020-02-06 DIAGNOSIS — Z11.4 ENCOUNTER FOR SCREENING FOR HIV: ICD-10-CM

## 2020-02-06 LAB
BASOPHILS # BLD AUTO: 0.05 K/UL (ref 0–0.2)
BASOPHILS NFR BLD: 0.9 % (ref 0–1.9)
DIFFERENTIAL METHOD: ABNORMAL
EOSINOPHIL # BLD AUTO: 0.3 K/UL (ref 0–0.5)
EOSINOPHIL NFR BLD: 5.6 % (ref 0–8)
ERYTHROCYTE [DISTWIDTH] IN BLOOD BY AUTOMATED COUNT: 13.2 % (ref 11.5–14.5)
HCT VFR BLD AUTO: 44.9 % (ref 40–54)
HGB BLD-MCNC: 15.1 G/DL (ref 14–18)
IMM GRANULOCYTES # BLD AUTO: 0.01 K/UL (ref 0–0.04)
IMM GRANULOCYTES NFR BLD AUTO: 0.2 % (ref 0–0.5)
LYMPHOCYTES # BLD AUTO: 1.4 K/UL (ref 1–4.8)
LYMPHOCYTES NFR BLD: 25 % (ref 18–48)
MCH RBC QN AUTO: 32.1 PG (ref 27–31)
MCHC RBC AUTO-ENTMCNC: 33.6 G/DL (ref 32–36)
MCV RBC AUTO: 95 FL (ref 82–98)
MONOCYTES # BLD AUTO: 0.7 K/UL (ref 0.3–1)
MONOCYTES NFR BLD: 12.1 % (ref 4–15)
NEUTROPHILS # BLD AUTO: 3.2 K/UL (ref 1.8–7.7)
NEUTROPHILS NFR BLD: 56.2 % (ref 38–73)
NRBC BLD-RTO: 0 /100 WBC
PLATELET # BLD AUTO: 118 K/UL (ref 150–350)
PMV BLD AUTO: 12.3 FL (ref 9.2–12.9)
RBC # BLD AUTO: 4.71 M/UL (ref 4.6–6.2)
WBC # BLD AUTO: 5.69 K/UL (ref 3.9–12.7)

## 2020-02-06 PROCEDURE — 99499 RISK ADDL DX/OHS AUDIT: ICD-10-PCS | Mod: S$GLB,,, | Performed by: FAMILY MEDICINE

## 2020-02-06 PROCEDURE — 85025 COMPLETE CBC W/AUTO DIFF WBC: CPT

## 2020-02-06 PROCEDURE — 1101F PT FALLS ASSESS-DOCD LE1/YR: CPT | Mod: CPTII,S$GLB,, | Performed by: FAMILY MEDICINE

## 2020-02-06 PROCEDURE — 99214 OFFICE O/P EST MOD 30 MIN: CPT | Mod: S$GLB,,, | Performed by: FAMILY MEDICINE

## 2020-02-06 PROCEDURE — 99214 PR OFFICE/OUTPT VISIT, EST, LEVL IV, 30-39 MIN: ICD-10-PCS | Mod: S$GLB,,, | Performed by: FAMILY MEDICINE

## 2020-02-06 PROCEDURE — 99999 PR PBB SHADOW E&M-EST. PATIENT-LVL III: CPT | Mod: PBBFAC,,, | Performed by: FAMILY MEDICINE

## 2020-02-06 PROCEDURE — 86703 HIV-1/HIV-2 1 RESULT ANTBDY: CPT

## 2020-02-06 PROCEDURE — 99999 PR PBB SHADOW E&M-EST. PATIENT-LVL III: ICD-10-PCS | Mod: PBBFAC,,, | Performed by: FAMILY MEDICINE

## 2020-02-06 PROCEDURE — 1101F PR PT FALLS ASSESS DOC 0-1 FALLS W/OUT INJ PAST YR: ICD-10-PCS | Mod: CPTII,S$GLB,, | Performed by: FAMILY MEDICINE

## 2020-02-06 PROCEDURE — 3074F PR MOST RECENT SYSTOLIC BLOOD PRESSURE < 130 MM HG: ICD-10-PCS | Mod: CPTII,S$GLB,, | Performed by: FAMILY MEDICINE

## 2020-02-06 PROCEDURE — 3078F PR MOST RECENT DIASTOLIC BLOOD PRESSURE < 80 MM HG: ICD-10-PCS | Mod: CPTII,S$GLB,, | Performed by: FAMILY MEDICINE

## 2020-02-06 PROCEDURE — 3074F SYST BP LT 130 MM HG: CPT | Mod: CPTII,S$GLB,, | Performed by: FAMILY MEDICINE

## 2020-02-06 PROCEDURE — 3008F BODY MASS INDEX DOCD: CPT | Mod: CPTII,S$GLB,, | Performed by: FAMILY MEDICINE

## 2020-02-06 PROCEDURE — 3008F PR BODY MASS INDEX (BMI) DOCUMENTED: ICD-10-PCS | Mod: CPTII,S$GLB,, | Performed by: FAMILY MEDICINE

## 2020-02-06 PROCEDURE — 99499 UNLISTED E&M SERVICE: CPT | Mod: S$GLB,,, | Performed by: FAMILY MEDICINE

## 2020-02-06 PROCEDURE — 3078F DIAST BP <80 MM HG: CPT | Mod: CPTII,S$GLB,, | Performed by: FAMILY MEDICINE

## 2020-02-06 NOTE — PROGRESS NOTES
Patient, Jasson Mayo (MRN #[id number]), presented with a recorded BMI of 38.1 kg/m^2 and a documented comorbidity(s):  - Hypertension  to which the severe obesity is a contributing factor. This is consistent with the definition of severe obesity (BMI 35.0-39.9) with comorbidity (ICD-10 E66.01, Z68.35). The patient's severe obesity was monitored, evaluated, addressed and/or treated. This addendum to the medical record is made on 02/06/2020.

## 2020-02-06 NOTE — PROGRESS NOTES
Subjective:       Patient ID: Jasson Mayo is a 64 y.o. male.    Chief Complaint: Follow-up    Follow-up hypertension thrombocytopenia elevated BMI sciatica.  Denies headache chest pain palpitations shortness of breath or edema.  Hematology evaluation for thrombocytopenia was reviewed and appreciated.  He has changed his diet try and lose weight.  Unfortunately he gained about 1 lb of weight since last visit.  Sciatica has improved since he retired.  He reports when he starts having some leg discomfort he stops when he is doing and symptoms improve    Review of Systems   Constitutional: Negative for activity change, appetite change, fatigue and unexpected weight change.   Respiratory: Negative for cough, chest tightness, shortness of breath and wheezing.    Cardiovascular: Negative for chest pain, palpitations and leg swelling.        Denies lightheadedness   Gastrointestinal: Negative for abdominal pain.   Genitourinary: Negative for difficulty urinating.   Musculoskeletal:        Intermittent sciatica  gout followed by Rheumatology   Neurological: Negative for dizziness, weakness, light-headedness, numbness and headaches.       Objective:      Physical Exam   Constitutional: He is oriented to person, place, and time. He appears well-developed and well-nourished. No distress.   Neck: Neck supple. No JVD present. No tracheal deviation present. No thyromegaly present.   Cardiovascular: Normal rate, regular rhythm and normal heart sounds.   No murmur heard.  Pulmonary/Chest: Effort normal and breath sounds normal. No respiratory distress. He has no wheezes. He has no rales.   Abdominal: Soft. Bowel sounds are normal. He exhibits no mass. There is no tenderness.   Lymphadenopathy:     He has no cervical adenopathy.   Neurological: He is alert and oriented to person, place, and time.   Skin: Skin is warm and dry. He is not diaphoretic.       Lab Visit on 01/15/2020   Component Date Value Ref Range Status     WBC 01/15/2020 5.70  3.90 - 12.70 K/uL Final    RBC 01/15/2020 4.96  4.60 - 6.20 M/uL Final    Hemoglobin 01/15/2020 15.5  14.0 - 18.0 g/dL Final    Hematocrit 01/15/2020 45.1  40.0 - 54.0 % Final    Mean Corpuscular Volume 01/15/2020 91  82 - 98 fL Final    Mean Corpuscular Hemoglobin 01/15/2020 31.3* 27.0 - 31.0 pg Final    Mean Corpuscular Hemoglobin Conc 01/15/2020 34.4  32.0 - 36.0 g/dL Final    RDW 01/15/2020 13.0  11.5 - 14.5 % Final    Platelets 01/15/2020 132* 150 - 350 K/uL Final    MPV 01/15/2020 10.7  9.2 - 12.9 fL Final    Lymph # 01/15/2020 CANCELED  1.0 - 4.8 K/uL Final    Mono # 01/15/2020 CANCELED  0.3 - 1.0 K/uL Final    Eos # 01/15/2020 CANCELED  0.0 - 0.5 K/uL Final    Baso # 01/15/2020 CANCELED  0.00 - 0.20 K/uL Final    Gran% 01/15/2020 49.0  38.0 - 73.0 % Final    Lymph% 01/15/2020 34.0  18.0 - 48.0 % Final    Mono% 01/15/2020 10.0  4.0 - 15.0 % Final    Eosinophil% 01/15/2020 4.0  0.0 - 8.0 % Final    Basophil% 01/15/2020 2.0* 0.0 - 1.9 % Final    Bands 01/15/2020 1.0  % Final    Platelet Estimate 01/15/2020 Appears normal   Final    Differential Method 01/15/2020 Manual   Corrected    Sodium 01/15/2020 136  136 - 145 mmol/L Final    Potassium 01/15/2020 4.5  3.5 - 5.1 mmol/L Final    Chloride 01/15/2020 103  95 - 110 mmol/L Final    CO2 01/15/2020 28  23 - 29 mmol/L Final    Glucose 01/15/2020 99  70 - 110 mg/dL Final    BUN, Bld 01/15/2020 21  8 - 23 mg/dL Final    Creatinine 01/15/2020 0.9  0.5 - 1.4 mg/dL Final    Calcium 01/15/2020 9.9  8.7 - 10.5 mg/dL Final    Total Protein 01/15/2020 7.2  6.0 - 8.4 g/dL Final    Albumin 01/15/2020 4.0  3.5 - 5.2 g/dL Final    Total Bilirubin 01/15/2020 0.6  0.1 - 1.0 mg/dL Final    Alkaline Phosphatase 01/15/2020 95  55 - 135 U/L Final    AST 01/15/2020 128* 10 - 40 U/L Final    ALT 01/15/2020 165* 10 - 44 U/L Final    Anion Gap 01/15/2020 5* 8 - 16 mmol/L Final    eGFR if African American 01/15/2020 >60.0  >60  mL/min/1.73 m^2 Final    eGFR if non African American 01/15/2020 >60.0  >60 mL/min/1.73 m^2 Final    Protein, Serum 01/15/2020 7.0  6.0 - 8.4 g/dL Final    Albumin grams/dl 01/15/2020 4.14  3.35 - 5.55 g/dL Final    Alpha-1 grams/dl 01/15/2020 0.20  0.17 - 0.41 g/dL Final    Alpha-2 grams/dl 01/15/2020 0.71  0.43 - 0.99 g/dL Final    Beta grams/dl 01/15/2020 0.80  0.50 - 1.10 g/dL Final    Gamma grams/dl 01/15/2020 1.16  0.67 - 1.58 g/dL Final    Immunofix Interp. 01/15/2020 SEE COMMENT   Final    Kappa Free Light Chains 01/15/2020 2.12* 0.33 - 1.94 mg/dL Final    Lambda Free Light Chains 01/15/2020 2.18  0.57 - 2.63 mg/dL Final    Kappa/Lambda FLC Ratio 01/15/2020 0.97  0.26 - 1.65 Final    Pathologist Interpretation DAVIN 01/15/2020 REVIEWED   Final    Pathologist Interpretation SPE 01/15/2020 REVIEWED   Final     Assessment:       1. Thrombocytopenia    2. Encounter for screening for HIV        Plan:     Blood pressure controlled 116/70.  BMI remains elevated.  Discussed diet.  Recheck CBC for thrombocytopenia.  He increased HIV screening.  Discussed need for shingles immunization pharmacy.  Follow-up in 6 months.    Thrombocytopenia  -     CBC auto differential    Encounter for screening for HIV  -     HIV 1/2 Ag/Ab (4th Gen)

## 2020-02-07 LAB — HIV 1+2 AB+HIV1 P24 AG SERPL QL IA: NEGATIVE

## 2020-02-12 ENCOUNTER — PATIENT MESSAGE (OUTPATIENT)
Dept: INTERNAL MEDICINE | Facility: CLINIC | Age: 65
End: 2020-02-12

## 2020-03-27 ENCOUNTER — TELEPHONE (OUTPATIENT)
Dept: RHEUMATOLOGY | Facility: CLINIC | Age: 65
End: 2020-03-27

## 2020-03-27 DIAGNOSIS — M1A.0720 IDIOPATHIC CHRONIC GOUT OF LEFT FOOT WITHOUT TOPHUS: Primary | ICD-10-CM

## 2020-03-27 RX ORDER — PROBENECID AND COLCHICINE .5; 5 MG/1; MG/1
1 TABLET ORAL DAILY
Qty: 30 TABLET | Refills: 11 | Status: SHIPPED | OUTPATIENT
Start: 2020-03-27 | End: 2021-02-25

## 2020-03-27 NOTE — TELEPHONE ENCOUNTER
Declined video visit on 4-2-20 at 9:15am, requesting info on when to call back and reschedule joint injection. Also would like alternative to colchicine rx that's more affordable, paying $45/mo now.

## 2020-04-15 DIAGNOSIS — D69.6 THROMBOCYTOPENIA: Primary | ICD-10-CM

## 2020-05-26 ENCOUNTER — TELEPHONE (OUTPATIENT)
Dept: INTERNAL MEDICINE | Facility: CLINIC | Age: 65
End: 2020-05-26

## 2020-05-26 NOTE — TELEPHONE ENCOUNTER
Spoke with wife, states that they want to know if he needs to be seen, with having double vision. Went to the eye doctor because they told him to come on in. Informed me that if they need to see Dr. Jon they will call back and give us an update as well.

## 2020-05-26 NOTE — TELEPHONE ENCOUNTER
Pt wife states that the double vision was caused by the capillaries were having a mini stroke in the vessel- his BP was high today. He is to come back within a week. Was told he needed to see PCP. Apt made.

## 2020-05-26 NOTE — TELEPHONE ENCOUNTER
----- Message from Lucia Mccarthy sent at 5/26/2020 11:07 AM CDT -----  Contact:  Sang mullen   Please call back regarding results of eye doctor and follow up with primary care,  [phone]

## 2020-05-26 NOTE — TELEPHONE ENCOUNTER
----- Message from Nereida Anand sent at 5/26/2020  9:10 AM CDT -----  Contact: [phone]-self  Patient is requesting a call back concerning pt is having Double Vision and the pt's left eye does not feel right. Please call

## 2020-05-27 ENCOUNTER — OFFICE VISIT (OUTPATIENT)
Dept: INTERNAL MEDICINE | Facility: CLINIC | Age: 65
End: 2020-05-27
Payer: MEDICARE

## 2020-05-27 ENCOUNTER — LAB VISIT (OUTPATIENT)
Dept: LAB | Facility: HOSPITAL | Age: 65
End: 2020-05-27
Attending: FAMILY MEDICINE
Payer: MEDICARE

## 2020-05-27 VITALS
WEIGHT: 246.94 LBS | BODY MASS INDEX: 37.43 KG/M2 | SYSTOLIC BLOOD PRESSURE: 116 MMHG | TEMPERATURE: 96 F | DIASTOLIC BLOOD PRESSURE: 76 MMHG | HEART RATE: 70 BPM | HEIGHT: 68 IN | OXYGEN SATURATION: 95 %

## 2020-05-27 DIAGNOSIS — R73.9 HYPERGLYCEMIA: ICD-10-CM

## 2020-05-27 DIAGNOSIS — I10 ESSENTIAL HYPERTENSION: Primary | ICD-10-CM

## 2020-05-27 DIAGNOSIS — R79.89 ELEVATED LFTS: ICD-10-CM

## 2020-05-27 DIAGNOSIS — D69.6 THROMBOCYTOPENIA: ICD-10-CM

## 2020-05-27 DIAGNOSIS — I10 ESSENTIAL HYPERTENSION: ICD-10-CM

## 2020-05-27 DIAGNOSIS — H49.22 SIXTH NERVE PALSY OF LEFT EYE: ICD-10-CM

## 2020-05-27 LAB
ALBUMIN SERPL BCP-MCNC: 3.6 G/DL (ref 3.5–5.2)
ALP SERPL-CCNC: 96 U/L (ref 55–135)
ALT SERPL W/O P-5'-P-CCNC: 66 U/L (ref 10–44)
ANION GAP SERPL CALC-SCNC: 5 MMOL/L (ref 8–16)
AST SERPL-CCNC: 50 U/L (ref 10–40)
BASOPHILS # BLD AUTO: 0.05 K/UL (ref 0–0.2)
BASOPHILS NFR BLD: 1.1 % (ref 0–1.9)
BILIRUB SERPL-MCNC: 0.8 MG/DL (ref 0.1–1)
BUN SERPL-MCNC: 23 MG/DL (ref 8–23)
CALCIUM SERPL-MCNC: 9.3 MG/DL (ref 8.7–10.5)
CHLORIDE SERPL-SCNC: 106 MMOL/L (ref 95–110)
CO2 SERPL-SCNC: 26 MMOL/L (ref 23–29)
CREAT SERPL-MCNC: 0.9 MG/DL (ref 0.5–1.4)
DIFFERENTIAL METHOD: ABNORMAL
EOSINOPHIL # BLD AUTO: 0.3 K/UL (ref 0–0.5)
EOSINOPHIL NFR BLD: 5.6 % (ref 0–8)
ERYTHROCYTE [DISTWIDTH] IN BLOOD BY AUTOMATED COUNT: 13.4 % (ref 11.5–14.5)
EST. GFR  (AFRICAN AMERICAN): >60 ML/MIN/1.73 M^2
EST. GFR  (NON AFRICAN AMERICAN): >60 ML/MIN/1.73 M^2
GLUCOSE SERPL-MCNC: 95 MG/DL (ref 70–110)
HCT VFR BLD AUTO: 47.4 % (ref 40–54)
HGB BLD-MCNC: 15.6 G/DL (ref 14–18)
IMM GRANULOCYTES # BLD AUTO: 0.01 K/UL (ref 0–0.04)
IMM GRANULOCYTES NFR BLD AUTO: 0.2 % (ref 0–0.5)
LYMPHOCYTES # BLD AUTO: 1.5 K/UL (ref 1–4.8)
LYMPHOCYTES NFR BLD: 31.3 % (ref 18–48)
MCH RBC QN AUTO: 30.9 PG (ref 27–31)
MCHC RBC AUTO-ENTMCNC: 32.9 G/DL (ref 32–36)
MCV RBC AUTO: 94 FL (ref 82–98)
MONOCYTES # BLD AUTO: 0.5 K/UL (ref 0.3–1)
MONOCYTES NFR BLD: 10.6 % (ref 4–15)
NEUTROPHILS # BLD AUTO: 2.4 K/UL (ref 1.8–7.7)
NEUTROPHILS NFR BLD: 51.2 % (ref 38–73)
NRBC BLD-RTO: 0 /100 WBC
PLATELET # BLD AUTO: 130 K/UL (ref 150–350)
PMV BLD AUTO: 11.5 FL (ref 9.2–12.9)
POTASSIUM SERPL-SCNC: 4.3 MMOL/L (ref 3.5–5.1)
PROT SERPL-MCNC: 7.1 G/DL (ref 6–8.4)
RBC # BLD AUTO: 5.05 M/UL (ref 4.6–6.2)
SODIUM SERPL-SCNC: 137 MMOL/L (ref 136–145)
TSH SERPL DL<=0.005 MIU/L-ACNC: 2.46 UIU/ML (ref 0.4–4)
WBC # BLD AUTO: 4.64 K/UL (ref 3.9–12.7)

## 2020-05-27 PROCEDURE — 36415 COLL VENOUS BLD VENIPUNCTURE: CPT

## 2020-05-27 PROCEDURE — 85025 COMPLETE CBC W/AUTO DIFF WBC: CPT

## 2020-05-27 PROCEDURE — 3078F PR MOST RECENT DIASTOLIC BLOOD PRESSURE < 80 MM HG: ICD-10-PCS | Mod: CPTII,S$GLB,, | Performed by: FAMILY MEDICINE

## 2020-05-27 PROCEDURE — 3074F SYST BP LT 130 MM HG: CPT | Mod: CPTII,S$GLB,, | Performed by: FAMILY MEDICINE

## 2020-05-27 PROCEDURE — 3008F PR BODY MASS INDEX (BMI) DOCUMENTED: ICD-10-PCS | Mod: CPTII,S$GLB,, | Performed by: FAMILY MEDICINE

## 2020-05-27 PROCEDURE — 1101F PT FALLS ASSESS-DOCD LE1/YR: CPT | Mod: CPTII,S$GLB,, | Performed by: FAMILY MEDICINE

## 2020-05-27 PROCEDURE — 99999 PR PBB SHADOW E&M-EST. PATIENT-LVL III: ICD-10-PCS | Mod: PBBFAC,,, | Performed by: FAMILY MEDICINE

## 2020-05-27 PROCEDURE — 99214 PR OFFICE/OUTPT VISIT, EST, LEVL IV, 30-39 MIN: ICD-10-PCS | Mod: S$GLB,,, | Performed by: FAMILY MEDICINE

## 2020-05-27 PROCEDURE — 99214 OFFICE O/P EST MOD 30 MIN: CPT | Mod: S$GLB,,, | Performed by: FAMILY MEDICINE

## 2020-05-27 PROCEDURE — 3074F PR MOST RECENT SYSTOLIC BLOOD PRESSURE < 130 MM HG: ICD-10-PCS | Mod: CPTII,S$GLB,, | Performed by: FAMILY MEDICINE

## 2020-05-27 PROCEDURE — 1101F PR PT FALLS ASSESS DOC 0-1 FALLS W/OUT INJ PAST YR: ICD-10-PCS | Mod: CPTII,S$GLB,, | Performed by: FAMILY MEDICINE

## 2020-05-27 PROCEDURE — 3008F BODY MASS INDEX DOCD: CPT | Mod: CPTII,S$GLB,, | Performed by: FAMILY MEDICINE

## 2020-05-27 PROCEDURE — 3078F DIAST BP <80 MM HG: CPT | Mod: CPTII,S$GLB,, | Performed by: FAMILY MEDICINE

## 2020-05-27 PROCEDURE — 99999 PR PBB SHADOW E&M-EST. PATIENT-LVL III: CPT | Mod: PBBFAC,,, | Performed by: FAMILY MEDICINE

## 2020-05-27 PROCEDURE — 80053 COMPREHEN METABOLIC PANEL: CPT

## 2020-05-27 PROCEDURE — 84443 ASSAY THYROID STIM HORMONE: CPT

## 2020-05-27 NOTE — PROGRESS NOTES
Subjective:       Patient ID: Jasson Mayo is a 64 y.o. male.    Chief Complaint: Hypertension (pt complains of double vision x1)    He developed double vision yesterday.  He reports seeing  double would things [id number].  Ophthalmology evaluation was done yesterday.  He is scheduled for ophthalmology follow-up in 1 week.  He was told that ophthalmology was considering doing an MRI.  He denies headache chest pain palpitations shortness of breath or edema.  He has had no trauma.  He has had no fever chills.  He denies polyuria polydipsia hypoglycemic symptoms.  Home blood pressures have been normal.    Review of Systems   Constitutional: Negative for activity change, appetite change, chills, fatigue, fever and unexpected weight change.   HENT: Negative for hearing loss, rhinorrhea and trouble swallowing.    Eyes: Positive for visual disturbance. Negative for discharge.   Respiratory: Negative for cough, chest tightness, shortness of breath and wheezing.    Cardiovascular: Negative for chest pain, palpitations and leg swelling.   Gastrointestinal: Negative for abdominal pain, blood in stool, constipation, diarrhea and vomiting.   Endocrine: Negative for polydipsia and polyuria.   Genitourinary: Negative for difficulty urinating, hematuria and urgency.   Musculoskeletal: Negative for arthralgias, joint swelling and neck pain.   Neurological: Negative for dizziness, syncope, facial asymmetry, speech difficulty, weakness, light-headedness, numbness and headaches.   Psychiatric/Behavioral: Negative for confusion and dysphoric mood.       Objective:      Physical Exam   Constitutional: He appears well-developed and well-nourished.   HENT:   Right Ear: External ear normal.   Left Ear: External ear normal.   Nose: Nose normal.   Mouth/Throat: Oropharynx is clear and moist.   Eyes: Pupils are equal, round, and reactive to light. Conjunctivae and EOM are normal.   No eye deviation noted   Neck: Neck supple. No JVD  present. No tracheal deviation present. No thyromegaly present.   Cardiovascular: Normal rate, regular rhythm and normal heart sounds.   Pulmonary/Chest: Effort normal and breath sounds normal.   Abdominal: Soft. Bowel sounds are normal. He exhibits no mass. There is no tenderness.   Lymphadenopathy:     He has no cervical adenopathy.   Neurological: He is alert. He displays normal reflexes. No cranial nerve deficit. He exhibits normal muscle tone. Coordination normal.   Skin: Skin is warm and dry. He is not diaphoretic.       Office Visit on 02/06/2020   Component Date Value Ref Range Status    WBC 02/06/2020 5.69  3.90 - 12.70 K/uL Final    RBC 02/06/2020 4.71  4.60 - 6.20 M/uL Final    Hemoglobin 02/06/2020 15.1  14.0 - 18.0 g/dL Final    Hematocrit 02/06/2020 44.9  40.0 - 54.0 % Final    Mean Corpuscular Volume 02/06/2020 95  82 - 98 fL Final    Mean Corpuscular Hemoglobin 02/06/2020 32.1* 27.0 - 31.0 pg Final    Mean Corpuscular Hemoglobin Conc 02/06/2020 33.6  32.0 - 36.0 g/dL Final    RDW 02/06/2020 13.2  11.5 - 14.5 % Final    Platelets 02/06/2020 118* 150 - 350 K/uL Final    MPV 02/06/2020 12.3  9.2 - 12.9 fL Final    Immature Granulocytes 02/06/2020 0.2  0.0 - 0.5 % Final    Gran # (ANC) 02/06/2020 3.2  1.8 - 7.7 K/uL Final    Immature Grans (Abs) 02/06/2020 0.01  0.00 - 0.04 K/uL Final    Lymph # 02/06/2020 1.4  1.0 - 4.8 K/uL Final    Mono # 02/06/2020 0.7  0.3 - 1.0 K/uL Final    Eos # 02/06/2020 0.3  0.0 - 0.5 K/uL Final    Baso # 02/06/2020 0.05  0.00 - 0.20 K/uL Final    nRBC 02/06/2020 0  0 /100 WBC Final    Gran% 02/06/2020 56.2  38.0 - 73.0 % Final    Lymph% 02/06/2020 25.0  18.0 - 48.0 % Final    Mono% 02/06/2020 12.1  4.0 - 15.0 % Final    Eosinophil% 02/06/2020 5.6  0.0 - 8.0 % Final    Basophil% 02/06/2020 0.9  0.0 - 1.9 % Final    Differential Method 02/06/2020 Automated   Final    HIV 1/2 Ag/Ab 02/06/2020 Negative  Negative Final     Assessment:       1. Essential  hypertension        Plan:     Blood pressure controlled 116/76.  Medication reviewed.  Lab was ordered.  Reviewed previous lab.  He has had elevated liver functions with negative hepatitis-B hepatitis C and ultrasound of liver showing fatty liver.  However recommend GI evaluation as well.  He wants to defer the time being.  Potential etiologies 6 nerve palsy includes idiopathic CVA tumor.  As a precaution regarding CVA will start Ecotrin 81 mg a day.  He needs an MRI.  Will await Ophthalmology follow-up.  Follow-up with me in 1 month.     will start Ecotrin 81 mg a day.    Essential hypertension  -     CBC auto differential; Future; Expected date: 05/27/2020  -     Comprehensive metabolic panel; Future; Expected date: 05/27/2020  -     TSH; Future; Expected date: 05/27/2020

## 2020-05-28 ENCOUNTER — TELEPHONE (OUTPATIENT)
Dept: INTERNAL MEDICINE | Facility: CLINIC | Age: 65
End: 2020-05-28

## 2020-05-28 NOTE — TELEPHONE ENCOUNTER
Pt states that he won't see an eye doctor until 2 weeks from now. Wanted to know if that was still okay or should he see someone else sooner.

## 2020-05-28 NOTE — TELEPHONE ENCOUNTER
----- Message from Felisa Pringle sent at 5/28/2020  8:38 AM CDT -----  Contact: Wife  States the pt can't see his eye doctor for two weeks and wants to know if  needs to order any additional test, can be reached at [phone] or [phone]///[id number]

## 2020-05-30 DIAGNOSIS — R79.89 ELEVATED LFTS: Primary | ICD-10-CM

## 2020-06-04 ENCOUNTER — TELEPHONE (OUTPATIENT)
Dept: INTERNAL MEDICINE | Facility: CLINIC | Age: 65
End: 2020-06-04

## 2020-06-04 NOTE — TELEPHONE ENCOUNTER
----- Message from Gerda Bruno sent at 6/4/2020  9:19 AM CDT -----  Contact: Wife Vonnie [phone]  Patient's wife is requesting to speak with nurse regarding GI referral. Please advise.

## 2020-06-15 ENCOUNTER — TELEPHONE (OUTPATIENT)
Dept: RHEUMATOLOGY | Facility: CLINIC | Age: 65
End: 2020-06-15

## 2020-06-15 NOTE — TELEPHONE ENCOUNTER
Spoke with the patient would like an earlier appointment due to still have severe pain in hands and knuckles originally scheduled on 6/29/20.  Rate his pain at 8 please advise

## 2020-06-15 NOTE — TELEPHONE ENCOUNTER
----- Message from Alyson Temple sent at 6/15/2020  2:03 PM CDT -----  Patient called to speak with a nurse. Gave no further details.    He would like a callback at [phone]    Thanks  KB

## 2020-06-15 NOTE — TELEPHONE ENCOUNTER
Spoke with pt and scheduled appt with Dr. TAO for 6.16.20 at 2.15 pm. Pt verbalized understanding

## 2020-06-15 NOTE — TELEPHONE ENCOUNTER
----- Message from Alyson Temple sent at 6/15/2020  2:03 PM CDT -----  Patient called to speak with a nurse. Gave no further details.    He would like a callback at [phone]    Thanks  KB

## 2020-06-16 ENCOUNTER — PATIENT OUTREACH (OUTPATIENT)
Dept: ADMINISTRATIVE | Facility: OTHER | Age: 65
End: 2020-06-16

## 2020-06-16 ENCOUNTER — OFFICE VISIT (OUTPATIENT)
Dept: RHEUMATOLOGY | Facility: CLINIC | Age: 65
End: 2020-06-16
Payer: MEDICARE

## 2020-06-16 VITALS
HEART RATE: 71 BPM | HEIGHT: 68 IN | DIASTOLIC BLOOD PRESSURE: 89 MMHG | BODY MASS INDEX: 37.49 KG/M2 | WEIGHT: 247.38 LBS | SYSTOLIC BLOOD PRESSURE: 130 MMHG

## 2020-06-16 DIAGNOSIS — M19.042 PRIMARY OSTEOARTHRITIS OF BOTH HANDS: Primary | ICD-10-CM

## 2020-06-16 DIAGNOSIS — K76.0 NAFLD (NONALCOHOLIC FATTY LIVER DISEASE): ICD-10-CM

## 2020-06-16 DIAGNOSIS — M1A.0720 IDIOPATHIC CHRONIC GOUT OF LEFT FOOT WITHOUT TOPHUS: ICD-10-CM

## 2020-06-16 DIAGNOSIS — M19.041 PRIMARY OSTEOARTHRITIS OF BOTH HANDS: Primary | ICD-10-CM

## 2020-06-16 PROCEDURE — 20600 SMALL JOINT ASPIRATION/INJECTION: R INDEX MCP, L INDEX MCP: ICD-10-PCS | Mod: 50,S$GLB,, | Performed by: INTERNAL MEDICINE

## 2020-06-16 PROCEDURE — 3075F PR MOST RECENT SYSTOLIC BLOOD PRESS GE 130-139MM HG: ICD-10-PCS | Mod: CPTII,S$GLB,, | Performed by: INTERNAL MEDICINE

## 2020-06-16 PROCEDURE — 99214 PR OFFICE/OUTPT VISIT, EST, LEVL IV, 30-39 MIN: ICD-10-PCS | Mod: 25,S$GLB,, | Performed by: INTERNAL MEDICINE

## 2020-06-16 PROCEDURE — 3008F BODY MASS INDEX DOCD: CPT | Mod: CPTII,S$GLB,, | Performed by: INTERNAL MEDICINE

## 2020-06-16 PROCEDURE — 1101F PR PT FALLS ASSESS DOC 0-1 FALLS W/OUT INJ PAST YR: ICD-10-PCS | Mod: CPTII,S$GLB,, | Performed by: INTERNAL MEDICINE

## 2020-06-16 PROCEDURE — 1101F PT FALLS ASSESS-DOCD LE1/YR: CPT | Mod: CPTII,S$GLB,, | Performed by: INTERNAL MEDICINE

## 2020-06-16 PROCEDURE — 3008F PR BODY MASS INDEX (BMI) DOCUMENTED: ICD-10-PCS | Mod: CPTII,S$GLB,, | Performed by: INTERNAL MEDICINE

## 2020-06-16 PROCEDURE — 99999 PR PBB SHADOW E&M-EST. PATIENT-LVL III: CPT | Mod: PBBFAC,,, | Performed by: INTERNAL MEDICINE

## 2020-06-16 PROCEDURE — 20600 DRAIN/INJ JOINT/BURSA W/O US: CPT | Mod: 50,S$GLB,, | Performed by: INTERNAL MEDICINE

## 2020-06-16 PROCEDURE — 3079F DIAST BP 80-89 MM HG: CPT | Mod: CPTII,S$GLB,, | Performed by: INTERNAL MEDICINE

## 2020-06-16 PROCEDURE — 3079F PR MOST RECENT DIASTOLIC BLOOD PRESSURE 80-89 MM HG: ICD-10-PCS | Mod: CPTII,S$GLB,, | Performed by: INTERNAL MEDICINE

## 2020-06-16 PROCEDURE — 99214 OFFICE O/P EST MOD 30 MIN: CPT | Mod: 25,S$GLB,, | Performed by: INTERNAL MEDICINE

## 2020-06-16 PROCEDURE — 3075F SYST BP GE 130 - 139MM HG: CPT | Mod: CPTII,S$GLB,, | Performed by: INTERNAL MEDICINE

## 2020-06-16 PROCEDURE — 99999 PR PBB SHADOW E&M-EST. PATIENT-LVL III: ICD-10-PCS | Mod: PBBFAC,,, | Performed by: INTERNAL MEDICINE

## 2020-06-16 RX ORDER — TRIAMCINOLONE ACETONIDE 40 MG/ML
40 INJECTION, SUSPENSION INTRA-ARTICULAR; INTRAMUSCULAR
Status: DISCONTINUED | OUTPATIENT
Start: 2020-06-16 | End: 2020-06-16 | Stop reason: HOSPADM

## 2020-06-16 RX ORDER — PROBENECID AND COLCHICINE .5; 5 MG/1; MG/1
TABLET ORAL
COMMUNITY
Start: 2020-06-02 | End: 2020-06-16 | Stop reason: SDUPTHER

## 2020-06-16 RX ADMIN — TRIAMCINOLONE ACETONIDE 40 MG: 40 INJECTION, SUSPENSION INTRA-ARTICULAR; INTRAMUSCULAR at 02:06

## 2020-06-16 NOTE — PROGRESS NOTES
RHEUMATOLOGY CLINIC FOLLOW UP VISIT  Chief complaints:-  My fingers hurt.    HPI:-  Jasson Nesbitt a 64 y.o. pleasant male comes in for a follow up visit with above chief complaints.  He follows up in the rheumatology clinic for osteoarthritis and gout.  He reports no gout flares  SINCE last visit.  He is on co-benemid.   He complains of recurrence of pain over bilateral 2nd MCP joints in the last couple of weeks.  The pain is worsened by activity and does not relieve with resting.  Morning stiffness over those to joints lasts for an hour.  He denies any pain over any other small joint in his hands.  He denies any pain or other joints of hands or feet.  No pain over knee joints today.  No adverse effects from medications. The injection given in last visit gave pain relief for 5 months.     Review of Systems   Constitutional: Negative for chills and fever.   HENT: Negative for congestion and sore throat.    Eyes: Negative for blurred vision and redness.   Respiratory: Negative for cough and shortness of breath.    Cardiovascular: Negative for chest pain and leg swelling.   Gastrointestinal: Negative for abdominal pain.   Genitourinary: Negative for dysuria.   Musculoskeletal: Positive for back pain, joint pain and neck pain. Negative for falls and myalgias.   Skin: Negative for rash.   Neurological: Negative for headaches.   Endo/Heme/Allergies: Does not bruise/bleed easily.   Psychiatric/Behavioral: Negative for memory loss. The patient does not have insomnia.        Past Medical History:   Diagnosis Date    Decreased platelet count     Fatty liver     Gout     HTN (hypertension) 2000    Non-A non-B hepatitis 1990    Obesity (BMI 35.0-39.9 without comorbidity)     Prediabetes 2015       Past Surgical History:   Procedure Laterality Date    COLONOSCOPY W/ POLYPECTOMY  10, 14 no polyp    KNEE ARTHROSCOPY Right     Meniscus repair    LIVER  "BIOPSY  2010    VASECTOMY          Social History     Tobacco Use    Smoking status: Never Smoker    Smokeless tobacco: Former User   Substance Use Topics    Alcohol use: No     Frequency: Monthly or less     Drinks per session: 1 or 2     Binge frequency: Never    Drug use: Not on file       Family History   Problem Relation Age of Onset    COPD Mother     Heart attacks under age 50 Mother     Cancer Father         PROSTATE CA       Review of patient's allergies indicates:  No Known Allergies        Physical examination:-    Vitals:    06/16/20 1406   BP: 130/89   Pulse: 71   Weight: 112.2 kg (247 lb 5.7 oz)   Height: 5' 8" (1.727 m)   PainSc: 10-Worst pain ever   PainLoc: Hand       Physical Exam   Constitutional: He is oriented to person, place, and time and well-developed, well-nourished, and in no distress. No distress.   HENT:   Head: Normocephalic and atraumatic.   Mouth/Throat: Oropharynx is clear and moist.   Eyes: Pupils are equal, round, and reactive to light. Conjunctivae and EOM are normal. Right eye exhibits no discharge. Left eye exhibits no discharge. No scleral icterus.   Neck: Normal range of motion. Neck supple.   Cardiovascular: Normal rate and intact distal pulses.   Pulmonary/Chest: Effort normal. No respiratory distress. He exhibits no tenderness.   Abdominal: Soft. There is no abdominal tenderness.   Musculoskeletal:      Comments: Tenderness present over the right second MCP joint without any significant evidence of synovitis.  No synovitis or tenderness over the small joints of hands or feet.  Good range of motion in large joints.   Lymphadenopathy:     He has no cervical adenopathy.   Neurological: He is alert and oriented to person, place, and time. Gait normal.   Skin: Skin is warm. No rash noted. He is not diaphoretic. No erythema. No pallor.   Psychiatric: Mood and affect normal.   Nursing note and vitals reviewed.    Radiographs:-  Independent visualization of images done.  " Personally reviewed the x-ray images.  Both x-ray images show significant degenerative disease or bilateral first carpometacarpal joint and second MCP joints without any involvement of other MCPs or PIPs.  MCP showed a possible cyst near the articular surface.    Medication List with Changes/Refills   Current Medications    ALLOPURINOL (ZYLOPRIM) 100 MG TABLET    TAKE 1 TABLET (100 MG TOTAL) BY MOUTH ONCE DAILY.    BENAZEPRIL (LOTENSIN) 20 MG TABLET    Take 1 tablet (20 mg total) by mouth once daily.    COLCHICINE-PROBENECID 0.5-500 MG (CO-BENEMID) 500-0.5 MG TAB    Take 1 tablet by mouth once daily.    COLCHICINE-PROBENECID 0.5-500 MG (CO-BENEMID) 500-0.5 MG TAB        VITAMIN E 1000 UNIT CAPSULE    Take 1,000 Units by mouth once daily.     Assessment/Plans:-  1. Primary osteoarthritis of both hands    2. Idiopathic chronic gout of left foot without tophus    3. NAFLD (nonalcoholic fatty liver disease)      Problem List Items Addressed This Visit        GI    NAFLD (nonalcoholic fatty liver disease)    Current Assessment & Plan     Advised carb restriction.             Orthopedic    Idiopathic chronic gout of left foot without tophus    Current Assessment & Plan     No flare-up since last visit.  Stable.  Uric acid at goal.  Continue co-benemid.          Primary osteoarthritis of both second MCP joints - Primary    Current Assessment & Plan     He has primary osteoarthritis over bilateral second MCP joints with x-rays showing severe degenerative disease and even possible cyst/erosion in both. Repeat kenalog inejction in both MCP joints today. No suspicion for rheumatoid arthritis or other systemic arthritides at this time.             Small Joint Aspiration/Injection: R index MCP, L index MCP    Date/Time: 6/16/2020 2:15 PM  Performed by: Gume Curiel MD  Authorized by: Gume Curiel MD     Indications:  Joint swelling and pain  Site marked: the procedure site was marked    Timeout: prior to  procedure the correct patient, procedure, and site was verified    Prep: patient was prepped and draped in usual sterile fashion      Local anesthesia used?: Yes    Anesthesia:  Local infiltration  Local anesthetic:  Lidocaine 2% without epinephrine  Anesthetic total (ml):  0.5    Location:  Index finger  Site:  R index MCP and L index MCP  Ultrasonic guidance for needle placement?: No    Needle size:  27 G  Approach:  Radial  Medications:  40 mg triamcinolone acetonide 40 mg/mL; 40 mg triamcinolone acetonide 40 mg/mL  Patient tolerance:  Patient tolerated the procedure well with no immediate complications       # Follow up in about 6 months (around 4/2/2020).  Disclaimer: This note was prepared using voice recognition system and is likely to have sound alike errors .  Please call me with any questions  .

## 2020-06-16 NOTE — ASSESSMENT & PLAN NOTE
He has primary osteoarthritis over bilateral second MCP joints with x-rays showing severe degenerative disease and even possible cyst/erosion in both. Repeat kenalog inejction in both MCP joints today. No suspicion for rheumatoid arthritis or other systemic arthritides at this time.   Problem: Falls - Risk of  Goal: *Absence of Falls  Document Hallie Fall Risk and appropriate interventions in the flowsheet.    Outcome: Progressing Towards Goal  Fall Risk Interventions:            Medication Interventions: Evaluate medications/consider consulting pharmacy, Teach patient to arise slowly

## 2020-06-29 ENCOUNTER — OFFICE VISIT (OUTPATIENT)
Dept: INTERNAL MEDICINE | Facility: CLINIC | Age: 65
End: 2020-06-29
Payer: MEDICARE

## 2020-06-29 VITALS
DIASTOLIC BLOOD PRESSURE: 82 MMHG | OXYGEN SATURATION: 96 % | HEART RATE: 75 BPM | BODY MASS INDEX: 36.74 KG/M2 | TEMPERATURE: 98 F | SYSTOLIC BLOOD PRESSURE: 108 MMHG | WEIGHT: 241.63 LBS

## 2020-06-29 DIAGNOSIS — H49.22 SIXTH NERVE PALSY OF LEFT EYE: Primary | ICD-10-CM

## 2020-06-29 DIAGNOSIS — I10 ESSENTIAL HYPERTENSION: ICD-10-CM

## 2020-06-29 DIAGNOSIS — D69.6 THROMBOCYTOPENIA: ICD-10-CM

## 2020-06-29 PROCEDURE — 3008F BODY MASS INDEX DOCD: CPT | Mod: CPTII,S$GLB,, | Performed by: FAMILY MEDICINE

## 2020-06-29 PROCEDURE — 3074F PR MOST RECENT SYSTOLIC BLOOD PRESSURE < 130 MM HG: ICD-10-PCS | Mod: CPTII,S$GLB,, | Performed by: FAMILY MEDICINE

## 2020-06-29 PROCEDURE — 99999 PR PBB SHADOW E&M-EST. PATIENT-LVL III: ICD-10-PCS | Mod: PBBFAC,,, | Performed by: FAMILY MEDICINE

## 2020-06-29 PROCEDURE — 3079F DIAST BP 80-89 MM HG: CPT | Mod: CPTII,S$GLB,, | Performed by: FAMILY MEDICINE

## 2020-06-29 PROCEDURE — 99499 RISK ADDL DX/OHS AUDIT: ICD-10-PCS | Mod: S$GLB,,, | Performed by: FAMILY MEDICINE

## 2020-06-29 PROCEDURE — 1101F PR PT FALLS ASSESS DOC 0-1 FALLS W/OUT INJ PAST YR: ICD-10-PCS | Mod: CPTII,S$GLB,, | Performed by: FAMILY MEDICINE

## 2020-06-29 PROCEDURE — 1101F PT FALLS ASSESS-DOCD LE1/YR: CPT | Mod: CPTII,S$GLB,, | Performed by: FAMILY MEDICINE

## 2020-06-29 PROCEDURE — 3074F SYST BP LT 130 MM HG: CPT | Mod: CPTII,S$GLB,, | Performed by: FAMILY MEDICINE

## 2020-06-29 PROCEDURE — 99213 PR OFFICE/OUTPT VISIT, EST, LEVL III, 20-29 MIN: ICD-10-PCS | Mod: S$GLB,,, | Performed by: FAMILY MEDICINE

## 2020-06-29 PROCEDURE — 99213 OFFICE O/P EST LOW 20 MIN: CPT | Mod: S$GLB,,, | Performed by: FAMILY MEDICINE

## 2020-06-29 PROCEDURE — 99499 UNLISTED E&M SERVICE: CPT | Mod: S$GLB,,, | Performed by: FAMILY MEDICINE

## 2020-06-29 PROCEDURE — 3008F PR BODY MASS INDEX (BMI) DOCUMENTED: ICD-10-PCS | Mod: CPTII,S$GLB,, | Performed by: FAMILY MEDICINE

## 2020-06-29 PROCEDURE — 3079F PR MOST RECENT DIASTOLIC BLOOD PRESSURE 80-89 MM HG: ICD-10-PCS | Mod: CPTII,S$GLB,, | Performed by: FAMILY MEDICINE

## 2020-06-29 PROCEDURE — 99999 PR PBB SHADOW E&M-EST. PATIENT-LVL III: CPT | Mod: PBBFAC,,, | Performed by: FAMILY MEDICINE

## 2020-06-29 NOTE — PROGRESS NOTES
Subjective:       Patient ID: Jasson Mayo is a 64 y.o. male.    Chief Complaint: Follow-up    Follow-up left 6 nerve palsy.  Reports no changes in double vision.  Ophthalmology continues to follow-up.  He is now 5 weeks from onset.  MRI is pending possibly being done in about a week per ophthalmology.  His blood pressure is controlled.  He did start Ecotrin 81 mg a day.  Denies any headache.  He denies any other neurological symptoms.    Review of Systems   Constitutional: Negative for chills and fever.   Eyes: Positive for visual disturbance.   Respiratory: Negative for cough and shortness of breath.    Cardiovascular: Negative for chest pain and palpitations.   Neurological: Negative for dizziness, syncope, speech difficulty, weakness, light-headedness, numbness and headaches.       Objective:      Physical Exam  Constitutional:       General: He is not in acute distress.     Appearance: Normal appearance.   Eyes:      Comments: Mild deviation inward of the left eye   Cardiovascular:      Rate and Rhythm: Normal rate and regular rhythm.      Heart sounds: No murmur.   Pulmonary:      Effort: Pulmonary effort is normal. No respiratory distress.      Breath sounds: Normal breath sounds.   Neurological:      Mental Status: He is alert.         Lab Visit on 05/27/2020   Component Date Value Ref Range Status    WBC 05/27/2020 4.64  3.90 - 12.70 K/uL Final    RBC 05/27/2020 5.05  4.60 - 6.20 M/uL Final    Hemoglobin 05/27/2020 15.6  14.0 - 18.0 g/dL Final    Hematocrit 05/27/2020 47.4  40.0 - 54.0 % Final    Mean Corpuscular Volume 05/27/2020 94  82 - 98 fL Final    Mean Corpuscular Hemoglobin 05/27/2020 30.9  27.0 - 31.0 pg Final    Mean Corpuscular Hemoglobin Conc 05/27/2020 32.9  32.0 - 36.0 g/dL Final    RDW 05/27/2020 13.4  11.5 - 14.5 % Final    Platelets 05/27/2020 130* 150 - 350 K/uL Final    MPV 05/27/2020 11.5  9.2 - 12.9 fL Final    Immature Granulocytes 05/27/2020 0.2  0.0 - 0.5 % Final     Gran # (ANC) 05/27/2020 2.4  1.8 - 7.7 K/uL Final    Immature Grans (Abs) 05/27/2020 0.01  0.00 - 0.04 K/uL Final    Lymph # 05/27/2020 1.5  1.0 - 4.8 K/uL Final    Mono # 05/27/2020 0.5  0.3 - 1.0 K/uL Final    Eos # 05/27/2020 0.3  0.0 - 0.5 K/uL Final    Baso # 05/27/2020 0.05  0.00 - 0.20 K/uL Final    nRBC 05/27/2020 0  0 /100 WBC Final    Gran% 05/27/2020 51.2  38.0 - 73.0 % Final    Lymph% 05/27/2020 31.3  18.0 - 48.0 % Final    Mono% 05/27/2020 10.6  4.0 - 15.0 % Final    Eosinophil% 05/27/2020 5.6  0.0 - 8.0 % Final    Basophil% 05/27/2020 1.1  0.0 - 1.9 % Final    Differential Method 05/27/2020 Automated   Final    Sodium 05/27/2020 137  136 - 145 mmol/L Final    Potassium 05/27/2020 4.3  3.5 - 5.1 mmol/L Final    Chloride 05/27/2020 106  95 - 110 mmol/L Final    CO2 05/27/2020 26  23 - 29 mmol/L Final    Glucose 05/27/2020 95  70 - 110 mg/dL Final    BUN, Bld 05/27/2020 23  8 - 23 mg/dL Final    Creatinine 05/27/2020 0.9  0.5 - 1.4 mg/dL Final    Calcium 05/27/2020 9.3  8.7 - 10.5 mg/dL Final    Total Protein 05/27/2020 7.1  6.0 - 8.4 g/dL Final    Albumin 05/27/2020 3.6  3.5 - 5.2 g/dL Final    Total Bilirubin 05/27/2020 0.8  0.1 - 1.0 mg/dL Final    Alkaline Phosphatase 05/27/2020 96  55 - 135 U/L Final    AST 05/27/2020 50* 10 - 40 U/L Final    ALT 05/27/2020 66* 10 - 44 U/L Final    Anion Gap 05/27/2020 5* 8 - 16 mmol/L Final    eGFR if African American 05/27/2020 >60.0  >60 mL/min/1.73 m^2 Final    eGFR if non African American 05/27/2020 >60.0  >60 mL/min/1.73 m^2 Final    TSH 05/27/2020 2.456  0.400 - 4.000 uIU/mL Final     Assessment:       No diagnosis found.    Plan:     Continue Ecotrin 81 mg a day.  He wants to defer GI evaluation.  Follow-up in 6 weeks.  Blood pressure controlled 108/82  thrombocytopenia reviewed with patient.  Relatively stable over the last 2 years    There are no diagnoses linked to this encounter.

## 2020-07-05 ENCOUNTER — NURSE TRIAGE (OUTPATIENT)
Dept: ADMINISTRATIVE | Facility: CLINIC | Age: 65
End: 2020-07-05

## 2020-07-05 NOTE — TELEPHONE ENCOUNTER
Spoke with pt:  Wife is COVID 19 positive and pt now with cough.protocl advice given and  pt verbalizes understanding. S/s to look for and seek treatment for given and  pt verbalizes understanding. Community testing info given.         Reason for Disposition   [1] Fever (or feeling feverish) OR cough AND [2] within 14 Days of COVID-19 EXPOSURE (Close Contact)    Additional Information   Negative: Severe difficulty breathing (e.g., struggling for each breath, speaks in single words)   Negative: Bluish (or gray) lips or face now   Negative: Sounds like a life-threatening emergency to the triager   Negative: [1] Difficulty breathing occurs AND [2] within 14 days of COVID-19 EXPOSURE (Close Contact)   Negative: Patient sounds very sick or weak to the triager    Protocols used: CORONAVIRUS (COVID-19) - EXPOSURE-A-

## 2020-07-07 ENCOUNTER — TELEPHONE (OUTPATIENT)
Dept: HEMATOLOGY/ONCOLOGY | Facility: CLINIC | Age: 65
End: 2020-07-07

## 2020-07-07 NOTE — TELEPHONE ENCOUNTER
Pt's wife stated that she her  her son all tested positive for Covid and pt's appt need to be rescheduled. Appt rescheduled to 8/12

## 2020-07-07 NOTE — TELEPHONE ENCOUNTER
----- Message from Kenneth Fong sent at 7/7/2020  9:40 AM CDT -----  Pt's wife Vonnie would like to be called back  asap regarding rescheduling pt's appt set for 7/15/20.   Wife would like to discuss further  Wife can be reached at [phone].    Thanks

## 2020-07-08 ENCOUNTER — TELEPHONE (OUTPATIENT)
Dept: INTERNAL MEDICINE | Facility: CLINIC | Age: 65
End: 2020-07-08

## 2020-07-08 RX ORDER — BENZONATATE 200 MG/1
200 CAPSULE ORAL 3 TIMES DAILY PRN
Qty: 42 CAPSULE | Refills: 0 | Status: SHIPPED | OUTPATIENT
Start: 2020-07-08 | End: 2020-07-18

## 2020-07-08 NOTE — TELEPHONE ENCOUNTER
----- Message from Indiana Pineda sent at 7/8/2020  2:56 PM CDT -----  Regarding: Advice  Type:  Needs Medical Advice    Who Called: patient's wife Vonnie  Symptoms (please be specific): bad cough  Reason for the call: Patient has been tested positive for Covid-19, Vonnie wants to know what kind of medicine to get for relief  with cough  Would the patient rather a call back or a response via [facility]? callback  Best Call Back Number: [phone] or [phone]   Additional Information: Wife was prescribed medication and wants something to be called in for patient

## 2020-07-08 NOTE — TELEPHONE ENCOUNTER
Pt states he tested positive for COVID on Monday at a [facility] site  Pt states he has a dry cough  No fever  Wants something for cough sent to pharmacy

## 2020-07-20 ENCOUNTER — HOSPITAL ENCOUNTER (OUTPATIENT)
Dept: RADIOLOGY | Facility: HOSPITAL | Age: 65
Discharge: HOME | End: 2020-07-20
Attending: FAMILY MEDICINE
Payer: MEDICARE

## 2020-07-20 ENCOUNTER — OFFICE VISIT (OUTPATIENT)
Dept: INTERNAL MEDICINE | Facility: CLINIC | Age: 65
End: 2020-07-20
Payer: MEDICARE

## 2020-07-20 VITALS
HEART RATE: 85 BPM | OXYGEN SATURATION: 97 % | WEIGHT: 246.5 LBS | TEMPERATURE: 97 F | BODY MASS INDEX: 37.36 KG/M2 | HEIGHT: 68 IN | SYSTOLIC BLOOD PRESSURE: 130 MMHG | DIASTOLIC BLOOD PRESSURE: 84 MMHG

## 2020-07-20 DIAGNOSIS — U07.1 COVID-19: ICD-10-CM

## 2020-07-20 DIAGNOSIS — R05.9 COUGH: ICD-10-CM

## 2020-07-20 DIAGNOSIS — R05.9 COUGH: Primary | ICD-10-CM

## 2020-07-20 PROCEDURE — 3008F PR BODY MASS INDEX (BMI) DOCUMENTED: ICD-10-PCS | Mod: CPTII,S$GLB,, | Performed by: FAMILY MEDICINE

## 2020-07-20 PROCEDURE — 3075F SYST BP GE 130 - 139MM HG: CPT | Mod: CPTII,S$GLB,, | Performed by: FAMILY MEDICINE

## 2020-07-20 PROCEDURE — 3008F BODY MASS INDEX DOCD: CPT | Mod: CPTII,S$GLB,, | Performed by: FAMILY MEDICINE

## 2020-07-20 PROCEDURE — 99213 OFFICE O/P EST LOW 20 MIN: CPT | Mod: S$GLB,,, | Performed by: FAMILY MEDICINE

## 2020-07-20 PROCEDURE — 99999 PR PBB SHADOW E&M-EST. PATIENT-LVL III: CPT | Mod: PBBFAC,,, | Performed by: FAMILY MEDICINE

## 2020-07-20 PROCEDURE — 99213 PR OFFICE/OUTPT VISIT, EST, LEVL III, 20-29 MIN: ICD-10-PCS | Mod: S$GLB,,, | Performed by: FAMILY MEDICINE

## 2020-07-20 PROCEDURE — 99999 PR PBB SHADOW E&M-EST. PATIENT-LVL III: ICD-10-PCS | Mod: PBBFAC,,, | Performed by: FAMILY MEDICINE

## 2020-07-20 PROCEDURE — 71046 X-RAY EXAM CHEST 2 VIEWS: CPT | Mod: TC

## 2020-07-20 PROCEDURE — 3079F DIAST BP 80-89 MM HG: CPT | Mod: CPTII,S$GLB,, | Performed by: FAMILY MEDICINE

## 2020-07-20 PROCEDURE — 71046 XR CHEST PA AND LATERAL: ICD-10-PCS | Mod: 26,,, | Performed by: RADIOLOGY

## 2020-07-20 PROCEDURE — 1101F PT FALLS ASSESS-DOCD LE1/YR: CPT | Mod: CPTII,S$GLB,, | Performed by: FAMILY MEDICINE

## 2020-07-20 PROCEDURE — 3075F PR MOST RECENT SYSTOLIC BLOOD PRESS GE 130-139MM HG: ICD-10-PCS | Mod: CPTII,S$GLB,, | Performed by: FAMILY MEDICINE

## 2020-07-20 PROCEDURE — 1101F PR PT FALLS ASSESS DOC 0-1 FALLS W/OUT INJ PAST YR: ICD-10-PCS | Mod: CPTII,S$GLB,, | Performed by: FAMILY MEDICINE

## 2020-07-20 PROCEDURE — 3079F PR MOST RECENT DIASTOLIC BLOOD PRESSURE 80-89 MM HG: ICD-10-PCS | Mod: CPTII,S$GLB,, | Performed by: FAMILY MEDICINE

## 2020-07-20 PROCEDURE — 71046 X-RAY EXAM CHEST 2 VIEWS: CPT | Mod: 26,,, | Performed by: RADIOLOGY

## 2020-07-20 NOTE — PROGRESS NOTES
Subjective:       Patient ID: Jasson Mayo is a 64 y.o. male.    Chief Complaint: Follow-up (patient said he tested positive two weeks ago. He said he has been in for 14 days. He said he has been still having the cough. He said it isn't as bad as it was. He wanted you to take a listen to him.) and Eye Problem (he said he is seeing double at a distance. He said that once the object is closer it isn't bad. He said he goes back to the eye dr thurman.)    He was diagnosed with COVID-19 14 days ago.  His wife had the same illness several days previous.  Is symptoms have included dry cough.  Using Tessalon Perles.  The cough is improving not resolved.  He denies fever chills loss smell loss of taste chest tightness wheezing diarrhea loss of appetite.    Review of Systems   Constitutional: Negative for diaphoresis and fever.   HENT: Negative for congestion.    Respiratory: Positive for cough. Negative for chest tightness, shortness of breath and wheezing.    Cardiovascular: Negative for chest pain and leg swelling.   Gastrointestinal: Negative for diarrhea and nausea.   Neurological: Negative for weakness and headaches.       Objective:      Physical Exam  Constitutional:       Appearance: Normal appearance. He is not ill-appearing.   Cardiovascular:      Rate and Rhythm: Normal rate and regular rhythm.      Heart sounds: No murmur.   Pulmonary:      Effort: Pulmonary effort is normal.      Breath sounds: Normal breath sounds. No wheezing, rhonchi or rales.   Skin:     General: Skin is warm and dry.      Coloration: Skin is not pale.   Neurological:      Mental Status: He is alert.         Lab Visit on 05/27/2020   Component Date Value Ref Range Status    WBC 05/27/2020 4.64  3.90 - 12.70 K/uL Final    RBC 05/27/2020 5.05  4.60 - 6.20 M/uL Final    Hemoglobin 05/27/2020 15.6  14.0 - 18.0 g/dL Final    Hematocrit 05/27/2020 47.4  40.0 - 54.0 % Final    Mean Corpuscular Volume 05/27/2020 94  82 - 98 fL Final     Mean Corpuscular Hemoglobin 05/27/2020 30.9  27.0 - 31.0 pg Final    Mean Corpuscular Hemoglobin Conc 05/27/2020 32.9  32.0 - 36.0 g/dL Final    RDW 05/27/2020 13.4  11.5 - 14.5 % Final    Platelets 05/27/2020 130* 150 - 350 K/uL Final    MPV 05/27/2020 11.5  9.2 - 12.9 fL Final    Immature Granulocytes 05/27/2020 0.2  0.0 - 0.5 % Final    Gran # (ANC) 05/27/2020 2.4  1.8 - 7.7 K/uL Final    Immature Grans (Abs) 05/27/2020 0.01  0.00 - 0.04 K/uL Final    Lymph # 05/27/2020 1.5  1.0 - 4.8 K/uL Final    Mono # 05/27/2020 0.5  0.3 - 1.0 K/uL Final    Eos # 05/27/2020 0.3  0.0 - 0.5 K/uL Final    Baso # 05/27/2020 0.05  0.00 - 0.20 K/uL Final    nRBC 05/27/2020 0  0 /100 WBC Final    Gran% 05/27/2020 51.2  38.0 - 73.0 % Final    Lymph% 05/27/2020 31.3  18.0 - 48.0 % Final    Mono% 05/27/2020 10.6  4.0 - 15.0 % Final    Eosinophil% 05/27/2020 5.6  0.0 - 8.0 % Final    Basophil% 05/27/2020 1.1  0.0 - 1.9 % Final    Differential Method 05/27/2020 Automated   Final    Sodium 05/27/2020 137  136 - 145 mmol/L Final    Potassium 05/27/2020 4.3  3.5 - 5.1 mmol/L Final    Chloride 05/27/2020 106  95 - 110 mmol/L Final    CO2 05/27/2020 26  23 - 29 mmol/L Final    Glucose 05/27/2020 95  70 - 110 mg/dL Final    BUN, Bld 05/27/2020 23  8 - 23 mg/dL Final    Creatinine 05/27/2020 0.9  0.5 - 1.4 mg/dL Final    Calcium 05/27/2020 9.3  8.7 - 10.5 mg/dL Final    Total Protein 05/27/2020 7.1  6.0 - 8.4 g/dL Final    Albumin 05/27/2020 3.6  3.5 - 5.2 g/dL Final    Total Bilirubin 05/27/2020 0.8  0.1 - 1.0 mg/dL Final    Alkaline Phosphatase 05/27/2020 96  55 - 135 U/L Final    AST 05/27/2020 50* 10 - 40 U/L Final    ALT 05/27/2020 66* 10 - 44 U/L Final    Anion Gap 05/27/2020 5* 8 - 16 mmol/L Final    eGFR if African American 05/27/2020 >60.0  >60 mL/min/1.73 m^2 Final    eGFR if non African American 05/27/2020 >60.0  >60 mL/min/1.73 m^2 Final    TSH 05/27/2020 2.456  0.400 - 4.000 uIU/mL Final      Assessment:       1. Cough        Plan:     Cough associated with COVID-19.  Chest x-ray ordered.  Continue routine COVID-19 recommendations    Cough  -     X-Ray Chest PA And Lateral; Future; Expected date: 07/20/2020

## 2020-07-31 ENCOUNTER — TELEPHONE (OUTPATIENT)
Dept: INTERNAL MEDICINE | Facility: CLINIC | Age: 65
End: 2020-07-31

## 2020-07-31 NOTE — TELEPHONE ENCOUNTER
----- Message from Lina Jimenez sent at 7/30/2020  4:21 PM CDT -----  Regarding: Give a call back  Pt called to speak with the nurse concerning appt scheduled for August 6th. Please give a call back  [phone]      Thank you!

## 2020-07-31 NOTE — TELEPHONE ENCOUNTER
Pt wanting to know if he needed to keep his appt next week or if it could be pushed back  Advised pt he still needed the appt and if he couldn't make the one next week then he could change it  Pt states he would let his wife change the appt on the audra

## 2020-08-06 ENCOUNTER — LAB VISIT (OUTPATIENT)
Dept: LAB | Facility: HOSPITAL | Age: 65
End: 2020-08-06
Attending: INTERNAL MEDICINE
Payer: MEDICARE

## 2020-08-06 DIAGNOSIS — D69.6 THROMBOCYTOPENIA: ICD-10-CM

## 2020-08-06 LAB
ALBUMIN SERPL BCP-MCNC: 3.8 G/DL (ref 3.5–5.2)
ALP SERPL-CCNC: 87 U/L (ref 55–135)
ALT SERPL W/O P-5'-P-CCNC: 55 U/L (ref 10–44)
ANION GAP SERPL CALC-SCNC: 6 MMOL/L (ref 8–16)
AST SERPL-CCNC: 84 U/L (ref 10–40)
BASOPHILS # BLD AUTO: 0.04 K/UL (ref 0–0.2)
BASOPHILS NFR BLD: 0.8 % (ref 0–1.9)
BILIRUB SERPL-MCNC: 0.6 MG/DL (ref 0.1–1)
BUN SERPL-MCNC: 19 MG/DL (ref 8–23)
CALCIUM SERPL-MCNC: 9.2 MG/DL (ref 8.7–10.5)
CHLORIDE SERPL-SCNC: 107 MMOL/L (ref 95–110)
CO2 SERPL-SCNC: 29 MMOL/L (ref 23–29)
CREAT SERPL-MCNC: 1 MG/DL (ref 0.5–1.4)
DIFFERENTIAL METHOD: ABNORMAL
EOSINOPHIL # BLD AUTO: 0.3 K/UL (ref 0–0.5)
EOSINOPHIL NFR BLD: 6.5 % (ref 0–8)
ERYTHROCYTE [DISTWIDTH] IN BLOOD BY AUTOMATED COUNT: 13.4 % (ref 11.5–14.5)
EST. GFR  (AFRICAN AMERICAN): >60 ML/MIN/1.73 M^2
EST. GFR  (NON AFRICAN AMERICAN): >60 ML/MIN/1.73 M^2
GLUCOSE SERPL-MCNC: 111 MG/DL (ref 70–110)
HCT VFR BLD AUTO: 42 % (ref 40–54)
HGB BLD-MCNC: 14.2 G/DL (ref 14–18)
IMM GRANULOCYTES # BLD AUTO: 0.02 K/UL (ref 0–0.04)
IMM GRANULOCYTES NFR BLD AUTO: 0.4 % (ref 0–0.5)
LYMPHOCYTES # BLD AUTO: 1.4 K/UL (ref 1–4.8)
LYMPHOCYTES NFR BLD: 27.5 % (ref 18–48)
MCH RBC QN AUTO: 31.5 PG (ref 27–31)
MCHC RBC AUTO-ENTMCNC: 33.8 G/DL (ref 32–36)
MCV RBC AUTO: 93 FL (ref 82–98)
MONOCYTES # BLD AUTO: 0.5 K/UL (ref 0.3–1)
MONOCYTES NFR BLD: 10.7 % (ref 4–15)
NEUTROPHILS # BLD AUTO: 2.7 K/UL (ref 1.8–7.7)
NEUTROPHILS NFR BLD: 54.1 % (ref 38–73)
NRBC BLD-RTO: 0 /100 WBC
PLATELET # BLD AUTO: 126 K/UL (ref 150–350)
PMV BLD AUTO: 10.6 FL (ref 9.2–12.9)
POTASSIUM SERPL-SCNC: 4.1 MMOL/L (ref 3.5–5.1)
PROT SERPL-MCNC: 7.1 G/DL (ref 6–8.4)
RBC # BLD AUTO: 4.51 M/UL (ref 4.6–6.2)
SODIUM SERPL-SCNC: 142 MMOL/L (ref 136–145)
WBC # BLD AUTO: 5.05 K/UL (ref 3.9–12.7)

## 2020-08-06 PROCEDURE — 36415 COLL VENOUS BLD VENIPUNCTURE: CPT | Mod: PO

## 2020-08-06 PROCEDURE — 85025 COMPLETE CBC W/AUTO DIFF WBC: CPT | Mod: PO

## 2020-08-06 PROCEDURE — 80053 COMPREHEN METABOLIC PANEL: CPT

## 2020-08-11 NOTE — PROGRESS NOTES
Subjective:       Patient ID: Jasson Mayo is a 65 y.o. male.    Chief Complaint: Thrombocytopenia [D69.6]  HPI: We have an opportunity to see Mr. Jasson Mayo in Hematology Oncology clinic at [facility] on 08/11/2020.  Mr. Jasson Mayo is a 65 y.o. gentleman with diffuse fatty liver, degenerative arthritis with radiculopathy, gout on colchicine and allopurinol presents for evaluation of thrombocytopenia.      Oncology History    No history exists.     Past Medical History:   Diagnosis Date    Decreased platelet count     Fatty liver     Gout     HTN (hypertension) 2000    Non-A non-B hepatitis 1990    Obesity (BMI 35.0-39.9 without comorbidity)     Prediabetes 2015     Family History   Problem Relation Age of Onset    COPD Mother     Heart attacks under age 50 Mother     Cancer Father         PROSTATE CA     Social History     Socioeconomic History    Marital status:      Spouse name: Not on file    Number of children: 1    Years of education: Not on file    Highest education level: Not on file   Occupational History    Occupation: [employer]     Employer: [employer]     Comment: Jimmy pandey   Social Needs    Financial resource strain: Not very hard    Food insecurity     Worry: Never true     Inability: Never true    Transportation needs     Medical: No     Non-medical: No   Tobacco Use    Smoking status: Never Smoker    Smokeless tobacco: Former User   Substance and Sexual Activity    Alcohol use: No     Frequency: Monthly or less     Drinks per session: 1 or 2     Binge frequency: Never    Drug use: Not on file    Sexual activity: Not on file   Lifestyle    Physical activity     Days per week: Not on file     Minutes per session: Not on file    Stress: Not at all   Relationships    Social connections     Talks on phone: More than three times a week     Gets together: Three times a week     Attends Yarsani service: Not on file      Active member of club or organization: No     Attends meetings of clubs or organizations: Never     Relationship status:    Other Topics Concern    Not on file   Social History Narrative    Not on file     Past Surgical History:   Procedure Laterality Date    COLONOSCOPY W/ POLYPECTOMY  10, 14 no polyp    KNEE ARTHROSCOPY Right     Meniscus repair    LIVER BIOPSY  2010    VASECTOMY       Current Outpatient Medications   Medication Sig Dispense Refill    allopurinoL (ZYLOPRIM) 100 MG tablet TAKE 1 TABLET (100 MG TOTAL) BY MOUTH ONCE DAILY. 90 tablet 2    benazepril (LOTENSIN) 20 MG tablet Take 1 tablet (20 mg total) by mouth once daily. 90 tablet 3    colchicine-probenecid 0.5-500 mg (CO-BENEMID) 500-0.5 mg Tab Take 1 tablet by mouth once daily. 30 tablet 11    vitamin E 1000 UNIT capsule Take 1,000 Units by mouth once daily.       No current facility-administered medications for this visit.        Labs:  Lab Results   Component Value Date    WBC 5.05 08/06/2020    HGB 14.2 08/06/2020    HCT 42.0 08/06/2020    MCV 93 08/06/2020     (L) 08/06/2020     BMP  Lab Results   Component Value Date     08/06/2020    K 4.1 08/06/2020     08/06/2020    CO2 29 08/06/2020    BUN 19 08/06/2020    CREATININE 1.0 08/06/2020    CALCIUM 9.2 08/06/2020    ANIONGAP 6 (L) 08/06/2020    ESTGFRAFRICA >60.0 08/06/2020    EGFRNONAA >60.0 08/06/2020     Lab Results   Component Value Date    ALT 55 (H) 08/06/2020    AST 84 (H) 08/06/2020    ALKPHOS 87 08/06/2020    BILITOT 0.6 08/06/2020       No results found for: IRON, TIBC, FERRITIN, SATURATEDIRO  No results found for: [id number]  No results found for: FOLATE  Lab Results   Component Value Date    TSH 2.456 05/27/2020       I have reviewed the radiology reports and examined the scan/xray images.    Review of Systems   Constitutional: Negative.    HENT: Negative.    Eyes: Negative.    Respiratory: Negative.    Cardiovascular: Negative.     Gastrointestinal: Negative.    Endocrine: Negative.    Genitourinary: Negative.    Musculoskeletal: Negative.    Skin: Negative.    Allergic/Immunologic: Negative.    Neurological: Negative.    Hematological: Negative.    Psychiatric/Behavioral: Negative.      ECOG SCORE    0 - Fully active-able to carry on all pre-disease performance without restriction            Objective:     Vitals:    08/12/20 1000   BP: 102/77   Pulse: 83   Temp: 98.6 °F (37 °C)   Body mass index is 38.45 kg/m².  Physical Exam  Vitals signs and nursing note reviewed.   Constitutional:       Appearance: He is well-developed.   HENT:      Head: Normocephalic and atraumatic.   Eyes:      Conjunctiva/sclera: Conjunctivae normal.   Neck:      Musculoskeletal: Normal range of motion and neck supple.   Cardiovascular:      Rate and Rhythm: Normal rate and regular rhythm.   Pulmonary:      Effort: Pulmonary effort is normal.      Breath sounds: Normal breath sounds.   Abdominal:      General: Bowel sounds are normal.      Palpations: Abdomen is soft.   Musculoskeletal: Normal range of motion.   Skin:     General: Skin is warm and dry.   Neurological:      Mental Status: He is alert and oriented to person, place, and time.   Psychiatric:         Behavior: Behavior normal.         Thought Content: Thought content normal.         Judgment: Judgment normal.           Assessment:      1. Thrombocytopenia           Plan:     Thrombocytopenia  Due to fatty liver  Continue to monitor  Need follow up with Hepatology for fatty liver.  -     CBC auto differential; Future; Expected date: 08/11/2021  -     Comprehensive metabolic panel; Future; Expected date: 08/11/2021

## 2020-08-12 ENCOUNTER — OFFICE VISIT (OUTPATIENT)
Dept: HEMATOLOGY/ONCOLOGY | Facility: CLINIC | Age: 65
End: 2020-08-12
Payer: MEDICARE

## 2020-08-12 VITALS
OXYGEN SATURATION: 95 % | BODY MASS INDEX: 38.28 KG/M2 | WEIGHT: 238.19 LBS | HEART RATE: 83 BPM | HEIGHT: 66 IN | SYSTOLIC BLOOD PRESSURE: 102 MMHG | DIASTOLIC BLOOD PRESSURE: 77 MMHG | TEMPERATURE: 99 F

## 2020-08-12 DIAGNOSIS — D69.6 THROMBOCYTOPENIA: Primary | ICD-10-CM

## 2020-08-12 PROCEDURE — 99999 PR PBB SHADOW E&M-EST. PATIENT-LVL III: ICD-10-PCS | Mod: PBBFAC,,, | Performed by: INTERNAL MEDICINE

## 2020-08-12 PROCEDURE — 3074F PR MOST RECENT SYSTOLIC BLOOD PRESSURE < 130 MM HG: ICD-10-PCS | Mod: CPTII,S$GLB,, | Performed by: INTERNAL MEDICINE

## 2020-08-12 PROCEDURE — 1126F AMNT PAIN NOTED NONE PRSNT: CPT | Mod: S$GLB,,, | Performed by: INTERNAL MEDICINE

## 2020-08-12 PROCEDURE — 1101F PT FALLS ASSESS-DOCD LE1/YR: CPT | Mod: CPTII,S$GLB,, | Performed by: INTERNAL MEDICINE

## 2020-08-12 PROCEDURE — 3078F PR MOST RECENT DIASTOLIC BLOOD PRESSURE < 80 MM HG: ICD-10-PCS | Mod: CPTII,S$GLB,, | Performed by: INTERNAL MEDICINE

## 2020-08-12 PROCEDURE — 1126F PR PAIN SEVERITY QUANTIFIED, NO PAIN PRESENT: ICD-10-PCS | Mod: S$GLB,,, | Performed by: INTERNAL MEDICINE

## 2020-08-12 PROCEDURE — 3078F DIAST BP <80 MM HG: CPT | Mod: CPTII,S$GLB,, | Performed by: INTERNAL MEDICINE

## 2020-08-12 PROCEDURE — 1159F PR MEDICATION LIST DOCUMENTED IN MEDICAL RECORD: ICD-10-PCS | Mod: S$GLB,,, | Performed by: INTERNAL MEDICINE

## 2020-08-12 PROCEDURE — 99999 PR PBB SHADOW E&M-EST. PATIENT-LVL III: CPT | Mod: PBBFAC,,, | Performed by: INTERNAL MEDICINE

## 2020-08-12 PROCEDURE — 99213 PR OFFICE/OUTPT VISIT, EST, LEVL III, 20-29 MIN: ICD-10-PCS | Mod: S$GLB,,, | Performed by: INTERNAL MEDICINE

## 2020-08-12 PROCEDURE — 99213 OFFICE O/P EST LOW 20 MIN: CPT | Mod: S$GLB,,, | Performed by: INTERNAL MEDICINE

## 2020-08-12 PROCEDURE — 3008F BODY MASS INDEX DOCD: CPT | Mod: CPTII,S$GLB,, | Performed by: INTERNAL MEDICINE

## 2020-08-12 PROCEDURE — 1159F MED LIST DOCD IN RCRD: CPT | Mod: S$GLB,,, | Performed by: INTERNAL MEDICINE

## 2020-08-12 PROCEDURE — 3008F PR BODY MASS INDEX (BMI) DOCUMENTED: ICD-10-PCS | Mod: CPTII,S$GLB,, | Performed by: INTERNAL MEDICINE

## 2020-08-12 PROCEDURE — 3074F SYST BP LT 130 MM HG: CPT | Mod: CPTII,S$GLB,, | Performed by: INTERNAL MEDICINE

## 2020-08-12 PROCEDURE — 1101F PR PT FALLS ASSESS DOC 0-1 FALLS W/OUT INJ PAST YR: ICD-10-PCS | Mod: CPTII,S$GLB,, | Performed by: INTERNAL MEDICINE

## 2020-09-24 ENCOUNTER — TELEPHONE (OUTPATIENT)
Dept: INTERNAL MEDICINE | Facility: CLINIC | Age: 65
End: 2020-09-24

## 2020-09-24 NOTE — TELEPHONE ENCOUNTER
----- Message from Luis Felipe Montalvo sent at 9/24/2020 11:43 AM CDT -----  Contact: Pt wife Vonnie  Pt wife Vonnie called and said that they called 2 days ago and asked to have someone call them back and no one called     Vonnie can be reached at [phone] or [phone]

## 2020-09-29 ENCOUNTER — OFFICE VISIT (OUTPATIENT)
Dept: INTERNAL MEDICINE | Facility: CLINIC | Age: 65
End: 2020-09-29
Payer: MEDICARE

## 2020-09-29 VITALS
HEIGHT: 66 IN | WEIGHT: 245.81 LBS | TEMPERATURE: 97 F | SYSTOLIC BLOOD PRESSURE: 118 MMHG | BODY MASS INDEX: 39.51 KG/M2 | OXYGEN SATURATION: 99 % | DIASTOLIC BLOOD PRESSURE: 88 MMHG | HEART RATE: 75 BPM

## 2020-09-29 DIAGNOSIS — Z28.39 IMMUNIZATION DEFICIENCY: ICD-10-CM

## 2020-09-29 DIAGNOSIS — R05.9 COUGH: ICD-10-CM

## 2020-09-29 DIAGNOSIS — I10 ESSENTIAL HYPERTENSION: Primary | ICD-10-CM

## 2020-09-29 PROCEDURE — 99999 PR PBB SHADOW E&M-EST. PATIENT-LVL III: ICD-10-PCS | Mod: PBBFAC,,, | Performed by: FAMILY MEDICINE

## 2020-09-29 PROCEDURE — 90694 VACC AIIV4 NO PRSRV 0.5ML IM: CPT | Mod: S$GLB,,, | Performed by: FAMILY MEDICINE

## 2020-09-29 PROCEDURE — 99213 OFFICE O/P EST LOW 20 MIN: CPT | Mod: 25,S$GLB,, | Performed by: FAMILY MEDICINE

## 2020-09-29 PROCEDURE — 99213 PR OFFICE/OUTPT VISIT, EST, LEVL III, 20-29 MIN: ICD-10-PCS | Mod: 25,S$GLB,, | Performed by: FAMILY MEDICINE

## 2020-09-29 PROCEDURE — 3079F PR MOST RECENT DIASTOLIC BLOOD PRESSURE 80-89 MM HG: ICD-10-PCS | Mod: CPTII,S$GLB,, | Performed by: FAMILY MEDICINE

## 2020-09-29 PROCEDURE — 3079F DIAST BP 80-89 MM HG: CPT | Mod: CPTII,S$GLB,, | Performed by: FAMILY MEDICINE

## 2020-09-29 PROCEDURE — 3008F BODY MASS INDEX DOCD: CPT | Mod: CPTII,S$GLB,, | Performed by: FAMILY MEDICINE

## 2020-09-29 PROCEDURE — 1101F PR PT FALLS ASSESS DOC 0-1 FALLS W/OUT INJ PAST YR: ICD-10-PCS | Mod: CPTII,S$GLB,, | Performed by: FAMILY MEDICINE

## 2020-09-29 PROCEDURE — 3008F PR BODY MASS INDEX (BMI) DOCUMENTED: ICD-10-PCS | Mod: CPTII,S$GLB,, | Performed by: FAMILY MEDICINE

## 2020-09-29 PROCEDURE — 1159F PR MEDICATION LIST DOCUMENTED IN MEDICAL RECORD: ICD-10-PCS | Mod: S$GLB,,, | Performed by: FAMILY MEDICINE

## 2020-09-29 PROCEDURE — 1126F AMNT PAIN NOTED NONE PRSNT: CPT | Mod: S$GLB,,, | Performed by: FAMILY MEDICINE

## 2020-09-29 PROCEDURE — 1159F MED LIST DOCD IN RCRD: CPT | Mod: S$GLB,,, | Performed by: FAMILY MEDICINE

## 2020-09-29 PROCEDURE — 3074F PR MOST RECENT SYSTOLIC BLOOD PRESSURE < 130 MM HG: ICD-10-PCS | Mod: CPTII,S$GLB,, | Performed by: FAMILY MEDICINE

## 2020-09-29 PROCEDURE — 90694 FLU VACCINE - QUADRIVALENT - ADJUVANTED: ICD-10-PCS | Mod: S$GLB,,, | Performed by: FAMILY MEDICINE

## 2020-09-29 PROCEDURE — 1126F PR PAIN SEVERITY QUANTIFIED, NO PAIN PRESENT: ICD-10-PCS | Mod: S$GLB,,, | Performed by: FAMILY MEDICINE

## 2020-09-29 PROCEDURE — 3074F SYST BP LT 130 MM HG: CPT | Mod: CPTII,S$GLB,, | Performed by: FAMILY MEDICINE

## 2020-09-29 PROCEDURE — 1101F PT FALLS ASSESS-DOCD LE1/YR: CPT | Mod: CPTII,S$GLB,, | Performed by: FAMILY MEDICINE

## 2020-09-29 PROCEDURE — G0008 FLU VACCINE - QUADRIVALENT - ADJUVANTED: ICD-10-PCS | Mod: S$GLB,,, | Performed by: FAMILY MEDICINE

## 2020-09-29 PROCEDURE — 99999 PR PBB SHADOW E&M-EST. PATIENT-LVL III: CPT | Mod: PBBFAC,,, | Performed by: FAMILY MEDICINE

## 2020-09-29 PROCEDURE — G0008 ADMIN INFLUENZA VIRUS VAC: HCPCS | Mod: S$GLB,,, | Performed by: FAMILY MEDICINE

## 2020-09-29 RX ORDER — BENAZEPRIL HYDROCHLORIDE 20 MG/1
20 TABLET ORAL DAILY
Qty: 90 TABLET | Refills: 3 | Status: SHIPPED | OUTPATIENT
Start: 2020-09-29 | End: 2021-08-02

## 2020-09-29 RX ORDER — ASPIRIN 81 MG/1
81 TABLET ORAL DAILY
COMMUNITY
End: 2022-07-07

## 2020-09-29 NOTE — PROGRESS NOTES
Subjective:       Patient ID: Jasson Mayo is a 65 y.o. male.    Chief Complaint: Follow-up    Follow-up hypertension COVID-19 cough.  Double vision continues to improved.  He denies headache chest pain palpitations shortness of breath or edema.  Cough has resolved.    Review of Systems   Constitutional: Negative for appetite change, chills, fatigue, fever and unexpected weight change.   HENT: Negative for congestion.    Respiratory: Negative for cough, chest tightness, shortness of breath and wheezing.    Cardiovascular: Negative for chest pain, palpitations and leg swelling.   Neurological: Negative for headaches.       Objective:      Physical Exam  Constitutional:       Appearance: Normal appearance. He is not ill-appearing.   Cardiovascular:      Rate and Rhythm: Normal rate and regular rhythm.      Heart sounds: No murmur.   Pulmonary:      Effort: No respiratory distress.      Breath sounds: No wheezing, rhonchi or rales.   Skin:     General: Skin is warm and dry.   Neurological:      Mental Status: He is alert and oriented to person, place, and time.         Lab Visit on 08/06/2020   Component Date Value Ref Range Status    WBC 08/06/2020 5.05  3.90 - 12.70 K/uL Final    RBC 08/06/2020 4.51* 4.60 - 6.20 M/uL Final    Hemoglobin 08/06/2020 14.2  14.0 - 18.0 g/dL Final    Hematocrit 08/06/2020 42.0  40.0 - 54.0 % Final    Mean Corpuscular Volume 08/06/2020 93  82 - 98 fL Final    Mean Corpuscular Hemoglobin 08/06/2020 31.5* 27.0 - 31.0 pg Final    Mean Corpuscular Hemoglobin Conc 08/06/2020 33.8  32.0 - 36.0 g/dL Final    RDW 08/06/2020 13.4  11.5 - 14.5 % Final    Platelets 08/06/2020 126* 150 - 350 K/uL Final    MPV 08/06/2020 10.6  9.2 - 12.9 fL Final    Immature Granulocytes 08/06/2020 0.4  0.0 - 0.5 % Final    Gran # (ANC) 08/06/2020 2.7  1.8 - 7.7 K/uL Final    Immature Grans (Abs) 08/06/2020 0.02  0.00 - 0.04 K/uL Final    Lymph # 08/06/2020 1.4  1.0 - 4.8 K/uL Final    Mono #  08/06/2020 0.5  0.3 - 1.0 K/uL Final    Eos # 08/06/2020 0.3  0.0 - 0.5 K/uL Final    Baso # 08/06/2020 0.04  0.00 - 0.20 K/uL Final    nRBC 08/06/2020 0  0 /100 WBC Final    Gran% 08/06/2020 54.1  38.0 - 73.0 % Final    Lymph% 08/06/2020 27.5  18.0 - 48.0 % Final    Mono% 08/06/2020 10.7  4.0 - 15.0 % Final    Eosinophil% 08/06/2020 6.5  0.0 - 8.0 % Final    Basophil% 08/06/2020 0.8  0.0 - 1.9 % Final    Differential Method 08/06/2020 Automated   Final    Sodium 08/06/2020 142  136 - 145 mmol/L Final    Potassium 08/06/2020 4.1  3.5 - 5.1 mmol/L Final    Chloride 08/06/2020 107  95 - 110 mmol/L Final    CO2 08/06/2020 29  23 - 29 mmol/L Final    Glucose 08/06/2020 111* 70 - 110 mg/dL Final    BUN, Bld 08/06/2020 19  8 - 23 mg/dL Final    Creatinine 08/06/2020 1.0  0.5 - 1.4 mg/dL Final    Calcium 08/06/2020 9.2  8.7 - 10.5 mg/dL Final    Total Protein 08/06/2020 7.1  6.0 - 8.4 g/dL Final    Albumin 08/06/2020 3.8  3.5 - 5.2 g/dL Final    Total Bilirubin 08/06/2020 0.6  0.1 - 1.0 mg/dL Final    Alkaline Phosphatase 08/06/2020 87  55 - 135 U/L Final    AST 08/06/2020 84* 10 - 40 U/L Final    ALT 08/06/2020 55* 10 - 44 U/L Final    Anion Gap 08/06/2020 6* 8 - 16 mmol/L Final    eGFR if African American 08/06/2020 >60.0  >60 mL/min/1.73 m^2 Final    eGFR if non African American 08/06/2020 >60.0  >60 mL/min/1.73 m^2 Final     Assessment:       1. Essential hypertension        Plan:     Blood pressure controlled 118/88.  Medication refilled.  Health maintenance reviewed.  Flu vaccine shringx given today.  Follow-up in 3 months.    Essential hypertension  -     benazepriL (LOTENSIN) 20 MG tablet; Take 1 tablet (20 mg total) by mouth once daily.  Dispense: 90 tablet; Refill: 3    Other orders  -     Influenza (FLUAD) - Quadrivalent (Adjuvanted) *Preferred* (65+) (PF)

## 2020-10-19 ENCOUNTER — PATIENT MESSAGE (OUTPATIENT)
Dept: RHEUMATOLOGY | Facility: CLINIC | Age: 65
End: 2020-10-19

## 2020-11-02 ENCOUNTER — PATIENT MESSAGE (OUTPATIENT)
Dept: RHEUMATOLOGY | Facility: CLINIC | Age: 65
End: 2020-11-02

## 2020-11-16 ENCOUNTER — PATIENT MESSAGE (OUTPATIENT)
Dept: INTERNAL MEDICINE | Facility: CLINIC | Age: 65
End: 2020-11-16

## 2020-11-17 DIAGNOSIS — E78.5 HYPERLIPIDEMIA, UNSPECIFIED HYPERLIPIDEMIA TYPE: Primary | ICD-10-CM

## 2020-12-01 ENCOUNTER — IMMUNIZATION (OUTPATIENT)
Dept: PHARMACY | Facility: CLINIC | Age: 65
End: 2020-12-01
Payer: MEDICARE

## 2020-12-01 ENCOUNTER — PATIENT MESSAGE (OUTPATIENT)
Dept: PHARMACY | Facility: CLINIC | Age: 65
End: 2020-12-01

## 2020-12-10 ENCOUNTER — TELEPHONE (OUTPATIENT)
Dept: RHEUMATOLOGY | Facility: CLINIC | Age: 65
End: 2020-12-10

## 2020-12-14 ENCOUNTER — OFFICE VISIT (OUTPATIENT)
Dept: RHEUMATOLOGY | Facility: CLINIC | Age: 65
End: 2020-12-14
Payer: MEDICARE

## 2020-12-14 VITALS
DIASTOLIC BLOOD PRESSURE: 86 MMHG | HEIGHT: 66 IN | HEART RATE: 73 BPM | WEIGHT: 252.19 LBS | BODY MASS INDEX: 40.53 KG/M2 | SYSTOLIC BLOOD PRESSURE: 132 MMHG

## 2020-12-14 DIAGNOSIS — M19.042 PRIMARY OSTEOARTHRITIS OF BOTH HANDS: Primary | ICD-10-CM

## 2020-12-14 DIAGNOSIS — M19.041 PRIMARY OSTEOARTHRITIS OF BOTH HANDS: Primary | ICD-10-CM

## 2020-12-14 DIAGNOSIS — M1A.0720 IDIOPATHIC CHRONIC GOUT OF LEFT FOOT WITHOUT TOPHUS: ICD-10-CM

## 2020-12-14 PROCEDURE — 3008F BODY MASS INDEX DOCD: CPT | Mod: CPTII,S$GLB,, | Performed by: INTERNAL MEDICINE

## 2020-12-14 PROCEDURE — 1125F AMNT PAIN NOTED PAIN PRSNT: CPT | Mod: S$GLB,,, | Performed by: INTERNAL MEDICINE

## 2020-12-14 PROCEDURE — 3288F FALL RISK ASSESSMENT DOCD: CPT | Mod: CPTII,S$GLB,, | Performed by: INTERNAL MEDICINE

## 2020-12-14 PROCEDURE — 3079F DIAST BP 80-89 MM HG: CPT | Mod: CPTII,S$GLB,, | Performed by: INTERNAL MEDICINE

## 2020-12-14 PROCEDURE — 99499 UNLISTED E&M SERVICE: CPT | Mod: S$GLB,,, | Performed by: INTERNAL MEDICINE

## 2020-12-14 PROCEDURE — 1159F PR MEDICATION LIST DOCUMENTED IN MEDICAL RECORD: ICD-10-PCS | Mod: S$GLB,,, | Performed by: INTERNAL MEDICINE

## 2020-12-14 PROCEDURE — 99214 OFFICE O/P EST MOD 30 MIN: CPT | Mod: 25,S$GLB,, | Performed by: INTERNAL MEDICINE

## 2020-12-14 PROCEDURE — 1101F PT FALLS ASSESS-DOCD LE1/YR: CPT | Mod: CPTII,S$GLB,, | Performed by: INTERNAL MEDICINE

## 2020-12-14 PROCEDURE — 20600 DRAIN/INJ JOINT/BURSA W/O US: CPT | Mod: 50,S$GLB,, | Performed by: INTERNAL MEDICINE

## 2020-12-14 PROCEDURE — 99214 PR OFFICE/OUTPT VISIT, EST, LEVL IV, 30-39 MIN: ICD-10-PCS | Mod: 25,S$GLB,, | Performed by: INTERNAL MEDICINE

## 2020-12-14 PROCEDURE — 99999 PR PBB SHADOW E&M-EST. PATIENT-LVL III: ICD-10-PCS | Mod: PBBFAC,,, | Performed by: INTERNAL MEDICINE

## 2020-12-14 PROCEDURE — 1101F PR PT FALLS ASSESS DOC 0-1 FALLS W/OUT INJ PAST YR: ICD-10-PCS | Mod: CPTII,S$GLB,, | Performed by: INTERNAL MEDICINE

## 2020-12-14 PROCEDURE — 3075F PR MOST RECENT SYSTOLIC BLOOD PRESS GE 130-139MM HG: ICD-10-PCS | Mod: CPTII,S$GLB,, | Performed by: INTERNAL MEDICINE

## 2020-12-14 PROCEDURE — 3288F PR FALLS RISK ASSESSMENT DOCUMENTED: ICD-10-PCS | Mod: CPTII,S$GLB,, | Performed by: INTERNAL MEDICINE

## 2020-12-14 PROCEDURE — 1159F MED LIST DOCD IN RCRD: CPT | Mod: S$GLB,,, | Performed by: INTERNAL MEDICINE

## 2020-12-14 PROCEDURE — 1125F PR PAIN SEVERITY QUANTIFIED, PAIN PRESENT: ICD-10-PCS | Mod: S$GLB,,, | Performed by: INTERNAL MEDICINE

## 2020-12-14 PROCEDURE — 3079F PR MOST RECENT DIASTOLIC BLOOD PRESSURE 80-89 MM HG: ICD-10-PCS | Mod: CPTII,S$GLB,, | Performed by: INTERNAL MEDICINE

## 2020-12-14 PROCEDURE — 20600 SMALL JOINT ASPIRATION/INJECTION: R INDEX MCP, L INDEX MCP: ICD-10-PCS | Mod: 50,S$GLB,, | Performed by: INTERNAL MEDICINE

## 2020-12-14 PROCEDURE — 99999 PR PBB SHADOW E&M-EST. PATIENT-LVL III: CPT | Mod: PBBFAC,,, | Performed by: INTERNAL MEDICINE

## 2020-12-14 PROCEDURE — 3075F SYST BP GE 130 - 139MM HG: CPT | Mod: CPTII,S$GLB,, | Performed by: INTERNAL MEDICINE

## 2020-12-14 PROCEDURE — 99499 RISK ADDL DX/OHS AUDIT: ICD-10-PCS | Mod: S$GLB,,, | Performed by: INTERNAL MEDICINE

## 2020-12-14 PROCEDURE — 3008F PR BODY MASS INDEX (BMI) DOCUMENTED: ICD-10-PCS | Mod: CPTII,S$GLB,, | Performed by: INTERNAL MEDICINE

## 2020-12-14 RX ORDER — TRIAMCINOLONE ACETONIDE 40 MG/ML
40 INJECTION, SUSPENSION INTRA-ARTICULAR; INTRAMUSCULAR
Status: DISCONTINUED | OUTPATIENT
Start: 2020-12-14 | End: 2020-12-14 | Stop reason: HOSPADM

## 2020-12-14 RX ADMIN — TRIAMCINOLONE ACETONIDE 40 MG: 40 INJECTION, SUSPENSION INTRA-ARTICULAR; INTRAMUSCULAR at 11:12

## 2020-12-14 NOTE — PROGRESS NOTES
RHEUMATOLOGY CLINIC FOLLOW UP VISIT  Chief complaints:-  My fingers hurt.    HPI:-  Jasson Nesbitt a 65 y.o. pleasant male comes in for a follow up visit with above chief complaints.  He follows up in the rheumatology clinic for osteoarthritis and gout.  He reports no gout flares  SINCE last visit.  He is on co-benemid.   He complains of recurrence of pain over bilateral 2nd MCP joints in the last couple of weeks.  The pain is worsened by activity and does not relieve with resting.  Morning stiffness over those to joints lasts for an hour.  He denies any pain over any other small joint in his hands.  He denies any pain or other joints of hands or feet.  No pain over knee joints today.  No adverse effects from medications. The injection given in last visit gave pain relief for 5 months.     Review of Systems   Constitutional: Negative for chills and fever.   HENT: Negative for congestion and sore throat.    Eyes: Negative for blurred vision and redness.   Respiratory: Negative for cough and shortness of breath.    Cardiovascular: Negative for chest pain and leg swelling.   Gastrointestinal: Negative for abdominal pain.   Genitourinary: Negative for dysuria.   Musculoskeletal: Positive for back pain, joint pain and neck pain. Negative for falls and myalgias.   Skin: Negative for rash.   Neurological: Negative for headaches.   Endo/Heme/Allergies: Does not bruise/bleed easily.   Psychiatric/Behavioral: Negative for memory loss. The patient does not have insomnia.        Past Medical History:   Diagnosis Date    Decreased platelet count     Fatty liver     Gout     HTN (hypertension) 2000    Non-A non-B hepatitis 1990    Obesity (BMI 35.0-39.9 without comorbidity)     Prediabetes 2015       Past Surgical History:   Procedure Laterality Date    COLONOSCOPY W/ POLYPECTOMY  10, 14 no polyp    KNEE ARTHROSCOPY Right     Meniscus repair    LIVER  "BIOPSY  2010    VASECTOMY          Social History     Tobacco Use    Smoking status: Never Smoker    Smokeless tobacco: Former User   Substance Use Topics    Alcohol use: No     Frequency: Monthly or less     Drinks per session: 1 or 2     Binge frequency: Never    Drug use: Not on file       Family History   Problem Relation Age of Onset    COPD Mother     Heart attacks under age 50 Mother     Cancer Father         PROSTATE CA       Review of patient's allergies indicates:  No Known Allergies        Physical examination:-    Vitals:    12/14/20 1027   BP: 132/86   Pulse: 73   Weight: 114.4 kg (252 lb 3.3 oz)   Height: 5' 6" (1.676 m)   PainSc:   6   PainLoc: Hand       Physical Exam   Constitutional: He is oriented to person, place, and time and well-developed, well-nourished, and in no distress. No distress.   HENT:   Head: Normocephalic and atraumatic.   Mouth/Throat: Oropharynx is clear and moist.   Eyes: Pupils are equal, round, and reactive to light. Conjunctivae and EOM are normal. Right eye exhibits no discharge. Left eye exhibits no discharge. No scleral icterus.   Neck: Normal range of motion. Neck supple.   Cardiovascular: Normal rate and intact distal pulses.   Pulmonary/Chest: Effort normal. No respiratory distress. He exhibits no tenderness.   Abdominal: Soft. There is no abdominal tenderness.   Musculoskeletal:      Comments: Tenderness present over the right second MCP joint without any significant evidence of synovitis.  No synovitis or tenderness over the small joints of hands or feet.  Good range of motion in large joints.   Lymphadenopathy:     He has no cervical adenopathy.   Neurological: He is alert and oriented to person, place, and time. Gait normal.   Skin: Skin is warm. No rash noted. He is not diaphoretic. No erythema. No pallor.   Psychiatric: Mood and affect normal.   Nursing note and vitals reviewed.    Radiographs:-  Independent visualization of images done.  Personally " reviewed the x-ray images.  Both x-ray images show significant degenerative disease or bilateral first carpometacarpal joint and second MCP joints without any involvement of other MCPs or PIPs.  MCP showed a possible cyst near the articular surface.    Medication List with Changes/Refills   Current Medications    ALLOPURINOL (ZYLOPRIM) 100 MG TABLET    TAKE 1 TABLET (100 MG TOTAL) BY MOUTH ONCE DAILY.    ASPIRIN (ECOTRIN) 81 MG EC TABLET    Take 81 mg by mouth once daily.    BENAZEPRIL (LOTENSIN) 20 MG TABLET    Take 1 tablet (20 mg total) by mouth once daily.    COLCHICINE-PROBENECID 0.5-500 MG (CO-BENEMID) 500-0.5 MG TAB    Take 1 tablet by mouth once daily.    VITAMIN E 1000 UNIT CAPSULE    Take 1,000 Units by mouth once daily.     Assessment/Plans:-  1. Primary osteoarthritis of both hands    2. Idiopathic chronic gout of left foot without tophus     Bilateral MCP osteoarthritis with intermittent flares.  Inject bilateral 2nd MCP joints with 20 mg Kenalog.  Continue allopurinol for gout.  Check CMP uric acid before next visit.  Problem List Items Addressed This Visit     Idiopathic chronic gout of left foot without tophus    Primary osteoarthritis of both second MCP joints - Primary        Small Joint Aspiration/Injection: R index MCP, L index MCP    Date/Time: 12/14/2020 11:15 AM  Performed by: Gume Curiel MD  Authorized by: Gume Curiel MD     Consent Done?:  Yes (Verbal)  Indications:  Joint swelling, pain and arthritis  Site marked: the procedure site was marked    Timeout: prior to procedure the correct patient, procedure, and site was verified    Local anesthesia used?: Yes    Anesthesia:  Local infiltration  Local anesthetic:  Lidocaine 2% without epinephrine  Anesthetic total (ml):  1    Location:  Index finger  Site:  R index MCP and L index MCP  Ultrasonic guidance for needle placement?: No    Needle size:  27 G  Approach:  Lateral  Medications:  40 mg triamcinolone acetonide 40  mg/mL; 40 mg triamcinolone acetonide 40 mg/mL  Patient tolerance:  Patient tolerated the procedure well with no immediate complications       # Follow up in about 6 months (around 4/2/2020).  Disclaimer: This note was prepared using voice recognition system and is likely to have sound alike errors .  Please call me with any questions  .

## 2020-12-14 NOTE — PROGRESS NOTES
Patient, Jasson Mayo (MRN #[id number]), presented with a recorded BMI of 40.71 kg/m^2 consistent with the definition of morbid obesity (ICD-10 E66.01). The patient's morbid obesity was monitored, evaluated, addressed and/or treated. This addendum to the medical record is made on 12/14/2020.

## 2021-01-05 ENCOUNTER — LAB VISIT (OUTPATIENT)
Dept: LAB | Facility: HOSPITAL | Age: 66
End: 2021-01-05
Attending: FAMILY MEDICINE
Payer: MEDICARE

## 2021-01-05 ENCOUNTER — OFFICE VISIT (OUTPATIENT)
Dept: INTERNAL MEDICINE | Facility: CLINIC | Age: 66
End: 2021-01-05
Payer: MEDICARE

## 2021-01-05 VITALS
DIASTOLIC BLOOD PRESSURE: 80 MMHG | SYSTOLIC BLOOD PRESSURE: 114 MMHG | BODY MASS INDEX: 38.97 KG/M2 | OXYGEN SATURATION: 95 % | TEMPERATURE: 96 F | WEIGHT: 242.5 LBS | HEIGHT: 66 IN | HEART RATE: 68 BPM

## 2021-01-05 DIAGNOSIS — I10 ESSENTIAL HYPERTENSION: Primary | ICD-10-CM

## 2021-01-05 DIAGNOSIS — I10 ESSENTIAL HYPERTENSION: ICD-10-CM

## 2021-01-05 LAB
CHOLEST SERPL-MCNC: 170 MG/DL (ref 120–199)
CHOLEST/HDLC SERPL: 3.7 {RATIO} (ref 2–5)
HDLC SERPL-MCNC: 46 MG/DL (ref 40–75)
HDLC SERPL: 27.1 % (ref 20–50)
LDLC SERPL CALC-MCNC: 108.2 MG/DL (ref 63–159)
NONHDLC SERPL-MCNC: 124 MG/DL
TRIGL SERPL-MCNC: 79 MG/DL (ref 30–150)

## 2021-01-05 PROCEDURE — G0009 PNEUMOCOCCAL POLYSACCHARIDE VACCINE 23-VALENT =>2YO SQ IM: ICD-10-PCS | Mod: S$GLB,,, | Performed by: FAMILY MEDICINE

## 2021-01-05 PROCEDURE — 3074F SYST BP LT 130 MM HG: CPT | Mod: CPTII,S$GLB,, | Performed by: FAMILY MEDICINE

## 2021-01-05 PROCEDURE — 80061 LIPID PANEL: CPT

## 2021-01-05 PROCEDURE — 1126F AMNT PAIN NOTED NONE PRSNT: CPT | Mod: S$GLB,,, | Performed by: FAMILY MEDICINE

## 2021-01-05 PROCEDURE — 99499 UNLISTED E&M SERVICE: CPT | Mod: S$GLB,,, | Performed by: FAMILY MEDICINE

## 2021-01-05 PROCEDURE — G0009 ADMIN PNEUMOCOCCAL VACCINE: HCPCS | Mod: S$GLB,,, | Performed by: FAMILY MEDICINE

## 2021-01-05 PROCEDURE — 1101F PT FALLS ASSESS-DOCD LE1/YR: CPT | Mod: CPTII,S$GLB,, | Performed by: FAMILY MEDICINE

## 2021-01-05 PROCEDURE — 1159F MED LIST DOCD IN RCRD: CPT | Mod: S$GLB,,, | Performed by: FAMILY MEDICINE

## 2021-01-05 PROCEDURE — 1126F PR PAIN SEVERITY QUANTIFIED, NO PAIN PRESENT: ICD-10-PCS | Mod: S$GLB,,, | Performed by: FAMILY MEDICINE

## 2021-01-05 PROCEDURE — 36415 COLL VENOUS BLD VENIPUNCTURE: CPT

## 2021-01-05 PROCEDURE — 1101F PR PT FALLS ASSESS DOC 0-1 FALLS W/OUT INJ PAST YR: ICD-10-PCS | Mod: CPTII,S$GLB,, | Performed by: FAMILY MEDICINE

## 2021-01-05 PROCEDURE — 3008F PR BODY MASS INDEX (BMI) DOCUMENTED: ICD-10-PCS | Mod: CPTII,S$GLB,, | Performed by: FAMILY MEDICINE

## 2021-01-05 PROCEDURE — 3079F DIAST BP 80-89 MM HG: CPT | Mod: CPTII,S$GLB,, | Performed by: FAMILY MEDICINE

## 2021-01-05 PROCEDURE — 99213 PR OFFICE/OUTPT VISIT, EST, LEVL III, 20-29 MIN: ICD-10-PCS | Mod: 25,S$GLB,, | Performed by: FAMILY MEDICINE

## 2021-01-05 PROCEDURE — 90732 PNEUMOCOCCAL POLYSACCHARIDE VACCINE 23-VALENT =>2YO SQ IM: ICD-10-PCS | Mod: S$GLB,,, | Performed by: FAMILY MEDICINE

## 2021-01-05 PROCEDURE — 3008F BODY MASS INDEX DOCD: CPT | Mod: CPTII,S$GLB,, | Performed by: FAMILY MEDICINE

## 2021-01-05 PROCEDURE — 90732 PPSV23 VACC 2 YRS+ SUBQ/IM: CPT | Mod: S$GLB,,, | Performed by: FAMILY MEDICINE

## 2021-01-05 PROCEDURE — 1159F PR MEDICATION LIST DOCUMENTED IN MEDICAL RECORD: ICD-10-PCS | Mod: S$GLB,,, | Performed by: FAMILY MEDICINE

## 2021-01-05 PROCEDURE — 3288F PR FALLS RISK ASSESSMENT DOCUMENTED: ICD-10-PCS | Mod: CPTII,S$GLB,, | Performed by: FAMILY MEDICINE

## 2021-01-05 PROCEDURE — 99999 PR PBB SHADOW E&M-EST. PATIENT-LVL III: ICD-10-PCS | Mod: PBBFAC,,, | Performed by: FAMILY MEDICINE

## 2021-01-05 PROCEDURE — 99999 PR PBB SHADOW E&M-EST. PATIENT-LVL III: CPT | Mod: PBBFAC,,, | Performed by: FAMILY MEDICINE

## 2021-01-05 PROCEDURE — 3079F PR MOST RECENT DIASTOLIC BLOOD PRESSURE 80-89 MM HG: ICD-10-PCS | Mod: CPTII,S$GLB,, | Performed by: FAMILY MEDICINE

## 2021-01-05 PROCEDURE — 99499 RISK ADDL DX/OHS AUDIT: ICD-10-PCS | Mod: S$GLB,,, | Performed by: FAMILY MEDICINE

## 2021-01-05 PROCEDURE — 99213 OFFICE O/P EST LOW 20 MIN: CPT | Mod: 25,S$GLB,, | Performed by: FAMILY MEDICINE

## 2021-01-05 PROCEDURE — 3074F PR MOST RECENT SYSTOLIC BLOOD PRESSURE < 130 MM HG: ICD-10-PCS | Mod: CPTII,S$GLB,, | Performed by: FAMILY MEDICINE

## 2021-01-05 PROCEDURE — 3288F FALL RISK ASSESSMENT DOCD: CPT | Mod: CPTII,S$GLB,, | Performed by: FAMILY MEDICINE

## 2021-02-04 ENCOUNTER — TELEPHONE (OUTPATIENT)
Dept: INTERNAL MEDICINE | Facility: CLINIC | Age: 66
End: 2021-02-04

## 2021-04-20 ENCOUNTER — PATIENT MESSAGE (OUTPATIENT)
Dept: RHEUMATOLOGY | Facility: CLINIC | Age: 66
End: 2021-04-20

## 2021-04-21 RX ORDER — METHYLPREDNISOLONE 4 MG/1
TABLET ORAL
Qty: 1 PACKAGE | Refills: 0 | Status: SHIPPED | OUTPATIENT
Start: 2021-04-21 | End: 2021-12-13

## 2021-05-04 ENCOUNTER — IMMUNIZATION (OUTPATIENT)
Dept: PHARMACY | Facility: CLINIC | Age: 66
End: 2021-05-04
Payer: MEDICARE

## 2021-06-09 ENCOUNTER — LAB VISIT (OUTPATIENT)
Dept: LAB | Facility: HOSPITAL | Age: 66
End: 2021-06-09
Attending: INTERNAL MEDICINE
Payer: MEDICARE

## 2021-06-09 DIAGNOSIS — M1A.0720 IDIOPATHIC CHRONIC GOUT OF LEFT FOOT WITHOUT TOPHUS: ICD-10-CM

## 2021-06-09 LAB
ALBUMIN SERPL BCP-MCNC: 3.6 G/DL (ref 3.5–5.2)
ALP SERPL-CCNC: 91 U/L (ref 55–135)
ALT SERPL W/O P-5'-P-CCNC: 83 U/L (ref 10–44)
ANION GAP SERPL CALC-SCNC: 9 MMOL/L (ref 8–16)
AST SERPL-CCNC: 69 U/L (ref 10–40)
BILIRUB SERPL-MCNC: 0.5 MG/DL (ref 0.1–1)
BUN SERPL-MCNC: 25 MG/DL (ref 8–23)
CALCIUM SERPL-MCNC: 9.8 MG/DL (ref 8.7–10.5)
CHLORIDE SERPL-SCNC: 106 MMOL/L (ref 95–110)
CO2 SERPL-SCNC: 22 MMOL/L (ref 23–29)
CREAT SERPL-MCNC: 1 MG/DL (ref 0.5–1.4)
EST. GFR  (AFRICAN AMERICAN): >60 ML/MIN/1.73 M^2
EST. GFR  (NON AFRICAN AMERICAN): >60 ML/MIN/1.73 M^2
GLUCOSE SERPL-MCNC: 108 MG/DL (ref 70–110)
POTASSIUM SERPL-SCNC: 4.8 MMOL/L (ref 3.5–5.1)
PROT SERPL-MCNC: 7 G/DL (ref 6–8.4)
SODIUM SERPL-SCNC: 137 MMOL/L (ref 136–145)
URATE SERPL-MCNC: 4.4 MG/DL (ref 3.4–7)

## 2021-06-09 PROCEDURE — 84550 ASSAY OF BLOOD/URIC ACID: CPT | Performed by: INTERNAL MEDICINE

## 2021-06-09 PROCEDURE — 80053 COMPREHEN METABOLIC PANEL: CPT | Performed by: INTERNAL MEDICINE

## 2021-06-09 PROCEDURE — 36415 COLL VENOUS BLD VENIPUNCTURE: CPT | Mod: PO | Performed by: INTERNAL MEDICINE

## 2021-06-11 ENCOUNTER — TELEPHONE (OUTPATIENT)
Dept: RHEUMATOLOGY | Facility: CLINIC | Age: 66
End: 2021-06-11

## 2021-06-14 ENCOUNTER — HOSPITAL ENCOUNTER (OUTPATIENT)
Dept: RADIOLOGY | Facility: HOSPITAL | Age: 66
Discharge: HOME | End: 2021-06-14
Attending: INTERNAL MEDICINE
Payer: MEDICARE

## 2021-06-14 ENCOUNTER — OFFICE VISIT (OUTPATIENT)
Dept: RHEUMATOLOGY | Facility: CLINIC | Age: 66
End: 2021-06-14
Payer: MEDICARE

## 2021-06-14 VITALS
HEIGHT: 66 IN | BODY MASS INDEX: 39.72 KG/M2 | HEART RATE: 68 BPM | SYSTOLIC BLOOD PRESSURE: 123 MMHG | WEIGHT: 247.13 LBS | DIASTOLIC BLOOD PRESSURE: 82 MMHG

## 2021-06-14 DIAGNOSIS — K76.0 NAFLD (NONALCOHOLIC FATTY LIVER DISEASE): ICD-10-CM

## 2021-06-14 DIAGNOSIS — M06.4 UNDIFFERENTIATED INFLAMMATORY ARTHRITIS: ICD-10-CM

## 2021-06-14 DIAGNOSIS — M1A.0720 IDIOPATHIC CHRONIC GOUT OF LEFT FOOT WITHOUT TOPHUS: ICD-10-CM

## 2021-06-14 DIAGNOSIS — M19.042 PRIMARY OSTEOARTHRITIS OF BOTH HANDS: Primary | ICD-10-CM

## 2021-06-14 DIAGNOSIS — M19.041 PRIMARY OSTEOARTHRITIS OF BOTH HANDS: Primary | ICD-10-CM

## 2021-06-14 DIAGNOSIS — E66.01 SEVERE OBESITY WITH BODY MASS INDEX (BMI) OF 35.0 TO 39.9 WITH SERIOUS COMORBIDITY: ICD-10-CM

## 2021-06-14 PROBLEM — M19.90 UNDIFFERENTIATED INFLAMMATORY ARTHRITIS: Status: ACTIVE | Noted: 2021-06-14

## 2021-06-14 PROCEDURE — 1159F MED LIST DOCD IN RCRD: CPT | Mod: S$GLB,,, | Performed by: INTERNAL MEDICINE

## 2021-06-14 PROCEDURE — 3008F PR BODY MASS INDEX (BMI) DOCUMENTED: ICD-10-PCS | Mod: CPTII,S$GLB,, | Performed by: INTERNAL MEDICINE

## 2021-06-14 PROCEDURE — 99499 RISK ADDL DX/OHS AUDIT: ICD-10-PCS | Mod: S$GLB,,, | Performed by: INTERNAL MEDICINE

## 2021-06-14 PROCEDURE — 3288F PR FALLS RISK ASSESSMENT DOCUMENTED: ICD-10-PCS | Mod: CPTII,S$GLB,, | Performed by: INTERNAL MEDICINE

## 2021-06-14 PROCEDURE — 1125F AMNT PAIN NOTED PAIN PRSNT: CPT | Mod: S$GLB,,, | Performed by: INTERNAL MEDICINE

## 2021-06-14 PROCEDURE — 3288F FALL RISK ASSESSMENT DOCD: CPT | Mod: CPTII,S$GLB,, | Performed by: INTERNAL MEDICINE

## 2021-06-14 PROCEDURE — 3008F BODY MASS INDEX DOCD: CPT | Mod: CPTII,S$GLB,, | Performed by: INTERNAL MEDICINE

## 2021-06-14 PROCEDURE — 99499 UNLISTED E&M SERVICE: CPT | Mod: S$GLB,,, | Performed by: INTERNAL MEDICINE

## 2021-06-14 PROCEDURE — 99999 PR PBB SHADOW E&M-EST. PATIENT-LVL IV: CPT | Mod: PBBFAC,,, | Performed by: INTERNAL MEDICINE

## 2021-06-14 PROCEDURE — 73130 X-RAY EXAM OF HAND: CPT | Mod: TC,50,FY,PO

## 2021-06-14 PROCEDURE — 99215 PR OFFICE/OUTPT VISIT, EST, LEVL V, 40-54 MIN: ICD-10-PCS | Mod: 25,S$GLB,, | Performed by: INTERNAL MEDICINE

## 2021-06-14 PROCEDURE — 73130 X-RAY EXAM OF HAND: CPT | Mod: 26,50,, | Performed by: RADIOLOGY

## 2021-06-14 PROCEDURE — 99215 OFFICE O/P EST HI 40 MIN: CPT | Mod: 25,S$GLB,, | Performed by: INTERNAL MEDICINE

## 2021-06-14 PROCEDURE — 20600 DRAIN/INJ JOINT/BURSA W/O US: CPT | Mod: 50,S$GLB,, | Performed by: INTERNAL MEDICINE

## 2021-06-14 PROCEDURE — 73130 XR HAND COMPLETE 3 VIEWS BILATERAL: ICD-10-PCS | Mod: 26,50,, | Performed by: RADIOLOGY

## 2021-06-14 PROCEDURE — 99999 PR PBB SHADOW E&M-EST. PATIENT-LVL IV: ICD-10-PCS | Mod: PBBFAC,,, | Performed by: INTERNAL MEDICINE

## 2021-06-14 PROCEDURE — 1101F PT FALLS ASSESS-DOCD LE1/YR: CPT | Mod: CPTII,S$GLB,, | Performed by: INTERNAL MEDICINE

## 2021-06-14 PROCEDURE — 20600 SMALL JOINT ASPIRATION/INJECTION: R INDEX MCP, L INDEX MCP: ICD-10-PCS | Mod: 50,S$GLB,, | Performed by: INTERNAL MEDICINE

## 2021-06-14 PROCEDURE — 1101F PR PT FALLS ASSESS DOC 0-1 FALLS W/OUT INJ PAST YR: ICD-10-PCS | Mod: CPTII,S$GLB,, | Performed by: INTERNAL MEDICINE

## 2021-06-14 PROCEDURE — 1125F PR PAIN SEVERITY QUANTIFIED, PAIN PRESENT: ICD-10-PCS | Mod: S$GLB,,, | Performed by: INTERNAL MEDICINE

## 2021-06-14 PROCEDURE — 1159F PR MEDICATION LIST DOCUMENTED IN MEDICAL RECORD: ICD-10-PCS | Mod: S$GLB,,, | Performed by: INTERNAL MEDICINE

## 2021-06-14 RX ORDER — TRIAMCINOLONE ACETONIDE 40 MG/ML
40 INJECTION, SUSPENSION INTRA-ARTICULAR; INTRAMUSCULAR
Status: DISCONTINUED | OUTPATIENT
Start: 2021-06-14 | End: 2021-06-14 | Stop reason: HOSPADM

## 2021-06-14 RX ORDER — GUAIFENESIN 600 MG/1
TABLET, EXTENDED RELEASE ORAL
COMMUNITY
End: 2021-12-13

## 2021-06-14 RX ORDER — SULFASALAZINE 500 MG/1
500 TABLET, DELAYED RELEASE ORAL 2 TIMES DAILY
Qty: 60 TABLET | Refills: 0 | Status: SHIPPED | OUTPATIENT
Start: 2021-06-14 | End: 2021-12-13

## 2021-06-14 RX ORDER — COLCHICINE 0.6 MG/1
TABLET ORAL
COMMUNITY
End: 2021-12-13

## 2021-06-14 RX ADMIN — TRIAMCINOLONE ACETONIDE 40 MG: 40 INJECTION, SUSPENSION INTRA-ARTICULAR; INTRAMUSCULAR at 09:06

## 2021-07-06 ENCOUNTER — OFFICE VISIT (OUTPATIENT)
Dept: INTERNAL MEDICINE | Facility: CLINIC | Age: 66
End: 2021-07-06
Payer: MEDICARE

## 2021-07-06 VITALS
HEART RATE: 71 BPM | DIASTOLIC BLOOD PRESSURE: 78 MMHG | BODY MASS INDEX: 38.62 KG/M2 | TEMPERATURE: 97 F | OXYGEN SATURATION: 95 % | WEIGHT: 240.31 LBS | SYSTOLIC BLOOD PRESSURE: 110 MMHG | HEIGHT: 66 IN

## 2021-07-06 DIAGNOSIS — I10 ESSENTIAL HYPERTENSION: Primary | ICD-10-CM

## 2021-07-06 DIAGNOSIS — Z28.39 IMMUNIZATION DEFICIENCY: ICD-10-CM

## 2021-07-06 DIAGNOSIS — K76.0 NAFLD (NONALCOHOLIC FATTY LIVER DISEASE): ICD-10-CM

## 2021-07-06 DIAGNOSIS — R79.89 ELEVATED LFTS: ICD-10-CM

## 2021-07-06 PROCEDURE — 99499 UNLISTED E&M SERVICE: CPT | Mod: S$GLB,,, | Performed by: FAMILY MEDICINE

## 2021-07-06 PROCEDURE — 99214 PR OFFICE/OUTPT VISIT, EST, LEVL IV, 30-39 MIN: ICD-10-PCS | Mod: S$GLB,,, | Performed by: FAMILY MEDICINE

## 2021-07-06 PROCEDURE — 1101F PT FALLS ASSESS-DOCD LE1/YR: CPT | Mod: CPTII,S$GLB,, | Performed by: FAMILY MEDICINE

## 2021-07-06 PROCEDURE — 99999 PR PBB SHADOW E&M-EST. PATIENT-LVL III: CPT | Mod: PBBFAC,,, | Performed by: FAMILY MEDICINE

## 2021-07-06 PROCEDURE — 3008F BODY MASS INDEX DOCD: CPT | Mod: CPTII,S$GLB,, | Performed by: FAMILY MEDICINE

## 2021-07-06 PROCEDURE — 1125F AMNT PAIN NOTED PAIN PRSNT: CPT | Mod: S$GLB,,, | Performed by: FAMILY MEDICINE

## 2021-07-06 PROCEDURE — 3078F PR MOST RECENT DIASTOLIC BLOOD PRESSURE < 80 MM HG: ICD-10-PCS | Mod: CPTII,S$GLB,, | Performed by: FAMILY MEDICINE

## 2021-07-06 PROCEDURE — 1159F PR MEDICATION LIST DOCUMENTED IN MEDICAL RECORD: ICD-10-PCS | Mod: S$GLB,,, | Performed by: FAMILY MEDICINE

## 2021-07-06 PROCEDURE — 3288F FALL RISK ASSESSMENT DOCD: CPT | Mod: CPTII,S$GLB,, | Performed by: FAMILY MEDICINE

## 2021-07-06 PROCEDURE — 3078F DIAST BP <80 MM HG: CPT | Mod: CPTII,S$GLB,, | Performed by: FAMILY MEDICINE

## 2021-07-06 PROCEDURE — 3288F PR FALLS RISK ASSESSMENT DOCUMENTED: ICD-10-PCS | Mod: CPTII,S$GLB,, | Performed by: FAMILY MEDICINE

## 2021-07-06 PROCEDURE — 1125F PR PAIN SEVERITY QUANTIFIED, PAIN PRESENT: ICD-10-PCS | Mod: S$GLB,,, | Performed by: FAMILY MEDICINE

## 2021-07-06 PROCEDURE — 3074F SYST BP LT 130 MM HG: CPT | Mod: CPTII,S$GLB,, | Performed by: FAMILY MEDICINE

## 2021-07-06 PROCEDURE — 99499 RISK ADDL DX/OHS AUDIT: ICD-10-PCS | Mod: S$GLB,,, | Performed by: FAMILY MEDICINE

## 2021-07-06 PROCEDURE — 1101F PR PT FALLS ASSESS DOC 0-1 FALLS W/OUT INJ PAST YR: ICD-10-PCS | Mod: CPTII,S$GLB,, | Performed by: FAMILY MEDICINE

## 2021-07-06 PROCEDURE — 3008F PR BODY MASS INDEX (BMI) DOCUMENTED: ICD-10-PCS | Mod: CPTII,S$GLB,, | Performed by: FAMILY MEDICINE

## 2021-07-06 PROCEDURE — 1159F MED LIST DOCD IN RCRD: CPT | Mod: S$GLB,,, | Performed by: FAMILY MEDICINE

## 2021-07-06 PROCEDURE — 3074F PR MOST RECENT SYSTOLIC BLOOD PRESSURE < 130 MM HG: ICD-10-PCS | Mod: CPTII,S$GLB,, | Performed by: FAMILY MEDICINE

## 2021-07-06 PROCEDURE — 99999 PR PBB SHADOW E&M-EST. PATIENT-LVL III: ICD-10-PCS | Mod: PBBFAC,,, | Performed by: FAMILY MEDICINE

## 2021-07-06 PROCEDURE — 99214 OFFICE O/P EST MOD 30 MIN: CPT | Mod: S$GLB,,, | Performed by: FAMILY MEDICINE

## 2021-07-12 ENCOUNTER — LAB VISIT (OUTPATIENT)
Dept: LAB | Facility: HOSPITAL | Age: 66
End: 2021-07-12
Payer: MEDICARE

## 2021-07-12 DIAGNOSIS — M06.4 UNDIFFERENTIATED INFLAMMATORY ARTHRITIS: ICD-10-CM

## 2021-07-12 LAB
ALBUMIN SERPL BCP-MCNC: 4.2 G/DL (ref 3.5–5.2)
ALP SERPL-CCNC: 93 U/L (ref 55–135)
ALT SERPL W/O P-5'-P-CCNC: 57 U/L (ref 10–44)
ANION GAP SERPL CALC-SCNC: 13 MMOL/L (ref 8–16)
AST SERPL-CCNC: 47 U/L (ref 10–40)
BASOPHILS # BLD AUTO: 0.06 K/UL (ref 0–0.2)
BASOPHILS NFR BLD: 1.1 % (ref 0–1.9)
BILIRUB SERPL-MCNC: 0.6 MG/DL (ref 0.1–1)
BUN SERPL-MCNC: 21 MG/DL (ref 8–23)
CALCIUM SERPL-MCNC: 10.1 MG/DL (ref 8.7–10.5)
CHLORIDE SERPL-SCNC: 107 MMOL/L (ref 95–110)
CO2 SERPL-SCNC: 21 MMOL/L (ref 23–29)
CREAT SERPL-MCNC: 1.4 MG/DL (ref 0.5–1.4)
DIFFERENTIAL METHOD: NORMAL
EOSINOPHIL # BLD AUTO: 0.1 K/UL (ref 0–0.5)
EOSINOPHIL NFR BLD: 1.3 % (ref 0–8)
ERYTHROCYTE [DISTWIDTH] IN BLOOD BY AUTOMATED COUNT: 13.4 % (ref 11.5–14.5)
EST. GFR  (AFRICAN AMERICAN): >60 ML/MIN/1.73 M^2
EST. GFR  (NON AFRICAN AMERICAN): 52 ML/MIN/1.73 M^2
GLUCOSE SERPL-MCNC: 104 MG/DL (ref 70–110)
HCT VFR BLD AUTO: 46.2 % (ref 40–54)
HGB BLD-MCNC: 15.5 G/DL (ref 14–18)
IMM GRANULOCYTES # BLD AUTO: 0.01 K/UL (ref 0–0.04)
IMM GRANULOCYTES NFR BLD AUTO: 0.2 % (ref 0–0.5)
LYMPHOCYTES # BLD AUTO: 1.1 K/UL (ref 1–4.8)
LYMPHOCYTES NFR BLD: 20.6 % (ref 18–48)
MCH RBC QN AUTO: 30.7 PG (ref 27–31)
MCHC RBC AUTO-ENTMCNC: 33.5 G/DL (ref 32–36)
MCV RBC AUTO: 92 FL (ref 82–98)
MONOCYTES # BLD AUTO: 0.7 K/UL (ref 0.3–1)
MONOCYTES NFR BLD: 13.4 % (ref 4–15)
NEUTROPHILS # BLD AUTO: 3.5 K/UL (ref 1.8–7.7)
NEUTROPHILS NFR BLD: 63.6 % (ref 38–73)
NRBC BLD-RTO: 0 /100 WBC
PLATELET # BLD AUTO: 166 K/UL (ref 150–450)
PMV BLD AUTO: 10.2 FL (ref 9.2–12.9)
POTASSIUM SERPL-SCNC: 4.7 MMOL/L (ref 3.5–5.1)
PROT SERPL-MCNC: 7.8 G/DL (ref 6–8.4)
RBC # BLD AUTO: 5.05 M/UL (ref 4.6–6.2)
SODIUM SERPL-SCNC: 141 MMOL/L (ref 136–145)
WBC # BLD AUTO: 5.54 K/UL (ref 3.9–12.7)

## 2021-07-12 PROCEDURE — 85025 COMPLETE CBC W/AUTO DIFF WBC: CPT | Mod: PO | Performed by: INTERNAL MEDICINE

## 2021-07-12 PROCEDURE — 36415 COLL VENOUS BLD VENIPUNCTURE: CPT | Mod: PO | Performed by: INTERNAL MEDICINE

## 2021-07-12 PROCEDURE — 80053 COMPREHEN METABOLIC PANEL: CPT | Performed by: INTERNAL MEDICINE

## 2021-07-30 DIAGNOSIS — I10 ESSENTIAL HYPERTENSION: ICD-10-CM

## 2021-08-02 RX ORDER — BENAZEPRIL HYDROCHLORIDE 20 MG/1
TABLET ORAL
Qty: 90 TABLET | Refills: 3 | Status: SHIPPED | OUTPATIENT
Start: 2021-08-02 | End: 2022-05-30

## 2021-09-14 ENCOUNTER — LAB VISIT (OUTPATIENT)
Dept: LAB | Facility: HOSPITAL | Age: 66
End: 2021-09-14
Attending: INTERNAL MEDICINE
Payer: MEDICARE

## 2021-09-14 DIAGNOSIS — M06.4 UNDIFFERENTIATED INFLAMMATORY ARTHRITIS: ICD-10-CM

## 2021-09-14 LAB
ALBUMIN SERPL BCP-MCNC: 3.5 G/DL (ref 3.5–5.2)
ALP SERPL-CCNC: 100 U/L (ref 55–135)
ALT SERPL W/O P-5'-P-CCNC: 60 U/L (ref 10–44)
ANION GAP SERPL CALC-SCNC: 12 MMOL/L (ref 8–16)
AST SERPL-CCNC: 53 U/L (ref 10–40)
BILIRUB SERPL-MCNC: 0.9 MG/DL (ref 0.1–1)
BUN SERPL-MCNC: 17 MG/DL (ref 8–23)
CALCIUM SERPL-MCNC: 9.5 MG/DL (ref 8.7–10.5)
CHLORIDE SERPL-SCNC: 104 MMOL/L (ref 95–110)
CO2 SERPL-SCNC: 22 MMOL/L (ref 23–29)
CREAT SERPL-MCNC: 0.9 MG/DL (ref 0.5–1.4)
EST. GFR  (AFRICAN AMERICAN): >60 ML/MIN/1.73 M^2
EST. GFR  (NON AFRICAN AMERICAN): >60 ML/MIN/1.73 M^2
GLUCOSE SERPL-MCNC: 101 MG/DL (ref 70–110)
POTASSIUM SERPL-SCNC: 4.5 MMOL/L (ref 3.5–5.1)
PROT SERPL-MCNC: 7 G/DL (ref 6–8.4)
SODIUM SERPL-SCNC: 138 MMOL/L (ref 136–145)

## 2021-09-14 PROCEDURE — 85025 COMPLETE CBC W/AUTO DIFF WBC: CPT | Performed by: INTERNAL MEDICINE

## 2021-09-14 PROCEDURE — 80053 COMPREHEN METABOLIC PANEL: CPT | Performed by: INTERNAL MEDICINE

## 2021-09-14 PROCEDURE — 36415 COLL VENOUS BLD VENIPUNCTURE: CPT | Mod: PO | Performed by: INTERNAL MEDICINE

## 2021-09-15 LAB
BASOPHILS # BLD AUTO: 0.07 K/UL (ref 0–0.2)
BASOPHILS NFR BLD: 1.4 % (ref 0–1.9)
DIFFERENTIAL METHOD: ABNORMAL
EOSINOPHIL # BLD AUTO: 0.3 K/UL (ref 0–0.5)
EOSINOPHIL NFR BLD: 5.9 % (ref 0–8)
ERYTHROCYTE [DISTWIDTH] IN BLOOD BY AUTOMATED COUNT: 13.5 % (ref 11.5–14.5)
HCT VFR BLD AUTO: 43.2 % (ref 40–54)
HGB BLD-MCNC: 15 G/DL (ref 14–18)
IMM GRANULOCYTES # BLD AUTO: 0.03 K/UL (ref 0–0.04)
IMM GRANULOCYTES NFR BLD AUTO: 0.6 % (ref 0–0.5)
LYMPHOCYTES # BLD AUTO: 1.7 K/UL (ref 1–4.8)
LYMPHOCYTES NFR BLD: 35.2 % (ref 18–48)
MCH RBC QN AUTO: 31.9 PG (ref 27–31)
MCHC RBC AUTO-ENTMCNC: 34.7 G/DL (ref 32–36)
MCV RBC AUTO: 92 FL (ref 82–98)
MONOCYTES # BLD AUTO: 0.6 K/UL (ref 0.3–1)
MONOCYTES NFR BLD: 11.5 % (ref 4–15)
NEUTROPHILS # BLD AUTO: 2.2 K/UL (ref 1.8–7.7)
NEUTROPHILS NFR BLD: 45.4 % (ref 38–73)
NRBC BLD-RTO: 0 /100 WBC
PLATELET # BLD AUTO: 121 K/UL (ref 150–450)
PLATELET BLD QL SMEAR: ABNORMAL
PMV BLD AUTO: 12.7 FL (ref 9.2–12.9)
RBC # BLD AUTO: 4.7 M/UL (ref 4.6–6.2)
WBC # BLD AUTO: 4.88 K/UL (ref 3.9–12.7)

## 2021-11-02 ENCOUNTER — TELEPHONE (OUTPATIENT)
Dept: ADMINISTRATIVE | Facility: HOSPITAL | Age: 66
End: 2021-11-02
Payer: MEDICARE

## 2021-11-29 ENCOUNTER — OFFICE VISIT (OUTPATIENT)
Dept: FAMILY MEDICINE | Facility: CLINIC | Age: 66
End: 2021-11-29
Payer: MEDICARE

## 2021-11-29 VITALS
DIASTOLIC BLOOD PRESSURE: 80 MMHG | HEART RATE: 70 BPM | WEIGHT: 245.19 LBS | HEIGHT: 68 IN | TEMPERATURE: 98 F | SYSTOLIC BLOOD PRESSURE: 117 MMHG | BODY MASS INDEX: 37.16 KG/M2

## 2021-11-29 DIAGNOSIS — G89.29 CHRONIC PAIN OF LEFT ANKLE: ICD-10-CM

## 2021-11-29 DIAGNOSIS — K76.0 NAFLD (NONALCOHOLIC FATTY LIVER DISEASE): ICD-10-CM

## 2021-11-29 DIAGNOSIS — M06.4 UNDIFFERENTIATED INFLAMMATORY ARTHRITIS: ICD-10-CM

## 2021-11-29 DIAGNOSIS — I10 ESSENTIAL HYPERTENSION: ICD-10-CM

## 2021-11-29 DIAGNOSIS — B17.8: ICD-10-CM

## 2021-11-29 DIAGNOSIS — M54.42 CHRONIC LEFT-SIDED LOW BACK PAIN WITH LEFT-SIDED SCIATICA: ICD-10-CM

## 2021-11-29 DIAGNOSIS — D69.6 THROMBOCYTOPENIA: ICD-10-CM

## 2021-11-29 DIAGNOSIS — E66.01 SEVERE OBESITY WITH BODY MASS INDEX (BMI) OF 35.0 TO 39.9 WITH SERIOUS COMORBIDITY: ICD-10-CM

## 2021-11-29 DIAGNOSIS — M25.572 CHRONIC PAIN OF LEFT ANKLE: ICD-10-CM

## 2021-11-29 DIAGNOSIS — G89.29 CHRONIC LEFT-SIDED LOW BACK PAIN WITH LEFT-SIDED SCIATICA: ICD-10-CM

## 2021-11-29 DIAGNOSIS — Z85.828 HISTORY OF SKIN CANCER: ICD-10-CM

## 2021-11-29 DIAGNOSIS — Z86.16 HISTORY OF COVID-19: ICD-10-CM

## 2021-11-29 DIAGNOSIS — H49.22 SIXTH NERVE PALSY OF LEFT EYE: ICD-10-CM

## 2021-11-29 DIAGNOSIS — M1A.0720 IDIOPATHIC CHRONIC GOUT OF LEFT FOOT WITHOUT TOPHUS: ICD-10-CM

## 2021-11-29 DIAGNOSIS — M19.072 OSTEOARTHRITIS OF LEFT ANKLE AND FOOT: ICD-10-CM

## 2021-11-29 DIAGNOSIS — M54.16 LUMBAR RADICULOPATHY: ICD-10-CM

## 2021-11-29 DIAGNOSIS — Z00.00 ENCOUNTER FOR PREVENTIVE CARE: Primary | ICD-10-CM

## 2021-11-29 DIAGNOSIS — G57.12 MERALGIA PARESTHETICA OF LEFT SIDE: ICD-10-CM

## 2021-11-29 PROCEDURE — 99499 RISK ADDL DX/OHS AUDIT: ICD-10-PCS | Mod: S$GLB,,, | Performed by: NURSE PRACTITIONER

## 2021-11-29 PROCEDURE — 4010F ACE/ARB THERAPY RXD/TAKEN: CPT | Mod: CPTII,S$GLB,, | Performed by: NURSE PRACTITIONER

## 2021-11-29 PROCEDURE — 99999 PR PBB SHADOW E&M-EST. PATIENT-LVL IV: CPT | Mod: PBBFAC,,, | Performed by: NURSE PRACTITIONER

## 2021-11-29 PROCEDURE — 99999 PR PBB SHADOW E&M-EST. PATIENT-LVL IV: ICD-10-PCS | Mod: PBBFAC,,, | Performed by: NURSE PRACTITIONER

## 2021-11-29 PROCEDURE — G0439 PPPS, SUBSEQ VISIT: HCPCS | Mod: S$GLB,,, | Performed by: NURSE PRACTITIONER

## 2021-11-29 PROCEDURE — 99499 UNLISTED E&M SERVICE: CPT | Mod: S$GLB,,, | Performed by: NURSE PRACTITIONER

## 2021-11-29 PROCEDURE — 4010F PR ACE/ARB THEARPY RXD/TAKEN: ICD-10-PCS | Mod: CPTII,S$GLB,, | Performed by: NURSE PRACTITIONER

## 2021-11-29 PROCEDURE — G0439 PR MEDICARE ANNUAL WELLNESS SUBSEQUENT VISIT: ICD-10-PCS | Mod: S$GLB,,, | Performed by: NURSE PRACTITIONER

## 2021-12-09 ENCOUNTER — LAB VISIT (OUTPATIENT)
Dept: LAB | Facility: HOSPITAL | Age: 66
End: 2021-12-09
Attending: INTERNAL MEDICINE
Payer: MEDICARE

## 2021-12-09 ENCOUNTER — TELEPHONE (OUTPATIENT)
Dept: RHEUMATOLOGY | Facility: CLINIC | Age: 66
End: 2021-12-09
Payer: MEDICARE

## 2021-12-09 DIAGNOSIS — M06.4 UNDIFFERENTIATED INFLAMMATORY ARTHRITIS: ICD-10-CM

## 2021-12-09 LAB
ALBUMIN SERPL BCP-MCNC: 3.5 G/DL (ref 3.5–5.2)
ALP SERPL-CCNC: 101 U/L (ref 55–135)
ALT SERPL W/O P-5'-P-CCNC: 56 U/L (ref 10–44)
ANION GAP SERPL CALC-SCNC: 8 MMOL/L (ref 8–16)
AST SERPL-CCNC: 46 U/L (ref 10–40)
BASOPHILS # BLD AUTO: 0.06 K/UL (ref 0–0.2)
BASOPHILS NFR BLD: 1 % (ref 0–1.9)
BILIRUB SERPL-MCNC: 0.6 MG/DL (ref 0.1–1)
BUN SERPL-MCNC: 17 MG/DL (ref 8–23)
CALCIUM SERPL-MCNC: 9.3 MG/DL (ref 8.7–10.5)
CHLORIDE SERPL-SCNC: 106 MMOL/L (ref 95–110)
CO2 SERPL-SCNC: 24 MMOL/L (ref 23–29)
CREAT SERPL-MCNC: 0.9 MG/DL (ref 0.5–1.4)
DIFFERENTIAL METHOD: ABNORMAL
EOSINOPHIL # BLD AUTO: 0.3 K/UL (ref 0–0.5)
EOSINOPHIL NFR BLD: 4.8 % (ref 0–8)
ERYTHROCYTE [DISTWIDTH] IN BLOOD BY AUTOMATED COUNT: 13.2 % (ref 11.5–14.5)
EST. GFR  (AFRICAN AMERICAN): >60 ML/MIN/1.73 M^2
EST. GFR  (NON AFRICAN AMERICAN): >60 ML/MIN/1.73 M^2
GLUCOSE SERPL-MCNC: 110 MG/DL (ref 70–110)
HCT VFR BLD AUTO: 45.4 % (ref 40–54)
HGB BLD-MCNC: 15 G/DL (ref 14–18)
IMM GRANULOCYTES # BLD AUTO: 0.01 K/UL (ref 0–0.04)
IMM GRANULOCYTES NFR BLD AUTO: 0.2 % (ref 0–0.5)
LYMPHOCYTES # BLD AUTO: 2 K/UL (ref 1–4.8)
LYMPHOCYTES NFR BLD: 35.1 % (ref 18–48)
MCH RBC QN AUTO: 31.3 PG (ref 27–31)
MCHC RBC AUTO-ENTMCNC: 33 G/DL (ref 32–36)
MCV RBC AUTO: 95 FL (ref 82–98)
MONOCYTES # BLD AUTO: 0.6 K/UL (ref 0.3–1)
MONOCYTES NFR BLD: 9.8 % (ref 4–15)
NEUTROPHILS # BLD AUTO: 2.9 K/UL (ref 1.8–7.7)
NEUTROPHILS NFR BLD: 49.1 % (ref 38–73)
NRBC BLD-RTO: 0 /100 WBC
PLATELET # BLD AUTO: 130 K/UL (ref 150–450)
PMV BLD AUTO: 11.2 FL (ref 9.2–12.9)
POTASSIUM SERPL-SCNC: 4.7 MMOL/L (ref 3.5–5.1)
PROT SERPL-MCNC: 6.8 G/DL (ref 6–8.4)
RBC # BLD AUTO: 4.79 M/UL (ref 4.6–6.2)
SODIUM SERPL-SCNC: 138 MMOL/L (ref 136–145)
WBC # BLD AUTO: 5.82 K/UL (ref 3.9–12.7)

## 2021-12-09 PROCEDURE — 36415 COLL VENOUS BLD VENIPUNCTURE: CPT | Mod: PO | Performed by: INTERNAL MEDICINE

## 2021-12-09 PROCEDURE — 80053 COMPREHEN METABOLIC PANEL: CPT | Performed by: INTERNAL MEDICINE

## 2021-12-09 PROCEDURE — 85025 COMPLETE CBC W/AUTO DIFF WBC: CPT | Performed by: INTERNAL MEDICINE

## 2021-12-13 ENCOUNTER — OFFICE VISIT (OUTPATIENT)
Dept: RHEUMATOLOGY | Facility: CLINIC | Age: 66
End: 2021-12-13
Payer: MEDICARE

## 2021-12-13 VITALS
HEART RATE: 66 BPM | SYSTOLIC BLOOD PRESSURE: 135 MMHG | BODY MASS INDEX: 37.49 KG/M2 | DIASTOLIC BLOOD PRESSURE: 85 MMHG | HEIGHT: 68 IN | WEIGHT: 247.38 LBS

## 2021-12-13 DIAGNOSIS — M06.4 UNDIFFERENTIATED INFLAMMATORY ARTHRITIS: ICD-10-CM

## 2021-12-13 DIAGNOSIS — M1A.0720 IDIOPATHIC CHRONIC GOUT OF LEFT FOOT WITHOUT TOPHUS: ICD-10-CM

## 2021-12-13 DIAGNOSIS — M19.042 PRIMARY OSTEOARTHRITIS OF BOTH HANDS: Primary | ICD-10-CM

## 2021-12-13 DIAGNOSIS — M19.041 PRIMARY OSTEOARTHRITIS OF BOTH HANDS: Primary | ICD-10-CM

## 2021-12-13 DIAGNOSIS — K76.0 NAFLD (NONALCOHOLIC FATTY LIVER DISEASE): ICD-10-CM

## 2021-12-13 DIAGNOSIS — E66.01 SEVERE OBESITY WITH BODY MASS INDEX (BMI) OF 35.0 TO 39.9 WITH SERIOUS COMORBIDITY: ICD-10-CM

## 2021-12-13 PROCEDURE — 99999 PR PBB SHADOW E&M-EST. PATIENT-LVL III: CPT | Mod: PBBFAC,,, | Performed by: INTERNAL MEDICINE

## 2021-12-13 PROCEDURE — 99214 PR OFFICE/OUTPT VISIT, EST, LEVL IV, 30-39 MIN: ICD-10-PCS | Mod: 25,95,S$GLB, | Performed by: INTERNAL MEDICINE

## 2021-12-13 PROCEDURE — 4010F ACE/ARB THERAPY RXD/TAKEN: CPT | Mod: CPTII,S$GLB,, | Performed by: INTERNAL MEDICINE

## 2021-12-13 PROCEDURE — 99999 PR PBB SHADOW E&M-EST. PATIENT-LVL III: ICD-10-PCS | Mod: PBBFAC,,, | Performed by: INTERNAL MEDICINE

## 2021-12-13 PROCEDURE — 20600 DRAIN/INJ JOINT/BURSA W/O US: CPT | Mod: 50,GC,S$GLB,

## 2021-12-13 PROCEDURE — 4010F PR ACE/ARB THEARPY RXD/TAKEN: ICD-10-PCS | Mod: CPTII,S$GLB,, | Performed by: INTERNAL MEDICINE

## 2021-12-13 PROCEDURE — 20600 SMALL JOINT ASPIRATION/INJECTION: R INDEX MCP, L INDEX MCP: ICD-10-PCS | Mod: 50,GC,S$GLB,

## 2021-12-13 PROCEDURE — 99214 OFFICE O/P EST MOD 30 MIN: CPT | Mod: 25,95,S$GLB, | Performed by: INTERNAL MEDICINE

## 2021-12-13 RX ORDER — SULFASALAZINE 500 MG/1
500 TABLET, DELAYED RELEASE ORAL 2 TIMES DAILY
Qty: 60 TABLET | Refills: 5 | Status: SHIPPED | OUTPATIENT
Start: 2021-12-13 | End: 2021-12-23

## 2021-12-13 RX ORDER — PROBENECID AND COLCHICINE .5; 5 MG/1; MG/1
1 TABLET ORAL DAILY
Qty: 90 TABLET | Refills: 3 | Status: SHIPPED | OUTPATIENT
Start: 2021-12-13 | End: 2022-12-07

## 2021-12-13 RX ORDER — PROBENECID AND COLCHICINE .5; 5 MG/1; MG/1
1 TABLET ORAL DAILY
Qty: 30 TABLET | Refills: 11 | Status: SHIPPED | OUTPATIENT
Start: 2021-12-13 | End: 2021-12-13

## 2021-12-13 RX ORDER — TRIAMCINOLONE ACETONIDE 40 MG/ML
40 INJECTION, SUSPENSION INTRA-ARTICULAR; INTRAMUSCULAR
Status: DISCONTINUED | OUTPATIENT
Start: 2021-12-13 | End: 2021-12-13 | Stop reason: HOSPADM

## 2021-12-13 RX ADMIN — TRIAMCINOLONE ACETONIDE 40 MG: 40 INJECTION, SUSPENSION INTRA-ARTICULAR; INTRAMUSCULAR at 09:12

## 2021-12-14 ENCOUNTER — PATIENT MESSAGE (OUTPATIENT)
Dept: RHEUMATOLOGY | Facility: CLINIC | Age: 66
End: 2021-12-14
Payer: MEDICARE

## 2022-01-07 ENCOUNTER — PATIENT MESSAGE (OUTPATIENT)
Dept: RHEUMATOLOGY | Facility: CLINIC | Age: 67
End: 2022-01-07
Payer: MEDICARE

## 2022-01-07 ENCOUNTER — PATIENT MESSAGE (OUTPATIENT)
Dept: INTERNAL MEDICINE | Facility: CLINIC | Age: 67
End: 2022-01-07
Payer: MEDICARE

## 2022-01-09 DIAGNOSIS — I10 PRIMARY HYPERTENSION: Primary | ICD-10-CM

## 2022-01-10 ENCOUNTER — LAB VISIT (OUTPATIENT)
Dept: LAB | Facility: HOSPITAL | Age: 67
End: 2022-01-10
Attending: INTERNAL MEDICINE
Payer: MEDICARE

## 2022-01-10 DIAGNOSIS — M06.4 UNDIFFERENTIATED INFLAMMATORY ARTHRITIS: ICD-10-CM

## 2022-01-10 LAB
ALBUMIN SERPL BCP-MCNC: 3.6 G/DL (ref 3.5–5.2)
ALP SERPL-CCNC: 85 U/L (ref 55–135)
ALT SERPL W/O P-5'-P-CCNC: 47 U/L (ref 10–44)
ANION GAP SERPL CALC-SCNC: 5 MMOL/L (ref 8–16)
AST SERPL-CCNC: 34 U/L (ref 10–40)
BASOPHILS # BLD AUTO: 0.05 K/UL (ref 0–0.2)
BASOPHILS NFR BLD: 0.9 % (ref 0–1.9)
BILIRUB SERPL-MCNC: 0.8 MG/DL (ref 0.1–1)
BUN SERPL-MCNC: 18 MG/DL (ref 8–23)
CALCIUM SERPL-MCNC: 9.4 MG/DL (ref 8.7–10.5)
CHLORIDE SERPL-SCNC: 101 MMOL/L (ref 95–110)
CO2 SERPL-SCNC: 29 MMOL/L (ref 23–29)
CREAT SERPL-MCNC: 0.9 MG/DL (ref 0.5–1.4)
DIFFERENTIAL METHOD: ABNORMAL
EOSINOPHIL # BLD AUTO: 0.3 K/UL (ref 0–0.5)
EOSINOPHIL NFR BLD: 5.4 % (ref 0–8)
ERYTHROCYTE [DISTWIDTH] IN BLOOD BY AUTOMATED COUNT: 13.5 % (ref 11.5–14.5)
EST. GFR  (AFRICAN AMERICAN): >60 ML/MIN/1.73 M^2
EST. GFR  (NON AFRICAN AMERICAN): >60 ML/MIN/1.73 M^2
GLUCOSE SERPL-MCNC: 103 MG/DL (ref 70–110)
HCT VFR BLD AUTO: 45.7 % (ref 40–54)
HGB BLD-MCNC: 14.9 G/DL (ref 14–18)
IMM GRANULOCYTES # BLD AUTO: 0.02 K/UL (ref 0–0.04)
IMM GRANULOCYTES NFR BLD AUTO: 0.4 % (ref 0–0.5)
LYMPHOCYTES # BLD AUTO: 2.1 K/UL (ref 1–4.8)
LYMPHOCYTES NFR BLD: 39.2 % (ref 18–48)
MCH RBC QN AUTO: 31.2 PG (ref 27–31)
MCHC RBC AUTO-ENTMCNC: 32.6 G/DL (ref 32–36)
MCV RBC AUTO: 96 FL (ref 82–98)
MONOCYTES # BLD AUTO: 0.6 K/UL (ref 0.3–1)
MONOCYTES NFR BLD: 11.3 % (ref 4–15)
NEUTROPHILS # BLD AUTO: 2.3 K/UL (ref 1.8–7.7)
NEUTROPHILS NFR BLD: 42.8 % (ref 38–73)
NRBC BLD-RTO: 0 /100 WBC
PLATELET # BLD AUTO: 149 K/UL (ref 150–450)
PMV BLD AUTO: 10.8 FL (ref 9.2–12.9)
POTASSIUM SERPL-SCNC: 4.4 MMOL/L (ref 3.5–5.1)
PROT SERPL-MCNC: 6.7 G/DL (ref 6–8.4)
RBC # BLD AUTO: 4.77 M/UL (ref 4.6–6.2)
SODIUM SERPL-SCNC: 135 MMOL/L (ref 136–145)
WBC # BLD AUTO: 5.41 K/UL (ref 3.9–12.7)

## 2022-01-10 PROCEDURE — 36415 COLL VENOUS BLD VENIPUNCTURE: CPT | Mod: PO | Performed by: INTERNAL MEDICINE

## 2022-01-10 PROCEDURE — 80053 COMPREHEN METABOLIC PANEL: CPT | Performed by: INTERNAL MEDICINE

## 2022-01-10 PROCEDURE — 85025 COMPLETE CBC W/AUTO DIFF WBC: CPT | Performed by: INTERNAL MEDICINE

## 2022-01-14 ENCOUNTER — LAB VISIT (OUTPATIENT)
Dept: LAB | Facility: HOSPITAL | Age: 67
End: 2022-01-14
Attending: FAMILY MEDICINE
Payer: MEDICARE

## 2022-01-14 DIAGNOSIS — I10 PRIMARY HYPERTENSION: ICD-10-CM

## 2022-01-14 LAB
CHOLEST SERPL-MCNC: 186 MG/DL (ref 120–199)
CHOLEST/HDLC SERPL: 4.5 {RATIO} (ref 2–5)
HDLC SERPL-MCNC: 41 MG/DL (ref 40–75)
HDLC SERPL: 22 % (ref 20–50)
LDLC SERPL CALC-MCNC: 109.4 MG/DL (ref 63–159)
NONHDLC SERPL-MCNC: 145 MG/DL
TRIGL SERPL-MCNC: 178 MG/DL (ref 30–150)

## 2022-01-14 PROCEDURE — 36415 COLL VENOUS BLD VENIPUNCTURE: CPT | Mod: PO | Performed by: FAMILY MEDICINE

## 2022-01-14 PROCEDURE — 80061 LIPID PANEL: CPT | Performed by: FAMILY MEDICINE

## 2022-02-03 ENCOUNTER — PES CALL (OUTPATIENT)
Dept: ADMINISTRATIVE | Facility: CLINIC | Age: 67
End: 2022-02-03
Payer: MEDICARE

## 2022-03-14 ENCOUNTER — LAB VISIT (OUTPATIENT)
Dept: LAB | Facility: HOSPITAL | Age: 67
End: 2022-03-14
Attending: INTERNAL MEDICINE
Payer: MEDICARE

## 2022-03-14 DIAGNOSIS — M06.4 UNDIFFERENTIATED INFLAMMATORY ARTHRITIS: ICD-10-CM

## 2022-03-14 LAB
ALBUMIN SERPL BCP-MCNC: 3.7 G/DL (ref 3.5–5.2)
ALP SERPL-CCNC: 89 U/L (ref 55–135)
ALT SERPL W/O P-5'-P-CCNC: 78 U/L (ref 10–44)
ANION GAP SERPL CALC-SCNC: 9 MMOL/L (ref 8–16)
AST SERPL-CCNC: 53 U/L (ref 10–40)
BASOPHILS # BLD AUTO: 0.04 K/UL (ref 0–0.2)
BASOPHILS NFR BLD: 0.7 % (ref 0–1.9)
BILIRUB SERPL-MCNC: 0.5 MG/DL (ref 0.1–1)
BUN SERPL-MCNC: 21 MG/DL (ref 8–23)
CALCIUM SERPL-MCNC: 10 MG/DL (ref 8.7–10.5)
CHLORIDE SERPL-SCNC: 103 MMOL/L (ref 95–110)
CO2 SERPL-SCNC: 26 MMOL/L (ref 23–29)
CREAT SERPL-MCNC: 0.8 MG/DL (ref 0.5–1.4)
DIFFERENTIAL METHOD: ABNORMAL
EOSINOPHIL # BLD AUTO: 0.3 K/UL (ref 0–0.5)
EOSINOPHIL NFR BLD: 4.7 % (ref 0–8)
ERYTHROCYTE [DISTWIDTH] IN BLOOD BY AUTOMATED COUNT: 13.2 % (ref 11.5–14.5)
EST. GFR  (AFRICAN AMERICAN): >60 ML/MIN/1.73 M^2
EST. GFR  (NON AFRICAN AMERICAN): >60 ML/MIN/1.73 M^2
GLUCOSE SERPL-MCNC: 111 MG/DL (ref 70–110)
HCT VFR BLD AUTO: 45 % (ref 40–54)
HGB BLD-MCNC: 15.7 G/DL (ref 14–18)
IMM GRANULOCYTES # BLD AUTO: 0.02 K/UL (ref 0–0.04)
IMM GRANULOCYTES NFR BLD AUTO: 0.4 % (ref 0–0.5)
LYMPHOCYTES # BLD AUTO: 2.1 K/UL (ref 1–4.8)
LYMPHOCYTES NFR BLD: 38.2 % (ref 18–48)
MCH RBC QN AUTO: 31.7 PG (ref 27–31)
MCHC RBC AUTO-ENTMCNC: 34.9 G/DL (ref 32–36)
MCV RBC AUTO: 91 FL (ref 82–98)
MONOCYTES # BLD AUTO: 0.6 K/UL (ref 0.3–1)
MONOCYTES NFR BLD: 10.3 % (ref 4–15)
NEUTROPHILS # BLD AUTO: 2.5 K/UL (ref 1.8–7.7)
NEUTROPHILS NFR BLD: 46.1 % (ref 38–73)
NRBC BLD-RTO: 0 /100 WBC
PLATELET # BLD AUTO: 136 K/UL (ref 150–450)
PMV BLD AUTO: 10.9 FL (ref 9.2–12.9)
POTASSIUM SERPL-SCNC: 4.5 MMOL/L (ref 3.5–5.1)
PROT SERPL-MCNC: 7.1 G/DL (ref 6–8.4)
RBC # BLD AUTO: 4.96 M/UL (ref 4.6–6.2)
SODIUM SERPL-SCNC: 138 MMOL/L (ref 136–145)
WBC # BLD AUTO: 5.52 K/UL (ref 3.9–12.7)

## 2022-03-14 PROCEDURE — 36415 COLL VENOUS BLD VENIPUNCTURE: CPT | Mod: PO | Performed by: INTERNAL MEDICINE

## 2022-03-14 PROCEDURE — 85025 COMPLETE CBC W/AUTO DIFF WBC: CPT | Mod: PO | Performed by: INTERNAL MEDICINE

## 2022-03-14 PROCEDURE — 80053 COMPREHEN METABOLIC PANEL: CPT | Performed by: INTERNAL MEDICINE

## 2022-03-21 ENCOUNTER — PATIENT MESSAGE (OUTPATIENT)
Dept: RHEUMATOLOGY | Facility: CLINIC | Age: 67
End: 2022-03-21
Payer: MEDICARE

## 2022-05-24 ENCOUNTER — PES CALL (OUTPATIENT)
Dept: ADMINISTRATIVE | Facility: CLINIC | Age: 67
End: 2022-05-24
Payer: MEDICARE

## 2022-06-07 ENCOUNTER — TELEPHONE (OUTPATIENT)
Dept: ADMINISTRATIVE | Facility: HOSPITAL | Age: 67
End: 2022-06-07
Payer: MEDICARE

## 2022-06-08 ENCOUNTER — PES CALL (OUTPATIENT)
Dept: ADMINISTRATIVE | Facility: CLINIC | Age: 67
End: 2022-06-08
Payer: MEDICARE

## 2022-06-10 ENCOUNTER — OFFICE VISIT (OUTPATIENT)
Dept: FAMILY MEDICINE | Facility: CLINIC | Age: 67
End: 2022-06-10
Payer: MEDICARE

## 2022-06-10 ENCOUNTER — LAB VISIT (OUTPATIENT)
Dept: LAB | Facility: HOSPITAL | Age: 67
End: 2022-06-10
Attending: INTERNAL MEDICINE
Payer: MEDICARE

## 2022-06-10 VITALS
SYSTOLIC BLOOD PRESSURE: 126 MMHG | HEIGHT: 68 IN | TEMPERATURE: 98 F | DIASTOLIC BLOOD PRESSURE: 89 MMHG | HEART RATE: 74 BPM | WEIGHT: 250.13 LBS | BODY MASS INDEX: 37.91 KG/M2

## 2022-06-10 DIAGNOSIS — Z86.16 HISTORY OF COVID-19: ICD-10-CM

## 2022-06-10 DIAGNOSIS — G57.12 MERALGIA PARESTHETICA OF LEFT SIDE: ICD-10-CM

## 2022-06-10 DIAGNOSIS — M06.4 UNDIFFERENTIATED INFLAMMATORY ARTHRITIS: ICD-10-CM

## 2022-06-10 DIAGNOSIS — M1A.0720 IDIOPATHIC CHRONIC GOUT OF LEFT FOOT WITHOUT TOPHUS: ICD-10-CM

## 2022-06-10 DIAGNOSIS — B17.8: ICD-10-CM

## 2022-06-10 DIAGNOSIS — K76.0 NAFLD (NONALCOHOLIC FATTY LIVER DISEASE): ICD-10-CM

## 2022-06-10 DIAGNOSIS — I10 ESSENTIAL HYPERTENSION: ICD-10-CM

## 2022-06-10 DIAGNOSIS — M25.572 CHRONIC PAIN OF LEFT ANKLE: ICD-10-CM

## 2022-06-10 DIAGNOSIS — G89.29 CHRONIC LEFT-SIDED LOW BACK PAIN WITH LEFT-SIDED SCIATICA: ICD-10-CM

## 2022-06-10 DIAGNOSIS — D69.6 THROMBOCYTOPENIA: ICD-10-CM

## 2022-06-10 DIAGNOSIS — Z00.00 ENCOUNTER FOR PREVENTIVE CARE: Primary | ICD-10-CM

## 2022-06-10 DIAGNOSIS — M19.072 OSTEOARTHRITIS OF LEFT ANKLE AND FOOT: ICD-10-CM

## 2022-06-10 DIAGNOSIS — Z85.828 HISTORY OF SKIN CANCER: ICD-10-CM

## 2022-06-10 DIAGNOSIS — G89.29 CHRONIC PAIN OF LEFT ANKLE: ICD-10-CM

## 2022-06-10 DIAGNOSIS — E66.01 SEVERE OBESITY WITH BODY MASS INDEX (BMI) OF 35.0 TO 39.9 WITH SERIOUS COMORBIDITY: ICD-10-CM

## 2022-06-10 DIAGNOSIS — M54.16 LUMBAR RADICULOPATHY: ICD-10-CM

## 2022-06-10 DIAGNOSIS — M54.42 CHRONIC LEFT-SIDED LOW BACK PAIN WITH LEFT-SIDED SCIATICA: ICD-10-CM

## 2022-06-10 DIAGNOSIS — H49.22 SIXTH NERVE PALSY OF LEFT EYE: ICD-10-CM

## 2022-06-10 LAB
ALBUMIN SERPL BCP-MCNC: 3.5 G/DL (ref 3.5–5.2)
ALP SERPL-CCNC: 94 U/L (ref 55–135)
ALT SERPL W/O P-5'-P-CCNC: 94 U/L (ref 10–44)
ANION GAP SERPL CALC-SCNC: 11 MMOL/L (ref 8–16)
AST SERPL-CCNC: 66 U/L (ref 10–40)
BASOPHILS NFR BLD: 2 % (ref 0–1.9)
BILIRUB SERPL-MCNC: 0.8 MG/DL (ref 0.1–1)
BUN SERPL-MCNC: 18 MG/DL (ref 8–23)
CALCIUM SERPL-MCNC: 9.3 MG/DL (ref 8.7–10.5)
CHLORIDE SERPL-SCNC: 106 MMOL/L (ref 95–110)
CO2 SERPL-SCNC: 21 MMOL/L (ref 23–29)
CREAT SERPL-MCNC: 0.9 MG/DL (ref 0.5–1.4)
DIFFERENTIAL METHOD: ABNORMAL
EOSINOPHIL NFR BLD: 7 % (ref 0–8)
ERYTHROCYTE [DISTWIDTH] IN BLOOD BY AUTOMATED COUNT: 12.9 % (ref 11.5–14.5)
EST. GFR  (AFRICAN AMERICAN): >60 ML/MIN/1.73 M^2
EST. GFR  (NON AFRICAN AMERICAN): >60 ML/MIN/1.73 M^2
GLUCOSE SERPL-MCNC: 125 MG/DL (ref 70–110)
HCT VFR BLD AUTO: 45.1 % (ref 40–54)
HGB BLD-MCNC: 15.7 G/DL (ref 14–18)
IMM GRANULOCYTES # BLD AUTO: ABNORMAL K/UL (ref 0–0.04)
IMM GRANULOCYTES NFR BLD AUTO: ABNORMAL % (ref 0–0.5)
LYMPHOCYTES NFR BLD: 29 % (ref 18–48)
MCH RBC QN AUTO: 31.5 PG (ref 27–31)
MCHC RBC AUTO-ENTMCNC: 34.8 G/DL (ref 32–36)
MCV RBC AUTO: 91 FL (ref 82–98)
MONOCYTES NFR BLD: 9 % (ref 4–15)
NEUTROPHILS NFR BLD: 53 % (ref 38–73)
NRBC BLD-RTO: 0 /100 WBC
PLATELET # BLD AUTO: 128 K/UL (ref 150–450)
PLATELET BLD QL SMEAR: ABNORMAL
PMV BLD AUTO: 11.4 FL (ref 9.2–12.9)
POTASSIUM SERPL-SCNC: 4.3 MMOL/L (ref 3.5–5.1)
PROT SERPL-MCNC: 6.5 G/DL (ref 6–8.4)
RBC # BLD AUTO: 4.98 M/UL (ref 4.6–6.2)
SODIUM SERPL-SCNC: 138 MMOL/L (ref 136–145)
WBC # BLD AUTO: 5.07 K/UL (ref 3.9–12.7)

## 2022-06-10 PROCEDURE — 3288F FALL RISK ASSESSMENT DOCD: CPT | Mod: CPTII,S$GLB,, | Performed by: NURSE PRACTITIONER

## 2022-06-10 PROCEDURE — 99999 PR PBB SHADOW E&M-EST. PATIENT-LVL IV: ICD-10-PCS | Mod: PBBFAC,,, | Performed by: NURSE PRACTITIONER

## 2022-06-10 PROCEDURE — 1101F PR PT FALLS ASSESS DOC 0-1 FALLS W/OUT INJ PAST YR: ICD-10-PCS | Mod: CPTII,S$GLB,, | Performed by: NURSE PRACTITIONER

## 2022-06-10 PROCEDURE — 1159F PR MEDICATION LIST DOCUMENTED IN MEDICAL RECORD: ICD-10-PCS | Mod: CPTII,S$GLB,, | Performed by: NURSE PRACTITIONER

## 2022-06-10 PROCEDURE — 1170F FXNL STATUS ASSESSED: CPT | Mod: CPTII,S$GLB,, | Performed by: NURSE PRACTITIONER

## 2022-06-10 PROCEDURE — G0439 PR MEDICARE ANNUAL WELLNESS SUBSEQUENT VISIT: ICD-10-PCS | Mod: S$GLB,,, | Performed by: NURSE PRACTITIONER

## 2022-06-10 PROCEDURE — 3008F BODY MASS INDEX DOCD: CPT | Mod: CPTII,S$GLB,, | Performed by: NURSE PRACTITIONER

## 2022-06-10 PROCEDURE — 3008F PR BODY MASS INDEX (BMI) DOCUMENTED: ICD-10-PCS | Mod: CPTII,S$GLB,, | Performed by: NURSE PRACTITIONER

## 2022-06-10 PROCEDURE — G0439 PPPS, SUBSEQ VISIT: HCPCS | Mod: S$GLB,,, | Performed by: NURSE PRACTITIONER

## 2022-06-10 PROCEDURE — 1125F PR PAIN SEVERITY QUANTIFIED, PAIN PRESENT: ICD-10-PCS | Mod: CPTII,S$GLB,, | Performed by: NURSE PRACTITIONER

## 2022-06-10 PROCEDURE — 3079F DIAST BP 80-89 MM HG: CPT | Mod: CPTII,S$GLB,, | Performed by: NURSE PRACTITIONER

## 2022-06-10 PROCEDURE — 99499 RISK ADDL DX/OHS AUDIT: ICD-10-PCS | Mod: S$GLB,,, | Performed by: NURSE PRACTITIONER

## 2022-06-10 PROCEDURE — 3074F PR MOST RECENT SYSTOLIC BLOOD PRESSURE < 130 MM HG: ICD-10-PCS | Mod: CPTII,S$GLB,, | Performed by: NURSE PRACTITIONER

## 2022-06-10 PROCEDURE — 3074F SYST BP LT 130 MM HG: CPT | Mod: CPTII,S$GLB,, | Performed by: NURSE PRACTITIONER

## 2022-06-10 PROCEDURE — 4010F PR ACE/ARB THEARPY RXD/TAKEN: ICD-10-PCS | Mod: CPTII,S$GLB,, | Performed by: NURSE PRACTITIONER

## 2022-06-10 PROCEDURE — 99999 PR PBB SHADOW E&M-EST. PATIENT-LVL IV: CPT | Mod: PBBFAC,,, | Performed by: NURSE PRACTITIONER

## 2022-06-10 PROCEDURE — 85027 COMPLETE CBC AUTOMATED: CPT | Mod: PO | Performed by: INTERNAL MEDICINE

## 2022-06-10 PROCEDURE — 1160F RVW MEDS BY RX/DR IN RCRD: CPT | Mod: CPTII,S$GLB,, | Performed by: NURSE PRACTITIONER

## 2022-06-10 PROCEDURE — 85007 BL SMEAR W/DIFF WBC COUNT: CPT | Mod: PO | Performed by: INTERNAL MEDICINE

## 2022-06-10 PROCEDURE — 3079F PR MOST RECENT DIASTOLIC BLOOD PRESSURE 80-89 MM HG: ICD-10-PCS | Mod: CPTII,S$GLB,, | Performed by: NURSE PRACTITIONER

## 2022-06-10 PROCEDURE — 4010F ACE/ARB THERAPY RXD/TAKEN: CPT | Mod: CPTII,S$GLB,, | Performed by: NURSE PRACTITIONER

## 2022-06-10 PROCEDURE — 1125F AMNT PAIN NOTED PAIN PRSNT: CPT | Mod: CPTII,S$GLB,, | Performed by: NURSE PRACTITIONER

## 2022-06-10 PROCEDURE — 1160F PR REVIEW ALL MEDS BY PRESCRIBER/CLIN PHARMACIST DOCUMENTED: ICD-10-PCS | Mod: CPTII,S$GLB,, | Performed by: NURSE PRACTITIONER

## 2022-06-10 PROCEDURE — 3288F PR FALLS RISK ASSESSMENT DOCUMENTED: ICD-10-PCS | Mod: CPTII,S$GLB,, | Performed by: NURSE PRACTITIONER

## 2022-06-10 PROCEDURE — 80053 COMPREHEN METABOLIC PANEL: CPT | Performed by: INTERNAL MEDICINE

## 2022-06-10 PROCEDURE — 36415 COLL VENOUS BLD VENIPUNCTURE: CPT | Mod: PO | Performed by: INTERNAL MEDICINE

## 2022-06-10 PROCEDURE — 1170F PR FUNCTIONAL STATUS ASSESSED: ICD-10-PCS | Mod: CPTII,S$GLB,, | Performed by: NURSE PRACTITIONER

## 2022-06-10 PROCEDURE — 1159F MED LIST DOCD IN RCRD: CPT | Mod: CPTII,S$GLB,, | Performed by: NURSE PRACTITIONER

## 2022-06-10 PROCEDURE — 1101F PT FALLS ASSESS-DOCD LE1/YR: CPT | Mod: CPTII,S$GLB,, | Performed by: NURSE PRACTITIONER

## 2022-06-10 PROCEDURE — 99499 UNLISTED E&M SERVICE: CPT | Mod: S$GLB,,, | Performed by: NURSE PRACTITIONER

## 2022-06-11 NOTE — PROGRESS NOTES
"  Jasson Mayo presented for a follow-up Medicare AWV today. The following components were reviewed and updated:    · Medical history  · Family History  · Social history  · Allergies and Current Medications  · Health Risk Assessment  · Health Maintenance  · Care Team    **See Completed Assessments for Annual Wellness visit with in the encounter summary    The following assessments were completed:  · Depression Screening  · Cognitive function Screening  · Timed Get Up Test  · Whisper Test  · PHQ-2  · Nutrition screen  · ADL  · PAQ  · Osteoporosis risk  · CAGE  · Living situation      Vitals:    06/10/22 1039   BP: 126/89   Pulse: 74   Temp: 97.8 °F (36.6 °C)   TempSrc: Oral   Weight: 113.5 kg (250 lb 1.8 oz)   Height: 5' 8" (1.727 m)     Body mass index is 38.03 kg/m².   ]        Diagnoses and health risks identified today and associated recommendations/orders:  I offered to discuss end of life issues, including information on how to make advance directives that the patient could use to name someone who would make medical decisions on their behalf if they became too ill to make themselves.  1. Encounter for preventive care  Stable  Up to date     2. Undifferentiated inflammatory arthritis  Stable  Sees rheumatology yearly  Continue current medications, plan of care  F/U with specialist as scheduled        3. Thrombocytopenia  Stable  Managed by PCP  Hematology recommended  Continue current medications, plan of care        4. Severe obesity with body mass index (BMI) of 35.0 to 39.9 with serious comorbidity  Unstable  Healthy weight loss recommended  Consider dietician     5. Essential hypertension  Stable  Managed by PCP  Low sodium diet recommended  Continue current medications, plan of care  F/U with PCP as scheduled     6. NAFLD (nonalcoholic fatty liver disease)  Stable  Managed by PCP  Last US abd 2017  Hepatology recommended  Avoid alcohol, tylenol  Low fat diet recommended  Continue current medications, plan " of care  F/U with PCP as scheduled     7. Non-A non-B hepatitis  Stable  Managed by PCP  Last US abd 2017  Hepatology recommended  Avoid alcohol, tylenol  Continue current medications, plan of care  F/U with PCP as scheduled        8. Lumbar radiculopathy  Stable  Managed by PCP  Consider back/spine specialist if symptomatic  Continue current medications, plan of care  F/U with PCP as scheduled     9. Meralgia paresthetica of left side  Stable  Managed by PCP  Consider back/spine specialist if symptomatic  Continue current medications, plan of care  F/U with PCP as scheduled        10. Chronic left-sided low back pain with left-sided sciatica  Stable  Managed by PCP  Consider back/spine specialist if symptomatic  Continue current medications, plan of care  F/U with PCP as scheduled     11. Chronic pain of left ankle  Stable  Managed by PCP  Consider orthopedics specialist if symptomatic  Continue current medications, plan of care  F/U with PCP as scheduled     12. Idiopathic chronic gout of left foot without tophus  Stable  Managed by PCP  Avoid gout inducing foods, beverages  Continue current medications, plan of care  F/U with PCP as scheduled     13. Osteoarthritis of left ankle and foot  Stable  Sees rheumatology yearly  Continue current medications, plan of care  F/U with specialist as scheduled        14. Sixth nerve palsy of left eye  Stable  Managed by optometry  Continue current medications, plan of care  F/U with specialist as scheduled      15. History of COVID-19  Stable  F/U with PCP as needed     16. History of skin cancer  Stable  Managed by dermatology  Continue current medications, plan of care  F/U with specialist as scheduled    ___Patient declined - already done.    _x__Patient is interested, I provided paperwork and offered to discuss  Wilda Jasson with a 5-10 year written screening schedule and personal prevention plan. Recommendations were developed using the USPSTF age appropriate  recommendations. Education, counseling, and referrals were provided as needed.  After Visit Summary printed and given to patient which includes a list of additional screenings\tests needed.    No follow-ups on file.      Senait Manley NP

## 2022-06-13 ENCOUNTER — OFFICE VISIT (OUTPATIENT)
Dept: RHEUMATOLOGY | Facility: CLINIC | Age: 67
End: 2022-06-13
Payer: MEDICARE

## 2022-06-13 VITALS
WEIGHT: 251.56 LBS | HEART RATE: 69 BPM | BODY MASS INDEX: 38.13 KG/M2 | DIASTOLIC BLOOD PRESSURE: 87 MMHG | RESPIRATION RATE: 18 BRPM | SYSTOLIC BLOOD PRESSURE: 135 MMHG | HEIGHT: 68 IN

## 2022-06-13 DIAGNOSIS — M1A.0720 IDIOPATHIC CHRONIC GOUT OF LEFT FOOT WITHOUT TOPHUS: Primary | ICD-10-CM

## 2022-06-13 DIAGNOSIS — M06.4 UNDIFFERENTIATED INFLAMMATORY ARTHRITIS: ICD-10-CM

## 2022-06-13 DIAGNOSIS — K76.0 NAFLD (NONALCOHOLIC FATTY LIVER DISEASE): ICD-10-CM

## 2022-06-13 DIAGNOSIS — M19.042 PRIMARY OSTEOARTHRITIS OF BOTH HANDS: ICD-10-CM

## 2022-06-13 DIAGNOSIS — M19.041 PRIMARY OSTEOARTHRITIS OF BOTH HANDS: ICD-10-CM

## 2022-06-13 PROCEDURE — 1125F PR PAIN SEVERITY QUANTIFIED, PAIN PRESENT: ICD-10-PCS | Mod: CPTII,S$GLB,, | Performed by: INTERNAL MEDICINE

## 2022-06-13 PROCEDURE — 1160F PR REVIEW ALL MEDS BY PRESCRIBER/CLIN PHARMACIST DOCUMENTED: ICD-10-PCS | Mod: CPTII,S$GLB,, | Performed by: INTERNAL MEDICINE

## 2022-06-13 PROCEDURE — 3008F BODY MASS INDEX DOCD: CPT | Mod: CPTII,S$GLB,, | Performed by: INTERNAL MEDICINE

## 2022-06-13 PROCEDURE — 1159F MED LIST DOCD IN RCRD: CPT | Mod: CPTII,S$GLB,, | Performed by: INTERNAL MEDICINE

## 2022-06-13 PROCEDURE — 1125F AMNT PAIN NOTED PAIN PRSNT: CPT | Mod: CPTII,S$GLB,, | Performed by: INTERNAL MEDICINE

## 2022-06-13 PROCEDURE — 1160F RVW MEDS BY RX/DR IN RCRD: CPT | Mod: CPTII,S$GLB,, | Performed by: INTERNAL MEDICINE

## 2022-06-13 PROCEDURE — 4010F PR ACE/ARB THEARPY RXD/TAKEN: ICD-10-PCS | Mod: CPTII,S$GLB,, | Performed by: INTERNAL MEDICINE

## 2022-06-13 PROCEDURE — 1101F PT FALLS ASSESS-DOCD LE1/YR: CPT | Mod: CPTII,S$GLB,, | Performed by: INTERNAL MEDICINE

## 2022-06-13 PROCEDURE — 99999 PR PBB SHADOW E&M-EST. PATIENT-LVL IV: CPT | Mod: PBBFAC,,, | Performed by: INTERNAL MEDICINE

## 2022-06-13 PROCEDURE — 99214 PR OFFICE/OUTPT VISIT, EST, LEVL IV, 30-39 MIN: ICD-10-PCS | Mod: 25,S$GLB,, | Performed by: INTERNAL MEDICINE

## 2022-06-13 PROCEDURE — 3288F FALL RISK ASSESSMENT DOCD: CPT | Mod: CPTII,S$GLB,, | Performed by: INTERNAL MEDICINE

## 2022-06-13 PROCEDURE — 4010F ACE/ARB THERAPY RXD/TAKEN: CPT | Mod: CPTII,S$GLB,, | Performed by: INTERNAL MEDICINE

## 2022-06-13 PROCEDURE — 99999 PR PBB SHADOW E&M-EST. PATIENT-LVL IV: ICD-10-PCS | Mod: PBBFAC,,, | Performed by: INTERNAL MEDICINE

## 2022-06-13 PROCEDURE — 1101F PR PT FALLS ASSESS DOC 0-1 FALLS W/OUT INJ PAST YR: ICD-10-PCS | Mod: CPTII,S$GLB,, | Performed by: INTERNAL MEDICINE

## 2022-06-13 PROCEDURE — 20600 SMALL JOINT ASPIRATION/INJECTION: R INDEX MCP, L INDEX MCP: ICD-10-PCS | Mod: F1,F6,S$GLB, | Performed by: INTERNAL MEDICINE

## 2022-06-13 PROCEDURE — 20600 DRAIN/INJ JOINT/BURSA W/O US: CPT | Mod: F1,F6,S$GLB, | Performed by: INTERNAL MEDICINE

## 2022-06-13 PROCEDURE — 99214 OFFICE O/P EST MOD 30 MIN: CPT | Mod: 25,S$GLB,, | Performed by: INTERNAL MEDICINE

## 2022-06-13 PROCEDURE — 3288F PR FALLS RISK ASSESSMENT DOCUMENTED: ICD-10-PCS | Mod: CPTII,S$GLB,, | Performed by: INTERNAL MEDICINE

## 2022-06-13 PROCEDURE — 3008F PR BODY MASS INDEX (BMI) DOCUMENTED: ICD-10-PCS | Mod: CPTII,S$GLB,, | Performed by: INTERNAL MEDICINE

## 2022-06-13 PROCEDURE — 1159F PR MEDICATION LIST DOCUMENTED IN MEDICAL RECORD: ICD-10-PCS | Mod: CPTII,S$GLB,, | Performed by: INTERNAL MEDICINE

## 2022-06-13 RX ORDER — TRIAMCINOLONE ACETONIDE 40 MG/ML
40 INJECTION, SUSPENSION INTRA-ARTICULAR; INTRAMUSCULAR
Status: DISCONTINUED | OUTPATIENT
Start: 2022-06-13 | End: 2022-06-13 | Stop reason: HOSPADM

## 2022-06-13 RX ORDER — ALLOPURINOL 100 MG/1
100 TABLET ORAL DAILY
Qty: 90 TABLET | Refills: 2 | Status: SHIPPED | OUTPATIENT
Start: 2022-06-13 | End: 2023-05-01

## 2022-06-13 RX ADMIN — TRIAMCINOLONE ACETONIDE 40 MG: 40 INJECTION, SUSPENSION INTRA-ARTICULAR; INTRAMUSCULAR at 09:06

## 2022-06-13 NOTE — PROGRESS NOTES
RHEUMATOLOGY CLINIC FOLLOW UP VISIT  Chief complaints, HPI, ROS, EXAM, Assessment & Plans:-  Jasson Nesbitt a 66 y.o. pleasant male comes in for follow up visit.  He comes in today with complaints of worsening pain of bilateral 2nd MCP joints associated with swelling and stiffness.  He has longstanding history of osteoarthritis involving MCP joints .  The pain is worsened by activity and does not resolve with resting.  Last corticosteroid injection was in December 6 months ago.  Pain relief lasted until this month.  Mild morning stiffness present in other large joints.  No flare-up of gout on allopurinol and colchicine-probenecid combination therapy Rheumatological review of system negative otherwise.  Physical examination shows active synovitis bilateral 2nd MCP joints.  Other joints were unremarkable.  1. Idiopathic chronic gout of left foot without tophus    2. Primary osteoarthritis of both hands    3. Undifferentiated inflammatory arthritis    4. NAFLD (nonalcoholic fatty liver disease)      Problem List Items Addressed This Visit     NAFLD (nonalcoholic fatty liver disease)    Idiopathic chronic gout of left foot without tophus - Primary    Primary osteoarthritis of both second MCP joints    Undifferentiated inflammatory arthritis           Worsening inflammatory arthritis involving bilateral 2nd MCP joints.  No hemochromatosis or CPPD deposition disease.  Chronic gout.  Low suspicion for rheumatoid arthritis.  Repeat corticosteroid injection of bilateral 2nd MCP joints. Failed SSZ.       Advised low carb diet    Small Joint Aspiration/Injection: R index MCP, L index MCP    Date/Time: 6/13/2022 9:45 AM  Performed by: Gume Curiel MD  Authorized by: Gume Curiel MD     Consent Done?:  Yes (Verbal)  Indications:  Joint swelling and pain  Site marked: the procedure site was marked    Timeout: prior to procedure the correct  patient, procedure, and site was verified    Prep: patient was prepped and draped in usual sterile fashion      Local anesthesia used?: No    Anesthesia:  Local infiltration  Local anesthetic:  Lidocaine 2% without epinephrine  Anesthetic total (ml):  0.5    Location:  Index finger  Site:  R index MCP and L index MCP  Ultrasonic guidance for needle placement?: No    Needle size:  27 G  Approach:  Radial  Medications:  40 mg triamcinolone acetonide 40 mg/mL  Patient tolerance:  Patient tolerated the procedure well with no immediate complications        # Follow up in about 6 months (around 12/13/2022).    Medication List with Changes/Refills   Current Medications    ALLOPURINOL (ZYLOPRIM) 100 MG TABLET    TAKE 1 TABLET BY MOUTH EVERY DAY    ASPIRIN (ECOTRIN) 81 MG EC TABLET    Take 81 mg by mouth once daily.    BENAZEPRIL (LOTENSIN) 20 MG TABLET    TAKE 1 TABLET BY MOUTH EVERY DAY    COLCHICINE-PROBENECID 0.5-500 MG (CO-BENEMID) 500-0.5 MG TAB    Take 1 tablet by mouth once daily.    VITAMIN E 1000 UNIT CAPSULE    Take 1,000 Units by mouth once daily.   Discontinued Medications    SULFASALAZINE (AZULFIDINE) 500 MG EC TABLET    TAKE 1 TABLET BY MOUTH 2 TIMES A DAY       Past Medical History:   Diagnosis Date    Decreased platelet count     Fatty liver     Gout     HTN (hypertension) 2000    Non-A non-B hepatitis 1990    Obesity (BMI 35.0-39.9 without comorbidity)     Prediabetes 2015       Past Surgical History:   Procedure Laterality Date    COLONOSCOPY W/ POLYPECTOMY  10, 14 no polyp    KNEE ARTHROSCOPY Right     Meniscus repair    LIVER BIOPSY  2010    VASECTOMY          Social History     Tobacco Use    Smoking status: Never Smoker    Smokeless tobacco: Former User   Substance Use Topics    Alcohol use: No       Family History   Problem Relation Age of Onset    COPD Mother     Heart attacks under age 50 Mother     Cancer Father         PROSTATE CA       Review of patient's allergies indicates:  No  Known Allergies    Disclaimer: This note was prepared using voice recognition system and is likely to have sound alike errors and is not proof read.  Please call me with any questions.

## 2022-06-29 ENCOUNTER — LAB VISIT (OUTPATIENT)
Dept: LAB | Facility: HOSPITAL | Age: 67
End: 2022-06-29
Attending: INTERNAL MEDICINE
Payer: MEDICARE

## 2022-06-29 ENCOUNTER — OFFICE VISIT (OUTPATIENT)
Dept: HEPATOLOGY | Facility: CLINIC | Age: 67
End: 2022-06-29
Payer: MEDICARE

## 2022-06-29 VITALS
WEIGHT: 242.31 LBS | SYSTOLIC BLOOD PRESSURE: 130 MMHG | DIASTOLIC BLOOD PRESSURE: 80 MMHG | HEART RATE: 61 BPM | HEIGHT: 68 IN | BODY MASS INDEX: 36.72 KG/M2

## 2022-06-29 DIAGNOSIS — K76.0 NAFLD (NONALCOHOLIC FATTY LIVER DISEASE): ICD-10-CM

## 2022-06-29 LAB
A1AT SERPL-MCNC: 144 MG/DL (ref 100–190)
CERULOPLASMIN SERPL-MCNC: 26 MG/DL (ref 15–45)
FERRITIN SERPL-MCNC: 919 NG/ML (ref 20–300)
IGA SERPL-MCNC: 271 MG/DL (ref 40–350)
IGG SERPL-MCNC: 1264 MG/DL (ref 650–1600)
IGM SERPL-MCNC: 141 MG/DL (ref 50–300)

## 2022-06-29 PROCEDURE — 86038 ANTINUCLEAR ANTIBODIES: CPT | Performed by: INTERNAL MEDICINE

## 2022-06-29 PROCEDURE — 80321 ALCOHOLS BIOMARKERS 1OR 2: CPT | Performed by: INTERNAL MEDICINE

## 2022-06-29 PROCEDURE — 1159F PR MEDICATION LIST DOCUMENTED IN MEDICAL RECORD: ICD-10-PCS | Mod: CPTII,S$GLB,, | Performed by: INTERNAL MEDICINE

## 2022-06-29 PROCEDURE — 99204 OFFICE O/P NEW MOD 45 MIN: CPT | Mod: S$GLB,,, | Performed by: INTERNAL MEDICINE

## 2022-06-29 PROCEDURE — 99204 PR OFFICE/OUTPT VISIT, NEW, LEVL IV, 45-59 MIN: ICD-10-PCS | Mod: S$GLB,,, | Performed by: INTERNAL MEDICINE

## 2022-06-29 PROCEDURE — 1159F MED LIST DOCD IN RCRD: CPT | Mod: CPTII,S$GLB,, | Performed by: INTERNAL MEDICINE

## 2022-06-29 PROCEDURE — 82784 ASSAY IGA/IGD/IGG/IGM EACH: CPT | Performed by: INTERNAL MEDICINE

## 2022-06-29 PROCEDURE — 4010F ACE/ARB THERAPY RXD/TAKEN: CPT | Mod: CPTII,S$GLB,, | Performed by: INTERNAL MEDICINE

## 2022-06-29 PROCEDURE — 3075F SYST BP GE 130 - 139MM HG: CPT | Mod: CPTII,S$GLB,, | Performed by: INTERNAL MEDICINE

## 2022-06-29 PROCEDURE — 3075F PR MOST RECENT SYSTOLIC BLOOD PRESS GE 130-139MM HG: ICD-10-PCS | Mod: CPTII,S$GLB,, | Performed by: INTERNAL MEDICINE

## 2022-06-29 PROCEDURE — 99999 PR PBB SHADOW E&M-EST. PATIENT-LVL IV: ICD-10-PCS | Mod: PBBFAC,,, | Performed by: INTERNAL MEDICINE

## 2022-06-29 PROCEDURE — 82728 ASSAY OF FERRITIN: CPT | Performed by: INTERNAL MEDICINE

## 2022-06-29 PROCEDURE — 1160F PR REVIEW ALL MEDS BY PRESCRIBER/CLIN PHARMACIST DOCUMENTED: ICD-10-PCS | Mod: CPTII,S$GLB,, | Performed by: INTERNAL MEDICINE

## 2022-06-29 PROCEDURE — 86256 FLUORESCENT ANTIBODY TITER: CPT | Performed by: INTERNAL MEDICINE

## 2022-06-29 PROCEDURE — 3079F DIAST BP 80-89 MM HG: CPT | Mod: CPTII,S$GLB,, | Performed by: INTERNAL MEDICINE

## 2022-06-29 PROCEDURE — 36415 COLL VENOUS BLD VENIPUNCTURE: CPT | Performed by: INTERNAL MEDICINE

## 2022-06-29 PROCEDURE — 99499 RISK ADDL DX/OHS AUDIT: ICD-10-PCS | Mod: S$GLB,,, | Performed by: INTERNAL MEDICINE

## 2022-06-29 PROCEDURE — 99999 PR PBB SHADOW E&M-EST. PATIENT-LVL IV: CPT | Mod: PBBFAC,,, | Performed by: INTERNAL MEDICINE

## 2022-06-29 PROCEDURE — 82390 ASSAY OF CERULOPLASMIN: CPT | Performed by: INTERNAL MEDICINE

## 2022-06-29 PROCEDURE — 4010F PR ACE/ARB THEARPY RXD/TAKEN: ICD-10-PCS | Mod: CPTII,S$GLB,, | Performed by: INTERNAL MEDICINE

## 2022-06-29 PROCEDURE — 86376 MICROSOMAL ANTIBODY EACH: CPT | Performed by: INTERNAL MEDICINE

## 2022-06-29 PROCEDURE — 3008F BODY MASS INDEX DOCD: CPT | Mod: CPTII,S$GLB,, | Performed by: INTERNAL MEDICINE

## 2022-06-29 PROCEDURE — 1160F RVW MEDS BY RX/DR IN RCRD: CPT | Mod: CPTII,S$GLB,, | Performed by: INTERNAL MEDICINE

## 2022-06-29 PROCEDURE — 86235 NUCLEAR ANTIGEN ANTIBODY: CPT | Mod: 59 | Performed by: INTERNAL MEDICINE

## 2022-06-29 PROCEDURE — 3079F PR MOST RECENT DIASTOLIC BLOOD PRESSURE 80-89 MM HG: ICD-10-PCS | Mod: CPTII,S$GLB,, | Performed by: INTERNAL MEDICINE

## 2022-06-29 PROCEDURE — 99499 UNLISTED E&M SERVICE: CPT | Mod: S$GLB,,, | Performed by: INTERNAL MEDICINE

## 2022-06-29 PROCEDURE — 82103 ALPHA-1-ANTITRYPSIN TOTAL: CPT | Performed by: INTERNAL MEDICINE

## 2022-06-29 PROCEDURE — 86039 ANTINUCLEAR ANTIBODIES (ANA): CPT | Performed by: INTERNAL MEDICINE

## 2022-06-29 PROCEDURE — 3008F PR BODY MASS INDEX (BMI) DOCUMENTED: ICD-10-PCS | Mod: CPTII,S$GLB,, | Performed by: INTERNAL MEDICINE

## 2022-06-29 PROCEDURE — 86235 NUCLEAR ANTIGEN ANTIBODY: CPT | Mod: 91 | Performed by: INTERNAL MEDICINE

## 2022-06-29 NOTE — PROGRESS NOTES
Subjective:     Jasson Mayo is here for evaluation of abnormal LFTs    History of Present Illness:  Jasson Mayo is a 66-year-old male with a known history of osteoarthritis, gout for which he takes combination of colchicine and probenecid, hypertension.  He is referred to liver Clinic to rule out liver pathology for his chronic thrombocytopenia.  He denies any liver related complaints.  Reports BMI>30 most of his life.     Duration of abnormality- 2014  Medications/OTC/Herbal-  Vitamin C and E  ETOH- NO  Metabolic issues-HTN  BMI- 36  Family Hx-no       Review of Systems   Constitutional: Negative for fatigue, fever and unexpected weight change.   Gastrointestinal: Negative for abdominal distention, abdominal pain, blood in stool, nausea and vomiting.   Musculoskeletal: Negative for arthralgias and gait problem.   Skin: Negative for pallor and rash.   Neurological: Negative for dizziness.   Hematological: Does not bruise/bleed easily.   Psychiatric/Behavioral: Negative for confusion, hallucinations and sleep disturbance.       Objective:     Physical Exam  Vitals and nursing note reviewed.   Constitutional:       Appearance: Normal appearance. He is obese.   Eyes:      General: No scleral icterus.  Cardiovascular:      Heart sounds: No murmur heard.  Pulmonary:      Effort: Pulmonary effort is normal.      Breath sounds: No wheezing, rhonchi or rales.   Abdominal:      General: Bowel sounds are normal. There is no distension.      Palpations: There is no mass.      Tenderness: There is no abdominal tenderness.      Comments: Truncal obesity present    Musculoskeletal:         General: No tenderness.      Right lower leg: No edema.      Left lower leg: No edema.   Lymphadenopathy:      Cervical: No cervical adenopathy.   Skin:     Coloration: Skin is not jaundiced.      Findings: No bruising or rash.   Neurological:      Mental Status: He is alert and oriented to person, place, and time.       Coordination: Coordination normal.   Psychiatric:         Behavior: Behavior normal.         Thought Content: Thought content normal.         Computed MELD-Na score unavailable. Necessary lab results were not found in the last year.  Computed MELD score unavailable. Necessary lab results were not found in the last year.    WBC   Date Value Ref Range Status   06/10/2022 5.07 3.90 - 12.70 K/uL Final     Hemoglobin   Date Value Ref Range Status   06/10/2022 15.7 14.0 - 18.0 g/dL Final     Hematocrit   Date Value Ref Range Status   06/10/2022 45.1 40.0 - 54.0 % Final     Platelets   Date Value Ref Range Status   06/10/2022 128 (L) 150 - 450 K/uL Final     BUN   Date Value Ref Range Status   06/10/2022 18 8 - 23 mg/dL Final     Creatinine   Date Value Ref Range Status   06/10/2022 0.9 0.5 - 1.4 mg/dL Final     Glucose   Date Value Ref Range Status   06/10/2022 125 (H) 70 - 110 mg/dL Final     Calcium   Date Value Ref Range Status   06/10/2022 9.3 8.7 - 10.5 mg/dL Final     Sodium   Date Value Ref Range Status   06/10/2022 138 136 - 145 mmol/L Final     Potassium   Date Value Ref Range Status   06/10/2022 4.3 3.5 - 5.1 mmol/L Final     Chloride   Date Value Ref Range Status   06/10/2022 106 95 - 110 mmol/L Final     AST   Date Value Ref Range Status   06/10/2022 66 (H) 10 - 40 U/L Final     ALT   Date Value Ref Range Status   06/10/2022 94 (H) 10 - 44 U/L Final     Alkaline Phosphatase   Date Value Ref Range Status   06/10/2022 94 55 - 135 U/L Final     Total Bilirubin   Date Value Ref Range Status   06/10/2022 0.8 0.1 - 1.0 mg/dL Final     Comment:     For infants and newborns, interpretation of results should be based  on gestational age, weight and in agreement with clinical  observations.    Premature Infant recommended reference ranges:  Up to 24 hours.............<8.0 mg/dL  Up to 48 hours............<12.0 mg/dL  3-5 days..................<15.0 mg/dL  6-29 days.................<15.0 mg/dL       Albumin   Date Value  Ref Range Status   06/10/2022 3.5 3.5 - 5.2 g/dL Final     No results found for: INR      Assessment/Plan:     1.Abnormal LFTs:  He appears to have ongoing chronic inflammation with elevated liver enzymes since 2014. He simultaneously also had thrombocytopenia since 2014. I suspect he has underlying nonalcoholic steatohepatitis, not sure what is the extent of fibrosis.  Will obtain a FibroScan to assess for advanced fibrosis, also will obtain an ultrasound to see whether he has any signs of portal hypertension, splenomegaly.    Will initiate work up to rule out infectious/autoimmune/genetic disorders of the liver.    Probenecid is known to cause thrombocytopenia, if no liver etiology is found for thrombocytopenia, would recommend a referral to Hematology.    2.Obesity- Class-2:  Discussed dietary recommendations- Low calorie, low carbohydrate, high protein diet with goal of loosing >3-5% to improve steatosis and >7-10% to improve histological changes if present    I have reviewed existing labs, imaging, procedures. Educated patient about disease process and differential. Ordered required labs, images and discussed treatment plan.     Susanne Alexis MD  Transplant Hepatologist  Dept of Hepatology, [place] [facility]

## 2022-06-30 LAB
ANA PATTERN 1: NORMAL
ANA SER QL IF: POSITIVE
ANA TITR SER IF: NORMAL {TITER}
MITOCHONDRIA AB TITR SER IF: NORMAL {TITER}
SMOOTH MUSCLE AB TITR SER IF: NORMAL {TITER}

## 2022-07-01 ENCOUNTER — PATIENT OUTREACH (OUTPATIENT)
Dept: ADMINISTRATIVE | Facility: HOSPITAL | Age: 67
End: 2022-07-01
Payer: MEDICARE

## 2022-07-01 LAB
ANTI SM ANTIBODY: 0.15 RATIO (ref 0–0.99)
ANTI SM/RNP ANTIBODY: 0.19 RATIO (ref 0–0.99)
ANTI-SM INTERPRETATION: NEGATIVE
ANTI-SM/RNP INTERPRETATION: NEGATIVE
ANTI-SSA ANTIBODY: 0.1 RATIO (ref 0–0.99)
ANTI-SSA INTERPRETATION: NEGATIVE
ANTI-SSB ANTIBODY: 0.07 RATIO (ref 0–0.99)
ANTI-SSB INTERPRETATION: NEGATIVE
DSDNA AB SER-ACNC: NORMAL [IU]/ML

## 2022-07-01 NOTE — PROGRESS NOTES
Working PHN Statin.     Pt not currently on a statin and does not have an allergy or intolerance noted.  Pt does has hx of elevated LFT-on problem list.  Please review and consider, if pt not a candidate, please document this.    Pt last visit July 2021.  Offered to schedule annual exam. He requests to call back as he is currently at another appt with his wife.

## 2022-07-03 DIAGNOSIS — K76.0 NAFLD (NONALCOHOLIC FATTY LIVER DISEASE): Primary | ICD-10-CM

## 2022-07-03 LAB
PETH 16:0/18.1 (POPETH): <10 NG/ML
PETH 16:0/18.2 (PLPETH): <10 NG/ML

## 2022-07-05 ENCOUNTER — PATIENT OUTREACH (OUTPATIENT)
Dept: ADMINISTRATIVE | Facility: HOSPITAL | Age: 67
End: 2022-07-05
Payer: MEDICARE

## 2022-07-05 LAB — LKM AB SER-ACNC: 2.7 UNITS

## 2022-07-05 NOTE — PROGRESS NOTES
Working PHN Med [facility]her[facility]-ACE/ARB.     Per med card pt is on benazepril-hctz.  Snap shot-med dispense history indicates pt last filled 90 day supply, 5/30/22.

## 2022-07-07 ENCOUNTER — OFFICE VISIT (OUTPATIENT)
Dept: INTERNAL MEDICINE | Facility: CLINIC | Age: 67
End: 2022-07-07
Payer: MEDICARE

## 2022-07-07 ENCOUNTER — LAB VISIT (OUTPATIENT)
Dept: LAB | Facility: HOSPITAL | Age: 67
End: 2022-07-07
Attending: FAMILY MEDICINE
Payer: MEDICARE

## 2022-07-07 VITALS
HEIGHT: 68 IN | TEMPERATURE: 99 F | HEART RATE: 89 BPM | DIASTOLIC BLOOD PRESSURE: 80 MMHG | SYSTOLIC BLOOD PRESSURE: 124 MMHG | OXYGEN SATURATION: 97 % | WEIGHT: 239.19 LBS | BODY MASS INDEX: 36.25 KG/M2

## 2022-07-07 DIAGNOSIS — Z12.5 SCREENING FOR PROSTATE CANCER: ICD-10-CM

## 2022-07-07 DIAGNOSIS — I10 ESSENTIAL HYPERTENSION: ICD-10-CM

## 2022-07-07 DIAGNOSIS — M1A.0720 IDIOPATHIC CHRONIC GOUT OF LEFT FOOT WITHOUT TOPHUS: ICD-10-CM

## 2022-07-07 DIAGNOSIS — K76.0 NAFLD (NONALCOHOLIC FATTY LIVER DISEASE): ICD-10-CM

## 2022-07-07 DIAGNOSIS — M19.072 PRIMARY OSTEOARTHRITIS OF LEFT ANKLE: ICD-10-CM

## 2022-07-07 DIAGNOSIS — D69.6 THROMBOCYTOPENIA: ICD-10-CM

## 2022-07-07 DIAGNOSIS — E78.5 HYPERLIPIDEMIA, UNSPECIFIED HYPERLIPIDEMIA TYPE: ICD-10-CM

## 2022-07-07 DIAGNOSIS — Z00.00 ANNUAL PHYSICAL EXAM: Primary | ICD-10-CM

## 2022-07-07 LAB
CHOLEST SERPL-MCNC: 161 MG/DL (ref 120–199)
CHOLEST/HDLC SERPL: 4.9 {RATIO} (ref 2–5)
COMPLEXED PSA SERPL-MCNC: 0.3 NG/ML (ref 0–4)
HDLC SERPL-MCNC: 33 MG/DL (ref 40–75)
HDLC SERPL: 20.5 % (ref 20–50)
LDLC SERPL CALC-MCNC: 94 MG/DL (ref 63–159)
NONHDLC SERPL-MCNC: 128 MG/DL
TRIGL SERPL-MCNC: 170 MG/DL (ref 30–150)

## 2022-07-07 PROCEDURE — 99214 OFFICE O/P EST MOD 30 MIN: CPT | Mod: S$GLB,,, | Performed by: FAMILY MEDICINE

## 2022-07-07 PROCEDURE — 1101F PT FALLS ASSESS-DOCD LE1/YR: CPT | Mod: CPTII,S$GLB,, | Performed by: FAMILY MEDICINE

## 2022-07-07 PROCEDURE — 4010F PR ACE/ARB THEARPY RXD/TAKEN: ICD-10-PCS | Mod: CPTII,S$GLB,, | Performed by: FAMILY MEDICINE

## 2022-07-07 PROCEDURE — 1159F MED LIST DOCD IN RCRD: CPT | Mod: CPTII,S$GLB,, | Performed by: FAMILY MEDICINE

## 2022-07-07 PROCEDURE — 84153 ASSAY OF PSA TOTAL: CPT | Performed by: FAMILY MEDICINE

## 2022-07-07 PROCEDURE — 36415 COLL VENOUS BLD VENIPUNCTURE: CPT | Performed by: FAMILY MEDICINE

## 2022-07-07 PROCEDURE — 80061 LIPID PANEL: CPT | Performed by: FAMILY MEDICINE

## 2022-07-07 PROCEDURE — 3079F PR MOST RECENT DIASTOLIC BLOOD PRESSURE 80-89 MM HG: ICD-10-PCS | Mod: CPTII,S$GLB,, | Performed by: FAMILY MEDICINE

## 2022-07-07 PROCEDURE — 3074F PR MOST RECENT SYSTOLIC BLOOD PRESSURE < 130 MM HG: ICD-10-PCS | Mod: CPTII,S$GLB,, | Performed by: FAMILY MEDICINE

## 2022-07-07 PROCEDURE — 3288F PR FALLS RISK ASSESSMENT DOCUMENTED: ICD-10-PCS | Mod: CPTII,S$GLB,, | Performed by: FAMILY MEDICINE

## 2022-07-07 PROCEDURE — 1126F PR PAIN SEVERITY QUANTIFIED, NO PAIN PRESENT: ICD-10-PCS | Mod: CPTII,S$GLB,, | Performed by: FAMILY MEDICINE

## 2022-07-07 PROCEDURE — 99999 PR PBB SHADOW E&M-EST. PATIENT-LVL III: ICD-10-PCS | Mod: PBBFAC,,, | Performed by: FAMILY MEDICINE

## 2022-07-07 PROCEDURE — 3288F FALL RISK ASSESSMENT DOCD: CPT | Mod: CPTII,S$GLB,, | Performed by: FAMILY MEDICINE

## 2022-07-07 PROCEDURE — 3079F DIAST BP 80-89 MM HG: CPT | Mod: CPTII,S$GLB,, | Performed by: FAMILY MEDICINE

## 2022-07-07 PROCEDURE — 99214 PR OFFICE/OUTPT VISIT, EST, LEVL IV, 30-39 MIN: ICD-10-PCS | Mod: S$GLB,,, | Performed by: FAMILY MEDICINE

## 2022-07-07 PROCEDURE — 3008F PR BODY MASS INDEX (BMI) DOCUMENTED: ICD-10-PCS | Mod: CPTII,S$GLB,, | Performed by: FAMILY MEDICINE

## 2022-07-07 PROCEDURE — 1101F PR PT FALLS ASSESS DOC 0-1 FALLS W/OUT INJ PAST YR: ICD-10-PCS | Mod: CPTII,S$GLB,, | Performed by: FAMILY MEDICINE

## 2022-07-07 PROCEDURE — 3008F BODY MASS INDEX DOCD: CPT | Mod: CPTII,S$GLB,, | Performed by: FAMILY MEDICINE

## 2022-07-07 PROCEDURE — 1126F AMNT PAIN NOTED NONE PRSNT: CPT | Mod: CPTII,S$GLB,, | Performed by: FAMILY MEDICINE

## 2022-07-07 PROCEDURE — 3074F SYST BP LT 130 MM HG: CPT | Mod: CPTII,S$GLB,, | Performed by: FAMILY MEDICINE

## 2022-07-07 PROCEDURE — 1159F PR MEDICATION LIST DOCUMENTED IN MEDICAL RECORD: ICD-10-PCS | Mod: CPTII,S$GLB,, | Performed by: FAMILY MEDICINE

## 2022-07-07 PROCEDURE — 4010F ACE/ARB THERAPY RXD/TAKEN: CPT | Mod: CPTII,S$GLB,, | Performed by: FAMILY MEDICINE

## 2022-07-07 PROCEDURE — 99999 PR PBB SHADOW E&M-EST. PATIENT-LVL III: CPT | Mod: PBBFAC,,, | Performed by: FAMILY MEDICINE

## 2022-07-07 RX ORDER — BENAZEPRIL HYDROCHLORIDE 20 MG/1
20 TABLET ORAL DAILY
Qty: 90 TABLET | Refills: 3 | Status: SHIPPED | OUTPATIENT
Start: 2022-07-07 | End: 2023-07-03 | Stop reason: SDUPTHER

## 2022-07-07 NOTE — PROGRESS NOTES
Subjective:       Patient ID: Jasson Mayo is a 66 y.o. male.    Chief Complaint: Annual Exam    Annual exam.  He is followed by Gastroenterology from  non alcoholic fatty liver disease and rheumatology for osteoarthritis and gout.  Medical history includes additionally hypertension thrombocytopenia.  Has a history of colon polyps.  He is due for 5 year repeat colonoscopy later this year.  He denies headache chest pain palpitations shortness of breath or edema.  Denies any urinary obstructive flow symptoms.    Review of Systems   Constitutional: Negative for activity change, chills, fatigue, fever and unexpected weight change.   HENT: Negative for hearing loss, rhinorrhea and trouble swallowing.    Eyes: Negative for discharge and visual disturbance.   Respiratory: Negative for cough, chest tightness, shortness of breath and wheezing.    Cardiovascular: Negative for chest pain, palpitations and leg swelling.   Gastrointestinal: Negative for abdominal distention, abdominal pain, blood in stool, constipation, diarrhea and vomiting.   Endocrine: Negative for polydipsia and polyuria.   Genitourinary: Negative for difficulty urinating, dysuria, frequency, hematuria and urgency.   Musculoskeletal: Positive for arthralgias. Negative for joint swelling and neck pain.   Neurological: Negative for dizziness, weakness, light-headedness and headaches.   Psychiatric/Behavioral: Negative for confusion and dysphoric mood.       Objective:      Physical Exam  Constitutional:       General: He is not in acute distress.     Appearance: He is not ill-appearing or diaphoretic.   HENT:      Right Ear: Tympanic membrane and ear canal normal.      Left Ear: Tympanic membrane and ear canal normal.      Nose: Nose normal.   Eyes:      Conjunctiva/sclera: Conjunctivae normal.   Neck:      Comments: Normal thyroid  Cardiovascular:      Rate and Rhythm: Normal rate and regular rhythm.      Heart sounds: No murmur heard.    No gallop.  swelling / redness to left arm since Saturday, believes he got bitten by "something on Saturday . denies pain   Pulmonary:      Effort: Pulmonary effort is normal. No respiratory distress.      Breath sounds: No wheezing, rhonchi or rales.   Abdominal:      General: There is no distension.      Palpations: Abdomen is soft. There is no mass.      Tenderness: There is no abdominal tenderness.   Lymphadenopathy:      Cervical: No cervical adenopathy.   Skin:     General: Skin is warm and dry.      Coloration: Skin is not pale.   Neurological:      Mental Status: He is alert and oriented to person, place, and time.   Psychiatric:         Mood and Affect: Mood normal.         Behavior: Behavior normal.         Thought Content: Thought content normal.         Judgment: Judgment normal.         Lab Visit on 06/29/2022   Component Date Value Ref Range Status    Smooth Muscle Ab 06/29/2022 Negative 1:40  Negative Final    IgG 06/29/2022 1264  650 - 1600 mg/dL Final    IgA 06/29/2022 271  40 - 350 mg/dL Final    IgM 06/29/2022 141  50 - 300 mg/dL Final    Anti-Liver/Kidney Microsome Ab 06/29/2022 2.7  <=20 UNITS Final    Ceruloplasmin 06/29/2022 26.0  15.0 - 45.0 mg/dL Final    Ferritin 06/29/2022 919 (A) 20.0 - 300.0 ng/mL Final    MERE Screen 06/29/2022 Positive (A) Negative <1:80 Final    Anti-Mitochon Ab IFA 06/29/2022 Negative 1:40  Negative Final    A-1 Antitrypsin 06/29/2022 144  100 - 190 mg/dL Final    PEth 16:0/18.1 (POPEth) 06/29/2022 <10  ng/mL Final    PEth 16:0/18.2 (PLPEth) 06/29/2022 <10  ng/mL Final    MERE PATTERN 1 06/29/2022 Homogeneous   Final    Anti Sm Antibody 06/29/2022 0.15  0.00 - 0.99 Ratio Final    Anti-Sm Interpretation 06/29/2022 Negative  Negative Final    Anti-SSA Antibody 06/29/2022 0.10  0.00 - 0.99 Ratio Final    Anti-SSA Interpretation 06/29/2022 Negative  Negative Final    Anti-SSB Antibody 06/29/2022 0.07  0.00 - 0.99 Ratio Final    Anti-SSB Interpretation 06/29/2022 Negative  Negative Final    ds DNA Ab 06/29/2022 Negative 1:10  Negative 1:10 Final    Anti Sm/RNP Antibody  06/29/2022 0.19  0.00 - 0.99 Ratio Final    Anti-Sm/RNP Interpretation 06/29/2022 Negative  Negative Final    MERE Titer 1 06/29/2022 1:160   Final     Assessment:       1. Annual physical exam    2. Essential hypertension    3. Screening for prostate cancer    4. Hyperlipidemia, unspecified hyperlipidemia type    5. NAFLD (nonalcoholic fatty liver disease)    6. Primary osteoarthritis of left ankle    7. Thrombocytopenia    8. Idiopathic chronic gout of left foot without tophus    9. BMI 36.0-36.9,adult        Plan:   Blood pressure controlled up-to-date lab reviewed.  Additionally lipid profile urinalysis PSA ordered.  Refill benazepril.  Do not see any indication for continue with Ecotrin.  He also had thrombocytopenia.  Discontinue Ecotrin.  In 1 year.  BMI 36.37.  He is currently on a weight reducing diet.      Annual physical exam    Essential hypertension  -     Lipid Panel; Future; Expected date: 07/07/2022  -     Urinalysis; Future; Expected date: 07/07/2022  -     benazepriL (LOTENSIN) 20 MG tablet; Take 1 tablet (20 mg total) by mouth once daily.  Dispense: 90 tablet; Refill: 3    Screening for prostate cancer  -     PSA, Screening; Future; Expected date: 07/07/2022    Hyperlipidemia, unspecified hyperlipidemia type    NAFLD (nonalcoholic fatty liver disease)    Primary osteoarthritis of left ankle    Thrombocytopenia    Idiopathic chronic gout of left foot without tophus    BMI 36.0-36.9,adult

## 2022-07-13 ENCOUNTER — PROCEDURE VISIT (OUTPATIENT)
Dept: HEPATOLOGY | Facility: CLINIC | Age: 67
End: 2022-07-13
Attending: INTERNAL MEDICINE
Payer: MEDICARE

## 2022-07-13 ENCOUNTER — HOSPITAL ENCOUNTER (OUTPATIENT)
Dept: RADIOLOGY | Facility: HOSPITAL | Age: 67
Discharge: HOME | End: 2022-07-13
Attending: INTERNAL MEDICINE
Payer: MEDICARE

## 2022-07-13 VITALS
WEIGHT: 239 LBS | HEART RATE: 90 BPM | HEIGHT: 68 IN | DIASTOLIC BLOOD PRESSURE: 82 MMHG | BODY MASS INDEX: 36.22 KG/M2 | SYSTOLIC BLOOD PRESSURE: 126 MMHG

## 2022-07-13 DIAGNOSIS — K76.0 NAFLD (NONALCOHOLIC FATTY LIVER DISEASE): ICD-10-CM

## 2022-07-13 PROCEDURE — 91200 LIVER ELASTOGRAPHY: CPT | Mod: S$GLB,,, | Performed by: NURSE PRACTITIONER

## 2022-07-13 PROCEDURE — 91200 PR LIVER ELASTOGRAPHY W/OUT IMAG W/INTERP & REPORT: ICD-10-PCS | Mod: S$GLB,,, | Performed by: NURSE PRACTITIONER

## 2022-07-13 PROCEDURE — 76705 ECHO EXAM OF ABDOMEN: CPT | Mod: 26,,, | Performed by: RADIOLOGY

## 2022-07-13 PROCEDURE — 76705 ECHO EXAM OF ABDOMEN: CPT | Mod: TC

## 2022-07-13 PROCEDURE — 76705 US ABDOMEN LIMITED: ICD-10-PCS | Mod: 26,,, | Performed by: RADIOLOGY

## 2022-07-13 NOTE — PROCEDURES
Fibroscan Procedure     Name: Jasson Mayo  Date of Procedure : 2022   :: BRENDA Iyer  Diagnosis: NAFLD    Probe: XL    Fibroscan readin.7 KPa    Fibrosis:F 0-1     CAP readin dB/m    Steatosis: :S3       Interpretation:   No fibrosis with severe steatosis

## 2022-07-13 NOTE — PROGRESS NOTES
Patient presented in clinic today for fibroscan examination of their liver. Patient verbalized that they have fasted 4 hours prior to their examination. Patient denies having any active implantable metal devices; such as a pacemaker, defibrillator, or pump.     ELISA LucioN, RN  Nurse Navigator, Hepatology  [facility] [place] [facility]

## 2022-07-15 ENCOUNTER — TELEPHONE (OUTPATIENT)
Dept: HEPATOLOGY | Facility: CLINIC | Age: 67
End: 2022-07-15
Payer: MEDICARE

## 2022-07-15 ENCOUNTER — PATIENT MESSAGE (OUTPATIENT)
Dept: HEPATOLOGY | Facility: CLINIC | Age: 67
End: 2022-07-15
Payer: MEDICARE

## 2022-07-15 NOTE — TELEPHONE ENCOUNTER
----- Message from Susanne Alexis MD sent at 7/14/2022  7:08 AM CDT -----    ----- Message -----  From: BRENDA Avalos  Sent: 7/13/2022  10:33 AM CDT  To: Susanne Alexis MD    No fibrosis with severe steatosis   F0, S3

## 2022-07-18 ENCOUNTER — OFFICE VISIT (OUTPATIENT)
Dept: OTOLARYNGOLOGY | Facility: CLINIC | Age: 67
End: 2022-07-18
Payer: MEDICARE

## 2022-07-18 VITALS — WEIGHT: 240.5 LBS | TEMPERATURE: 97 F | BODY MASS INDEX: 36.57 KG/M2

## 2022-07-18 DIAGNOSIS — H93.92 LESION OF LEFT EAR: Primary | ICD-10-CM

## 2022-07-18 PROCEDURE — 1126F AMNT PAIN NOTED NONE PRSNT: CPT | Mod: CPTII,S$GLB,, | Performed by: STUDENT IN AN ORGANIZED HEALTH CARE EDUCATION/TRAINING PROGRAM

## 2022-07-18 PROCEDURE — 13151 CMPLX RPR E/N/E/L 1.1-2.5 CM: CPT | Mod: S$GLB,,, | Performed by: STUDENT IN AN ORGANIZED HEALTH CARE EDUCATION/TRAINING PROGRAM

## 2022-07-18 PROCEDURE — 3008F BODY MASS INDEX DOCD: CPT | Mod: CPTII,S$GLB,, | Performed by: STUDENT IN AN ORGANIZED HEALTH CARE EDUCATION/TRAINING PROGRAM

## 2022-07-18 PROCEDURE — 3288F PR FALLS RISK ASSESSMENT DOCUMENTED: ICD-10-PCS | Mod: CPTII,S$GLB,, | Performed by: STUDENT IN AN ORGANIZED HEALTH CARE EDUCATION/TRAINING PROGRAM

## 2022-07-18 PROCEDURE — 1101F PR PT FALLS ASSESS DOC 0-1 FALLS W/OUT INJ PAST YR: ICD-10-PCS | Mod: CPTII,S$GLB,, | Performed by: STUDENT IN AN ORGANIZED HEALTH CARE EDUCATION/TRAINING PROGRAM

## 2022-07-18 PROCEDURE — 88305 TISSUE EXAM BY PATHOLOGIST: ICD-10-PCS | Mod: 26,,, | Performed by: PATHOLOGY

## 2022-07-18 PROCEDURE — 1101F PT FALLS ASSESS-DOCD LE1/YR: CPT | Mod: CPTII,S$GLB,, | Performed by: STUDENT IN AN ORGANIZED HEALTH CARE EDUCATION/TRAINING PROGRAM

## 2022-07-18 PROCEDURE — 11442 PR EXC SKIN BENIG 1.1-2 CM FACE,FACIAL: ICD-10-PCS | Mod: 51,S$GLB,, | Performed by: STUDENT IN AN ORGANIZED HEALTH CARE EDUCATION/TRAINING PROGRAM

## 2022-07-18 PROCEDURE — 99203 OFFICE O/P NEW LOW 30 MIN: CPT | Mod: 25,S$GLB,, | Performed by: STUDENT IN AN ORGANIZED HEALTH CARE EDUCATION/TRAINING PROGRAM

## 2022-07-18 PROCEDURE — 4010F ACE/ARB THERAPY RXD/TAKEN: CPT | Mod: CPTII,S$GLB,, | Performed by: STUDENT IN AN ORGANIZED HEALTH CARE EDUCATION/TRAINING PROGRAM

## 2022-07-18 PROCEDURE — 1126F PR PAIN SEVERITY QUANTIFIED, NO PAIN PRESENT: ICD-10-PCS | Mod: CPTII,S$GLB,, | Performed by: STUDENT IN AN ORGANIZED HEALTH CARE EDUCATION/TRAINING PROGRAM

## 2022-07-18 PROCEDURE — 1159F MED LIST DOCD IN RCRD: CPT | Mod: CPTII,S$GLB,, | Performed by: STUDENT IN AN ORGANIZED HEALTH CARE EDUCATION/TRAINING PROGRAM

## 2022-07-18 PROCEDURE — 88305 TISSUE EXAM BY PATHOLOGIST: CPT | Mod: 26,,, | Performed by: PATHOLOGY

## 2022-07-18 PROCEDURE — 99999 PR PBB SHADOW E&M-EST. PATIENT-LVL II: CPT | Mod: PBBFAC,,, | Performed by: STUDENT IN AN ORGANIZED HEALTH CARE EDUCATION/TRAINING PROGRAM

## 2022-07-18 PROCEDURE — 3008F PR BODY MASS INDEX (BMI) DOCUMENTED: ICD-10-PCS | Mod: CPTII,S$GLB,, | Performed by: STUDENT IN AN ORGANIZED HEALTH CARE EDUCATION/TRAINING PROGRAM

## 2022-07-18 PROCEDURE — 13151 PR RECMPL WND LID,NOS,EAR 1.1-2.5 CM: ICD-10-PCS | Mod: S$GLB,,, | Performed by: STUDENT IN AN ORGANIZED HEALTH CARE EDUCATION/TRAINING PROGRAM

## 2022-07-18 PROCEDURE — 88305 TISSUE EXAM BY PATHOLOGIST: CPT | Performed by: PATHOLOGY

## 2022-07-18 PROCEDURE — 4010F PR ACE/ARB THEARPY RXD/TAKEN: ICD-10-PCS | Mod: CPTII,S$GLB,, | Performed by: STUDENT IN AN ORGANIZED HEALTH CARE EDUCATION/TRAINING PROGRAM

## 2022-07-18 PROCEDURE — 99203 PR OFFICE/OUTPT VISIT, NEW, LEVL III, 30-44 MIN: ICD-10-PCS | Mod: 25,S$GLB,, | Performed by: STUDENT IN AN ORGANIZED HEALTH CARE EDUCATION/TRAINING PROGRAM

## 2022-07-18 PROCEDURE — 11442 EXC FACE-MM B9+MARG 1.1-2 CM: CPT | Mod: 51,S$GLB,, | Performed by: STUDENT IN AN ORGANIZED HEALTH CARE EDUCATION/TRAINING PROGRAM

## 2022-07-18 PROCEDURE — 3288F FALL RISK ASSESSMENT DOCD: CPT | Mod: CPTII,S$GLB,, | Performed by: STUDENT IN AN ORGANIZED HEALTH CARE EDUCATION/TRAINING PROGRAM

## 2022-07-18 PROCEDURE — 99999 PR PBB SHADOW E&M-EST. PATIENT-LVL II: ICD-10-PCS | Mod: PBBFAC,,, | Performed by: STUDENT IN AN ORGANIZED HEALTH CARE EDUCATION/TRAINING PROGRAM

## 2022-07-18 PROCEDURE — 1159F PR MEDICATION LIST DOCUMENTED IN MEDICAL RECORD: ICD-10-PCS | Mod: CPTII,S$GLB,, | Performed by: STUDENT IN AN ORGANIZED HEALTH CARE EDUCATION/TRAINING PROGRAM

## 2022-07-18 RX ORDER — BACITRACIN 500 [USP'U]/G
OINTMENT TOPICAL 2 TIMES DAILY
Qty: 28 G | Refills: 0 | Status: SHIPPED | OUTPATIENT
Start: 2022-07-18 | End: 2022-07-25

## 2022-07-18 NOTE — PROGRESS NOTES
Chief complaint:    Chief Complaint   Patient presents with    Consult     Growth outer portion of left ear - just noticed last night           Referring Provider:  Aaareferral Self  No address on file      History of present illness:   Mr. Mayo is a 66 y.o. presenting for evaluation of left ear lesion.     He  has been referred by Dr. Peña.      He noticed it while shaving last night. Denies pain, bleeding, numbness, scaling, itching. No drainage.     No hearing loss or ear fullness.    No facial weakness or neck masses.     No history of face/scalp/neck cutaneous cancers.      However has had lots of lesions treated with cryotherapy - no cancer though.    Workup thus far has included: none.      History      Past Medical History:   Past Medical History:   Diagnosis Date    Decreased platelet count     Fatty liver     Gout     HTN (hypertension) 2000    Non-A non-B hepatitis 1990    Obesity (BMI 35.0-39.9 without comorbidity)     Prediabetes 2015         Past Surgical History:  Past Surgical History:   Procedure Laterality Date    COLONOSCOPY W/ POLYPECTOMY  10, 14 no polyp    KNEE ARTHROSCOPY Right     Meniscus repair    LIVER BIOPSY  2010    VASECTOMY           Medications: Medication list is documented in [facility] and was reviewed. He  has a current medication list which includes the following prescription(s): allopurinol, benazepril, colchicine-probenecid 0.5-500 mg, and vitamin e.     Allergies: Review of patient's allergies indicates:  No Known Allergies      Family history: family history includes COPD in his mother; Cancer in his father; Heart attacks under age 50 in his mother.         Social History          Alcohol use:  reports no history of alcohol use.            Tobacco:  reports that he has never smoked. He has quit using smokeless tobacco.         Physical Examination      Vitals: Temperature 97.3 °F (36.3 °C), temperature source Temporal, weight 109.1 kg (240 lb 8.4 oz).        General appearance of patient: healthy and no distress       Quality of voice: no dysphonia, no dysarthria       Inspection of head and face: Normocephalic, without obvious abnormality, sinuses nontender to percussion       Bilateral parotid glands soft, nontender, no swelling, no masses/lesions       Facial strength: intact, symmetric       Extraocular movements intact       Nose: external nose without external deformity, nasal mucosa normal, septum midline       Ear canals, external ears, TMs: normal bilaterally. 1 cm hyperpigmented lesion of left superior conchal bowl     Oral cavity: tongue mobile, FOM soft, mucosa healthy throughout with no masses, lesions, ulcerations       Oropharynx: mucosa of tonsillar fossae healthy; soft palate/uvula intact, mobile, mucosa healthy; tongue base soft, nontender; no mucosal ulcerations or masses or other worrisome lesions      Neck: soft, supple, no masses or palpable lymph nodes      Thyroid: normal size, nontender, no nodules palpable        Procedures:    Procedure note      Excision of left ear lesion, 1.5 x 0.8 cm  Intermediate wound closure, 1.5 cm    Risks and benefits were discussed and the patient elected to proceed with excision of the lesion.     The lesion was examined carefully, noting any surrounding scars and the general laxity of the skin in the adjacent area. A favorably-oriented incision was planned for excision of the superficial facial mass.     Local anesthesia using a 1:1 mixture of 1% lidocaine with epinephrine and 1/2% bupivacaine with epinephrine (1:100k conc epi) was injected in the surrounding skin to achieve adequate anesthesia. The area was then cleaned with isopropyl alcohol and draped.    The incisions were made with a 15 blade in an elliptical fashion. A small portion of overlying skin was excised with the mass. Excision was carried down to the level of the cartilage which was not involved. The lesion was excised en bloc and  was sent for pathology.      The wound was irrigated with sterile saline.     In order to achieve tension free wound closure significnat undermining with sharp supraperiosteal dissection was carried circumferentially around the wound. Closure was performed using 5-0 FAST in an interrupted fashion.     The wound was then dressed with bacitracin.     Patient tolerated the procedure well.       Assessment/Plan:    Lesion of left ear     F/u path results  baci x 1 week  Ok to shower in 48 hours  Will call with results      Janes Hebert MD  [facility]   [facility] [address]  [place] [address]  P: [phone]  F: [phone]

## 2022-07-19 ENCOUNTER — TELEPHONE (OUTPATIENT)
Dept: OTOLARYNGOLOGY | Facility: CLINIC | Age: 67
End: 2022-07-19
Payer: MEDICARE

## 2022-07-19 ENCOUNTER — PATIENT MESSAGE (OUTPATIENT)
Dept: OTOLARYNGOLOGY | Facility: CLINIC | Age: 67
End: 2022-07-19
Payer: MEDICARE

## 2022-07-19 NOTE — TELEPHONE ENCOUNTER
A little self-resolving bleeding form the incision is not uncommon. Let them know they can gently clean around the incisions with a q-tip and continue using the bacitracin. No need to send a picture or do anything different.

## 2022-07-19 NOTE — TELEPHONE ENCOUNTER
Pt wife called and say his ear has been oozing blood all night and would like to know if you can call in anantibiotic    Can you please call his wife  Vonnie  @ [phone] or you can call Mr Mayo @ [phone]

## 2022-07-20 NOTE — TELEPHONE ENCOUNTER
Spoke with  Gael and she is aware, she received message and she states the bleeding is doing much better now

## 2022-07-26 ENCOUNTER — TELEPHONE (OUTPATIENT)
Dept: OTOLARYNGOLOGY | Facility: CLINIC | Age: 67
End: 2022-07-26
Payer: MEDICARE

## 2022-07-26 NOTE — TELEPHONE ENCOUNTER
Called pt wife back and advised we have not received the results yet and will call her back as soon as the doctor review the results

## 2022-07-26 NOTE — TELEPHONE ENCOUNTER
----- Message from Emilie Soto sent at 7/26/2022  4:37 PM CDT -----  Type:  Test Results    Who Called: pt  Name of Test (Lab/Mammo/Etc):  tumor/cancer test  Date of Test: 7/18/2022  Ordering Provider:  Sticker  Where the test was performed: [facility]  Would the patient rather a call back or a response via [facility]? call  Best Call Back Number:  [phone]  Additional Information:  n/a

## 2022-07-27 LAB
FINAL PATHOLOGIC DIAGNOSIS: NORMAL
GROSS: NORMAL
Lab: NORMAL

## 2022-08-03 ENCOUNTER — TELEPHONE (OUTPATIENT)
Dept: HEMATOLOGY/ONCOLOGY | Facility: CLINIC | Age: 67
End: 2022-08-03
Payer: MEDICARE

## 2022-08-03 ENCOUNTER — PATIENT MESSAGE (OUTPATIENT)
Dept: HEPATOLOGY | Facility: CLINIC | Age: 67
End: 2022-08-03

## 2022-08-03 ENCOUNTER — OFFICE VISIT (OUTPATIENT)
Dept: HEPATOLOGY | Facility: CLINIC | Age: 67
End: 2022-08-03
Payer: MEDICARE

## 2022-08-03 VITALS
HEART RATE: 78 BPM | WEIGHT: 238.44 LBS | HEIGHT: 68 IN | SYSTOLIC BLOOD PRESSURE: 124 MMHG | BODY MASS INDEX: 36.14 KG/M2 | DIASTOLIC BLOOD PRESSURE: 80 MMHG

## 2022-08-03 DIAGNOSIS — K75.81 NASH (NONALCOHOLIC STEATOHEPATITIS): Primary | ICD-10-CM

## 2022-08-03 PROCEDURE — 1159F PR MEDICATION LIST DOCUMENTED IN MEDICAL RECORD: ICD-10-PCS | Mod: CPTII,S$GLB,, | Performed by: INTERNAL MEDICINE

## 2022-08-03 PROCEDURE — 3074F SYST BP LT 130 MM HG: CPT | Mod: CPTII,S$GLB,, | Performed by: INTERNAL MEDICINE

## 2022-08-03 PROCEDURE — 1126F AMNT PAIN NOTED NONE PRSNT: CPT | Mod: CPTII,S$GLB,, | Performed by: INTERNAL MEDICINE

## 2022-08-03 PROCEDURE — 3079F PR MOST RECENT DIASTOLIC BLOOD PRESSURE 80-89 MM HG: ICD-10-PCS | Mod: CPTII,S$GLB,, | Performed by: INTERNAL MEDICINE

## 2022-08-03 PROCEDURE — 1101F PR PT FALLS ASSESS DOC 0-1 FALLS W/OUT INJ PAST YR: ICD-10-PCS | Mod: CPTII,S$GLB,, | Performed by: INTERNAL MEDICINE

## 2022-08-03 PROCEDURE — 99999 PR PBB SHADOW E&M-EST. PATIENT-LVL IV: ICD-10-PCS | Mod: PBBFAC,,, | Performed by: INTERNAL MEDICINE

## 2022-08-03 PROCEDURE — 3008F PR BODY MASS INDEX (BMI) DOCUMENTED: ICD-10-PCS | Mod: CPTII,S$GLB,, | Performed by: INTERNAL MEDICINE

## 2022-08-03 PROCEDURE — 99214 OFFICE O/P EST MOD 30 MIN: CPT | Mod: S$GLB,,, | Performed by: INTERNAL MEDICINE

## 2022-08-03 PROCEDURE — 4010F ACE/ARB THERAPY RXD/TAKEN: CPT | Mod: CPTII,S$GLB,, | Performed by: INTERNAL MEDICINE

## 2022-08-03 PROCEDURE — 4010F PR ACE/ARB THEARPY RXD/TAKEN: ICD-10-PCS | Mod: CPTII,S$GLB,, | Performed by: INTERNAL MEDICINE

## 2022-08-03 PROCEDURE — 99499 RISK ADDL DX/OHS AUDIT: ICD-10-PCS | Mod: S$GLB,,, | Performed by: INTERNAL MEDICINE

## 2022-08-03 PROCEDURE — 1160F PR REVIEW ALL MEDS BY PRESCRIBER/CLIN PHARMACIST DOCUMENTED: ICD-10-PCS | Mod: CPTII,S$GLB,, | Performed by: INTERNAL MEDICINE

## 2022-08-03 PROCEDURE — 3074F PR MOST RECENT SYSTOLIC BLOOD PRESSURE < 130 MM HG: ICD-10-PCS | Mod: CPTII,S$GLB,, | Performed by: INTERNAL MEDICINE

## 2022-08-03 PROCEDURE — 1126F PR PAIN SEVERITY QUANTIFIED, NO PAIN PRESENT: ICD-10-PCS | Mod: CPTII,S$GLB,, | Performed by: INTERNAL MEDICINE

## 2022-08-03 PROCEDURE — 99499 UNLISTED E&M SERVICE: CPT | Mod: S$GLB,,, | Performed by: INTERNAL MEDICINE

## 2022-08-03 PROCEDURE — 1159F MED LIST DOCD IN RCRD: CPT | Mod: CPTII,S$GLB,, | Performed by: INTERNAL MEDICINE

## 2022-08-03 PROCEDURE — 1160F RVW MEDS BY RX/DR IN RCRD: CPT | Mod: CPTII,S$GLB,, | Performed by: INTERNAL MEDICINE

## 2022-08-03 PROCEDURE — 3079F DIAST BP 80-89 MM HG: CPT | Mod: CPTII,S$GLB,, | Performed by: INTERNAL MEDICINE

## 2022-08-03 PROCEDURE — 3008F BODY MASS INDEX DOCD: CPT | Mod: CPTII,S$GLB,, | Performed by: INTERNAL MEDICINE

## 2022-08-03 PROCEDURE — 1101F PT FALLS ASSESS-DOCD LE1/YR: CPT | Mod: CPTII,S$GLB,, | Performed by: INTERNAL MEDICINE

## 2022-08-03 PROCEDURE — 99999 PR PBB SHADOW E&M-EST. PATIENT-LVL IV: CPT | Mod: PBBFAC,,, | Performed by: INTERNAL MEDICINE

## 2022-08-03 PROCEDURE — 3288F FALL RISK ASSESSMENT DOCD: CPT | Mod: CPTII,S$GLB,, | Performed by: INTERNAL MEDICINE

## 2022-08-03 PROCEDURE — 99214 PR OFFICE/OUTPT VISIT, EST, LEVL IV, 30-39 MIN: ICD-10-PCS | Mod: S$GLB,,, | Performed by: INTERNAL MEDICINE

## 2022-08-03 PROCEDURE — 3288F PR FALLS RISK ASSESSMENT DOCUMENTED: ICD-10-PCS | Mod: CPTII,S$GLB,, | Performed by: INTERNAL MEDICINE

## 2022-08-03 NOTE — PROGRESS NOTES
Subjective:     Jasson Mayo is here for evaluation of abnormal LFTs    History of Present Illness:  Jasson Mayo is a 66-year-old male with a known history of osteoarthritis, gout for which he takes combination of colchicine and probenecid, hypertension.  He is referred to liver Clinic to rule out liver pathology for his chronic thrombocytopenia.  He denies any liver related complaints.  Reports BMI>30 most of his life.     Duration of abnormality- 2014  Medications/OTC/Herbal-  Vitamin C and E  ETOH- NO  Metabolic issues-HTN  BMI- 36  Family Hx-no     Interval history: 8/3/2022  Regular  f/u  no events since last visit. Did loose 5lbs, watching diet. Unable to exercise due to ankle pain and sciatica.    Review of Systems   Constitutional: Negative for fatigue, fever and unexpected weight change.   Gastrointestinal: Negative for abdominal distention, abdominal pain, blood in stool, nausea and vomiting.   Musculoskeletal: Negative for arthralgias and gait problem.   Skin: Negative for pallor and rash.   Neurological: Negative for dizziness.   Hematological: Does not bruise/bleed easily.   Psychiatric/Behavioral: Negative for confusion, hallucinations and sleep disturbance.       Objective:     Physical Exam  Vitals and nursing note reviewed.   Constitutional:       Appearance: Normal appearance. He is obese.   Eyes:      General: No scleral icterus.  Cardiovascular:      Heart sounds: No murmur heard.  Pulmonary:      Effort: Pulmonary effort is normal.      Breath sounds: No wheezing, rhonchi or rales.   Abdominal:      General: Bowel sounds are normal. There is no distension.      Palpations: There is no mass.      Tenderness: There is no abdominal tenderness.      Comments: Truncal obesity present    Musculoskeletal:         General: No tenderness.      Right lower leg: No edema.      Left lower leg: No edema.   Lymphadenopathy:      Cervical: No cervical adenopathy.   Skin:     Coloration:  Skin is not jaundiced.      Findings: No bruising or rash.   Neurological:      Mental Status: He is alert and oriented to person, place, and time.      Coordination: Coordination normal.   Psychiatric:         Behavior: Behavior normal.         Thought Content: Thought content normal.         Computed MELD-Na score unavailable. Necessary lab results were not found in the last year.  Computed MELD score unavailable. Necessary lab results were not found in the last year.    WBC   Date Value Ref Range Status   06/10/2022 5.07 3.90 - 12.70 K/uL Final     Hemoglobin   Date Value Ref Range Status   06/10/2022 15.7 14.0 - 18.0 g/dL Final     Hematocrit   Date Value Ref Range Status   06/10/2022 45.1 40.0 - 54.0 % Final     Platelets   Date Value Ref Range Status   06/10/2022 128 (L) 150 - 450 K/uL Final     BUN   Date Value Ref Range Status   06/10/2022 18 8 - 23 mg/dL Final     Creatinine   Date Value Ref Range Status   06/10/2022 0.9 0.5 - 1.4 mg/dL Final     Glucose   Date Value Ref Range Status   06/10/2022 125 (H) 70 - 110 mg/dL Final     Calcium   Date Value Ref Range Status   06/10/2022 9.3 8.7 - 10.5 mg/dL Final     Sodium   Date Value Ref Range Status   06/10/2022 138 136 - 145 mmol/L Final     Potassium   Date Value Ref Range Status   06/10/2022 4.3 3.5 - 5.1 mmol/L Final     Chloride   Date Value Ref Range Status   06/10/2022 106 95 - 110 mmol/L Final     AST   Date Value Ref Range Status   06/10/2022 66 (H) 10 - 40 U/L Final     ALT   Date Value Ref Range Status   06/10/2022 94 (H) 10 - 44 U/L Final     Alkaline Phosphatase   Date Value Ref Range Status   06/10/2022 94 55 - 135 U/L Final     Total Bilirubin   Date Value Ref Range Status   06/10/2022 0.8 0.1 - 1.0 mg/dL Final     Comment:     For infants and newborns, interpretation of results should be based  on gestational age, weight and in agreement with clinical  observations.    Premature Infant recommended reference ranges:  Up to 24  hours.............<8.0 mg/dL  Up to 48 hours............<12.0 mg/dL  3-5 days..................<15.0 mg/dL  6-29 days.................<15.0 mg/dL       Albumin   Date Value Ref Range Status   06/10/2022 3.5 3.5 - 5.2 g/dL Final     No results found for: INR      Assessment/Plan:     1.Abnormal LFTs/GRIMES:  He appears to have ongoing chronic inflammation with elevated liver enzymes since 2014. He simultaneously also had thrombocytopenia since 2014. I suspect he has underlying nonalcoholic steatohepatitis.  FibroScan to assess  Fibrosis shows S3 steatosis and F0-F1 ,  ultrasound shows no signs of portal hypertension but does show splenomegaly.    Work up to rule out infectious/autoimmune  disorders of the liver has been negative   Probenecid is known to cause thrombocytopenia, if no liver etiology is found for thrombocytopenia, would recommend a referral to Hematology.  If abnormal LFTs continue over a year, we may have to proceed with liver biopsy for staging of fibrosis    2.Obesity- Class-2:  Discussed dietary recommendations- Low calorie, low carbohydrate, high protein diet with goal of loosing >3-5% to improve steatosis and >7-10% to improve histological changes if present    3. Splenomegaly/ Thrombocytopenia:  Per fibroscan no advanced fibrosis or cirrhosis     I have reviewed existing labs, imaging, procedures. Educated patient about disease process and differential. Ordered required labs, images and discussed treatment plan.     Susanne Alexis MD  Transplant Hepatologist  Dept of Hepatology, [place] [facility]

## 2022-08-03 NOTE — TELEPHONE ENCOUNTER
Spoke to patient's spouse in reference to Hematology referral from Dr. Alexis and need to schedule f/u d/t already being an established patient.  Appointment scheduled per request first available in [place].  Appointment notice via [facility].

## 2022-08-10 ENCOUNTER — OFFICE VISIT (OUTPATIENT)
Dept: HEMATOLOGY/ONCOLOGY | Facility: CLINIC | Age: 67
End: 2022-08-10
Payer: MEDICARE

## 2022-08-10 VITALS
SYSTOLIC BLOOD PRESSURE: 111 MMHG | OXYGEN SATURATION: 96 % | TEMPERATURE: 98 F | HEART RATE: 70 BPM | DIASTOLIC BLOOD PRESSURE: 78 MMHG | HEIGHT: 68 IN | WEIGHT: 234.81 LBS | BODY MASS INDEX: 35.59 KG/M2

## 2022-08-10 DIAGNOSIS — D69.6 THROMBOCYTOPENIA: Primary | ICD-10-CM

## 2022-08-10 DIAGNOSIS — K76.0 NAFLD (NONALCOHOLIC FATTY LIVER DISEASE): ICD-10-CM

## 2022-08-10 DIAGNOSIS — R16.1 SPLENOMEGALY: ICD-10-CM

## 2022-08-10 PROCEDURE — 3074F SYST BP LT 130 MM HG: CPT | Mod: CPTII,S$GLB,, | Performed by: NURSE PRACTITIONER

## 2022-08-10 PROCEDURE — 1159F MED LIST DOCD IN RCRD: CPT | Mod: CPTII,S$GLB,, | Performed by: NURSE PRACTITIONER

## 2022-08-10 PROCEDURE — 99999 PR PBB SHADOW E&M-EST. PATIENT-LVL III: CPT | Mod: PBBFAC,,, | Performed by: NURSE PRACTITIONER

## 2022-08-10 PROCEDURE — 99214 OFFICE O/P EST MOD 30 MIN: CPT | Mod: S$GLB,,, | Performed by: NURSE PRACTITIONER

## 2022-08-10 PROCEDURE — 3008F PR BODY MASS INDEX (BMI) DOCUMENTED: ICD-10-PCS | Mod: CPTII,S$GLB,, | Performed by: NURSE PRACTITIONER

## 2022-08-10 PROCEDURE — 3074F PR MOST RECENT SYSTOLIC BLOOD PRESSURE < 130 MM HG: ICD-10-PCS | Mod: CPTII,S$GLB,, | Performed by: NURSE PRACTITIONER

## 2022-08-10 PROCEDURE — 3078F DIAST BP <80 MM HG: CPT | Mod: CPTII,S$GLB,, | Performed by: NURSE PRACTITIONER

## 2022-08-10 PROCEDURE — 4010F ACE/ARB THERAPY RXD/TAKEN: CPT | Mod: CPTII,S$GLB,, | Performed by: NURSE PRACTITIONER

## 2022-08-10 PROCEDURE — 99214 PR OFFICE/OUTPT VISIT, EST, LEVL IV, 30-39 MIN: ICD-10-PCS | Mod: S$GLB,,, | Performed by: NURSE PRACTITIONER

## 2022-08-10 PROCEDURE — 3288F PR FALLS RISK ASSESSMENT DOCUMENTED: ICD-10-PCS | Mod: CPTII,S$GLB,, | Performed by: NURSE PRACTITIONER

## 2022-08-10 PROCEDURE — 1159F PR MEDICATION LIST DOCUMENTED IN MEDICAL RECORD: ICD-10-PCS | Mod: CPTII,S$GLB,, | Performed by: NURSE PRACTITIONER

## 2022-08-10 PROCEDURE — 1101F PT FALLS ASSESS-DOCD LE1/YR: CPT | Mod: CPTII,S$GLB,, | Performed by: NURSE PRACTITIONER

## 2022-08-10 PROCEDURE — 1126F PR PAIN SEVERITY QUANTIFIED, NO PAIN PRESENT: ICD-10-PCS | Mod: CPTII,S$GLB,, | Performed by: NURSE PRACTITIONER

## 2022-08-10 PROCEDURE — 3008F BODY MASS INDEX DOCD: CPT | Mod: CPTII,S$GLB,, | Performed by: NURSE PRACTITIONER

## 2022-08-10 PROCEDURE — 1160F PR REVIEW ALL MEDS BY PRESCRIBER/CLIN PHARMACIST DOCUMENTED: ICD-10-PCS | Mod: CPTII,S$GLB,, | Performed by: NURSE PRACTITIONER

## 2022-08-10 PROCEDURE — 3078F PR MOST RECENT DIASTOLIC BLOOD PRESSURE < 80 MM HG: ICD-10-PCS | Mod: CPTII,S$GLB,, | Performed by: NURSE PRACTITIONER

## 2022-08-10 PROCEDURE — 1126F AMNT PAIN NOTED NONE PRSNT: CPT | Mod: CPTII,S$GLB,, | Performed by: NURSE PRACTITIONER

## 2022-08-10 PROCEDURE — 1101F PR PT FALLS ASSESS DOC 0-1 FALLS W/OUT INJ PAST YR: ICD-10-PCS | Mod: CPTII,S$GLB,, | Performed by: NURSE PRACTITIONER

## 2022-08-10 PROCEDURE — 4010F PR ACE/ARB THEARPY RXD/TAKEN: ICD-10-PCS | Mod: CPTII,S$GLB,, | Performed by: NURSE PRACTITIONER

## 2022-08-10 PROCEDURE — 1160F RVW MEDS BY RX/DR IN RCRD: CPT | Mod: CPTII,S$GLB,, | Performed by: NURSE PRACTITIONER

## 2022-08-10 PROCEDURE — 99999 PR PBB SHADOW E&M-EST. PATIENT-LVL III: ICD-10-PCS | Mod: PBBFAC,,, | Performed by: NURSE PRACTITIONER

## 2022-08-10 PROCEDURE — 3288F FALL RISK ASSESSMENT DOCD: CPT | Mod: CPTII,S$GLB,, | Performed by: NURSE PRACTITIONER

## 2022-08-10 NOTE — PROGRESS NOTES
Subjective:      Patient ID: Jasson Mayo is a 67 y.o. male.    Chief Complaint: arthralgias    HPI:  Patient is a 67 year old male who presents today for continued monitoring of thrombocytopenia.  He has previously been followed in the clinic by Dr. Frank Rinaldi.  Today is the first time I am evaluating/assessing the patient.  He has been found with diffuse fatty liver followed by hepatology, degenerative arthritis with radiculopathy, gout and is currently being treated with co-benemid and allopurinol.       Social History     Socioeconomic History    Marital status:     Number of children: 1   Occupational History    Occupation: [employer]     Employer: [employer]     Comment: [employer]   Tobacco Use    Smoking status: Never Smoker    Smokeless tobacco: Former User   Substance and Sexual Activity    Alcohol use: No     Social Determinants of Health     Financial Resource Strain: Unknown    Difficulty of Paying Living Expenses: Patient refused   Food Insecurity: No Food Insecurity    Worried About Running Out of Food in the Last Year: Never true    Ran Out of Food in the Last Year: Never true   Transportation Needs: No Transportation Needs    Lack of Transportation (Medical): No    Lack of Transportation (Non-Medical): No   Physical Activity: Insufficiently Active    Days of Exercise per Week: 2 days    Minutes of Exercise per Session: 30 min   Stress: No Stress Concern Present    Feeling of Stress : Not at all   Social Connections: Unknown    Frequency of Communication with Friends and Family: More than three times a week    Frequency of Social Gatherings with Friends and Family: Three times a week    Active Member of Clubs or Organizations: No    Attends Club or Organization Meetings: Never    Marital Status:    Housing Stability: Low Risk     Unable to Pay for Housing in the Last Year: No    Number of Places Lived in the Last Year: 1    Unstable Housing in  the Last Year: No       Family History   Problem Relation Age of Onset    COPD Mother     Heart attacks under age 50 Mother     Cancer Father         PROSTATE CA       Past Surgical History:   Procedure Laterality Date    COLONOSCOPY W/ POLYPECTOMY  10, 14 no polyp    KNEE ARTHROSCOPY Right     Meniscus repair    LIVER BIOPSY  2010    VASECTOMY         Past Medical History:   Diagnosis Date    Decreased platelet count     Fatty liver     Gout     HTN (hypertension) 2000    Non-A non-B hepatitis 1990    Obesity (BMI 35.0-39.9 without comorbidity)     Prediabetes 2015       Review of Systems   Constitutional: Negative for appetite change and unexpected weight change.   HENT: Negative for mouth sores.    Eyes: Negative for visual disturbance.   Respiratory: Negative for cough and shortness of breath.    Cardiovascular: Negative for chest pain.   Gastrointestinal: Negative for abdominal pain and diarrhea.   Genitourinary: Negative for frequency.   Musculoskeletal: Positive for arthralgias. Negative for back pain.   Skin: Negative for rash.   Neurological: Negative for headaches.   Hematological: Negative for adenopathy. Bruises/bleeds easily.   Psychiatric/Behavioral: The patient is not nervous/anxious.           Medication List with Changes/Refills   Current Medications    ALLOPURINOL (ZYLOPRIM) 100 MG TABLET    Take 1 tablet (100 mg total) by mouth once daily.    BENAZEPRIL (LOTENSIN) 20 MG TABLET    Take 1 tablet (20 mg total) by mouth once daily.    COLCHICINE-PROBENECID 0.5-500 MG (CO-BENEMID) 500-0.5 MG TAB    Take 1 tablet by mouth once daily.    VITAMIN E 1000 UNIT CAPSULE    Take 1,000 Units by mouth once daily.        Objective:     Vitals:    08/10/22 1001   BP: 111/78   Pulse: 70   Temp: 97.7 °F (36.5 °C)       Physical Exam  Vitals and nursing note reviewed.   Constitutional:       Appearance: Normal appearance.   HENT:      Head: Normocephalic and atraumatic.      Right Ear: External ear  normal.      Left Ear: External ear normal.   Cardiovascular:      Rate and Rhythm: Normal rate and regular rhythm.      Pulses: Normal pulses.      Heart sounds: S1 normal and S2 normal.   Pulmonary:      Effort: Pulmonary effort is normal.      Breath sounds: Normal breath sounds.   Abdominal:      General: There is no distension.   Musculoskeletal:         General: Normal range of motion.      Cervical back: Normal range of motion.   Skin:     General: Skin is warm and dry.   Neurological:      General: No focal deficit present.      Mental Status: He is alert and oriented to person, place, and time.   Psychiatric:         Attention and Perception: Attention and perception normal.         Mood and Affect: Mood and affect normal.         Speech: Speech normal.         Behavior: Behavior normal. Behavior is cooperative.         Thought Content: Thought content normal.         Cognition and Memory: Cognition and memory normal.         Judgment: Judgment normal.         Assessment:     Problem List Items Addressed This Visit        Hematology    Thrombocytopenia - Primary    Relevant Orders    CBC Auto Differential       GI    NAFLD (nonalcoholic fatty liver disease)    Relevant Orders    CBC Auto Differential    Comprehensive Metabolic Panel      Other Visit Diagnoses     Splenomegaly        Relevant Orders    CBC Auto Differential          Lab Results   Component Value Date    WBC 5.07 06/10/2022    RBC 4.98 06/10/2022    HGB 15.7 06/10/2022    HCT 45.1 06/10/2022    MCV 91 06/10/2022    MCH 31.5 (H) 06/10/2022    MCHC 34.8 06/10/2022    RDW 12.9 06/10/2022     (L) 06/10/2022    MPV 11.4 06/10/2022    GRAN 53.0 06/10/2022    LYMPH 29.0 06/10/2022    MONO 9.0 06/10/2022    EOS 0.3 03/14/2022    BASO 0.04 03/14/2022    EOSINOPHIL 7.0 06/10/2022    BASOPHIL 2.0 (H) 06/10/2022      Lab Results   Component Value Date     06/10/2022    K 4.3 06/10/2022     06/10/2022    CO2 21 (L) 06/10/2022    BUN 18  06/10/2022    CREATININE 0.9 06/10/2022    CALCIUM 9.3 06/10/2022    ANIONGAP 11 06/10/2022    ESTGFRAFRICA >60.0 06/10/2022    EGFRNONAA >60.0 06/10/2022     Lab Results   Component Value Date    ALT 94 (H) 06/10/2022    AST 66 (H) 06/10/2022    ALKPHOS 94 06/10/2022    BILITOT 0.8 06/10/2022       Plan:   Thrombocytopenia  -     CBC Auto Differential; Future; Expected date: 08/10/2022    NAFLD (nonalcoholic fatty liver disease)  -     CBC Auto Differential; Future; Expected date: 08/10/2022  -     Comprehensive Metabolic Panel; Future; Expected date: 08/10/2022    Splenomegaly  -     CBC Auto Differential; Future; Expected date: 08/10/2022    Labs have remained stable. Thrombocytopenia due to enlarged spleen.  Will repeat labs today cbc and cmp.  F/u in 6 months with CBC and CMP. Continue to monitor for abnormal s/s of bleeding.  Continue f/u with hepatology for GRIMES and eating mediterranean diet.      Collaborating Provider:  Dr. Olegario Malone    Thank You,  Leonardo Lisa, FNP-C  Hematology Oncology

## 2022-08-11 ENCOUNTER — LAB VISIT (OUTPATIENT)
Dept: LAB | Facility: HOSPITAL | Age: 67
End: 2022-08-11
Attending: NURSE PRACTITIONER
Payer: MEDICARE

## 2022-08-11 DIAGNOSIS — K76.0 NAFLD (NONALCOHOLIC FATTY LIVER DISEASE): ICD-10-CM

## 2022-08-11 DIAGNOSIS — D69.6 THROMBOCYTOPENIA: ICD-10-CM

## 2022-08-11 DIAGNOSIS — R16.1 SPLENOMEGALY: ICD-10-CM

## 2022-08-11 LAB
ALBUMIN SERPL BCP-MCNC: 3.7 G/DL (ref 3.5–5.2)
ALP SERPL-CCNC: 82 U/L (ref 55–135)
ALT SERPL W/O P-5'-P-CCNC: 52 U/L (ref 10–44)
ANION GAP SERPL CALC-SCNC: 5 MMOL/L (ref 8–16)
AST SERPL-CCNC: 48 U/L (ref 10–40)
BASOPHILS # BLD AUTO: 0.05 K/UL (ref 0–0.2)
BASOPHILS NFR BLD: 1 % (ref 0–1.9)
BILIRUB SERPL-MCNC: 0.9 MG/DL (ref 0.1–1)
BUN SERPL-MCNC: 28 MG/DL (ref 8–23)
CALCIUM SERPL-MCNC: 9.6 MG/DL (ref 8.7–10.5)
CHLORIDE SERPL-SCNC: 105 MMOL/L (ref 95–110)
CO2 SERPL-SCNC: 25 MMOL/L (ref 23–29)
CREAT SERPL-MCNC: 1 MG/DL (ref 0.5–1.4)
DIFFERENTIAL METHOD: ABNORMAL
EOSINOPHIL # BLD AUTO: 0.3 K/UL (ref 0–0.5)
EOSINOPHIL NFR BLD: 6 % (ref 0–8)
ERYTHROCYTE [DISTWIDTH] IN BLOOD BY AUTOMATED COUNT: 13.2 % (ref 11.5–14.5)
EST. GFR  (NO RACE VARIABLE): >60 ML/MIN/1.73 M^2
GLUCOSE SERPL-MCNC: 93 MG/DL (ref 70–110)
HCT VFR BLD AUTO: 42.1 % (ref 40–54)
HGB BLD-MCNC: 14.8 G/DL (ref 14–18)
IMM GRANULOCYTES # BLD AUTO: 0.01 K/UL (ref 0–0.04)
IMM GRANULOCYTES NFR BLD AUTO: 0.2 % (ref 0–0.5)
LYMPHOCYTES # BLD AUTO: 1.9 K/UL (ref 1–4.8)
LYMPHOCYTES NFR BLD: 38.8 % (ref 18–48)
MCH RBC QN AUTO: 32.2 PG (ref 27–31)
MCHC RBC AUTO-ENTMCNC: 35.2 G/DL (ref 32–36)
MCV RBC AUTO: 92 FL (ref 82–98)
MONOCYTES # BLD AUTO: 0.6 K/UL (ref 0.3–1)
MONOCYTES NFR BLD: 11.5 % (ref 4–15)
NEUTROPHILS # BLD AUTO: 2.1 K/UL (ref 1.8–7.7)
NEUTROPHILS NFR BLD: 42.7 % (ref 38–73)
NRBC BLD-RTO: 0 /100 WBC
PLATELET # BLD AUTO: 135 K/UL (ref 150–450)
PMV BLD AUTO: 11.1 FL (ref 9.2–12.9)
POTASSIUM SERPL-SCNC: 4.3 MMOL/L (ref 3.5–5.1)
PROT SERPL-MCNC: 6.9 G/DL (ref 6–8.4)
RBC # BLD AUTO: 4.59 M/UL (ref 4.6–6.2)
SODIUM SERPL-SCNC: 135 MMOL/L (ref 136–145)
WBC # BLD AUTO: 4.97 K/UL (ref 3.9–12.7)

## 2022-08-11 PROCEDURE — 85025 COMPLETE CBC W/AUTO DIFF WBC: CPT | Mod: PO | Performed by: NURSE PRACTITIONER

## 2022-08-11 PROCEDURE — 80053 COMPREHEN METABOLIC PANEL: CPT | Performed by: NURSE PRACTITIONER

## 2022-08-11 PROCEDURE — 36415 COLL VENOUS BLD VENIPUNCTURE: CPT | Mod: PO | Performed by: NURSE PRACTITIONER

## 2022-11-01 ENCOUNTER — LAB VISIT (OUTPATIENT)
Dept: LAB | Facility: HOSPITAL | Age: 67
End: 2022-11-01
Attending: INTERNAL MEDICINE
Payer: MEDICARE

## 2022-11-01 DIAGNOSIS — K75.81 NASH (NONALCOHOLIC STEATOHEPATITIS): ICD-10-CM

## 2022-11-01 LAB
ALBUMIN SERPL BCP-MCNC: 3.8 G/DL (ref 3.5–5.2)
ALP SERPL-CCNC: 114 U/L (ref 55–135)
ALT SERPL W/O P-5'-P-CCNC: 34 U/L (ref 10–44)
ANION GAP SERPL CALC-SCNC: 10 MMOL/L (ref 8–16)
AST SERPL-CCNC: 31 U/L (ref 10–40)
BILIRUB SERPL-MCNC: 0.6 MG/DL (ref 0.1–1)
BUN SERPL-MCNC: 27 MG/DL (ref 8–23)
CALCIUM SERPL-MCNC: 9.8 MG/DL (ref 8.7–10.5)
CHLORIDE SERPL-SCNC: 105 MMOL/L (ref 95–110)
CO2 SERPL-SCNC: 22 MMOL/L (ref 23–29)
CREAT SERPL-MCNC: 0.9 MG/DL (ref 0.5–1.4)
EST. GFR  (NO RACE VARIABLE): >60 ML/MIN/1.73 M^2
GLUCOSE SERPL-MCNC: 114 MG/DL (ref 70–110)
POTASSIUM SERPL-SCNC: 4.9 MMOL/L (ref 3.5–5.1)
PROT SERPL-MCNC: 7.2 G/DL (ref 6–8.4)
SODIUM SERPL-SCNC: 137 MMOL/L (ref 136–145)

## 2022-11-01 PROCEDURE — 80053 COMPREHEN METABOLIC PANEL: CPT | Performed by: INTERNAL MEDICINE

## 2022-11-01 PROCEDURE — 36415 COLL VENOUS BLD VENIPUNCTURE: CPT | Mod: PO | Performed by: INTERNAL MEDICINE

## 2022-12-16 ENCOUNTER — LAB VISIT (OUTPATIENT)
Dept: LAB | Facility: HOSPITAL | Age: 67
End: 2022-12-16
Attending: INTERNAL MEDICINE
Payer: MEDICARE

## 2022-12-16 DIAGNOSIS — M06.4 UNDIFFERENTIATED INFLAMMATORY ARTHRITIS: ICD-10-CM

## 2022-12-16 DIAGNOSIS — M1A.0720 IDIOPATHIC CHRONIC GOUT OF LEFT FOOT WITHOUT TOPHUS: ICD-10-CM

## 2022-12-16 LAB
ALBUMIN SERPL BCP-MCNC: 3.7 G/DL (ref 3.5–5.2)
ALP SERPL-CCNC: 103 U/L (ref 55–135)
ALT SERPL W/O P-5'-P-CCNC: 31 U/L (ref 10–44)
ANION GAP SERPL CALC-SCNC: 7 MMOL/L (ref 8–16)
AST SERPL-CCNC: 31 U/L (ref 10–40)
BILIRUB SERPL-MCNC: 0.9 MG/DL (ref 0.1–1)
BUN SERPL-MCNC: 22 MG/DL (ref 8–23)
CALCIUM SERPL-MCNC: 9.6 MG/DL (ref 8.7–10.5)
CHLORIDE SERPL-SCNC: 106 MMOL/L (ref 95–110)
CO2 SERPL-SCNC: 25 MMOL/L (ref 23–29)
CREAT SERPL-MCNC: 0.9 MG/DL (ref 0.5–1.4)
EST. GFR  (NO RACE VARIABLE): >60 ML/MIN/1.73 M^2
GLUCOSE SERPL-MCNC: 104 MG/DL (ref 70–110)
POTASSIUM SERPL-SCNC: 4.1 MMOL/L (ref 3.5–5.1)
PROT SERPL-MCNC: 7 G/DL (ref 6–8.4)
SODIUM SERPL-SCNC: 138 MMOL/L (ref 136–145)
URATE SERPL-MCNC: 4.4 MG/DL (ref 3.4–7)

## 2022-12-16 PROCEDURE — 84550 ASSAY OF BLOOD/URIC ACID: CPT | Performed by: INTERNAL MEDICINE

## 2022-12-16 PROCEDURE — 80053 COMPREHEN METABOLIC PANEL: CPT | Performed by: INTERNAL MEDICINE

## 2022-12-16 PROCEDURE — 36415 COLL VENOUS BLD VENIPUNCTURE: CPT | Mod: PO | Performed by: INTERNAL MEDICINE

## 2022-12-19 ENCOUNTER — OFFICE VISIT (OUTPATIENT)
Dept: RHEUMATOLOGY | Facility: CLINIC | Age: 67
End: 2022-12-19
Payer: MEDICARE

## 2022-12-19 VITALS
HEIGHT: 68 IN | BODY MASS INDEX: 35.02 KG/M2 | WEIGHT: 231.06 LBS | DIASTOLIC BLOOD PRESSURE: 79 MMHG | SYSTOLIC BLOOD PRESSURE: 115 MMHG | HEART RATE: 80 BPM

## 2022-12-19 DIAGNOSIS — M19.041 PRIMARY OSTEOARTHRITIS OF BOTH HANDS: ICD-10-CM

## 2022-12-19 DIAGNOSIS — K76.0 NAFLD (NONALCOHOLIC FATTY LIVER DISEASE): ICD-10-CM

## 2022-12-19 DIAGNOSIS — M06.4 UNDIFFERENTIATED INFLAMMATORY ARTHRITIS: ICD-10-CM

## 2022-12-19 DIAGNOSIS — M1A.0720 IDIOPATHIC CHRONIC GOUT OF LEFT FOOT WITHOUT TOPHUS: Primary | ICD-10-CM

## 2022-12-19 DIAGNOSIS — M19.042 PRIMARY OSTEOARTHRITIS OF BOTH HANDS: ICD-10-CM

## 2022-12-19 PROCEDURE — 3288F PR FALLS RISK ASSESSMENT DOCUMENTED: ICD-10-PCS | Mod: CPTII,S$GLB,, | Performed by: INTERNAL MEDICINE

## 2022-12-19 PROCEDURE — 3078F PR MOST RECENT DIASTOLIC BLOOD PRESSURE < 80 MM HG: ICD-10-PCS | Mod: CPTII,S$GLB,, | Performed by: INTERNAL MEDICINE

## 2022-12-19 PROCEDURE — 1159F PR MEDICATION LIST DOCUMENTED IN MEDICAL RECORD: ICD-10-PCS | Mod: CPTII,S$GLB,, | Performed by: INTERNAL MEDICINE

## 2022-12-19 PROCEDURE — 1160F PR REVIEW ALL MEDS BY PRESCRIBER/CLIN PHARMACIST DOCUMENTED: ICD-10-PCS | Mod: CPTII,S$GLB,, | Performed by: INTERNAL MEDICINE

## 2022-12-19 PROCEDURE — 3288F FALL RISK ASSESSMENT DOCD: CPT | Mod: CPTII,S$GLB,, | Performed by: INTERNAL MEDICINE

## 2022-12-19 PROCEDURE — 99214 PR OFFICE/OUTPT VISIT, EST, LEVL IV, 30-39 MIN: ICD-10-PCS | Mod: 25,S$GLB,, | Performed by: INTERNAL MEDICINE

## 2022-12-19 PROCEDURE — 99214 OFFICE O/P EST MOD 30 MIN: CPT | Mod: 25,S$GLB,, | Performed by: INTERNAL MEDICINE

## 2022-12-19 PROCEDURE — 4010F ACE/ARB THERAPY RXD/TAKEN: CPT | Mod: CPTII,S$GLB,, | Performed by: INTERNAL MEDICINE

## 2022-12-19 PROCEDURE — 3008F BODY MASS INDEX DOCD: CPT | Mod: CPTII,S$GLB,, | Performed by: INTERNAL MEDICINE

## 2022-12-19 PROCEDURE — 1101F PT FALLS ASSESS-DOCD LE1/YR: CPT | Mod: CPTII,S$GLB,, | Performed by: INTERNAL MEDICINE

## 2022-12-19 PROCEDURE — 1160F RVW MEDS BY RX/DR IN RCRD: CPT | Mod: CPTII,S$GLB,, | Performed by: INTERNAL MEDICINE

## 2022-12-19 PROCEDURE — 1101F PR PT FALLS ASSESS DOC 0-1 FALLS W/OUT INJ PAST YR: ICD-10-PCS | Mod: CPTII,S$GLB,, | Performed by: INTERNAL MEDICINE

## 2022-12-19 PROCEDURE — 1125F AMNT PAIN NOTED PAIN PRSNT: CPT | Mod: CPTII,S$GLB,, | Performed by: INTERNAL MEDICINE

## 2022-12-19 PROCEDURE — 99999 PR PBB SHADOW E&M-EST. PATIENT-LVL III: ICD-10-PCS | Mod: PBBFAC,,, | Performed by: INTERNAL MEDICINE

## 2022-12-19 PROCEDURE — 4010F PR ACE/ARB THEARPY RXD/TAKEN: ICD-10-PCS | Mod: CPTII,S$GLB,, | Performed by: INTERNAL MEDICINE

## 2022-12-19 PROCEDURE — 1159F MED LIST DOCD IN RCRD: CPT | Mod: CPTII,S$GLB,, | Performed by: INTERNAL MEDICINE

## 2022-12-19 PROCEDURE — 20600 DRAIN/INJ JOINT/BURSA W/O US: CPT | Mod: 50,GC,S$GLB, | Performed by: STUDENT IN AN ORGANIZED HEALTH CARE EDUCATION/TRAINING PROGRAM

## 2022-12-19 PROCEDURE — 3074F PR MOST RECENT SYSTOLIC BLOOD PRESSURE < 130 MM HG: ICD-10-PCS | Mod: CPTII,S$GLB,, | Performed by: INTERNAL MEDICINE

## 2022-12-19 PROCEDURE — 99999 PR PBB SHADOW E&M-EST. PATIENT-LVL III: CPT | Mod: PBBFAC,,, | Performed by: INTERNAL MEDICINE

## 2022-12-19 PROCEDURE — 20600 SMALL JOINT ASPIRATION/INJECTION: R INDEX MCP, L INDEX MCP: ICD-10-PCS | Mod: 50,GC,S$GLB, | Performed by: STUDENT IN AN ORGANIZED HEALTH CARE EDUCATION/TRAINING PROGRAM

## 2022-12-19 PROCEDURE — 3078F DIAST BP <80 MM HG: CPT | Mod: CPTII,S$GLB,, | Performed by: INTERNAL MEDICINE

## 2022-12-19 PROCEDURE — 3008F PR BODY MASS INDEX (BMI) DOCUMENTED: ICD-10-PCS | Mod: CPTII,S$GLB,, | Performed by: INTERNAL MEDICINE

## 2022-12-19 PROCEDURE — 1125F PR PAIN SEVERITY QUANTIFIED, PAIN PRESENT: ICD-10-PCS | Mod: CPTII,S$GLB,, | Performed by: INTERNAL MEDICINE

## 2022-12-19 PROCEDURE — 3074F SYST BP LT 130 MM HG: CPT | Mod: CPTII,S$GLB,, | Performed by: INTERNAL MEDICINE

## 2022-12-19 RX ORDER — TRIAMCINOLONE ACETONIDE 40 MG/ML
20 INJECTION, SUSPENSION INTRA-ARTICULAR; INTRAMUSCULAR
Status: DISCONTINUED | OUTPATIENT
Start: 2022-12-19 | End: 2022-12-19 | Stop reason: HOSPADM

## 2022-12-19 RX ADMIN — TRIAMCINOLONE ACETONIDE 20 MG: 40 INJECTION, SUSPENSION INTRA-ARTICULAR; INTRAMUSCULAR at 11:12

## 2022-12-19 NOTE — PROGRESS NOTES
RHEUMATOLOGY CLINIC FOLLOW UP VISIT  Chief complaints, HPI, ROS, EXAM, Assessment & Plans:-  Jasson Nesbitt a 67 y.o. pleasant male comes in for follow up visit.  He comes in today with complaints of worsening pain of bilateral 2nd MCP joints associated with swelling and stiffness.  He has longstanding history of osteoarthritis involving MCP joints .  The pain is worsened by activity and does not resolve with resting.  Last corticosteroid injection was 6 months ago.  Pain relief lasted until this month.  Mild morning stiffness present in other large joints.  No flare-up of gout on allopurinol and colchicine-probenecid combination therapy Rheumatological review of system negative otherwise.  Physical examination shows active synovitis bilateral 2nd MCP joints.  Other joints were unremarkable.  1. Idiopathic chronic gout of left foot without tophus    2. Primary osteoarthritis of both hands    3. NAFLD (nonalcoholic fatty liver disease)    4. Undifferentiated inflammatory arthritis      Problem List Items Addressed This Visit       NAFLD (nonalcoholic fatty liver disease)    Idiopathic chronic gout of left foot without tophus - Primary    Primary osteoarthritis of both second MCP joints    Undifferentiated inflammatory arthritis       Worsening inflammatory arthritis involving bilateral 2nd MCP joints.  No hemochromatosis or CPPD deposition disease.  Chronic gout.  Low suspicion for rheumatoid arthritis.  Repeat corticosteroid injection of bilateral 2nd MCP joints. Failed SSZ.     Advised low carb diet    Small Joint Aspiration/Injection: R index MCP, L index MCP    Date/Time: 12/19/2022 11:15 AM  Performed by: Vincent Majano MD  Authorized by: Gume Curiel MD     Consent Done?:  Yes (Verbal)  Indications:  Pain, arthritis and joint swelling  Site marked: the procedure site was marked    Timeout: prior to procedure the correct patient,  procedure, and site was verified    Local anesthesia used?: No    Anesthesia:  Local infiltration  Location:  Index finger  Site:  R index MCP and L index MCP  Ultrasonic guidance for needle placement?: No    Needle size:  27 G  Approach:  Medial  Medications:  20 mg triamcinolone acetonide 40 mg/mL  Patient tolerance:  Patient tolerated the procedure well with no immediate complications    # Follow up in about 6 months (around 6/19/2023).    Medication List with Changes/Refills   Current Medications    ALLOPURINOL (ZYLOPRIM) 100 MG TABLET    Take 1 tablet (100 mg total) by mouth once daily.    BENAZEPRIL (LOTENSIN) 20 MG TABLET    Take 1 tablet (20 mg total) by mouth once daily.    COLCHICINE-PROBENECID 0.5-500 MG (CO-BENEMID) 500-0.5 MG TAB    TAKE 1 TABLET BY MOUTH EVERY DAY    VITAMIN E 1000 UNIT CAPSULE    Take 1,000 Units by mouth once daily.       Past Medical History:   Diagnosis Date    Decreased platelet count     Fatty liver     Gout     HTN (hypertension) 2000    Non-A non-B hepatitis 1990    Obesity (BMI 35.0-39.9 without comorbidity)     Prediabetes 2015       Past Surgical History:   Procedure Laterality Date    COLONOSCOPY W/ POLYPECTOMY  10, 14 no polyp    KNEE ARTHROSCOPY Right     Meniscus repair    LIVER BIOPSY  2010    VASECTOMY          Social History     Tobacco Use    Smoking status: Never    Smokeless tobacco: Former   Substance Use Topics    Alcohol use: No       Family History   Problem Relation Age of Onset    COPD Mother     Heart attacks under age 50 Mother     Cancer Father         PROSTATE CA       Review of patient's allergies indicates:  No Known Allergies    Disclaimer: This note was prepared using voice recognition system and is likely to have sound alike errors and is not proof read.  Please call me with any questions.

## 2023-01-07 ENCOUNTER — OFFICE VISIT (OUTPATIENT)
Dept: URGENT CARE | Facility: CLINIC | Age: 68
End: 2023-01-07
Payer: MEDICARE

## 2023-01-07 VITALS
TEMPERATURE: 99 F | OXYGEN SATURATION: 97 % | HEIGHT: 68 IN | WEIGHT: 231 LBS | DIASTOLIC BLOOD PRESSURE: 94 MMHG | HEART RATE: 69 BPM | BODY MASS INDEX: 35.01 KG/M2 | RESPIRATION RATE: 16 BRPM | SYSTOLIC BLOOD PRESSURE: 139 MMHG

## 2023-01-07 DIAGNOSIS — J06.9 VIRAL URI WITH COUGH: Primary | ICD-10-CM

## 2023-01-07 DIAGNOSIS — R05.9 COUGH, UNSPECIFIED TYPE: ICD-10-CM

## 2023-01-07 LAB
CTP QC/QA: YES
SARS-COV-2 AG RESP QL IA.RAPID: NEGATIVE

## 2023-01-07 PROCEDURE — 1159F MED LIST DOCD IN RCRD: CPT | Mod: CPTII,S$GLB,, | Performed by: PHYSICIAN ASSISTANT

## 2023-01-07 PROCEDURE — 87811 SARS CORONAVIRUS 2 ANTIGEN POCT, MANUAL READ: ICD-10-PCS | Mod: QW,S$GLB,, | Performed by: PHYSICIAN ASSISTANT

## 2023-01-07 PROCEDURE — 3075F SYST BP GE 130 - 139MM HG: CPT | Mod: CPTII,S$GLB,, | Performed by: PHYSICIAN ASSISTANT

## 2023-01-07 PROCEDURE — 3080F DIAST BP >= 90 MM HG: CPT | Mod: CPTII,S$GLB,, | Performed by: PHYSICIAN ASSISTANT

## 2023-01-07 PROCEDURE — 1160F PR REVIEW ALL MEDS BY PRESCRIBER/CLIN PHARMACIST DOCUMENTED: ICD-10-PCS | Mod: CPTII,S$GLB,, | Performed by: PHYSICIAN ASSISTANT

## 2023-01-07 PROCEDURE — 1126F PR PAIN SEVERITY QUANTIFIED, NO PAIN PRESENT: ICD-10-PCS | Mod: CPTII,S$GLB,, | Performed by: PHYSICIAN ASSISTANT

## 2023-01-07 PROCEDURE — 1126F AMNT PAIN NOTED NONE PRSNT: CPT | Mod: CPTII,S$GLB,, | Performed by: PHYSICIAN ASSISTANT

## 2023-01-07 PROCEDURE — 3008F BODY MASS INDEX DOCD: CPT | Mod: CPTII,S$GLB,, | Performed by: PHYSICIAN ASSISTANT

## 2023-01-07 PROCEDURE — 3080F PR MOST RECENT DIASTOLIC BLOOD PRESSURE >= 90 MM HG: ICD-10-PCS | Mod: CPTII,S$GLB,, | Performed by: PHYSICIAN ASSISTANT

## 2023-01-07 PROCEDURE — 1159F PR MEDICATION LIST DOCUMENTED IN MEDICAL RECORD: ICD-10-PCS | Mod: CPTII,S$GLB,, | Performed by: PHYSICIAN ASSISTANT

## 2023-01-07 PROCEDURE — 3008F PR BODY MASS INDEX (BMI) DOCUMENTED: ICD-10-PCS | Mod: CPTII,S$GLB,, | Performed by: PHYSICIAN ASSISTANT

## 2023-01-07 PROCEDURE — 87811 SARS-COV-2 COVID19 W/OPTIC: CPT | Mod: QW,S$GLB,, | Performed by: PHYSICIAN ASSISTANT

## 2023-01-07 PROCEDURE — 3075F PR MOST RECENT SYSTOLIC BLOOD PRESS GE 130-139MM HG: ICD-10-PCS | Mod: CPTII,S$GLB,, | Performed by: PHYSICIAN ASSISTANT

## 2023-01-07 PROCEDURE — 99213 PR OFFICE/OUTPT VISIT, EST, LEVL III, 20-29 MIN: ICD-10-PCS | Mod: S$GLB,,, | Performed by: PHYSICIAN ASSISTANT

## 2023-01-07 PROCEDURE — 1160F RVW MEDS BY RX/DR IN RCRD: CPT | Mod: CPTII,S$GLB,, | Performed by: PHYSICIAN ASSISTANT

## 2023-01-07 PROCEDURE — 99213 OFFICE O/P EST LOW 20 MIN: CPT | Mod: S$GLB,,, | Performed by: PHYSICIAN ASSISTANT

## 2023-01-07 RX ORDER — PREDNISONE 10 MG/1
TABLET ORAL
Qty: 11 TABLET | Refills: 0 | Status: SHIPPED | OUTPATIENT
Start: 2023-01-07 | End: 2023-03-27

## 2023-01-07 NOTE — PROGRESS NOTES
"Subjective:       Patient ID: Jasson Mayo is a 67 y.o. male.    Vitals:  height is 5' 8" (1.727 m) and weight is 104.8 kg (231 lb). His oral temperature is 99.1 °F (37.3 °C). His blood pressure is 139/94 (abnormal) and his pulse is 69. His respiration is 16 and oxygen saturation is 97%.     Chief Complaint: Sore Throat    Patient presents today with sinus congestion that began a couple of days ago.  Today, patient's throat feels sore and he is hoarse.  Patient has taken Mucinex.     Sore Throat   This is a new problem. The current episode started in the past 7 days. Associated symptoms include congestion, coughing and a hoarse voice. Pertinent negatives include no shortness of breath. Treatments tried: Mucinex.     Constitution: Negative for chills and fever.   HENT:  Positive for congestion and sore throat.    Respiratory:  Positive for cough. Negative for shortness of breath.    Musculoskeletal:  Negative for muscle ache.     Objective:      Physical Exam   Constitutional: He does not appear ill. No distress.   HENT:   Head: Normocephalic and atraumatic.   Ears:   Right Ear: Tympanic membrane, external ear and ear canal normal.   Left Ear: Tympanic membrane, external ear and ear canal normal.   Nose: Right sinus exhibits no maxillary sinus tenderness and no frontal sinus tenderness. Left sinus exhibits no maxillary sinus tenderness and no frontal sinus tenderness.   Mouth/Throat: Mucous membranes are moist. No oropharyngeal exudate or posterior oropharyngeal erythema. Oropharynx is clear.   Eyes: Conjunctivae are normal. Right eye exhibits no discharge. Left eye exhibits no discharge. Extraocular movement intact   Cardiovascular: Normal rate, regular rhythm and normal heart sounds.   No murmur heard.  Pulmonary/Chest: Effort normal and breath sounds normal. He has no wheezes. He has no rhonchi. He has no rales.   Abdominal: Normal appearance.   Musculoskeletal: Normal range of motion.         General: " Normal range of motion.   Neurological: He is alert.   Skin: Skin is warm, dry and not pale. jaundice  Psychiatric: His behavior is normal. Mood, judgment and thought content normal.   Nursing note and vitals reviewed.      Assessment:       1. Viral URI with cough    2. Cough, unspecified type          Plan:         Viral URI with cough    Cough, unspecified type  -     SARS Coronavirus 2 Antigen, POCT Manual Read    Results for orders placed or performed in visit on 01/07/23   SARS Coronavirus 2 Antigen, POCT Manual Read   Result Value Ref Range    SARS Coronavirus 2 Antigen Negative Negative     Acceptable Yes         Other orders  -     predniSONE (DELTASONE) 10 MG tablet; Take 40mg for 1 days, take 30mg for 1 days, take 20mg for 1 days, take 10mg for 2 days  Dispense: 11 tablet; Refill: 0    Lungs clear to auscultation bilaterally.  No tenderness palpation over sinuses.  Discussed mass versus less 7-10 days.  Discussed continue symptomatic treatment at home.  Discussed warning signs/symptoms, reasons would need re-evaluation for possible secondary infection.  Patient voices understanding and agrees with plan.     Cough, Runny Nose, and the Common Cold   The Basics   Written by the doctors and editors at [facility]   What causes cough, runny nose, and other symptoms of the common cold? -- These symptoms are usually caused by a viral infection. Lots of different viruses can take hold inside your nose, mouth, throat, or airways and cause cold symptoms.  Most people get over a cold without any lasting problems. Even so, having a cold can be uncomfortable. Also, some cold symptoms can also be caused by other illnesses, such as coronavirus 2019 (COVID-19) or the flu.  What are the symptoms of the common cold? -- The symptoms include:  Sneezing  Coughing  Sniffling and runny nose  Sore throat  Chest congestion  In children, the common cold can also cause a fever. But adults do not usually get a fever when  they have a cold. Some symptoms of the common cold can overlap with symptoms of COVID-19, although sneezing is uncommon in COVID-19.   When should I call the doctor or nurse? -- Contact your doctor or nurse if you live in an area where people have COVID-19. They will ask you questions about your symptoms and whether you might be at risk. They can tell you if you should get tested for the virus that causes COVID-19. If they think you are more likely to just have a cold, they might tell you to stay home and contact them again if your symptoms change or get worse.   You should also contact your doctor or nurse if you:  Lose your sense of taste or smell  Have a fever of more than 100.4º F (38º C) that comes with shaking chills, loss of appetite, or trouble breathing  Have a fever and also have lung disease, such as emphysema or asthma  Have a cough that lasts longer than 10 days  Have chest pain when you cough or breathe deeply, have trouble breathing, or cough up blood  If you are older than 65, or if you have any chronic medical conditions such as diabetes, you should contact your doctor or nurse any time you get a long-lasting cough.  Take your child to the emergency room if they:  Become confused or stop responding to you  Have trouble breathing or have to work hard to breathe  Contact your child's doctor or nurse if the child:  Refuses to drink anything for a long time  Is younger than 4 months  Has a fever and is not acting like themself  Has a cough that lasts for more than 2 weeks and is not getting any better  Has a stuffed or runny nose that gets worse or does not get any better after 10 days  Has red eyes or yellow goop coming out of their eyes  Has ear pain, pulls at their ears, or shows other signs of having an ear infection  What can I do to feel better? -- If you are a teenager or an adult, you can try cough and cold medicines that you can get without a prescription. These medicines might help with your  symptoms. But they won't cure your cold, or help you get well faster.  If you decide to try nonprescription cold medicines, be sure to follow the directions on the label. Do not combine 2 or more medicines that have acetaminophen in them. If you take too much acetaminophen, the drug can damage your liver. Also, if you have a heart condition, high blood pressure, or you take any prescription medicines, ask your pharmacist if it is safe to take the cold medicine you have in mind.  What should I know if my child has a cold? -- In children, the common cold is often more severe than it is in adults. It also lasts longer. Plus, children often get a fever during the first 3 days of a cold.  Are cough and cold medicines safe for children? -- If your child is younger than 6, you should not give them any cold medicines. These medicines are not safe for young children. Even if your child is older than 6, cough and cold medicines are unlikely to help.  Never give aspirin to any child younger than 18 years old. In children, aspirin can cause a life-threatening condition called Reye syndrome. When giving your child acetaminophen or other nonprescription medicines, never give more than the recommended dose.  How long will I be sick? -- Colds usually last 3 to 7 days in adults and 10 days in children, but some people have symptoms for up to 2 weeks.  Can the common cold lead to more serious problems? -- In some cases, yes. In some people having a cold can lead to:  Ear infections  Worsening of asthma symptoms  Sinus infections  Pneumonia or bronchitis (infections of the lungs)  How can I keep from getting another cold? -- The most important thing you can do is to wash your hands often with soap and water. This can also prevent the spread of other illnesses like the flu and COVID-19. The table has instructions on how to wash your hands to prevent spreading illness (table 1).  The germs that cause the common cold can live on tables,  door handles, and other surfaces for at least 2 hours. You never know when you might be touching germs. That's why it's so important to clean your hands often.  It's also important to stay away from other people when you are sick. This will help prevent the spread of illness.  All topics are updated as new evidence becomes available and our peer review process is complete.  This topic retrieved from [employer] on: Sep 21, 2021.  Topic 56147 Version 20.0  Release: 29.4.2 - C29.263  © 2021 UpToDate, Inc. and/or its affiliates. All rights reserved.  table 1: Hand washing to prevent spreading illness  Wet your hands and put soap on them    Rub your hands together for at least 20 seconds. Make sure to clean your wrists, fingernails, and in between your fingers.    Rinse your hands    Dry your hands with a paper towel that you can throw away    If you are not near a sink, you can use a hand gel to clean your hands. The gels with at least 60 percent alcohol work the best. But it is better to wash with soap and water if you can.  Graphic 987351 Version 3.0  Consumer Information Use and Disclaimer   This information is not specific medical advice and does not replace information you receive from your health care provider. This is only a brief summary of general information. It does NOT include all information about conditions, illnesses, injuries, tests, procedures, treatments, therapies, discharge instructions or life-style choices that may apply to you. You must talk with your health care provider for complete information about your health and treatment options. This information should not be used to decide whether or not to accept your health care provider's advice, instructions or recommendations. Only your health care provider has the knowledge and training to provide advice that is right for you. The use of this information is governed by the [employer] End User License Agreement, available at  https://www.d[employer]IQtersCryoportuwer.com/en/solutions/lexicomp/about/dawood.The use of [employer] content is governed by the [employer] Terms of Use. ©2021 [employer] All rights reserved.  Copyright   © 2021 UpToDate, Inc. and/or its affiliates. All rights reserved.

## 2023-01-10 ENCOUNTER — OFFICE VISIT (OUTPATIENT)
Dept: HEPATOLOGY | Facility: CLINIC | Age: 68
End: 2023-01-10
Payer: MEDICARE

## 2023-01-10 VITALS
HEIGHT: 68 IN | BODY MASS INDEX: 34.38 KG/M2 | SYSTOLIC BLOOD PRESSURE: 140 MMHG | DIASTOLIC BLOOD PRESSURE: 80 MMHG | WEIGHT: 226.88 LBS | HEART RATE: 60 BPM

## 2023-01-10 DIAGNOSIS — K76.0 NAFLD (NONALCOHOLIC FATTY LIVER DISEASE): Primary | ICD-10-CM

## 2023-01-10 PROCEDURE — 3008F BODY MASS INDEX DOCD: CPT | Mod: CPTII,S$GLB,, | Performed by: INTERNAL MEDICINE

## 2023-01-10 PROCEDURE — 3008F PR BODY MASS INDEX (BMI) DOCUMENTED: ICD-10-PCS | Mod: CPTII,S$GLB,, | Performed by: INTERNAL MEDICINE

## 2023-01-10 PROCEDURE — 99499 UNLISTED E&M SERVICE: CPT | Mod: S$GLB,,, | Performed by: INTERNAL MEDICINE

## 2023-01-10 PROCEDURE — 1159F PR MEDICATION LIST DOCUMENTED IN MEDICAL RECORD: ICD-10-PCS | Mod: CPTII,S$GLB,, | Performed by: INTERNAL MEDICINE

## 2023-01-10 PROCEDURE — 3079F DIAST BP 80-89 MM HG: CPT | Mod: CPTII,S$GLB,, | Performed by: INTERNAL MEDICINE

## 2023-01-10 PROCEDURE — 3288F PR FALLS RISK ASSESSMENT DOCUMENTED: ICD-10-PCS | Mod: CPTII,S$GLB,, | Performed by: INTERNAL MEDICINE

## 2023-01-10 PROCEDURE — 99999 PR PBB SHADOW E&M-EST. PATIENT-LVL III: CPT | Mod: PBBFAC,,, | Performed by: INTERNAL MEDICINE

## 2023-01-10 PROCEDURE — 3079F PR MOST RECENT DIASTOLIC BLOOD PRESSURE 80-89 MM HG: ICD-10-PCS | Mod: CPTII,S$GLB,, | Performed by: INTERNAL MEDICINE

## 2023-01-10 PROCEDURE — 1101F PR PT FALLS ASSESS DOC 0-1 FALLS W/OUT INJ PAST YR: ICD-10-PCS | Mod: CPTII,S$GLB,, | Performed by: INTERNAL MEDICINE

## 2023-01-10 PROCEDURE — 1159F MED LIST DOCD IN RCRD: CPT | Mod: CPTII,S$GLB,, | Performed by: INTERNAL MEDICINE

## 2023-01-10 PROCEDURE — 99999 PR PBB SHADOW E&M-EST. PATIENT-LVL III: ICD-10-PCS | Mod: PBBFAC,,, | Performed by: INTERNAL MEDICINE

## 2023-01-10 PROCEDURE — 3077F SYST BP >= 140 MM HG: CPT | Mod: CPTII,S$GLB,, | Performed by: INTERNAL MEDICINE

## 2023-01-10 PROCEDURE — 1126F AMNT PAIN NOTED NONE PRSNT: CPT | Mod: CPTII,S$GLB,, | Performed by: INTERNAL MEDICINE

## 2023-01-10 PROCEDURE — 3077F PR MOST RECENT SYSTOLIC BLOOD PRESSURE >= 140 MM HG: ICD-10-PCS | Mod: CPTII,S$GLB,, | Performed by: INTERNAL MEDICINE

## 2023-01-10 PROCEDURE — 1126F PR PAIN SEVERITY QUANTIFIED, NO PAIN PRESENT: ICD-10-PCS | Mod: CPTII,S$GLB,, | Performed by: INTERNAL MEDICINE

## 2023-01-10 PROCEDURE — 1101F PT FALLS ASSESS-DOCD LE1/YR: CPT | Mod: CPTII,S$GLB,, | Performed by: INTERNAL MEDICINE

## 2023-01-10 PROCEDURE — 3288F FALL RISK ASSESSMENT DOCD: CPT | Mod: CPTII,S$GLB,, | Performed by: INTERNAL MEDICINE

## 2023-01-10 PROCEDURE — 99214 PR OFFICE/OUTPT VISIT, EST, LEVL IV, 30-39 MIN: ICD-10-PCS | Mod: S$GLB,,, | Performed by: INTERNAL MEDICINE

## 2023-01-10 PROCEDURE — 99214 OFFICE O/P EST MOD 30 MIN: CPT | Mod: S$GLB,,, | Performed by: INTERNAL MEDICINE

## 2023-01-10 PROCEDURE — 99499 RISK ADDL DX/OHS AUDIT: ICD-10-PCS | Mod: S$GLB,,, | Performed by: INTERNAL MEDICINE

## 2023-01-10 NOTE — PROGRESS NOTES
Subjective:     Jasson Mayo is here for evaluation of abnormal LFTs    History of Present Illness:  Jasson Mayo is a 66-year-old male with a known history of osteoarthritis, gout for which he takes combination of colchicine and probenecid, hypertension.  He is referred to liver Clinic to rule out liver pathology for his chronic thrombocytopenia.  He denies any liver related complaints.  Reports BMI>30 most of his life.     Duration of abnormality- 2014  Medications/OTC/Herbal-  Vitamin C and E  ETOH- NO  Metabolic issues-HTN  BMI- 36  Family Hx-no     Interval history: 8/3/2022  Regular  f/u  no events since last visit. Did loose 5lbs, watching diet. Unable to exercise due to ankle pain and sciatica.    1/10/2023  Since last visit weight decreased from 240 lb to 226 lb.  Says he feels better, able to move easily, he would like to continue losing 20 more lb.  Denies liver related complaints.     Review of Systems   Constitutional:  Negative for fatigue, fever and unexpected weight change.   Gastrointestinal:  Negative for abdominal distention, abdominal pain, blood in stool, nausea and vomiting.   Genitourinary:  Positive for genital sores.   Musculoskeletal:  Negative for arthralgias and gait problem.   Skin:  Negative for pallor and rash.   Neurological:  Negative for dizziness.   Hematological:  Does not bruise/bleed easily.   Psychiatric/Behavioral:  Negative for confusion, hallucinations and sleep disturbance.      Objective:     Physical Exam  Vitals and nursing note reviewed.   Constitutional:       Appearance: Normal appearance. He is obese.   Eyes:      General: No scleral icterus.  Cardiovascular:      Heart sounds: No murmur heard.  Pulmonary:      Effort: Pulmonary effort is normal.      Breath sounds: No wheezing, rhonchi or rales.   Abdominal:      General: Bowel sounds are normal. There is no distension.      Palpations: There is no mass.      Tenderness: There is no abdominal  tenderness.      Comments: Truncal obesity present    Musculoskeletal:         General: No tenderness.      Right lower leg: No edema.      Left lower leg: No edema.   Lymphadenopathy:      Cervical: No cervical adenopathy.   Skin:     Coloration: Skin is not jaundiced.      Findings: No bruising or rash.   Neurological:      Mental Status: He is alert and oriented to person, place, and time.      Coordination: Coordination normal.   Psychiatric:         Behavior: Behavior normal.         Thought Content: Thought content normal.       Computed MELD-Na score unavailable. Necessary lab results were not found in the last year.  Computed MELD score unavailable. Necessary lab results were not found in the last year.    WBC   Date Value Ref Range Status   08/11/2022 4.97 3.90 - 12.70 K/uL Final     Hemoglobin   Date Value Ref Range Status   08/11/2022 14.8 14.0 - 18.0 g/dL Final     Hematocrit   Date Value Ref Range Status   08/11/2022 42.1 40.0 - 54.0 % Final     Platelets   Date Value Ref Range Status   08/11/2022 135 (L) 150 - 450 K/uL Final     BUN   Date Value Ref Range Status   12/16/2022 22 8 - 23 mg/dL Final     Creatinine   Date Value Ref Range Status   12/16/2022 0.9 0.5 - 1.4 mg/dL Final     Glucose   Date Value Ref Range Status   12/16/2022 104 70 - 110 mg/dL Final     Calcium   Date Value Ref Range Status   12/16/2022 9.6 8.7 - 10.5 mg/dL Final     Sodium   Date Value Ref Range Status   12/16/2022 138 136 - 145 mmol/L Final     Potassium   Date Value Ref Range Status   12/16/2022 4.1 3.5 - 5.1 mmol/L Final     Chloride   Date Value Ref Range Status   12/16/2022 106 95 - 110 mmol/L Final     AST   Date Value Ref Range Status   12/16/2022 31 10 - 40 U/L Final     ALT   Date Value Ref Range Status   12/16/2022 31 10 - 44 U/L Final     Alkaline Phosphatase   Date Value Ref Range Status   12/16/2022 103 55 - 135 U/L Final     Total Bilirubin   Date Value Ref Range Status   12/16/2022 0.9 0.1 - 1.0 mg/dL Final      Comment:     For infants and newborns, interpretation of results should be based  on gestational age, weight and in agreement with clinical  observations.    Premature Infant recommended reference ranges:  Up to 24 hours.............<8.0 mg/dL  Up to 48 hours............<12.0 mg/dL  3-5 days..................<15.0 mg/dL  6-29 days.................<15.0 mg/dL       Albumin   Date Value Ref Range Status   12/16/2022 3.7 3.5 - 5.2 g/dL Final     No results found for: INR      Assessment/Plan:     1.Abnormal LFTs/GRIMES:  He appears to have ongoing chronic inflammation with elevated liver enzymes since 2014. He simultaneously also had thrombocytopenia since 2014. I suspect he has underlying nonalcoholic steatohepatitis.  FibroScan to assess  Fibrosis shows S3 steatosis and F0-F1 ,  ultrasound shows no signs of portal hypertension but does show splenomegaly.    Work up to rule out infectious/autoimmune  disorders of the liver has been negative   Probenecid is known to cause thrombocytopenia, if no liver etiology is found for thrombocytopenia, would recommend a referral to Hematology.  If abnormal LFTs continue over a year, we may have to proceed with liver biopsy for staging of fibrosis    2.Obesity- Class-2:  Discussed dietary recommendations- Low calorie, low carbohydrate, high protein diet with goal of loosing >3-5% to improve steatosis and >7-10% to improve histological changes if present    3. Splenomegaly/ Thrombocytopenia:  Per fibroscan no advanced fibrosis or cirrhosis     1/10/2023  LFTs continue to be normal.   FibroScan to assess  Fibrosis shows S3 steatosis and F0-F1 ,  ultrasound shows no signs of portal hypertension but does show splenomegaly.    Thrombocytopenia and splenomegaly are not due to liver etiology.  Will refer to hematology    RTC: 6 mo    I have reviewed existing labs, imaging, procedures. Educated patient about disease process and differential. Ordered required labs, images and discussed  treatment plan.     Susanne Alexis MD  Transplant Hepatologist  Dept of Hepatology, [place] [facility]

## 2023-01-11 ENCOUNTER — LAB VISIT (OUTPATIENT)
Dept: LAB | Facility: HOSPITAL | Age: 68
End: 2023-01-11
Attending: INTERNAL MEDICINE
Payer: MEDICARE

## 2023-01-11 DIAGNOSIS — K76.0 NAFLD (NONALCOHOLIC FATTY LIVER DISEASE): ICD-10-CM

## 2023-01-11 LAB
ERYTHROCYTE [DISTWIDTH] IN BLOOD BY AUTOMATED COUNT: 13.7 % (ref 11.5–14.5)
HCT VFR BLD AUTO: 45.2 % (ref 40–54)
HGB BLD-MCNC: 14.6 G/DL (ref 14–18)
MCH RBC QN AUTO: 30.7 PG (ref 27–31)
MCHC RBC AUTO-ENTMCNC: 32.3 G/DL (ref 32–36)
MCV RBC AUTO: 95 FL (ref 82–98)
PLATELET # BLD AUTO: 159 K/UL (ref 150–450)
PMV BLD AUTO: 11.5 FL (ref 9.2–12.9)
RBC # BLD AUTO: 4.75 M/UL (ref 4.6–6.2)
WBC # BLD AUTO: 7.6 K/UL (ref 3.9–12.7)

## 2023-01-11 PROCEDURE — 36415 COLL VENOUS BLD VENIPUNCTURE: CPT | Mod: PO | Performed by: INTERNAL MEDICINE

## 2023-01-11 PROCEDURE — 85027 COMPLETE CBC AUTOMATED: CPT | Performed by: INTERNAL MEDICINE

## 2023-01-27 ENCOUNTER — OFFICE VISIT (OUTPATIENT)
Dept: HEMATOLOGY/ONCOLOGY | Facility: CLINIC | Age: 68
End: 2023-01-27
Payer: MEDICARE

## 2023-01-27 VITALS
WEIGHT: 229.94 LBS | HEART RATE: 67 BPM | TEMPERATURE: 97 F | OXYGEN SATURATION: 97 % | BODY MASS INDEX: 34.96 KG/M2 | SYSTOLIC BLOOD PRESSURE: 114 MMHG | DIASTOLIC BLOOD PRESSURE: 80 MMHG

## 2023-01-27 DIAGNOSIS — K76.0 NAFLD (NONALCOHOLIC FATTY LIVER DISEASE): Primary | ICD-10-CM

## 2023-01-27 PROCEDURE — 3074F PR MOST RECENT SYSTOLIC BLOOD PRESSURE < 130 MM HG: ICD-10-PCS | Mod: CPTII,S$GLB,, | Performed by: INTERNAL MEDICINE

## 2023-01-27 PROCEDURE — 3079F DIAST BP 80-89 MM HG: CPT | Mod: CPTII,S$GLB,, | Performed by: INTERNAL MEDICINE

## 2023-01-27 PROCEDURE — 3074F SYST BP LT 130 MM HG: CPT | Mod: CPTII,S$GLB,, | Performed by: INTERNAL MEDICINE

## 2023-01-27 PROCEDURE — 99213 PR OFFICE/OUTPT VISIT, EST, LEVL III, 20-29 MIN: ICD-10-PCS | Mod: S$GLB,,, | Performed by: INTERNAL MEDICINE

## 2023-01-27 PROCEDURE — 3079F PR MOST RECENT DIASTOLIC BLOOD PRESSURE 80-89 MM HG: ICD-10-PCS | Mod: CPTII,S$GLB,, | Performed by: INTERNAL MEDICINE

## 2023-01-27 PROCEDURE — 99999 PR PBB SHADOW E&M-EST. PATIENT-LVL III: CPT | Mod: PBBFAC,,, | Performed by: INTERNAL MEDICINE

## 2023-01-27 PROCEDURE — 1126F AMNT PAIN NOTED NONE PRSNT: CPT | Mod: CPTII,S$GLB,, | Performed by: INTERNAL MEDICINE

## 2023-01-27 PROCEDURE — 1101F PT FALLS ASSESS-DOCD LE1/YR: CPT | Mod: CPTII,S$GLB,, | Performed by: INTERNAL MEDICINE

## 2023-01-27 PROCEDURE — 3288F PR FALLS RISK ASSESSMENT DOCUMENTED: ICD-10-PCS | Mod: CPTII,S$GLB,, | Performed by: INTERNAL MEDICINE

## 2023-01-27 PROCEDURE — 1126F PR PAIN SEVERITY QUANTIFIED, NO PAIN PRESENT: ICD-10-PCS | Mod: CPTII,S$GLB,, | Performed by: INTERNAL MEDICINE

## 2023-01-27 PROCEDURE — 99213 OFFICE O/P EST LOW 20 MIN: CPT | Mod: S$GLB,,, | Performed by: INTERNAL MEDICINE

## 2023-01-27 PROCEDURE — 1101F PR PT FALLS ASSESS DOC 0-1 FALLS W/OUT INJ PAST YR: ICD-10-PCS | Mod: CPTII,S$GLB,, | Performed by: INTERNAL MEDICINE

## 2023-01-27 PROCEDURE — 3008F PR BODY MASS INDEX (BMI) DOCUMENTED: ICD-10-PCS | Mod: CPTII,S$GLB,, | Performed by: INTERNAL MEDICINE

## 2023-01-27 PROCEDURE — 1160F PR REVIEW ALL MEDS BY PRESCRIBER/CLIN PHARMACIST DOCUMENTED: ICD-10-PCS | Mod: CPTII,S$GLB,, | Performed by: INTERNAL MEDICINE

## 2023-01-27 PROCEDURE — 1159F MED LIST DOCD IN RCRD: CPT | Mod: CPTII,S$GLB,, | Performed by: INTERNAL MEDICINE

## 2023-01-27 PROCEDURE — 1159F PR MEDICATION LIST DOCUMENTED IN MEDICAL RECORD: ICD-10-PCS | Mod: CPTII,S$GLB,, | Performed by: INTERNAL MEDICINE

## 2023-01-27 PROCEDURE — 99999 PR PBB SHADOW E&M-EST. PATIENT-LVL III: ICD-10-PCS | Mod: PBBFAC,,, | Performed by: INTERNAL MEDICINE

## 2023-01-27 PROCEDURE — 3288F FALL RISK ASSESSMENT DOCD: CPT | Mod: CPTII,S$GLB,, | Performed by: INTERNAL MEDICINE

## 2023-01-27 PROCEDURE — 1160F RVW MEDS BY RX/DR IN RCRD: CPT | Mod: CPTII,S$GLB,, | Performed by: INTERNAL MEDICINE

## 2023-01-27 PROCEDURE — 3008F BODY MASS INDEX DOCD: CPT | Mod: CPTII,S$GLB,, | Performed by: INTERNAL MEDICINE

## 2023-01-27 NOTE — PROGRESS NOTES
Subjective:      Patient ID: Jasson Mayo is a 67 y.o. male.    Chief Complaint: No chief complaint on file.      HPI: This is a 67 year old man with chronic fatty liver disease and thrombocytopenia.   He follows hepatology for liver disease. He also has DJD with radiculopathy and Gout.  Labs on 1/11/2023 showed platelet 135, Hb 14.6, WBC 7.6. He has no evidence of bleeding.    Review of Systems   Constitutional:  Negative for chills, fatigue and fever.   HENT:  Negative for mouth sores and sore throat.    Respiratory:  Negative for cough, shortness of breath and wheezing.    Cardiovascular:  Negative for chest pain and palpitations.   Gastrointestinal:  Negative for abdominal pain and blood in stool.   Genitourinary:  Negative for hematuria.   Neurological:  Negative for dizziness and headaches.   Psychiatric/Behavioral:  Negative for agitation and confusion.      Objective:     Physical Exam  Constitutional:       Appearance: Normal appearance.   HENT:      Head: Normocephalic.      Mouth/Throat:      Pharynx: No oropharyngeal exudate or posterior oropharyngeal erythema.   Eyes:      Pupils: Pupils are equal, round, and reactive to light.   Cardiovascular:      Heart sounds:     No friction rub. No gallop.   Pulmonary:      Effort: No respiratory distress.      Breath sounds: Normal breath sounds. No wheezing.   Abdominal:      Palpations: Abdomen is soft.      Tenderness: There is no abdominal tenderness. There is no guarding or rebound.   Musculoskeletal:      Cervical back: Neck supple.      Right lower leg: No edema.      Left lower leg: No edema.   Lymphadenopathy:      Cervical: No cervical adenopathy.   Skin:     Findings: No erythema or rash.   Neurological:      Mental Status: He is alert.   Psychiatric:         Mood and Affect: Mood normal.         Behavior: Behavior normal.         Thought Content: Thought content normal.         Judgment: Judgment normal.       Assessment:     Problem  List Items Addressed This Visit          GI    NAFLD (nonalcoholic fatty liver disease)        Chronic Thrombocytopenia.  Fatty liver disease.     Plan:     Thrombocytopenia is likely due to chronic liver disease.   Recent Labs on 1/11/2023 show platelet 135. He has no evidence of bleeding.  RTC: 6 months - CBC.

## 2023-02-08 ENCOUNTER — TELEPHONE (OUTPATIENT)
Dept: HEMATOLOGY/ONCOLOGY | Facility: CLINIC | Age: 68
End: 2023-02-08
Payer: MEDICARE

## 2023-02-08 NOTE — TELEPHONE ENCOUNTER
----- Message from Lissett Mcbride LPN sent at 2/7/2023 11:42 AM CST -----  The following pt saw Dr. Goldman 1/27.  He wants to know if he still needs to see you tomorrow?    Lissett

## 2023-02-08 NOTE — TELEPHONE ENCOUNTER
Pt came in today before checking in for his appt to advise if he would need this visit. Nurse informed pt that he does not, 's last note stated for the pt to follow up in 6mth w/ cbc prior. Pt verbalized understanding stating he thought so. Nurse informed pt that the appt will be rescheduled for 6mth.pt verbalized understanding, left clinic.

## 2023-02-24 ENCOUNTER — PES CALL (OUTPATIENT)
Dept: ADMINISTRATIVE | Facility: CLINIC | Age: 68
End: 2023-02-24
Payer: MEDICARE

## 2023-03-27 ENCOUNTER — OFFICE VISIT (OUTPATIENT)
Dept: URGENT CARE | Facility: CLINIC | Age: 68
End: 2023-03-27
Payer: MEDICARE

## 2023-03-27 VITALS
SYSTOLIC BLOOD PRESSURE: 116 MMHG | BODY MASS INDEX: 34.71 KG/M2 | DIASTOLIC BLOOD PRESSURE: 80 MMHG | WEIGHT: 229 LBS | OXYGEN SATURATION: 95 % | HEIGHT: 68 IN | HEART RATE: 86 BPM | TEMPERATURE: 98 F

## 2023-03-27 DIAGNOSIS — J02.9 SORE THROAT: ICD-10-CM

## 2023-03-27 DIAGNOSIS — J06.9 VIRAL URI WITH COUGH: Primary | ICD-10-CM

## 2023-03-27 LAB
CTP QC/QA: YES
CTP QC/QA: YES
MOLECULAR STREP A: NEGATIVE
SARS-COV-2 AG RESP QL IA.RAPID: NEGATIVE

## 2023-03-27 PROCEDURE — 87651 STREP A DNA AMP PROBE: CPT | Mod: QW,S$GLB,, | Performed by: NURSE PRACTITIONER

## 2023-03-27 PROCEDURE — 87811 SARS-COV-2 COVID19 W/OPTIC: CPT | Mod: QW,S$GLB,, | Performed by: NURSE PRACTITIONER

## 2023-03-27 PROCEDURE — 87811 SARS CORONAVIRUS 2 ANTIGEN POCT, MANUAL READ: ICD-10-PCS | Mod: QW,S$GLB,, | Performed by: NURSE PRACTITIONER

## 2023-03-27 PROCEDURE — 87651 POCT STREP A MOLECULAR: ICD-10-PCS | Mod: QW,S$GLB,, | Performed by: NURSE PRACTITIONER

## 2023-03-27 PROCEDURE — 99213 PR OFFICE/OUTPT VISIT, EST, LEVL III, 20-29 MIN: ICD-10-PCS | Mod: S$GLB,,, | Performed by: NURSE PRACTITIONER

## 2023-03-27 PROCEDURE — 99213 OFFICE O/P EST LOW 20 MIN: CPT | Mod: S$GLB,,, | Performed by: NURSE PRACTITIONER

## 2023-03-27 RX ORDER — FLUTICASONE PROPIONATE 50 MCG
1 SPRAY, SUSPENSION (ML) NASAL DAILY PRN
Qty: 16 G | Refills: 0 | Status: SHIPPED | OUTPATIENT
Start: 2023-03-27 | End: 2023-07-18 | Stop reason: ALTCHOICE

## 2023-03-27 RX ORDER — PREDNISONE 20 MG/1
20 TABLET ORAL DAILY
Qty: 5 TABLET | Refills: 0 | Status: SHIPPED | OUTPATIENT
Start: 2023-03-27 | End: 2023-04-01

## 2023-03-27 RX ORDER — AZELASTINE 1 MG/ML
1 SPRAY, METERED NASAL 2 TIMES DAILY PRN
Qty: 30 ML | Refills: 0 | Status: SHIPPED | OUTPATIENT
Start: 2023-03-27 | End: 2023-07-18 | Stop reason: ALTCHOICE

## 2023-03-27 NOTE — PROGRESS NOTES
"Subjective:       Patient ID: Jasson Mayo is a 67 y.o. male.    Vitals:  height is 5' 8" (1.727 m) and weight is 103.9 kg (229 lb). His temperature is 98.4 °F (36.9 °C). His blood pressure is 116/80 and his pulse is 86. His oxygen saturation is 95%.     Chief Complaint: Cough    Nasal congestion, cough and sinus pressure for approximately 3 days   Patient did take Mucinex with slight relief.     Provider note begins below:  Patient denies any fever, chills, chest pain, SOB, nausea/vomiting or abdominal pain.  Patient is fully awake and alert.  Speaking in full sentences.  He is afebrile.    Other  This is a new problem. The current episode started in the past 7 days. The problem occurs constantly. The problem has been gradually worsening. Associated symptoms include congestion and coughing. Pertinent negatives include no abdominal pain, arthralgias, chest pain, chills, fatigue, fever, joint swelling, nausea, neck pain, rash or vomiting. Treatments tried: Mucinex. The treatment provided mild relief.     Constitution: Negative. Negative for chills, fatigue and fever.   HENT:  Positive for congestion. Negative for ear pain, ear discharge, facial swelling and sinus pressure.    Neck: Negative for neck pain, neck stiffness and painful lymph nodes.   Cardiovascular: Negative.  Negative for chest pain and sob on exertion.   Eyes: Negative.  Negative for eye itching, eye pain and eye redness.   Respiratory:  Positive for cough. Negative for chest tightness, shortness of breath, wheezing and asthma.    Gastrointestinal: Negative.  Negative for abdominal pain, nausea and vomiting.   Endocrine: negative. excessive thirst.   Genitourinary: Negative.  Negative for dysuria, frequency, urgency and flank pain.   Musculoskeletal: Negative.  Negative for pain, trauma, joint pain and joint swelling.   Skin: Negative.  Negative for rash, wound, lesion and hives.   Allergic/Immunologic: Negative.  Negative for eczema, asthma, " hives, itching and sneezing.   Neurological: Negative.  Negative for dizziness, passing out, disorientation and altered mental status.   Hematologic/Lymphatic: Negative.  Negative for swollen lymph nodes.   Psychiatric/Behavioral: Negative.  Negative for altered mental status, disorientation and confusion.      Objective:      Physical Exam   Constitutional: He is oriented to person, place, and time. He appears well-developed. He is cooperative.  Non-toxic appearance. He does not appear ill. No distress.   HENT:   Head: Normocephalic and atraumatic.   Ears:   Right Ear: Hearing, tympanic membrane, external ear and ear canal normal. impacted cerumen  Left Ear: Hearing, tympanic membrane, external ear and ear canal normal. impacted cerumen  Nose: Mucosal edema, rhinorrhea, sinus tenderness and congestion present. No purulent discharge or nasal deformity. No epistaxis. Right sinus exhibits no maxillary sinus tenderness and no frontal sinus tenderness. Left sinus exhibits no maxillary sinus tenderness and no frontal sinus tenderness.   Mouth/Throat: Uvula is midline and mucous membranes are normal. No trismus in the jaw. Normal dentition. No uvula swelling. Posterior oropharyngeal erythema present. No oropharyngeal exudate or posterior oropharyngeal edema.   Eyes: Conjunctivae and lids are normal. No scleral icterus.   Neck: Trachea normal and phonation normal. Neck supple. No edema present. No erythema present. No neck rigidity present.   Cardiovascular: Normal rate, regular rhythm, normal heart sounds and normal pulses.   Pulmonary/Chest: Effort normal and breath sounds normal. No stridor. No respiratory distress. He has no decreased breath sounds. He has no wheezes. He has no rhonchi. He has no rales. He exhibits no tenderness.   Abdominal: Normal appearance. He exhibits no distension. Soft. flat abdomen There is no abdominal tenderness. There is no rebound, no guarding, no left CVA tenderness and no right CVA  tenderness.   Musculoskeletal: Normal range of motion.         General: No deformity. Normal range of motion.   Lymphadenopathy:     He has no cervical adenopathy.   Neurological: no focal deficit. He is alert and oriented to person, place, and time. He exhibits normal muscle tone. Coordination normal.   Skin: Skin is warm, dry, intact, not diaphoretic and not pale.   Psychiatric: His speech is normal and behavior is normal. Mood, judgment and thought content normal.   Nursing note and vitals reviewed.  The following results have been reviewed with the patient:  LABS-  Results for orders placed or performed in visit on 03/27/23   POCT Strep A, Molecular   Result Value Ref Range    Molecular Strep A, POC Negative Negative     Acceptable Yes    SARS Coronavirus 2 Antigen, POCT Manual Read   Result Value Ref Range    SARS Coronavirus 2 Antigen Negative Negative     Acceptable Yes         IMAGING-  No results found.      Assessment:       1. Viral URI with cough    2. Sore throat          Plan:       FOLLOWUP  Follow up if symptoms worsen or fail to improve, for PLEASE CONTACT PCP OR CONTACT THE EMERGENCY ROOM..     PATIENT INSTRUCTIONS  Patient Instructions   - You received a Prednisone RX today.  This can elevate your blood pressure, elevate your blood sugar, water weight gain, nervous energy, redness to the face and dimpling of the skin where the shot goes in.   - Do not use steroids more than 3 times per year.   - If you have diabetes, please check you blood sugar frequently.  - If you have high blood pressure, please check your blood pressure frequently.     INSTRUCTIONS:  - Rest.  - Drink plenty of fluids.  - Take Tylenol and/or Ibuprofen as directed as needed for fever/pain.  Do not take more than the recommended dose.  - follow up with your PCP within the next 1-2 weeks as needed.  - You must understand that you have received an Urgent Care treatment only and that you may be  released before all of your medical problems are known or treated.   - You, the patient, will arrange for follow up care as instructed.   - If your condition worsens or fails to improve we recommend that you receive another evaluation at the ER immediately or contact your PCP to discuss your concerns.   - You can call [phone] or [phone] to help schedule an appointment with the appropriate provider.     -If you smoke cigarettes, it would be beneficial for you to stop.            THANK YOU FOR ALLOWING ME TO PARTICIPATE IN YOUR HEALTHCARE,     Bong Messer, NP   Viral URI with cough  -     predniSONE (DELTASONE) 20 MG tablet; Take 1 tablet (20 mg total) by mouth once daily. for 5 days  Dispense: 5 tablet; Refill: 0  -     fluticasone propionate (FLONASE) 50 mcg/actuation nasal spray; 1 spray (50 mcg total) by Each Nostril route daily as needed for Rhinitis.  Dispense: 16 g; Refill: 0  -     azelastine (ASTELIN) 137 mcg (0.1 %) nasal spray; 1 spray (137 mcg total) by Nasal route 2 (two) times daily as needed for Rhinitis.  Dispense: 30 mL; Refill: 0    Sore throat  -     POCT Strep A, Molecular  -     SARS Coronavirus 2 Antigen, POCT Manual Read

## 2023-03-27 NOTE — PATIENT INSTRUCTIONS
- You received a Prednisone RX today.  This can elevate your blood pressure, elevate your blood sugar, water weight gain, nervous energy, redness to the face and dimpling of the skin where the shot goes in.   - Do not use steroids more than 3 times per year.   - If you have diabetes, please check you blood sugar frequently.  - If you have high blood pressure, please check your blood pressure frequently.     INSTRUCTIONS:  - Rest.  - Drink plenty of fluids.  - Take Tylenol and/or Ibuprofen as directed as needed for fever/pain.  Do not take more than the recommended dose.  - follow up with your PCP within the next 1-2 weeks as needed.  - You must understand that you have received an Urgent Care treatment only and that you may be released before all of your medical problems are known or treated.   - You, the patient, will arrange for follow up care as instructed.   - If your condition worsens or fails to improve we recommend that you receive another evaluation at the ER immediately or contact your PCP to discuss your concerns.   - You can call [phone] or [phone] to help schedule an appointment with the appropriate provider.     -If you smoke cigarettes, it would be beneficial for you to stop.

## 2023-05-01 DIAGNOSIS — M1A.0720 IDIOPATHIC CHRONIC GOUT OF LEFT FOOT WITHOUT TOPHUS: ICD-10-CM

## 2023-05-01 RX ORDER — ALLOPURINOL 100 MG/1
100 TABLET ORAL DAILY
Qty: 90 TABLET | Refills: 2 | Status: SHIPPED | OUTPATIENT
Start: 2023-05-01 | End: 2024-02-06

## 2023-07-03 DIAGNOSIS — I10 ESSENTIAL HYPERTENSION: ICD-10-CM

## 2023-07-04 RX ORDER — BENAZEPRIL HYDROCHLORIDE 20 MG/1
20 TABLET ORAL DAILY
Qty: 90 TABLET | Refills: 3 | Status: SHIPPED | OUTPATIENT
Start: 2023-07-04

## 2023-07-07 ENCOUNTER — TELEPHONE (OUTPATIENT)
Dept: GASTROENTEROLOGY | Facility: CLINIC | Age: 68
End: 2023-07-07
Payer: MEDICARE

## 2023-07-10 ENCOUNTER — LAB VISIT (OUTPATIENT)
Dept: LAB | Facility: HOSPITAL | Age: 68
End: 2023-07-10
Attending: INTERNAL MEDICINE
Payer: MEDICARE

## 2023-07-10 DIAGNOSIS — K76.0 NAFLD (NONALCOHOLIC FATTY LIVER DISEASE): ICD-10-CM

## 2023-07-10 LAB
ALBUMIN SERPL BCP-MCNC: 3.8 G/DL (ref 3.5–5.2)
ALP SERPL-CCNC: 96 U/L (ref 55–135)
ALT SERPL W/O P-5'-P-CCNC: 33 U/L (ref 10–44)
ANION GAP SERPL CALC-SCNC: 10 MMOL/L (ref 8–16)
AST SERPL-CCNC: 31 U/L (ref 10–40)
BILIRUB SERPL-MCNC: 0.7 MG/DL (ref 0.1–1)
BUN SERPL-MCNC: 23 MG/DL (ref 8–23)
CALCIUM SERPL-MCNC: 9.8 MG/DL (ref 8.7–10.5)
CHLORIDE SERPL-SCNC: 104 MMOL/L (ref 95–110)
CO2 SERPL-SCNC: 25 MMOL/L (ref 23–29)
CREAT SERPL-MCNC: 1 MG/DL (ref 0.5–1.4)
EST. GFR  (NO RACE VARIABLE): >60 ML/MIN/1.73 M^2
GLUCOSE SERPL-MCNC: 98 MG/DL (ref 70–110)
POTASSIUM SERPL-SCNC: 4.7 MMOL/L (ref 3.5–5.1)
PROT SERPL-MCNC: 7.3 G/DL (ref 6–8.4)
SODIUM SERPL-SCNC: 139 MMOL/L (ref 136–145)

## 2023-07-10 PROCEDURE — 80053 COMPREHEN METABOLIC PANEL: CPT | Performed by: INTERNAL MEDICINE

## 2023-07-10 PROCEDURE — 36415 COLL VENOUS BLD VENIPUNCTURE: CPT | Mod: PO | Performed by: INTERNAL MEDICINE

## 2023-07-17 DIAGNOSIS — D69.6 THROMBOCYTOPENIA: Primary | ICD-10-CM

## 2023-07-18 ENCOUNTER — OFFICE VISIT (OUTPATIENT)
Dept: INTERNAL MEDICINE | Facility: CLINIC | Age: 68
End: 2023-07-18
Payer: MEDICARE

## 2023-07-18 ENCOUNTER — LAB VISIT (OUTPATIENT)
Dept: LAB | Facility: HOSPITAL | Age: 68
End: 2023-07-18
Attending: FAMILY MEDICINE
Payer: MEDICARE

## 2023-07-18 VITALS
RESPIRATION RATE: 18 BRPM | TEMPERATURE: 98 F | HEART RATE: 70 BPM | WEIGHT: 233 LBS | BODY MASS INDEX: 35.31 KG/M2 | HEIGHT: 68 IN | OXYGEN SATURATION: 97 % | DIASTOLIC BLOOD PRESSURE: 80 MMHG | SYSTOLIC BLOOD PRESSURE: 118 MMHG

## 2023-07-18 DIAGNOSIS — Z12.5 SCREENING FOR PROSTATE CANCER: ICD-10-CM

## 2023-07-18 DIAGNOSIS — E66.01 SEVERE OBESITY WITH BODY MASS INDEX (BMI) OF 35.0 TO 39.9 WITH SERIOUS COMORBIDITY: ICD-10-CM

## 2023-07-18 DIAGNOSIS — I10 ESSENTIAL HYPERTENSION: ICD-10-CM

## 2023-07-18 DIAGNOSIS — M1A.9XX0 CHRONIC GOUT WITHOUT TOPHUS, UNSPECIFIED CAUSE, UNSPECIFIED SITE: ICD-10-CM

## 2023-07-18 DIAGNOSIS — E78.5 HYPERLIPIDEMIA, UNSPECIFIED HYPERLIPIDEMIA TYPE: ICD-10-CM

## 2023-07-18 DIAGNOSIS — M06.4 UNDIFFERENTIATED INFLAMMATORY ARTHRITIS: ICD-10-CM

## 2023-07-18 DIAGNOSIS — Z00.00 ANNUAL PHYSICAL EXAM: Primary | ICD-10-CM

## 2023-07-18 LAB
CHOLEST SERPL-MCNC: 151 MG/DL (ref 120–199)
CHOLEST/HDLC SERPL: 4.7 {RATIO} (ref 2–5)
COMPLEXED PSA SERPL-MCNC: 0.22 NG/ML (ref 0–4)
HDLC SERPL-MCNC: 32 MG/DL (ref 40–75)
HDLC SERPL: 21.2 % (ref 20–50)
LDLC SERPL CALC-MCNC: 94 MG/DL (ref 63–159)
NONHDLC SERPL-MCNC: 119 MG/DL
TRIGL SERPL-MCNC: 125 MG/DL (ref 30–150)
TSH SERPL DL<=0.005 MIU/L-ACNC: 2.75 UIU/ML (ref 0.4–4)
URATE SERPL-MCNC: 4.6 MG/DL (ref 3.4–7)

## 2023-07-18 PROCEDURE — 3288F PR FALLS RISK ASSESSMENT DOCUMENTED: ICD-10-PCS | Mod: CPTII,S$GLB,, | Performed by: FAMILY MEDICINE

## 2023-07-18 PROCEDURE — 99999 PR PBB SHADOW E&M-EST. PATIENT-LVL III: ICD-10-PCS | Mod: PBBFAC,,, | Performed by: FAMILY MEDICINE

## 2023-07-18 PROCEDURE — 3288F FALL RISK ASSESSMENT DOCD: CPT | Mod: CPTII,S$GLB,, | Performed by: FAMILY MEDICINE

## 2023-07-18 PROCEDURE — 1101F PR PT FALLS ASSESS DOC 0-1 FALLS W/OUT INJ PAST YR: ICD-10-PCS | Mod: CPTII,S$GLB,, | Performed by: FAMILY MEDICINE

## 2023-07-18 PROCEDURE — 3008F PR BODY MASS INDEX (BMI) DOCUMENTED: ICD-10-PCS | Mod: CPTII,S$GLB,, | Performed by: FAMILY MEDICINE

## 2023-07-18 PROCEDURE — 99214 OFFICE O/P EST MOD 30 MIN: CPT | Mod: S$GLB,,, | Performed by: FAMILY MEDICINE

## 2023-07-18 PROCEDURE — 99214 PR OFFICE/OUTPT VISIT, EST, LEVL IV, 30-39 MIN: ICD-10-PCS | Mod: S$GLB,,, | Performed by: FAMILY MEDICINE

## 2023-07-18 PROCEDURE — 4010F PR ACE/ARB THEARPY RXD/TAKEN: ICD-10-PCS | Mod: CPTII,S$GLB,, | Performed by: FAMILY MEDICINE

## 2023-07-18 PROCEDURE — 99999 PR PBB SHADOW E&M-EST. PATIENT-LVL III: CPT | Mod: PBBFAC,,, | Performed by: FAMILY MEDICINE

## 2023-07-18 PROCEDURE — 80061 LIPID PANEL: CPT | Performed by: FAMILY MEDICINE

## 2023-07-18 PROCEDURE — 84443 ASSAY THYROID STIM HORMONE: CPT | Performed by: FAMILY MEDICINE

## 2023-07-18 PROCEDURE — 3008F BODY MASS INDEX DOCD: CPT | Mod: CPTII,S$GLB,, | Performed by: FAMILY MEDICINE

## 2023-07-18 PROCEDURE — 4010F ACE/ARB THERAPY RXD/TAKEN: CPT | Mod: CPTII,S$GLB,, | Performed by: FAMILY MEDICINE

## 2023-07-18 PROCEDURE — 84153 ASSAY OF PSA TOTAL: CPT | Performed by: FAMILY MEDICINE

## 2023-07-18 PROCEDURE — 84550 ASSAY OF BLOOD/URIC ACID: CPT | Performed by: FAMILY MEDICINE

## 2023-07-18 PROCEDURE — 3074F SYST BP LT 130 MM HG: CPT | Mod: CPTII,S$GLB,, | Performed by: FAMILY MEDICINE

## 2023-07-18 PROCEDURE — 1101F PT FALLS ASSESS-DOCD LE1/YR: CPT | Mod: CPTII,S$GLB,, | Performed by: FAMILY MEDICINE

## 2023-07-18 PROCEDURE — 3074F PR MOST RECENT SYSTOLIC BLOOD PRESSURE < 130 MM HG: ICD-10-PCS | Mod: CPTII,S$GLB,, | Performed by: FAMILY MEDICINE

## 2023-07-18 PROCEDURE — 1159F MED LIST DOCD IN RCRD: CPT | Mod: CPTII,S$GLB,, | Performed by: FAMILY MEDICINE

## 2023-07-18 PROCEDURE — 1159F PR MEDICATION LIST DOCUMENTED IN MEDICAL RECORD: ICD-10-PCS | Mod: CPTII,S$GLB,, | Performed by: FAMILY MEDICINE

## 2023-07-18 PROCEDURE — 36415 COLL VENOUS BLD VENIPUNCTURE: CPT | Performed by: FAMILY MEDICINE

## 2023-07-18 PROCEDURE — 3079F DIAST BP 80-89 MM HG: CPT | Mod: CPTII,S$GLB,, | Performed by: FAMILY MEDICINE

## 2023-07-18 PROCEDURE — 3079F PR MOST RECENT DIASTOLIC BLOOD PRESSURE 80-89 MM HG: ICD-10-PCS | Mod: CPTII,S$GLB,, | Performed by: FAMILY MEDICINE

## 2023-07-18 NOTE — PROGRESS NOTES
Subjective:       Patient ID: Jasson Mayo is a 67 y.o. male.    Chief Complaint: Annual Exam    Annual exam.  He is followed by Hematology for thrombocytopenia.  He has by Rheumatology for chronic gout.  Medical history also includes hypertension fatty liver disease elevated BMI hypertension.  Denies headache chest pain palpitations shortness of breath or edema.  He has osteoarthritis the left ankle with some discomfort mostly on climbing.    Review of Systems   Constitutional:  Negative for activity change, chills, fever and unexpected weight change.   HENT:  Negative for congestion, hearing loss, rhinorrhea and trouble swallowing.    Eyes:  Negative for discharge and visual disturbance.   Respiratory:  Negative for cough, chest tightness, shortness of breath and wheezing.    Cardiovascular:  Negative for chest pain, palpitations and leg swelling.   Gastrointestinal:  Negative for abdominal distention, abdominal pain, blood in stool, constipation, diarrhea and vomiting.   Endocrine: Negative for polydipsia and polyuria.   Genitourinary:  Negative for difficulty urinating, dysuria, frequency, hematuria and urgency.   Musculoskeletal:  Positive for arthralgias and joint swelling. Negative for neck pain.   Neurological:  Negative for dizziness, weakness, light-headedness and headaches.   Psychiatric/Behavioral:  Negative for confusion and dysphoric mood.      Objective:      Physical Exam  Constitutional:       General: He is not in acute distress.     Appearance: He is well-developed. He is not ill-appearing or diaphoretic.   HENT:      Right Ear: Tympanic membrane normal.      Left Ear: Tympanic membrane normal.      Nose: Nose normal.   Eyes:      Conjunctiva/sclera: Conjunctivae normal.   Neck:      Thyroid: No thyromegaly.      Comments: Normal thyroid  2+ carotids  Cardiovascular:      Rate and Rhythm: Normal rate and regular rhythm.      Heart sounds: Normal heart sounds. No murmur heard.    No  gallop.   Pulmonary:      Effort: Pulmonary effort is normal. No respiratory distress.      Breath sounds: Normal breath sounds. No wheezing, rhonchi or rales.   Abdominal:      General: Bowel sounds are normal. There is no distension.      Palpations: Abdomen is soft. There is no mass.      Tenderness: There is no abdominal tenderness.   Musculoskeletal:         General: Deformity present.      Cervical back: Neck supple.   Lymphadenopathy:      Cervical: No cervical adenopathy.   Skin:     General: Skin is warm and dry.      Coloration: Skin is not pale.      Findings: No erythema.   Neurological:      Mental Status: He is alert and oriented to person, place, and time.   Psychiatric:         Mood and Affect: Mood normal.         Behavior: Behavior normal.       Lab Visit on 07/10/2023   Component Date Value Ref Range Status    Sodium 07/10/2023 139  136 - 145 mmol/L Final    Potassium 07/10/2023 4.7  3.5 - 5.1 mmol/L Final    Chloride 07/10/2023 104  95 - 110 mmol/L Final    CO2 07/10/2023 25  23 - 29 mmol/L Final    Glucose 07/10/2023 98  70 - 110 mg/dL Final    BUN 07/10/2023 23  8 - 23 mg/dL Final    Creatinine 07/10/2023 1.0  0.5 - 1.4 mg/dL Final    Calcium 07/10/2023 9.8  8.7 - 10.5 mg/dL Final    Total Protein 07/10/2023 7.3  6.0 - 8.4 g/dL Final    Albumin 07/10/2023 3.8  3.5 - 5.2 g/dL Final    Total Bilirubin 07/10/2023 0.7  0.1 - 1.0 mg/dL Final    Alkaline Phosphatase 07/10/2023 96  55 - 135 U/L Final    AST 07/10/2023 31  10 - 40 U/L Final    ALT 07/10/2023 33  10 - 44 U/L Final    eGFR 07/10/2023 >60.0  >60 mL/min/1.73 m^2 Final    Anion Gap 07/10/2023 10  8 - 16 mmol/L Final     Assessment:       1. Annual physical exam    2. Essential hypertension    3. Hyperlipidemia, unspecified hyperlipidemia type    4. Screening for prostate cancer    5. Chronic gout without tophus, unspecified cause, unspecified site    6. Severe obesity with body mass index (BMI) of 35.0 to 39.9 with serious comorbidity    7.  Undifferentiated inflammatory arthritis        Plan:     Blood pressure controlled medications reviewed lab was ordered recommend ace brace for ankle if needed.  Follow-up in 6 months BMI noted.  Diet discussed.  Annual physical exam    Essential hypertension  -     Urinalysis; Future; Expected date: 07/18/2023  -     TSH; Future; Expected date: 07/18/2023    Hyperlipidemia, unspecified hyperlipidemia type  -     Lipid Panel; Future; Expected date: 07/18/2023    Screening for prostate cancer  -     PSA, Screening; Future; Expected date: 07/18/2023    Chronic gout without tophus, unspecified cause, unspecified site  -     Uric Acid; Future; Expected date: 07/18/2023    Severe obesity with body mass index (BMI) of 35.0 to 39.9 with serious comorbidity    Undifferentiated inflammatory arthritis

## 2023-08-09 ENCOUNTER — OFFICE VISIT (OUTPATIENT)
Dept: HEPATOLOGY | Facility: CLINIC | Age: 68
End: 2023-08-09
Payer: MEDICARE

## 2023-08-09 VITALS
WEIGHT: 235.44 LBS | HEART RATE: 60 BPM | BODY MASS INDEX: 35.68 KG/M2 | SYSTOLIC BLOOD PRESSURE: 122 MMHG | HEIGHT: 68 IN | DIASTOLIC BLOOD PRESSURE: 80 MMHG

## 2023-08-09 DIAGNOSIS — K76.0 NAFLD (NONALCOHOLIC FATTY LIVER DISEASE): Primary | ICD-10-CM

## 2023-08-09 DIAGNOSIS — D69.6 THROMBOCYTOPENIA: ICD-10-CM

## 2023-08-09 DIAGNOSIS — R16.1 SPLENOMEGALY: ICD-10-CM

## 2023-08-09 PROCEDURE — 3008F PR BODY MASS INDEX (BMI) DOCUMENTED: ICD-10-PCS | Mod: CPTII,S$GLB,, | Performed by: INTERNAL MEDICINE

## 2023-08-09 PROCEDURE — 1159F PR MEDICATION LIST DOCUMENTED IN MEDICAL RECORD: ICD-10-PCS | Mod: CPTII,S$GLB,, | Performed by: INTERNAL MEDICINE

## 2023-08-09 PROCEDURE — 4010F PR ACE/ARB THEARPY RXD/TAKEN: ICD-10-PCS | Mod: CPTII,S$GLB,, | Performed by: INTERNAL MEDICINE

## 2023-08-09 PROCEDURE — 3074F PR MOST RECENT SYSTOLIC BLOOD PRESSURE < 130 MM HG: ICD-10-PCS | Mod: CPTII,S$GLB,, | Performed by: INTERNAL MEDICINE

## 2023-08-09 PROCEDURE — 1101F PR PT FALLS ASSESS DOC 0-1 FALLS W/OUT INJ PAST YR: ICD-10-PCS | Mod: CPTII,S$GLB,, | Performed by: INTERNAL MEDICINE

## 2023-08-09 PROCEDURE — 3288F PR FALLS RISK ASSESSMENT DOCUMENTED: ICD-10-PCS | Mod: CPTII,S$GLB,, | Performed by: INTERNAL MEDICINE

## 2023-08-09 PROCEDURE — 99213 PR OFFICE/OUTPT VISIT, EST, LEVL III, 20-29 MIN: ICD-10-PCS | Mod: S$GLB,,, | Performed by: INTERNAL MEDICINE

## 2023-08-09 PROCEDURE — 3074F SYST BP LT 130 MM HG: CPT | Mod: CPTII,S$GLB,, | Performed by: INTERNAL MEDICINE

## 2023-08-09 PROCEDURE — 1126F PR PAIN SEVERITY QUANTIFIED, NO PAIN PRESENT: ICD-10-PCS | Mod: CPTII,S$GLB,, | Performed by: INTERNAL MEDICINE

## 2023-08-09 PROCEDURE — 4010F ACE/ARB THERAPY RXD/TAKEN: CPT | Mod: CPTII,S$GLB,, | Performed by: INTERNAL MEDICINE

## 2023-08-09 PROCEDURE — 1126F AMNT PAIN NOTED NONE PRSNT: CPT | Mod: CPTII,S$GLB,, | Performed by: INTERNAL MEDICINE

## 2023-08-09 PROCEDURE — 1101F PT FALLS ASSESS-DOCD LE1/YR: CPT | Mod: CPTII,S$GLB,, | Performed by: INTERNAL MEDICINE

## 2023-08-09 PROCEDURE — 99213 OFFICE O/P EST LOW 20 MIN: CPT | Mod: S$GLB,,, | Performed by: INTERNAL MEDICINE

## 2023-08-09 PROCEDURE — 3079F DIAST BP 80-89 MM HG: CPT | Mod: CPTII,S$GLB,, | Performed by: INTERNAL MEDICINE

## 2023-08-09 PROCEDURE — 3288F FALL RISK ASSESSMENT DOCD: CPT | Mod: CPTII,S$GLB,, | Performed by: INTERNAL MEDICINE

## 2023-08-09 PROCEDURE — 3008F BODY MASS INDEX DOCD: CPT | Mod: CPTII,S$GLB,, | Performed by: INTERNAL MEDICINE

## 2023-08-09 PROCEDURE — 3079F PR MOST RECENT DIASTOLIC BLOOD PRESSURE 80-89 MM HG: ICD-10-PCS | Mod: CPTII,S$GLB,, | Performed by: INTERNAL MEDICINE

## 2023-08-09 PROCEDURE — 99999 PR PBB SHADOW E&M-EST. PATIENT-LVL III: CPT | Mod: PBBFAC,,, | Performed by: INTERNAL MEDICINE

## 2023-08-09 PROCEDURE — 99999 PR PBB SHADOW E&M-EST. PATIENT-LVL III: ICD-10-PCS | Mod: PBBFAC,,, | Performed by: INTERNAL MEDICINE

## 2023-08-09 PROCEDURE — 1160F PR REVIEW ALL MEDS BY PRESCRIBER/CLIN PHARMACIST DOCUMENTED: ICD-10-PCS | Mod: CPTII,S$GLB,, | Performed by: INTERNAL MEDICINE

## 2023-08-09 PROCEDURE — 1159F MED LIST DOCD IN RCRD: CPT | Mod: CPTII,S$GLB,, | Performed by: INTERNAL MEDICINE

## 2023-08-09 PROCEDURE — 1160F RVW MEDS BY RX/DR IN RCRD: CPT | Mod: CPTII,S$GLB,, | Performed by: INTERNAL MEDICINE

## 2023-08-09 NOTE — PROGRESS NOTES
Subjective:     Jasson Mayo is here for evaluation of abnormal LFTs    History of Present Illness:  Jasson Mayo is a 66-year-old male with a known history of osteoarthritis, gout for which he takes combination of colchicine and probenecid, hypertension.  He is referred to liver Clinic to rule out liver pathology for his chronic thrombocytopenia.  He denies any liver related complaints.  Reports BMI>30 most of his life.     Duration of abnormality- 2014  Medications/OTC/Herbal-  Vitamin C and E  ETOH- NO  Metabolic issues-HTN  BMI- 36  Family Hx-no     Interval history: 8/3/2022  Regular  f/u  no events since last visit. Did loose 5lbs, watching diet. Unable to exercise due to ankle pain and sciatica.    1/10/2023  Since last visit weight decreased from 240 lb to 226 lb.  Says he feels better, able to move easily, he would like to continue losing 20 more lb.  Denies liver related complaints.     8/9/2023  Gained 10lbs back, says it was difficult to eat healthy diet with grand kids this summer. Now that school started he hopes to start his diet and exercise back     Review of Systems   Constitutional:  Negative for fatigue, fever and unexpected weight change.   Gastrointestinal:  Negative for abdominal distention, abdominal pain, blood in stool, nausea and vomiting.   Genitourinary:  Positive for genital sores.   Musculoskeletal:  Negative for arthralgias and gait problem.   Skin:  Negative for pallor and rash.   Neurological:  Negative for dizziness.   Hematological:  Does not bruise/bleed easily.   Psychiatric/Behavioral:  Negative for confusion, hallucinations and sleep disturbance.        Objective:     Physical Exam  Vitals and nursing note reviewed.   Constitutional:       Appearance: Normal appearance. He is obese.   Eyes:      General: No scleral icterus.  Cardiovascular:      Heart sounds: No murmur heard.  Pulmonary:      Effort: Pulmonary effort is normal.      Breath sounds: No  wheezing, rhonchi or rales.   Abdominal:      General: Bowel sounds are normal. There is no distension.      Palpations: There is no mass.      Tenderness: There is no abdominal tenderness.      Comments: Truncal obesity present    Musculoskeletal:         General: No tenderness.      Right lower leg: No edema.      Left lower leg: No edema.   Lymphadenopathy:      Cervical: No cervical adenopathy.   Skin:     Coloration: Skin is not jaundiced.      Findings: No bruising or rash.   Neurological:      Mental Status: He is alert and oriented to person, place, and time.      Coordination: Coordination normal.   Psychiatric:         Behavior: Behavior normal.         Thought Content: Thought content normal.         Computed MELD 3.0 unavailable. Necessary lab results were not found in the last year.  Computed MELD-Na unavailable. Necessary lab results were not found in the last year.    WBC   Date Value Ref Range Status   01/11/2023 7.60 3.90 - 12.70 K/uL Final     Hemoglobin   Date Value Ref Range Status   01/11/2023 14.6 14.0 - 18.0 g/dL Final     Hematocrit   Date Value Ref Range Status   01/11/2023 45.2 40.0 - 54.0 % Final     Platelets   Date Value Ref Range Status   01/11/2023 159 150 - 450 K/uL Final     BUN   Date Value Ref Range Status   07/10/2023 23 8 - 23 mg/dL Final     Creatinine   Date Value Ref Range Status   07/10/2023 1.0 0.5 - 1.4 mg/dL Final     Glucose   Date Value Ref Range Status   07/10/2023 98 70 - 110 mg/dL Final     Calcium   Date Value Ref Range Status   07/10/2023 9.8 8.7 - 10.5 mg/dL Final     Sodium   Date Value Ref Range Status   07/10/2023 139 136 - 145 mmol/L Final     Potassium   Date Value Ref Range Status   07/10/2023 4.7 3.5 - 5.1 mmol/L Final     Chloride   Date Value Ref Range Status   07/10/2023 104 95 - 110 mmol/L Final     AST   Date Value Ref Range Status   07/10/2023 31 10 - 40 U/L Final     ALT   Date Value Ref Range Status   07/10/2023 33 10 - 44 U/L Final     Alkaline  "Phosphatase   Date Value Ref Range Status   07/10/2023 96 55 - 135 U/L Final     Total Bilirubin   Date Value Ref Range Status   07/10/2023 0.7 0.1 - 1.0 mg/dL Final     Comment:     For infants and newborns, interpretation of results should be based  on gestational age, weight and in agreement with clinical  observations.    Premature Infant recommended reference ranges:  Up to 24 hours.............<8.0 mg/dL  Up to 48 hours............<12.0 mg/dL  3-5 days..................<15.0 mg/dL  6-29 days.................<15.0 mg/dL       Albumin   Date Value Ref Range Status   07/10/2023 3.8 3.5 - 5.2 g/dL Final     No results found for: "INR"      Assessment/Plan:     1.Abnormal LFTs/GRIMES:  He appears to have ongoing chronic inflammation with elevated liver enzymes since 2014. He simultaneously also had thrombocytopenia since 2014. I suspect he has underlying nonalcoholic steatohepatitis.  FibroScan to assess  Fibrosis shows S3 steatosis and F0-F1 ,  ultrasound shows no signs of portal hypertension but does show splenomegaly.    Work up to rule out infectious/autoimmune  disorders of the liver has been negative   Probenecid is known to cause thrombocytopenia, if no liver etiology is found for thrombocytopenia, would recommend a referral to Hematology.  If abnormal LFTs continue over a year, we may have to proceed with liver biopsy for staging of fibrosis    2.Obesity- Class-2:  Discussed dietary recommendations- Low calorie, low carbohydrate, high protein diet with goal of loosing >3-5% to improve steatosis and >7-10% to improve histological changes if present    3. Splenomegaly/ Thrombocytopenia:  Per fibroscan no advanced fibrosis or cirrhosis     1/10/2023  LFTs continue to be normal.   FibroScan to assess  Fibrosis shows S3 steatosis and F0-F1 ,  ultrasound shows no signs of portal hypertension but does show splenomegaly.    Thrombocytopenia and splenomegaly are not due to liver etiology.  Will refer to " hematology    8/9/2023  1. NAFLD (nonalcoholic fatty liver disease)  Comprehensive Metabolic Panel      2. Splenomegaly  MR Elastography      3. Thrombocytopenia          Noted heme eval-  assessed thrombocytopenia due to chronic liver disease  FibroScan shows S3 steatosis with F0 to F1 fibrosis, fibrosis is not advanced enough to cause splenomegaly and thrombocytopenia  Discussed further evaluation with liver biopsy versus MRE, he prefers to proceed with MRE  Reportedly he had liver biopsy several years ago, the reason and the result he does not remember, says it was in [facility], not available in electronic records  LFTs continue to be normal since he had 20 lb weight loss    RTC: 6 mo    Susanne Alexis MD  Dept of Hepatology, [place] [facility] [place]

## 2023-08-18 ENCOUNTER — PATIENT MESSAGE (OUTPATIENT)
Dept: HEPATOLOGY | Facility: CLINIC | Age: 68
End: 2023-08-18
Payer: MEDICARE

## 2023-08-28 ENCOUNTER — HOSPITAL ENCOUNTER (OUTPATIENT)
Dept: RADIOLOGY | Facility: HOSPITAL | Age: 68
Discharge: HOME | End: 2023-08-28
Attending: INTERNAL MEDICINE
Payer: MEDICARE

## 2023-08-28 ENCOUNTER — LAB VISIT (OUTPATIENT)
Dept: LAB | Facility: HOSPITAL | Age: 68
End: 2023-08-28
Attending: NURSE PRACTITIONER
Payer: MEDICARE

## 2023-08-28 DIAGNOSIS — R16.1 SPLENOMEGALY: ICD-10-CM

## 2023-08-28 DIAGNOSIS — D69.6 THROMBOCYTOPENIA: ICD-10-CM

## 2023-08-28 LAB
BASOPHILS # BLD AUTO: 0.06 K/UL (ref 0–0.2)
BASOPHILS NFR BLD: 1.1 % (ref 0–1.9)
DIFFERENTIAL METHOD: ABNORMAL
EOSINOPHIL # BLD AUTO: 0.3 K/UL (ref 0–0.5)
EOSINOPHIL NFR BLD: 5.9 % (ref 0–8)
ERYTHROCYTE [DISTWIDTH] IN BLOOD BY AUTOMATED COUNT: 13.2 % (ref 11.5–14.5)
HCT VFR BLD AUTO: 45.1 % (ref 40–54)
HGB BLD-MCNC: 15.7 G/DL (ref 14–18)
IMM GRANULOCYTES # BLD AUTO: 0.02 K/UL (ref 0–0.04)
IMM GRANULOCYTES NFR BLD AUTO: 0.4 % (ref 0–0.5)
LYMPHOCYTES # BLD AUTO: 1.7 K/UL (ref 1–4.8)
LYMPHOCYTES NFR BLD: 32.4 % (ref 18–48)
MCH RBC QN AUTO: 31.9 PG (ref 27–31)
MCHC RBC AUTO-ENTMCNC: 34.8 G/DL (ref 32–36)
MCV RBC AUTO: 92 FL (ref 82–98)
MONOCYTES # BLD AUTO: 0.5 K/UL (ref 0.3–1)
MONOCYTES NFR BLD: 9 % (ref 4–15)
NEUTROPHILS # BLD AUTO: 2.7 K/UL (ref 1.8–7.7)
NEUTROPHILS NFR BLD: 51.6 % (ref 38–73)
NRBC BLD-RTO: 0 /100 WBC
PLATELET # BLD AUTO: 140 K/UL (ref 150–450)
PMV BLD AUTO: 11.5 FL (ref 9.2–12.9)
RBC # BLD AUTO: 4.92 M/UL (ref 4.6–6.2)
WBC # BLD AUTO: 5.24 K/UL (ref 3.9–12.7)

## 2023-08-28 PROCEDURE — 76391 MR ELASTOGRAPHY: ICD-10-PCS | Mod: 26,,, | Performed by: STUDENT IN AN ORGANIZED HEALTH CARE EDUCATION/TRAINING PROGRAM

## 2023-08-28 PROCEDURE — 85025 COMPLETE CBC W/AUTO DIFF WBC: CPT | Mod: PO | Performed by: NURSE PRACTITIONER

## 2023-08-28 PROCEDURE — 76391 MR ELASTOGRAPHY: CPT | Mod: 26,,, | Performed by: STUDENT IN AN ORGANIZED HEALTH CARE EDUCATION/TRAINING PROGRAM

## 2023-08-28 PROCEDURE — 76391 MR ELASTOGRAPHY: CPT | Mod: TC

## 2023-08-28 PROCEDURE — 36415 COLL VENOUS BLD VENIPUNCTURE: CPT | Mod: PO | Performed by: NURSE PRACTITIONER

## 2023-08-30 ENCOUNTER — CLINICAL SUPPORT (OUTPATIENT)
Dept: FAMILY MEDICINE | Facility: CLINIC | Age: 68
End: 2023-08-30
Payer: MEDICARE

## 2023-08-30 ENCOUNTER — OFFICE VISIT (OUTPATIENT)
Dept: HEMATOLOGY/ONCOLOGY | Facility: CLINIC | Age: 68
End: 2023-08-30
Payer: MEDICARE

## 2023-08-30 ENCOUNTER — HOSPITAL ENCOUNTER (OUTPATIENT)
Dept: CARDIOLOGY | Facility: HOSPITAL | Age: 68
Discharge: HOME | End: 2023-08-30
Attending: NURSE PRACTITIONER
Payer: MEDICARE

## 2023-08-30 VITALS
SYSTOLIC BLOOD PRESSURE: 116 MMHG | HEIGHT: 68 IN | WEIGHT: 232.88 LBS | DIASTOLIC BLOOD PRESSURE: 83 MMHG | BODY MASS INDEX: 35.29 KG/M2 | HEART RATE: 63 BPM

## 2023-08-30 DIAGNOSIS — D69.6 THROMBOCYTOPENIA: Primary | ICD-10-CM

## 2023-08-30 DIAGNOSIS — R01.2 ABNORMAL HEART SOUNDS: ICD-10-CM

## 2023-08-30 DIAGNOSIS — K76.0 NAFLD (NONALCOHOLIC FATTY LIVER DISEASE): ICD-10-CM

## 2023-08-30 DIAGNOSIS — R16.1 SPLENOMEGALY: ICD-10-CM

## 2023-08-30 DIAGNOSIS — R79.89 ELEVATED FERRITIN: ICD-10-CM

## 2023-08-30 PROCEDURE — 99214 OFFICE O/P EST MOD 30 MIN: CPT | Mod: S$GLB,,, | Performed by: NURSE PRACTITIONER

## 2023-08-30 PROCEDURE — 1160F PR REVIEW ALL MEDS BY PRESCRIBER/CLIN PHARMACIST DOCUMENTED: ICD-10-PCS | Mod: CPTII,S$GLB,, | Performed by: NURSE PRACTITIONER

## 2023-08-30 PROCEDURE — 3288F PR FALLS RISK ASSESSMENT DOCUMENTED: ICD-10-PCS | Mod: CPTII,S$GLB,, | Performed by: NURSE PRACTITIONER

## 2023-08-30 PROCEDURE — 99999 PR PBB SHADOW E&M-EST. PATIENT-LVL III: ICD-10-PCS | Mod: PBBFAC,,, | Performed by: NURSE PRACTITIONER

## 2023-08-30 PROCEDURE — 93005 ELECTROCARDIOGRAM TRACING: CPT | Mod: PO

## 2023-08-30 PROCEDURE — 99214 PR OFFICE/OUTPT VISIT, EST, LEVL IV, 30-39 MIN: ICD-10-PCS | Mod: S$GLB,,, | Performed by: NURSE PRACTITIONER

## 2023-08-30 PROCEDURE — 1101F PR PT FALLS ASSESS DOC 0-1 FALLS W/OUT INJ PAST YR: ICD-10-PCS | Mod: CPTII,S$GLB,, | Performed by: NURSE PRACTITIONER

## 2023-08-30 PROCEDURE — 1160F RVW MEDS BY RX/DR IN RCRD: CPT | Mod: CPTII,S$GLB,, | Performed by: NURSE PRACTITIONER

## 2023-08-30 PROCEDURE — 1126F PR PAIN SEVERITY QUANTIFIED, NO PAIN PRESENT: ICD-10-PCS | Mod: CPTII,S$GLB,, | Performed by: NURSE PRACTITIONER

## 2023-08-30 PROCEDURE — 3074F SYST BP LT 130 MM HG: CPT | Mod: CPTII,S$GLB,, | Performed by: NURSE PRACTITIONER

## 2023-08-30 PROCEDURE — 3008F PR BODY MASS INDEX (BMI) DOCUMENTED: ICD-10-PCS | Mod: CPTII,S$GLB,, | Performed by: NURSE PRACTITIONER

## 2023-08-30 PROCEDURE — 3288F FALL RISK ASSESSMENT DOCD: CPT | Mod: CPTII,S$GLB,, | Performed by: NURSE PRACTITIONER

## 2023-08-30 PROCEDURE — 4010F PR ACE/ARB THEARPY RXD/TAKEN: ICD-10-PCS | Mod: CPTII,S$GLB,, | Performed by: NURSE PRACTITIONER

## 2023-08-30 PROCEDURE — 1159F PR MEDICATION LIST DOCUMENTED IN MEDICAL RECORD: ICD-10-PCS | Mod: CPTII,S$GLB,, | Performed by: NURSE PRACTITIONER

## 2023-08-30 PROCEDURE — 3079F DIAST BP 80-89 MM HG: CPT | Mod: CPTII,S$GLB,, | Performed by: NURSE PRACTITIONER

## 2023-08-30 PROCEDURE — 1126F AMNT PAIN NOTED NONE PRSNT: CPT | Mod: CPTII,S$GLB,, | Performed by: NURSE PRACTITIONER

## 2023-08-30 PROCEDURE — 93010 ELECTROCARDIOGRAM REPORT: CPT | Mod: S$GLB,,, | Performed by: INTERNAL MEDICINE

## 2023-08-30 PROCEDURE — 99999 PR PBB SHADOW E&M-EST. PATIENT-LVL III: CPT | Mod: PBBFAC,,, | Performed by: NURSE PRACTITIONER

## 2023-08-30 PROCEDURE — 93010 EKG 12-LEAD: ICD-10-PCS | Mod: S$GLB,,, | Performed by: INTERNAL MEDICINE

## 2023-08-30 PROCEDURE — 3008F BODY MASS INDEX DOCD: CPT | Mod: CPTII,S$GLB,, | Performed by: NURSE PRACTITIONER

## 2023-08-30 PROCEDURE — 1159F MED LIST DOCD IN RCRD: CPT | Mod: CPTII,S$GLB,, | Performed by: NURSE PRACTITIONER

## 2023-08-30 PROCEDURE — 3079F PR MOST RECENT DIASTOLIC BLOOD PRESSURE 80-89 MM HG: ICD-10-PCS | Mod: CPTII,S$GLB,, | Performed by: NURSE PRACTITIONER

## 2023-08-30 PROCEDURE — 1101F PT FALLS ASSESS-DOCD LE1/YR: CPT | Mod: CPTII,S$GLB,, | Performed by: NURSE PRACTITIONER

## 2023-08-30 PROCEDURE — 3074F PR MOST RECENT SYSTOLIC BLOOD PRESSURE < 130 MM HG: ICD-10-PCS | Mod: CPTII,S$GLB,, | Performed by: NURSE PRACTITIONER

## 2023-08-30 PROCEDURE — 4010F ACE/ARB THERAPY RXD/TAKEN: CPT | Mod: CPTII,S$GLB,, | Performed by: NURSE PRACTITIONER

## 2023-08-30 NOTE — PROGRESS NOTES
Subjective:      Patient ID: Jasson Mayo is a 68 y.o. male.    Chief Complaint: joint pains    HPI:   68 year old male who presents today for continued monitoring of thrombocytopenia.  He has previously been followed in the clinic by Dr. Frank Rinaldi.  Today is the first time I am evaluating/assessing the patient.  He has been found with diffuse fatty liver  -found with mild steatosis on fibroscan followed by hepatology, degenerative arthritis with radiculopathy, gout and is currently being treated with co-benemid and allopurinol.    Interval History:  8/30/2023 Presents today to review labs with continue stable thrombocytopenia.  Only complaint is chronic arthralgias.  Denies any abnormal bleeding.       Social History     Socioeconomic History    Marital status:     Number of children: 1   Occupational History    Occupation: [employer]     Employer: [employer]     Comment: Jimmy pandey   Tobacco Use    Smoking status: Never    Smokeless tobacco: Former   Substance and Sexual Activity    Alcohol use: No    Drug use: Never    Sexual activity: Not Currently     Partners: Female     Birth control/protection: None     Social Determinants of Health     Financial Resource Strain: Low Risk  (7/17/2023)    Overall Financial Resource Strain (CARDIA)     Difficulty of Paying Living Expenses: Not hard at all   Food Insecurity: No Food Insecurity (7/17/2023)    Hunger Vital Sign     Worried About Running Out of Food in the Last Year: Never true     Ran Out of Food in the Last Year: Never true   Transportation Needs: No Transportation Needs (7/17/2023)    PRAPARE - Transportation     Lack of Transportation (Medical): No     Lack of Transportation (Non-Medical): No   Physical Activity: Insufficiently Active (7/17/2023)    Exercise Vital Sign     Days of Exercise per Week: 3 days     Minutes of Exercise per Session: 30 min   Stress: No Stress Concern Present (7/17/2023)    [facility] of  Occupational Health - Occupational Stress Questionnaire     Feeling of Stress : Not at all   Social Connections: Unknown (7/17/2023)    Social Connection and Isolation Panel [NHANES]     Frequency of Communication with Friends and Family: More than three times a week     Frequency of Social Gatherings with Friends and Family: More than three times a week     Active Member of Clubs or Organizations: Yes     Attends Club or Organization Meetings: 1 to 4 times per year     Marital Status:    Housing Stability: Low Risk  (7/17/2023)    Housing Stability Vital Sign     Unable to Pay for Housing in the Last Year: No     Number of Places Lived in the Last Year: 1     Unstable Housing in the Last Year: No       Family History   Problem Relation Age of Onset    COPD Mother     Heart attacks under age 50 Mother     Cancer Father         PROSTATE CA       Past Surgical History:   Procedure Laterality Date    COLONOSCOPY W/ POLYPECTOMY  10, 13 no polyp    KNEE ARTHROSCOPY Right     Meniscus repair    LIVER BIOPSY  2010    VASECTOMY         Past Medical History:   Diagnosis Date    Decreased platelet count     Fatty liver     Gout     HTN (hypertension) 2000    Non-A non-B hepatitis 1990    Obesity (BMI 35.0-39.9 without comorbidity)     Prediabetes 2015       Review of Systems   Constitutional:  Negative for appetite change and unexpected weight change.   HENT:  Negative for mouth sores.    Eyes:  Negative for visual disturbance.   Respiratory:  Negative for cough and shortness of breath.    Cardiovascular:  Negative for chest pain.   Gastrointestinal:  Negative for abdominal pain and diarrhea.   Endocrine: Negative.    Genitourinary:  Negative for frequency.   Musculoskeletal:  Positive for arthralgias and back pain.   Skin: Negative.  Negative for rash.   Allergic/Immunologic: Negative.    Neurological:  Negative for headaches.   Hematological:  Negative for adenopathy.   Psychiatric/Behavioral: Negative.  The patient  is not nervous/anxious.           Medication List with Changes/Refills   Current Medications    ALLOPURINOL (ZYLOPRIM) 100 MG TABLET    TAKE 1 TABLET (100 MG TOTAL) BY MOUTH ONCE DAILY.    BENAZEPRIL (LOTENSIN) 20 MG TABLET    Take 1 tablet (20 mg total) by mouth once daily.    COLCHICINE-PROBENECID 0.5-500 MG (CO-BENEMID) 500-0.5 MG TAB    TAKE 1 TABLET BY MOUTH EVERY DAY    VITAMIN E 1000 UNIT CAPSULE    Take 1,000 Units by mouth once daily.        Objective:     Vitals:    08/30/23 0854   BP: 116/83   Pulse: 63       Physical Exam  Vitals reviewed.   Constitutional:       Appearance: Normal appearance. He is obese.   HENT:      Head: Normocephalic and atraumatic.      Right Ear: External ear normal.      Left Ear: External ear normal.   Cardiovascular:      Rate and Rhythm: Normal rate. Rhythm irregular.      Heart sounds: S1 normal and S2 normal.   Pulmonary:      Effort: Pulmonary effort is normal.      Breath sounds: Normal breath sounds.   Abdominal:      General: There is no distension.   Musculoskeletal:         General: Normal range of motion.      Cervical back: Normal range of motion.   Skin:     General: Skin is warm and dry.   Neurological:      General: No focal deficit present.      Mental Status: He is alert and oriented to person, place, and time.   Psychiatric:         Attention and Perception: Attention and perception normal.         Mood and Affect: Mood and affect normal.         Speech: Speech normal.         Behavior: Behavior normal. Behavior is cooperative.         Thought Content: Thought content normal.         Cognition and Memory: Cognition and memory normal.         Judgment: Judgment normal.         Assessment:     Problem List Items Addressed This Visit          Hematology    Thrombocytopenia - Primary    Relevant Orders    CBC Auto Differential (Completed)    CBC Auto Differential       GI    NAFLD (nonalcoholic fatty liver disease)    Relevant Orders    Comprehensive Metabolic  "Panel    CBC Auto Differential    Comprehensive Metabolic Panel     Other Visit Diagnoses       Splenomegaly        Relevant Orders    Comprehensive Metabolic Panel    CBC Auto Differential    Elevated ferritin        Relevant Orders    IRON AND TIBC    Ferritin    Comprehensive Metabolic Panel    Comprehensive Metabolic Panel    Abnormal heart sounds        Relevant Orders    EKG 12-lead    EKG 12-lead            Lab Results   Component Value Date    WBC 5.50 08/30/2023    RBC 4.77 08/30/2023    HGB 15.2 08/30/2023    HCT 43.6 08/30/2023    MCV 91 08/30/2023    MCH 31.9 (H) 08/30/2023    MCHC 34.9 08/30/2023    RDW 13.2 08/30/2023     (L) 08/30/2023    MPV 11.0 08/30/2023    GRAN 44.0 08/30/2023    LYMPH CANCELED 08/30/2023    LYMPH 42.0 08/30/2023    MONO CANCELED 08/30/2023    MONO 8.0 08/30/2023    EOS CANCELED 08/30/2023    BASO CANCELED 08/30/2023    EOSINOPHIL 5.0 08/30/2023    BASOPHIL 1.0 08/30/2023      Lab Results   Component Value Date     07/10/2023    K 4.7 07/10/2023     07/10/2023    CO2 25 07/10/2023    BUN 23 07/10/2023    CREATININE 1.0 07/10/2023    CALCIUM 9.8 07/10/2023    ANIONGAP 10 07/10/2023    ESTGFRAFRICA >60.0 06/10/2022    EGFRNONAA >60.0 06/10/2022     Lab Results   Component Value Date    ALT 33 07/10/2023    AST 31 07/10/2023    ALKPHOS 96 07/10/2023    BILITOT 0.7 07/10/2023     No results found for: "UIBC", "IRON", "TRANS", "TRANSFERRIN", "TIBC", "LABIRON", "FESATURATED"   Lab Results   Component Value Date    FERRITIN 919 (H) 06/29/2022       Plan:   Thrombocytopenia  -     CBC Auto Differential; Future; Expected date: 08/30/2023  -     CBC Auto Differential; Future; Expected date: 08/30/2023    NAFLD (nonalcoholic fatty liver disease)  -     Comprehensive Metabolic Panel; Future; Expected date: 08/30/2023  -     CBC Auto Differential; Future; Expected date: 08/30/2023  -     Comprehensive Metabolic Panel; Future; Expected date: 08/30/2023    Splenomegaly  -     " Comprehensive Metabolic Panel; Future; Expected date: 08/30/2023  -     CBC Auto Differential; Future; Expected date: 08/30/2023    Elevated ferritin  -     IRON AND TIBC; Future; Expected date: 08/30/2023  -     Ferritin; Future; Expected date: 08/30/2023  -     Comprehensive Metabolic Panel; Future; Expected date: 08/30/2023  -     Comprehensive Metabolic Panel; Future; Expected date: 08/30/2023    Abnormal heart sounds  -     EKG 12-lead; Future  -     EKG 12-lead; Future      Med Onc Chart Routing      Follow up with physician    Follow up with [facility] 6 months. with labs prior   Infusion scheduling note   n/a   Injection scheduling note n/a   Labs   Scheduling:  Preferred lab: [facility]  Lab interval:  iron studies today.  Labs prior to 6 month f/u -cbc cmp   Imaging   Ecg today   Pharmacy appointment No pharmacy appointment needed      Other referrals     No additional referrals needed  n/a        Noted elevated ferritin level with no iron studies.  Will assess iron studies to determe if iron is elevated.  If iron is elevated will do hereditary hemochromatosis labs.  If not, will f/u in 6 months with labs prior - labs remain stable.     Collaborating Provider:  Dr. Olegario Maolne    Thank You,  Leonardo Lisa, FNP-C  Hematology Oncology

## 2023-08-31 ENCOUNTER — TELEPHONE (OUTPATIENT)
Dept: HEMATOLOGY/ONCOLOGY | Facility: CLINIC | Age: 68
End: 2023-08-31
Payer: MEDICARE

## 2023-08-31 ENCOUNTER — TELEPHONE (OUTPATIENT)
Dept: HEPATOLOGY | Facility: CLINIC | Age: 68
End: 2023-08-31

## 2023-08-31 DIAGNOSIS — E83.19 INCREASED STORAGE IRON: Primary | ICD-10-CM

## 2023-08-31 NOTE — TELEPHONE ENCOUNTER
----- Message from Jamia Lisa NP sent at 8/31/2023 11:16 AM CDT -----  Please let patient know that his iron studies are elevated.  Have him do HH labs and f/u in 3 weeks to discuss results.  OK for virtual visit if he is ok with this.

## 2023-08-31 NOTE — TELEPHONE ENCOUNTER
"Please schedule pt a followup appt in [facility] for thrombocytopenia, no liver issues " per dr. Alexis.   "

## 2023-09-05 ENCOUNTER — LAB VISIT (OUTPATIENT)
Dept: LAB | Facility: HOSPITAL | Age: 68
End: 2023-09-05
Payer: MEDICARE

## 2023-09-05 ENCOUNTER — OFFICE VISIT (OUTPATIENT)
Dept: URGENT CARE | Facility: CLINIC | Age: 68
End: 2023-09-05
Payer: MEDICARE

## 2023-09-05 VITALS
HEART RATE: 114 BPM | HEIGHT: 68 IN | DIASTOLIC BLOOD PRESSURE: 81 MMHG | TEMPERATURE: 98 F | BODY MASS INDEX: 34.86 KG/M2 | SYSTOLIC BLOOD PRESSURE: 127 MMHG | WEIGHT: 230 LBS | RESPIRATION RATE: 18 BRPM | OXYGEN SATURATION: 100 %

## 2023-09-05 DIAGNOSIS — L23.7 POISON IVY DERMATITIS: Primary | ICD-10-CM

## 2023-09-05 DIAGNOSIS — E83.19 INCREASED STORAGE IRON: ICD-10-CM

## 2023-09-05 DIAGNOSIS — L25.9 CONTACT DERMATITIS, UNSPECIFIED CONTACT DERMATITIS TYPE, UNSPECIFIED TRIGGER: ICD-10-CM

## 2023-09-05 PROCEDURE — 81256 HFE GENE: CPT | Performed by: NURSE PRACTITIONER

## 2023-09-05 PROCEDURE — 96372 THER/PROPH/DIAG INJ SC/IM: CPT | Mod: S$GLB,,, | Performed by: PHYSICIAN ASSISTANT

## 2023-09-05 PROCEDURE — 96372 PR INJECTION,THERAP/PROPH/DIAG2ST, IM OR SUBCUT: ICD-10-PCS | Mod: S$GLB,,, | Performed by: PHYSICIAN ASSISTANT

## 2023-09-05 PROCEDURE — 99213 PR OFFICE/OUTPT VISIT, EST, LEVL III, 20-29 MIN: ICD-10-PCS | Mod: 25,S$GLB,, | Performed by: PHYSICIAN ASSISTANT

## 2023-09-05 PROCEDURE — 36415 COLL VENOUS BLD VENIPUNCTURE: CPT | Mod: PO | Performed by: NURSE PRACTITIONER

## 2023-09-05 PROCEDURE — 99213 OFFICE O/P EST LOW 20 MIN: CPT | Mod: 25,S$GLB,, | Performed by: PHYSICIAN ASSISTANT

## 2023-09-05 RX ORDER — TRIAMCINOLONE ACETONIDE 1 MG/G
OINTMENT TOPICAL 2 TIMES DAILY
Qty: 15 G | Refills: 0 | Status: SHIPPED | OUTPATIENT
Start: 2023-09-05 | End: 2023-09-27

## 2023-09-05 RX ORDER — PREDNISONE 20 MG/1
20 TABLET ORAL DAILY
Qty: 5 TABLET | Refills: 0 | Status: SHIPPED | OUTPATIENT
Start: 2023-09-05 | End: 2023-09-10

## 2023-09-05 RX ORDER — DEXAMETHASONE SODIUM PHOSPHATE 100 MG/10ML
10 INJECTION INTRAMUSCULAR; INTRAVENOUS
Status: COMPLETED | OUTPATIENT
Start: 2023-09-05 | End: 2023-09-05

## 2023-09-05 RX ADMIN — DEXAMETHASONE SODIUM PHOSPHATE 10 MG: 100 INJECTION INTRAMUSCULAR; INTRAVENOUS at 09:09

## 2023-09-05 NOTE — PROGRESS NOTES
"Subjective:      Patient ID: Jasson Mayo is a 68 y.o. male.    Vitals:  height is 5' 8" (1.727 m) and weight is 104.3 kg (230 lb).     Chief Complaint: Rash    Poison ivy exposure 09/01/23  Patient states the rash is red, itching and dry   Patient does have a scrub and cream at home for the rash, it has not completely cleared up     Rash  This is a new problem. The current episode started in the past 7 days. The problem has been gradually worsening since onset. The affected locations include the right arm and left arm. The rash is characterized by itchiness, dryness and redness. He was exposed to plant contact. Pertinent negatives include no congestion, cough, diarrhea, eye pain, fatigue, fever, shortness of breath, sore throat or vomiting. Treatments tried: OTC cream/scrub. The treatment provided mild relief. There is no history of allergies, asthma, eczema or varicella.       Constitution: Negative for chills, sweating, fatigue and fever.   HENT:  Negative for ear pain, drooling, congestion, sore throat, trouble swallowing and voice change.    Neck: Negative for neck pain, neck stiffness and painful lymph nodes.   Cardiovascular:  Negative for chest pain, leg swelling, palpitations, sob on exertion and passing out.   Eyes:  Negative for eye discharge, eye itching, eye pain, eye redness and eyelid swelling.   Respiratory:  Negative for chest tightness, cough, sputum production, bloody sputum, shortness of breath, stridor and wheezing.    Gastrointestinal:  Negative for abdominal pain, abdominal bloating, nausea, vomiting, constipation, diarrhea and heartburn.   Genitourinary:  Negative for urine decreased.   Musculoskeletal:  Negative for joint pain, joint swelling, abnormal ROM of joint, pain with walking, muscle cramps and muscle ache.   Skin:  Positive for rash and erythema. Negative for hives.   Allergic/Immunologic: Negative for hives, itching and sneezing.   Neurological:  Negative for dizziness, " light-headedness, passing out, loss of balance, headaches, altered mental status, loss of consciousness, numbness and seizures.   Hematologic/Lymphatic: Negative for swollen lymph nodes.   Psychiatric/Behavioral:  Negative for altered mental status and nervous/anxious. The patient is not nervous/anxious.       Objective:     Physical Exam   Constitutional: He is oriented to person, place, and time. He appears well-developed.   HENT:   Head: Normocephalic and atraumatic. Head is without abrasion, without contusion and without laceration.   Ears:   Right Ear: External ear normal.   Left Ear: External ear normal.   Nose: Nose normal.   Mouth/Throat: Oropharynx is clear and moist and mucous membranes are normal.   Eyes: Conjunctivae, EOM and lids are normal. Pupils are equal, round, and reactive to light.   Neck: Trachea normal and phonation normal. Neck supple.   Cardiovascular: Normal rate, regular rhythm and normal heart sounds.   Pulmonary/Chest: Effort normal and breath sounds normal. No stridor. No respiratory distress.   Musculoskeletal: Normal range of motion.         General: Normal range of motion.   Neurological: He is alert and oriented to person, place, and time.   Skin: Skin is warm, dry, intact and no rash. Capillary refill takes less than 2 seconds. erythema No abrasion, No burn, No bruising and No ecchymosis         Comments: +vesicular rash to b/l upper extremities consistent with poison ivy   Psychiatric: His speech is normal and behavior is normal. Judgment and thought content normal.   Nursing note and vitals reviewed.      Assessment:     1. Poison ivy dermatitis    2. Contact dermatitis, unspecified contact dermatitis type, unspecified trigger        Plan:       Poison ivy dermatitis    Contact dermatitis, unspecified contact dermatitis type, unspecified trigger    Other orders  -     dexAMETHasone injection 10 mg  -     predniSONE (DELTASONE) 20 MG tablet; Take 1 tablet (20 mg total) by mouth  once daily. for 5 days  Dispense: 5 tablet; Refill: 0  -     triamcinolone acetonide 0.1% (KENALOG) 0.1 % ointment; Apply topically 2 (two) times daily. for 5 days  Dispense: 15 g; Refill: 0      Patient Instructions     You must understand that you've received an Urgent Care treatment only and that you may be released before all your medical problems are known or treated. You, the patient, will arrange for follow up care as instructed.  Follow up with your PCP or specialty clinic as directed if not improved or as needed. You can call [phone] to schedule an appointment with the appropriate provider.  If your condition worsens we recommend that you receive another evaluation at the Emergency Department for any concerns or worsening of condition.  Patient aware and verbalized understanding.     - You received a steroid shot today.  This can elevate your blood pressure, elevate your blood sugar, water weight gain, nervous energy, redness to the face and dimpling of the skin where the shot goes in.   - Do not use steroids more than 3 times per year.   - If you have diabetes, please check you blood sugar frequently.  - If you have high blood pressure, please check your blood pressure frequently.

## 2023-09-05 NOTE — PATIENT INSTRUCTIONS
You must understand that you've received an Urgent Care treatment only and that you may be released before all your medical problems are known or treated. You, the patient, will arrange for follow up care as instructed.  Follow up with your PCP or specialty clinic as directed if not improved or as needed. You can call [phone] to schedule an appointment with the appropriate provider.  If your condition worsens we recommend that you receive another evaluation at the Emergency Department for any concerns or worsening of condition.  Patient aware and verbalized understanding.     - You received a steroid shot today.  This can elevate your blood pressure, elevate your blood sugar, water weight gain, nervous energy, redness to the face and dimpling of the skin where the shot goes in.   - Do not use steroids more than 3 times per year.   - If you have diabetes, please check you blood sugar frequently.  - If you have high blood pressure, please check your blood pressure frequently.

## 2023-09-08 LAB
GENETICIST REVIEW: NORMAL
HFE GENE MUT ANL BLD/T: NORMAL
HFE RELEASED BY: NORMAL
HFE RESULT SUMMARY: NORMAL
REF LAB TEST METHOD: NORMAL
SPECIMEN SOURCE: NORMAL
SPECIMEN,  HEMOCHROMATOSIS: NORMAL

## 2023-09-15 ENCOUNTER — OFFICE VISIT (OUTPATIENT)
Dept: PODIATRY | Facility: CLINIC | Age: 68
End: 2023-09-15
Payer: MEDICARE

## 2023-09-15 DIAGNOSIS — L84 CORN OF TOE: Primary | ICD-10-CM

## 2023-09-15 PROCEDURE — 1160F PR REVIEW ALL MEDS BY PRESCRIBER/CLIN PHARMACIST DOCUMENTED: ICD-10-PCS | Mod: CPTII,S$GLB,, | Performed by: PODIATRIST

## 2023-09-15 PROCEDURE — 3288F FALL RISK ASSESSMENT DOCD: CPT | Mod: CPTII,S$GLB,, | Performed by: PODIATRIST

## 2023-09-15 PROCEDURE — 99203 PR OFFICE/OUTPT VISIT, NEW, LEVL III, 30-44 MIN: ICD-10-PCS | Mod: S$GLB,,, | Performed by: PODIATRIST

## 2023-09-15 PROCEDURE — 1160F RVW MEDS BY RX/DR IN RCRD: CPT | Mod: CPTII,S$GLB,, | Performed by: PODIATRIST

## 2023-09-15 PROCEDURE — 1126F PR PAIN SEVERITY QUANTIFIED, NO PAIN PRESENT: ICD-10-PCS | Mod: CPTII,S$GLB,, | Performed by: PODIATRIST

## 2023-09-15 PROCEDURE — 4010F PR ACE/ARB THEARPY RXD/TAKEN: ICD-10-PCS | Mod: CPTII,S$GLB,, | Performed by: PODIATRIST

## 2023-09-15 PROCEDURE — 1101F PT FALLS ASSESS-DOCD LE1/YR: CPT | Mod: CPTII,S$GLB,, | Performed by: PODIATRIST

## 2023-09-15 PROCEDURE — 1101F PR PT FALLS ASSESS DOC 0-1 FALLS W/OUT INJ PAST YR: ICD-10-PCS | Mod: CPTII,S$GLB,, | Performed by: PODIATRIST

## 2023-09-15 PROCEDURE — 1159F MED LIST DOCD IN RCRD: CPT | Mod: CPTII,S$GLB,, | Performed by: PODIATRIST

## 2023-09-15 PROCEDURE — 99203 OFFICE O/P NEW LOW 30 MIN: CPT | Mod: S$GLB,,, | Performed by: PODIATRIST

## 2023-09-15 PROCEDURE — 3288F PR FALLS RISK ASSESSMENT DOCUMENTED: ICD-10-PCS | Mod: CPTII,S$GLB,, | Performed by: PODIATRIST

## 2023-09-15 PROCEDURE — 4010F ACE/ARB THERAPY RXD/TAKEN: CPT | Mod: CPTII,S$GLB,, | Performed by: PODIATRIST

## 2023-09-15 PROCEDURE — 99999 PR PBB SHADOW E&M-EST. PATIENT-LVL II: CPT | Mod: PBBFAC,,, | Performed by: PODIATRIST

## 2023-09-15 PROCEDURE — 1159F PR MEDICATION LIST DOCUMENTED IN MEDICAL RECORD: ICD-10-PCS | Mod: CPTII,S$GLB,, | Performed by: PODIATRIST

## 2023-09-15 PROCEDURE — 99999 PR PBB SHADOW E&M-EST. PATIENT-LVL II: ICD-10-PCS | Mod: PBBFAC,,, | Performed by: PODIATRIST

## 2023-09-15 PROCEDURE — 1126F AMNT PAIN NOTED NONE PRSNT: CPT | Mod: CPTII,S$GLB,, | Performed by: PODIATRIST

## 2023-09-15 NOTE — PROGRESS NOTES
Subjective:       Patient ID: Jasson Mayo is a 68 y.o. male.    Chief Complaint: Callouses (Patient complains of discomfort to left second medial toe. Callous noted to area.)    HPI: Jasson Mayo presents to the office today, with areas of concern to the medial aspect of the left 2nd toe.  States that it feels as if he/she is walking on rocks.  Denies any puncture wounds or injuries.  States this pain is been ongoing for some time without significant improvement.  No specific memory of walking barefoot or causing injury.  States visualization of new developed lesions to the foot.  No signs of acute infection.    Review of patient's allergies indicates:  No Known Allergies    Past Medical History:   Diagnosis Date    Decreased platelet count     Fatty liver     Gout     HTN (hypertension) 2000    Non-A non-B hepatitis 1990    Obesity (BMI 35.0-39.9 without comorbidity)     Prediabetes 2015       Family History   Problem Relation Age of Onset    COPD Mother     Heart attacks under age 50 Mother     Cancer Father         PROSTATE CA       Social History     Socioeconomic History    Marital status:     Number of children: 1   Occupational History    Occupation: [employer]     Employer: [employer]     Comment: [employer]   Tobacco Use    Smoking status: Never    Smokeless tobacco: Former   Substance and Sexual Activity    Alcohol use: No    Drug use: Never    Sexual activity: Not Currently     Partners: Female     Birth control/protection: None     Social Determinants of Health     Financial Resource Strain: Low Risk  (7/17/2023)    Overall Financial Resource Strain (CARDIA)     Difficulty of Paying Living Expenses: Not hard at all   Food Insecurity: No Food Insecurity (7/17/2023)    Hunger Vital Sign     Worried About Running Out of Food in the Last Year: Never true     Ran Out of Food in the Last Year: Never true   Transportation Needs: No Transportation Needs (7/17/2023)     PRAPARE - Transportation     Lack of Transportation (Medical): No     Lack of Transportation (Non-Medical): No   Physical Activity: Insufficiently Active (7/17/2023)    Exercise Vital Sign     Days of Exercise per Week: 3 days     Minutes of Exercise per Session: 30 min   Stress: No Stress Concern Present (7/17/2023)    [employer] - Occupational Stress Questionnaire     Feeling of Stress : Not at all   Social Connections: Unknown (7/17/2023)    Social Connection and Isolation Panel [NHANES]     Frequency of Communication with Friends and Family: More than three times a week     Frequency of Social Gatherings with Friends and Family: More than three times a week     Active Member of Clubs or Organizations: Yes     Attends Club or Organization Meetings: 1 to 4 times per year     Marital Status:    Housing Stability: Low Risk  (7/17/2023)    Housing Stability Vital Sign     Unable to Pay for Housing in the Last Year: No     Number of Places Lived in the Last Year: 1     Unstable Housing in the Last Year: No       Past Surgical History:   Procedure Laterality Date    COLONOSCOPY W/ POLYPECTOMY  10, 14 no polyp    KNEE ARTHROSCOPY Right     Meniscus repair    LIVER BIOPSY  2010    VASECTOMY         Review of Systems       Objective:   There were no vitals taken for this visit.    MR Elastography  Narrative: EXAMINATION:  MR ELASTOGRAPHY    CLINICAL HISTORY:  Splenomegaly, not elsewhere classified    TECHNIQUE:  MRI abdomen performed without contrast per MR elastography protocol.    COMPARISON:  Ultrasound abdomen limited 07/13/2022, CT renal stone study 03/21/2017.    FINDINGS:  Visualized heart and lungs are unremarkable.  The liver is normal in size, with no focal hepatic lesions.  Focal well rounded lesion at the inferior right hepatic lobe measuring approximately 7 mm.  This lesion is unchanged in size from comparison CT dated 03/21/2017, and may represent a hepatic nodule or  prominent lymph node.  Mild splenomegaly with a suspected splenule measuring approximately 7 mm.  The gallbladder is unremarkable.  Common bile duct and intrahepatic ducts are unremarkable.  The pancreas is unremarkable.  The adrenal glands and visualized kidneys are unremarkable.  Visualized loops of bowel are unremarkable.  Vasculature of the abdomen is unremarkable.  Osseous structures are unremarkable.    Liver fat fraction measurements as follows:    MONICO--7.9%    Segmentation--12.2%    MR spectroscopy voxel--9.9%    Liver elasticity measures 2.81 kPa consistent with Normal or F0 fibrosis.  Impression: 1.  Liver fat fraction measures 10%, consistent with mild steatosis.    2.  Liver elasticity measures 2.81 kPa consistent with Normal or F0 fibrosis.    3.  Focal well rounded lesion at the inferior right hepatic lobe measuring approximately 7 mm.  Lesion is unchanged in size from comparison CT dated 03/21/2017, and is likely benign.  Considerations include a hepatic nodule or prominent lymph node.    REFERENCE RANGES:    Fat fraction analysis:    <5%: Normal.    5 to 15%: Mild steatosis.    >15%: Moderate or severe steatosis.    Elastography:    <2.93 kPa: Normal or F0 fibrosis.    2.93 to 4.15 kPa: F1 or F2 fibrosis.    >4.15 kPa: F3 or F4 fibrosis.    * If steatosis is present, elevated liver stiffness (>2.74 kPa) may be secondary to inflammation (GRIMES).    Electronically signed by: Jules Boone  Date:    08/29/2023  Time:    08:41       Physical Exam   LOWER EXTREMITY PHYSICAL EXAMINATION    Vascular:  The right dorsalis pedis pulse 2/4 and the right posterior tibial pulse 2/4.  The left dorsalis pedis pulse 2/4 and posterior tibial pulse on the left is 2/4.  Capillary refill is intact.  Pedal hair growth intact      Musculoskeletal: Manual Muscle Testing is 5/5 with dorsiflexion, plantar flexion, abduction, and adduction.   There is normal range of motion in the forefoot, hindfoot, and Ankle joint.  Pain on  palpation of this area to the medial aspect the left 2nd toe.  Slight displacement of fat pad at the proximal interphalangeal joint    Dermatological:  Skin is supple and moist.  There is 1 lesion present to the medial aspect of the left 2nd toe which have a resemblance to a plantar porokeratosis.  Centralize core is noted.  There is no acute signs of infection.  No signs of underlying ulceration.  There is no absence of skin line.  No obvious capillary hemorrhaging.      Imaging:         Results for orders placed in visit on 02/11/16    X-Ray Foot Complete Left    Narrative  Comparison: None    left foot 3 views    Findings:    Degenerative change noted at the ankle, throughout the midfoot, IP joints and first MTP joint.  No acute fracture or dislocation.  3 mm x 0.5 mm radiopaque foreign body projects in the dorsal soft tissues along the medial margin of the fourth metatarsal head.  Mild associated soft tissue swelling in this region suggested on the lateral view.  Vascular calcifications identified.  IMPRESSION:      3 mm x 0.5 mm linear radiopaque foreign body projecting in the dorsal soft tissues along the medial margin fourth metatarsal head.  See above.    Degenerative facet changes throughout the ankle and foot.  See above.    Calcaneal spurs.    No acute fracture or dislocation.  See above.  ______________________________________    Electronically signed by: JOON BETANCOURT III, MD  Date:     02/11/16  Time:    13:37       No results found for this or any previous visit.          Assessment:     1. Corn of toe        Plan:     Corn of toe      We discussed the etiology and pathology associated with acquired plantar porokeratosis.  We discussed the importance of removing the centralize core and alleviating pain discomfort.  We also discussed utilizing a pumice stone at home to reduce callus formation and subsequent recurrence of this area.  Recommend to apply hydrating creams and lotions this area daily to  ensure that there is no subsequent buildup.  Recommend the use of a silicone toe sleeve which can offload the area and reduce pressure/friction from the adjacent digit    Porokeratotic skin formation, as outlined within the examination portion of this note, is surgically debrided with sharp #10/#15 blade, to alleviate discomfort with weight bearing and ambulation, and to lessen the possibility of skin complications, e.g., ulceration due to pressure. No ulceration(s) is are noted with/post debridement. The lesion is completed healed and resolved. No evidence of infection.         Future Appointments   Date Time Provider Department Center   9/27/2023  8:00 AM Jamia Lisa NP [facility] [place]   1/22/2024  8:00 AM [facility]   2/9/2024  8:10 AM LAB[facility]ATORY, [place] [facility] [place]   2/21/2024 10:30 AM Patty Davies FNP [facility]

## 2023-09-27 ENCOUNTER — TELEPHONE (OUTPATIENT)
Dept: INFUSION THERAPY | Facility: HOSPITAL | Age: 68
End: 2023-09-27
Payer: MEDICARE

## 2023-09-27 ENCOUNTER — OFFICE VISIT (OUTPATIENT)
Dept: HEMATOLOGY/ONCOLOGY | Facility: CLINIC | Age: 68
End: 2023-09-27
Payer: MEDICARE

## 2023-09-27 VITALS
RESPIRATION RATE: 18 BRPM | SYSTOLIC BLOOD PRESSURE: 132 MMHG | DIASTOLIC BLOOD PRESSURE: 89 MMHG | HEART RATE: 61 BPM | WEIGHT: 237.5 LBS | HEIGHT: 66 IN | OXYGEN SATURATION: 96 % | BODY MASS INDEX: 38.17 KG/M2

## 2023-09-27 DIAGNOSIS — D69.6 THROMBOCYTOPENIA: ICD-10-CM

## 2023-09-27 DIAGNOSIS — E83.19 INCREASED STORAGE IRON: Primary | ICD-10-CM

## 2023-09-27 DIAGNOSIS — K76.0 NAFLD (NONALCOHOLIC FATTY LIVER DISEASE): ICD-10-CM

## 2023-09-27 DIAGNOSIS — R79.89 ELEVATED FERRITIN: ICD-10-CM

## 2023-09-27 DIAGNOSIS — R16.1 SPLENOMEGALY: ICD-10-CM

## 2023-09-27 DIAGNOSIS — E83.110 HEREDITARY HEMOCHROMATOSIS: ICD-10-CM

## 2023-09-27 PROCEDURE — 3075F SYST BP GE 130 - 139MM HG: CPT | Mod: CPTII,S$GLB,, | Performed by: NURSE PRACTITIONER

## 2023-09-27 PROCEDURE — 1159F MED LIST DOCD IN RCRD: CPT | Mod: CPTII,S$GLB,, | Performed by: NURSE PRACTITIONER

## 2023-09-27 PROCEDURE — 99999 PR PBB SHADOW E&M-EST. PATIENT-LVL III: CPT | Mod: PBBFAC,,, | Performed by: NURSE PRACTITIONER

## 2023-09-27 PROCEDURE — 99999 PR PBB SHADOW E&M-EST. PATIENT-LVL III: ICD-10-PCS | Mod: PBBFAC,,, | Performed by: NURSE PRACTITIONER

## 2023-09-27 PROCEDURE — 1160F RVW MEDS BY RX/DR IN RCRD: CPT | Mod: CPTII,S$GLB,, | Performed by: NURSE PRACTITIONER

## 2023-09-27 PROCEDURE — 4010F PR ACE/ARB THEARPY RXD/TAKEN: ICD-10-PCS | Mod: CPTII,S$GLB,, | Performed by: NURSE PRACTITIONER

## 2023-09-27 PROCEDURE — 3288F FALL RISK ASSESSMENT DOCD: CPT | Mod: CPTII,S$GLB,, | Performed by: NURSE PRACTITIONER

## 2023-09-27 PROCEDURE — 99214 PR OFFICE/OUTPT VISIT, EST, LEVL IV, 30-39 MIN: ICD-10-PCS | Mod: S$GLB,,, | Performed by: NURSE PRACTITIONER

## 2023-09-27 PROCEDURE — 99214 OFFICE O/P EST MOD 30 MIN: CPT | Mod: S$GLB,,, | Performed by: NURSE PRACTITIONER

## 2023-09-27 PROCEDURE — 3008F BODY MASS INDEX DOCD: CPT | Mod: CPTII,S$GLB,, | Performed by: NURSE PRACTITIONER

## 2023-09-27 PROCEDURE — 4010F ACE/ARB THERAPY RXD/TAKEN: CPT | Mod: CPTII,S$GLB,, | Performed by: NURSE PRACTITIONER

## 2023-09-27 PROCEDURE — 1126F PR PAIN SEVERITY QUANTIFIED, NO PAIN PRESENT: ICD-10-PCS | Mod: CPTII,S$GLB,, | Performed by: NURSE PRACTITIONER

## 2023-09-27 PROCEDURE — 3075F PR MOST RECENT SYSTOLIC BLOOD PRESS GE 130-139MM HG: ICD-10-PCS | Mod: CPTII,S$GLB,, | Performed by: NURSE PRACTITIONER

## 2023-09-27 PROCEDURE — 3079F DIAST BP 80-89 MM HG: CPT | Mod: CPTII,S$GLB,, | Performed by: NURSE PRACTITIONER

## 2023-09-27 PROCEDURE — 3079F PR MOST RECENT DIASTOLIC BLOOD PRESSURE 80-89 MM HG: ICD-10-PCS | Mod: CPTII,S$GLB,, | Performed by: NURSE PRACTITIONER

## 2023-09-27 PROCEDURE — 3008F PR BODY MASS INDEX (BMI) DOCUMENTED: ICD-10-PCS | Mod: CPTII,S$GLB,, | Performed by: NURSE PRACTITIONER

## 2023-09-27 PROCEDURE — 1101F PT FALLS ASSESS-DOCD LE1/YR: CPT | Mod: CPTII,S$GLB,, | Performed by: NURSE PRACTITIONER

## 2023-09-27 PROCEDURE — 1159F PR MEDICATION LIST DOCUMENTED IN MEDICAL RECORD: ICD-10-PCS | Mod: CPTII,S$GLB,, | Performed by: NURSE PRACTITIONER

## 2023-09-27 PROCEDURE — 3288F PR FALLS RISK ASSESSMENT DOCUMENTED: ICD-10-PCS | Mod: CPTII,S$GLB,, | Performed by: NURSE PRACTITIONER

## 2023-09-27 PROCEDURE — 1160F PR REVIEW ALL MEDS BY PRESCRIBER/CLIN PHARMACIST DOCUMENTED: ICD-10-PCS | Mod: CPTII,S$GLB,, | Performed by: NURSE PRACTITIONER

## 2023-09-27 PROCEDURE — 1101F PR PT FALLS ASSESS DOC 0-1 FALLS W/OUT INJ PAST YR: ICD-10-PCS | Mod: CPTII,S$GLB,, | Performed by: NURSE PRACTITIONER

## 2023-09-27 PROCEDURE — 1126F AMNT PAIN NOTED NONE PRSNT: CPT | Mod: CPTII,S$GLB,, | Performed by: NURSE PRACTITIONER

## 2023-09-27 RX ORDER — LIDOCAINE HYDROCHLORIDE 10 MG/ML
1 INJECTION, SOLUTION EPIDURAL; INFILTRATION; INTRACAUDAL; PERINEURAL ONCE
Status: CANCELLED | OUTPATIENT
Start: 2023-09-27 | End: 2023-09-27

## 2023-09-27 NOTE — PROGRESS NOTES
Subjective:      Patient ID: Jasson Mayo is a 68 y.o. male.    Chief Complaint: no complaint    HPI:  68 year old male who presents today for continued monitoring of thrombocytopenia.  He has previously been followed in the clinic by Dr. Frank Rinaldi.  Today is the first time I am evaluating/assessing the patient.  He has been found with diffuse fatty liver  -found with mild steatosis on fibroscan followed by hepatology, degenerative arthritis with radiculopathy, gout and is currently being treated with co-benemid and allopurinol.     Interval History:  8/30/2023 Presents today to review labs with continue stable thrombocytopenia.  Only complaint is chronic arthralgias.  Denies any abnormal bleeding.      Interval History:  9/27/2023 Presents today to review results of iron studies and HH studies.  Found with elevated iron and carrier of H63D gene.       : C282Y: Not detected.   H63D: One copy of the H63D variant was identified.    Interpretation SEE BELOW    Comment: This individual is heterozygous for H63D. This result is   consistent with at minimum a carrier status for hereditary   hemochromatosis (HH).     The presence of a single H63D variant is unlikely to be of   clinical significance in the absence of other   disease-causing variants (1, 2).    Denies eating a diet high in iron content or drinking alcohol.  Social History     Socioeconomic History    Marital status:     Number of children: 1   Occupational History    Occupation: [employer]     Employer: [employer]     Comment: [employer]   Tobacco Use    Smoking status: Never    Smokeless tobacco: Former   Substance and Sexual Activity    Alcohol use: No    Drug use: Never    Sexual activity: Not Currently     Partners: Female     Birth control/protection: None     Social Determinants of Health     Financial Resource Strain: Low Risk  (7/17/2023)    Overall Financial Resource Strain (CARDIA)     Difficulty of Paying Living Expenses:  Not hard at all   Food Insecurity: No Food Insecurity (7/17/2023)    Hunger Vital Sign     Worried About Running Out of Food in the Last Year: Never true     Ran Out of Food in the Last Year: Never true   Transportation Needs: No Transportation Needs (7/17/2023)    PRAPARE - Transportation     Lack of Transportation (Medical): No     Lack of Transportation (Non-Medical): No   Physical Activity: Insufficiently Active (7/17/2023)    Exercise Vital Sign     Days of Exercise per Week: 3 days     Minutes of Exercise per Session: 30 min   Stress: No Stress Concern Present (7/17/2023)    [employer] - Occupational Stress Questionnaire     Feeling of Stress : Not at all   Social Connections: Unknown (7/17/2023)    Social Connection and Isolation Panel [NHANES]     Frequency of Communication with Friends and Family: More than three times a week     Frequency of Social Gatherings with Friends and Family: More than three times a week     Active Member of Clubs or Organizations: Yes     Attends Club or Organization Meetings: 1 to 4 times per year     Marital Status:    Housing Stability: Low Risk  (7/17/2023)    Housing Stability Vital Sign     Unable to Pay for Housing in the Last Year: No     Number of Places Lived in the Last Year: 1     Unstable Housing in the Last Year: No       Family History   Problem Relation Age of Onset    COPD Mother     Heart attacks under age 50 Mother     Cancer Father         PROSTATE CA       Past Surgical History:   Procedure Laterality Date    COLONOSCOPY W/ POLYPECTOMY  10, 14 no polyp    KNEE ARTHROSCOPY Right     Meniscus repair    LIVER BIOPSY  2010    VASECTOMY         Past Medical History:   Diagnosis Date    Decreased platelet count     Fatty liver     Gout     HTN (hypertension) 2000    Non-A non-B hepatitis 1990    Obesity (BMI 35.0-39.9 without comorbidity)     Prediabetes 2015       Review of Systems   Constitutional:  Negative for appetite change  and unexpected weight change.   HENT:  Negative for mouth sores.    Eyes:  Negative for visual disturbance.   Respiratory:  Negative for cough and shortness of breath.    Cardiovascular:  Negative for chest pain.   Gastrointestinal:  Negative for abdominal pain and diarrhea.   Endocrine: Negative.    Genitourinary:  Negative for frequency.   Musculoskeletal:  Negative for back pain.   Skin:  Positive for rash (resolved).   Allergic/Immunologic: Negative.    Neurological:  Negative for headaches.   Hematological:  Negative for adenopathy.   Psychiatric/Behavioral:  The patient is not nervous/anxious.           Medication List with Changes/Refills   Current Medications    ALLOPURINOL (ZYLOPRIM) 100 MG TABLET    TAKE 1 TABLET (100 MG TOTAL) BY MOUTH ONCE DAILY.    BENAZEPRIL (LOTENSIN) 20 MG TABLET    Take 1 tablet (20 mg total) by mouth once daily.    COLCHICINE-PROBENECID 0.5-500 MG (CO-BENEMID) 500-0.5 MG TAB    TAKE 1 TABLET BY MOUTH EVERY DAY    VITAMIN E 1000 UNIT CAPSULE    Take 1,000 Units by mouth once daily.   Discontinued Medications    TRIAMCINOLONE ACETONIDE 0.1% (KENALOG) 0.1 % OINTMENT    Apply topically 2 (two) times daily. for 5 days        Objective:     Vitals:    09/27/23 0746   BP: 132/89   Pulse: 61   Resp: 18       Physical Exam  Vitals reviewed.   Constitutional:       Appearance: Normal appearance. He is obese.   HENT:      Head: Normocephalic and atraumatic.      Right Ear: External ear normal.      Left Ear: External ear normal.   Cardiovascular:      Rate and Rhythm: Normal rate and regular rhythm.      Heart sounds: Normal heart sounds, S1 normal and S2 normal.   Pulmonary:      Effort: Pulmonary effort is normal.      Breath sounds: Normal breath sounds.   Abdominal:      General: There is no distension.   Musculoskeletal:         General: Normal range of motion.      Cervical back: Normal range of motion.   Skin:     General: Skin is warm and dry.   Neurological:      General: No focal  deficit present.      Mental Status: He is alert and oriented to person, place, and time.   Psychiatric:         Attention and Perception: Attention and perception normal.         Mood and Affect: Mood and affect normal.         Speech: Speech normal.         Behavior: Behavior normal. Behavior is cooperative.         Thought Content: Thought content normal.         Cognition and Memory: Cognition and memory normal.         Judgment: Judgment normal.         Assessment:     Problem List Items Addressed This Visit          Hematology    Thrombocytopenia    Relevant Orders    CBC Auto Differential    Comprehensive Metabolic Panel    Ferritin    Iron and TIBC       GI    NAFLD (nonalcoholic fatty liver disease)    Relevant Orders    CBC Auto Differential    Comprehensive Metabolic Panel    Ferritin    Iron and TIBC    Hereditary hemochromatosis    Relevant Orders    CBC Auto Differential    Comprehensive Metabolic Panel    Ferritin    Iron and TIBC    Increased storage iron - Primary    Relevant Orders    CBC Auto Differential    Comprehensive Metabolic Panel    Ferritin    Iron and TIBC     Other Visit Diagnoses       Splenomegaly        Relevant Orders    CBC Auto Differential    Comprehensive Metabolic Panel    Ferritin    Iron and TIBC    Elevated ferritin        Relevant Orders    CBC Auto Differential    Comprehensive Metabolic Panel    Ferritin    Iron and TIBC            Lab Results   Component Value Date    WBC 5.50 08/30/2023    RBC 4.77 08/30/2023    HGB 15.2 08/30/2023    HCT 43.6 08/30/2023    MCV 91 08/30/2023    MCH 31.9 (H) 08/30/2023    MCHC 34.9 08/30/2023    RDW 13.2 08/30/2023     (L) 08/30/2023    MPV 11.0 08/30/2023    GRAN 44.0 08/30/2023    LYMPH CANCELED 08/30/2023    LYMPH 42.0 08/30/2023    MONO CANCELED 08/30/2023    MONO 8.0 08/30/2023    EOS CANCELED 08/30/2023    BASO CANCELED 08/30/2023    EOSINOPHIL 5.0 08/30/2023    BASOPHIL 1.0 08/30/2023      Lab Results   Component Value Date      08/30/2023    K 4.6 08/30/2023     08/30/2023    CO2 27 08/30/2023    BUN 25 (H) 08/30/2023    CREATININE 0.9 08/30/2023    CALCIUM 9.6 08/30/2023    ANIONGAP 7 (L) 08/30/2023    ESTGFRAFRICA >60.0 06/10/2022    EGFRNONAA >60.0 06/10/2022     Lab Results   Component Value Date    ALT 40 08/30/2023    AST 35 08/30/2023    ALKPHOS 84 08/30/2023    BILITOT 1.0 08/30/2023     Lab Results   Component Value Date    IRON 180 (H) 08/30/2023    TRANSFERRIN 223 08/30/2023    TIBC 330 08/30/2023    FESATURATED 55 (H) 08/30/2023      Lab Results   Component Value Date    FERRITIN 552 (H) 08/30/2023         Plan:   Increased storage iron  -     CBC Auto Differential; Future; Expected date: 09/27/2023  -     Comprehensive Metabolic Panel; Future; Expected date: 09/27/2023  -     Ferritin; Future; Expected date: 09/27/2023  -     Iron and TIBC; Future; Expected date: 09/27/2023    Thrombocytopenia  -     CBC Auto Differential; Future; Expected date: 09/27/2023  -     Comprehensive Metabolic Panel; Future; Expected date: 09/27/2023  -     Ferritin; Future; Expected date: 09/27/2023  -     Iron and TIBC; Future; Expected date: 09/27/2023    Splenomegaly  -     CBC Auto Differential; Future; Expected date: 09/27/2023  -     Comprehensive Metabolic Panel; Future; Expected date: 09/27/2023  -     Ferritin; Future; Expected date: 09/27/2023  -     Iron and TIBC; Future; Expected date: 09/27/2023    Elevated ferritin  -     CBC Auto Differential; Future; Expected date: 09/27/2023  -     Comprehensive Metabolic Panel; Future; Expected date: 09/27/2023  -     Ferritin; Future; Expected date: 09/27/2023  -     Iron and TIBC; Future; Expected date: 09/27/2023    NAFLD (nonalcoholic fatty liver disease)  -     CBC Auto Differential; Future; Expected date: 09/27/2023  -     Comprehensive Metabolic Panel; Future; Expected date: 09/27/2023  -     Ferritin; Future; Expected date: 09/27/2023  -     Iron and TIBC; Future; Expected  date: 09/27/2023    Hereditary hemochromatosis  -     CBC Auto Differential; Future; Expected date: 09/27/2023  -     Comprehensive Metabolic Panel; Future; Expected date: 09/27/2023  -     Ferritin; Future; Expected date: 09/27/2023  -     Iron and TIBC; Future; Expected date: 09/27/2023    Other orders  -     LIDOcaine (PF) 10 mg/ml (1%) injection 10 mg      Med Onc Chart Routing      Follow up with physician    Follow up with LUIS . F/u 2 months with labs prior - in person visit   Infusion scheduling note   schedule for phlebotomy at the [facility] within the next week and then again in 1 months - please call patient to schedule.   Injection scheduling note n/a   Labs   Scheduling:  Preferred lab: [facility]  Lab interval:  cbc,cmp, iron studies   Imaging   N/a   Pharmacy appointment No pharmacy appointment needed      Other referrals       No additional referrals needed  n/a          Collaborating Provider:  Dr. Olegario Malone    Thank You,  Leonardo Lisa, FNP-C  Hematology Oncology

## 2023-09-27 NOTE — TELEPHONE ENCOUNTER
Spoke with patient's wife on speaker phone with the patient. Appt made for phlebotomy Monday at 8am at the . Call ended well.

## 2023-10-02 ENCOUNTER — INFUSION (OUTPATIENT)
Dept: INFUSION THERAPY | Facility: HOSPITAL | Age: 68
End: 2023-10-02
Attending: INTERNAL MEDICINE
Payer: MEDICARE

## 2023-10-02 VITALS
HEART RATE: 64 BPM | TEMPERATURE: 98 F | OXYGEN SATURATION: 98 % | DIASTOLIC BLOOD PRESSURE: 71 MMHG | SYSTOLIC BLOOD PRESSURE: 99 MMHG | RESPIRATION RATE: 16 BRPM

## 2023-10-02 DIAGNOSIS — E83.110 HEREDITARY HEMOCHROMATOSIS: Primary | ICD-10-CM

## 2023-10-02 DIAGNOSIS — E83.19 INCREASED STORAGE IRON: ICD-10-CM

## 2023-10-02 PROCEDURE — 99195 PHLEBOTOMY: CPT

## 2023-10-02 RX ORDER — LIDOCAINE HYDROCHLORIDE 10 MG/ML
1 INJECTION, SOLUTION EPIDURAL; INFILTRATION; INTRACAUDAL; PERINEURAL ONCE
Status: CANCELLED | OUTPATIENT
Start: 2023-10-09 | End: 2023-10-09

## 2023-10-02 NOTE — NURSING
1 unit therapeutic phlebotomy performed via Right AC then discarded, pressure dressing applied.  Pt accepted water and snack.  Blood pressure 99/71 upon completion.  Pt denies any complaints of dizziness, lightheadedness, or faintness.  Pt to return to clinic on 10/27/2023.  Patient ambulated out without difficulty.

## 2023-10-02 NOTE — PLAN OF CARE
Patient reports no complaints or concerns at this time. Tolerated phlebotomy well with no adverse reactions. Patient to return in one month for next treatment.

## 2023-10-02 NOTE — DISCHARGE INSTRUCTIONS
.[facility]  [id number]  [address]  [phone] phone     [phone] fax  Hours of Operation: Monday- Friday 8:00am- 5:00pm  After hours phone  [phone]  Hematology / Oncology Physicians on call    Dr. Faisal Leblanc      Nurse Practitioners:    Jamia Lisa, PARIS Hayes, PARIS Arrieta, PARIS Lopez, PA      Please don't hesitate to call if you have any concerns.

## 2023-10-04 ENCOUNTER — TELEPHONE (OUTPATIENT)
Dept: INFUSION THERAPY | Facility: HOSPITAL | Age: 68
End: 2023-10-04
Payer: MEDICARE

## 2023-10-04 NOTE — TELEPHONE ENCOUNTER
----- Message from Teresa Constantino sent at 10/3/2023  7:12 PM CDT -----  Regarding: Patient advice  Contact: Pt  Pt called in regards to rescheduling an infusion       Pt can be reached at [phone]

## 2023-10-30 ENCOUNTER — INFUSION (OUTPATIENT)
Dept: INFUSION THERAPY | Facility: HOSPITAL | Age: 68
End: 2023-10-30
Attending: INTERNAL MEDICINE
Payer: MEDICARE

## 2023-10-30 VITALS
SYSTOLIC BLOOD PRESSURE: 127 MMHG | RESPIRATION RATE: 16 BRPM | HEART RATE: 67 BPM | OXYGEN SATURATION: 96 % | DIASTOLIC BLOOD PRESSURE: 78 MMHG | TEMPERATURE: 98 F

## 2023-10-30 DIAGNOSIS — E83.19 INCREASED STORAGE IRON: ICD-10-CM

## 2023-10-30 DIAGNOSIS — E83.110 HEREDITARY HEMOCHROMATOSIS: Primary | ICD-10-CM

## 2023-10-30 PROCEDURE — 99195 PHLEBOTOMY: CPT

## 2023-10-30 RX ORDER — LIDOCAINE HYDROCHLORIDE 10 MG/ML
1 INJECTION, SOLUTION EPIDURAL; INFILTRATION; INTRACAUDAL; PERINEURAL ONCE
Status: CANCELLED | OUTPATIENT
Start: 2023-11-06 | End: 2023-11-06

## 2023-10-30 NOTE — NURSING
One unit therapeutic phlebotomy performed via right a/c without difficulty. Upon completion, needle dc'd, pressure dressing applied, and blood discarded in appropriate container.  Juice/snack given. Vitals documented in flowsheets. Pt denies weakness/dizziness. Pt discharged with next appt scheduled.

## 2023-11-27 ENCOUNTER — LAB VISIT (OUTPATIENT)
Dept: LAB | Facility: HOSPITAL | Age: 68
End: 2023-11-27
Attending: NURSE PRACTITIONER
Payer: MEDICARE

## 2023-11-27 DIAGNOSIS — E83.19 INCREASED STORAGE IRON: ICD-10-CM

## 2023-11-27 DIAGNOSIS — K76.0 NAFLD (NONALCOHOLIC FATTY LIVER DISEASE): ICD-10-CM

## 2023-11-27 DIAGNOSIS — E83.110 HEREDITARY HEMOCHROMATOSIS: ICD-10-CM

## 2023-11-27 DIAGNOSIS — R16.1 SPLENOMEGALY: ICD-10-CM

## 2023-11-27 DIAGNOSIS — R79.89 ELEVATED FERRITIN: ICD-10-CM

## 2023-11-27 DIAGNOSIS — D69.6 THROMBOCYTOPENIA: ICD-10-CM

## 2023-11-27 LAB
ALBUMIN SERPL BCP-MCNC: 3.9 G/DL (ref 3.5–5.2)
ALP SERPL-CCNC: 103 U/L (ref 55–135)
ALT SERPL W/O P-5'-P-CCNC: 44 U/L (ref 10–44)
ANION GAP SERPL CALC-SCNC: 10 MMOL/L (ref 8–16)
AST SERPL-CCNC: 35 U/L (ref 10–40)
BASOPHILS # BLD AUTO: 0.04 K/UL (ref 0–0.2)
BASOPHILS NFR BLD: 0.7 % (ref 0–1.9)
BILIRUB SERPL-MCNC: 0.6 MG/DL (ref 0.1–1)
BUN SERPL-MCNC: 20 MG/DL (ref 8–23)
CALCIUM SERPL-MCNC: 9.7 MG/DL (ref 8.7–10.5)
CHLORIDE SERPL-SCNC: 103 MMOL/L (ref 95–110)
CO2 SERPL-SCNC: 27 MMOL/L (ref 23–29)
CREAT SERPL-MCNC: 1 MG/DL (ref 0.5–1.4)
DIFFERENTIAL METHOD: ABNORMAL
EOSINOPHIL # BLD AUTO: 0.4 K/UL (ref 0–0.5)
EOSINOPHIL NFR BLD: 6.2 % (ref 0–8)
ERYTHROCYTE [DISTWIDTH] IN BLOOD BY AUTOMATED COUNT: 12.7 % (ref 11.5–14.5)
EST. GFR  (NO RACE VARIABLE): >60 ML/MIN/1.73 M^2
FERRITIN SERPL-MCNC: 366 NG/ML (ref 20–300)
GLUCOSE SERPL-MCNC: 114 MG/DL (ref 70–110)
HCT VFR BLD AUTO: 47.6 % (ref 40–54)
HGB BLD-MCNC: 16.3 G/DL (ref 14–18)
IMM GRANULOCYTES # BLD AUTO: 0.01 K/UL (ref 0–0.04)
IMM GRANULOCYTES NFR BLD AUTO: 0.2 % (ref 0–0.5)
IRON SERPL-MCNC: 99 UG/DL (ref 45–160)
LYMPHOCYTES # BLD AUTO: 1.9 K/UL (ref 1–4.8)
LYMPHOCYTES NFR BLD: 32.1 % (ref 18–48)
MCH RBC QN AUTO: 31.3 PG (ref 27–31)
MCHC RBC AUTO-ENTMCNC: 34.2 G/DL (ref 32–36)
MCV RBC AUTO: 91 FL (ref 82–98)
MONOCYTES # BLD AUTO: 0.6 K/UL (ref 0.3–1)
MONOCYTES NFR BLD: 10.5 % (ref 4–15)
NEUTROPHILS # BLD AUTO: 3 K/UL (ref 1.8–7.7)
NEUTROPHILS NFR BLD: 50.5 % (ref 38–73)
NRBC BLD-RTO: 0 /100 WBC
PLATELET # BLD AUTO: 146 K/UL (ref 150–450)
PMV BLD AUTO: 10.7 FL (ref 9.2–12.9)
POTASSIUM SERPL-SCNC: 4.3 MMOL/L (ref 3.5–5.1)
PROT SERPL-MCNC: 7.5 G/DL (ref 6–8.4)
RBC # BLD AUTO: 5.21 M/UL (ref 4.6–6.2)
SATURATED IRON: 28 % (ref 20–50)
SODIUM SERPL-SCNC: 140 MMOL/L (ref 136–145)
TOTAL IRON BINDING CAPACITY: 351 UG/DL (ref 250–450)
TRANSFERRIN SERPL-MCNC: 237 MG/DL (ref 200–375)
WBC # BLD AUTO: 5.83 K/UL (ref 3.9–12.7)

## 2023-11-27 PROCEDURE — 80053 COMPREHEN METABOLIC PANEL: CPT | Performed by: NURSE PRACTITIONER

## 2023-11-27 PROCEDURE — 36415 COLL VENOUS BLD VENIPUNCTURE: CPT | Mod: PO | Performed by: NURSE PRACTITIONER

## 2023-11-27 PROCEDURE — 83540 ASSAY OF IRON: CPT | Performed by: NURSE PRACTITIONER

## 2023-11-27 PROCEDURE — 82728 ASSAY OF FERRITIN: CPT | Performed by: NURSE PRACTITIONER

## 2023-11-27 PROCEDURE — 85025 COMPLETE CBC W/AUTO DIFF WBC: CPT | Mod: PO | Performed by: NURSE PRACTITIONER

## 2023-11-27 PROCEDURE — 84466 ASSAY OF TRANSFERRIN: CPT | Performed by: NURSE PRACTITIONER

## 2023-11-29 ENCOUNTER — OFFICE VISIT (OUTPATIENT)
Dept: HEMATOLOGY/ONCOLOGY | Facility: CLINIC | Age: 68
End: 2023-11-29
Payer: MEDICARE

## 2023-11-29 VITALS — WEIGHT: 238.69 LBS | OXYGEN SATURATION: 96 % | HEART RATE: 61 BPM | HEIGHT: 68 IN | BODY MASS INDEX: 36.18 KG/M2

## 2023-11-29 DIAGNOSIS — R79.89 ELEVATED FERRITIN: ICD-10-CM

## 2023-11-29 DIAGNOSIS — D69.6 THROMBOCYTOPENIA: ICD-10-CM

## 2023-11-29 DIAGNOSIS — E83.19 INCREASED STORAGE IRON: Primary | ICD-10-CM

## 2023-11-29 DIAGNOSIS — E83.110 HEREDITARY HEMOCHROMATOSIS: ICD-10-CM

## 2023-11-29 DIAGNOSIS — K76.0 NAFLD (NONALCOHOLIC FATTY LIVER DISEASE): ICD-10-CM

## 2023-11-29 DIAGNOSIS — R16.1 SPLENOMEGALY: ICD-10-CM

## 2023-11-29 PROCEDURE — 99999 PR PBB SHADOW E&M-EST. PATIENT-LVL III: CPT | Mod: PBBFAC,,, | Performed by: NURSE PRACTITIONER

## 2023-11-29 PROCEDURE — 3288F FALL RISK ASSESSMENT DOCD: CPT | Mod: CPTII,S$GLB,, | Performed by: NURSE PRACTITIONER

## 2023-11-29 PROCEDURE — 3288F PR FALLS RISK ASSESSMENT DOCUMENTED: ICD-10-PCS | Mod: CPTII,S$GLB,, | Performed by: NURSE PRACTITIONER

## 2023-11-29 PROCEDURE — 1101F PR PT FALLS ASSESS DOC 0-1 FALLS W/OUT INJ PAST YR: ICD-10-PCS | Mod: CPTII,S$GLB,, | Performed by: NURSE PRACTITIONER

## 2023-11-29 PROCEDURE — 1159F MED LIST DOCD IN RCRD: CPT | Mod: CPTII,S$GLB,, | Performed by: NURSE PRACTITIONER

## 2023-11-29 PROCEDURE — 1101F PT FALLS ASSESS-DOCD LE1/YR: CPT | Mod: CPTII,S$GLB,, | Performed by: NURSE PRACTITIONER

## 2023-11-29 PROCEDURE — 1159F PR MEDICATION LIST DOCUMENTED IN MEDICAL RECORD: ICD-10-PCS | Mod: CPTII,S$GLB,, | Performed by: NURSE PRACTITIONER

## 2023-11-29 PROCEDURE — 1126F AMNT PAIN NOTED NONE PRSNT: CPT | Mod: CPTII,S$GLB,, | Performed by: NURSE PRACTITIONER

## 2023-11-29 PROCEDURE — 99999 PR PBB SHADOW E&M-EST. PATIENT-LVL III: ICD-10-PCS | Mod: PBBFAC,,, | Performed by: NURSE PRACTITIONER

## 2023-11-29 PROCEDURE — 1126F PR PAIN SEVERITY QUANTIFIED, NO PAIN PRESENT: ICD-10-PCS | Mod: CPTII,S$GLB,, | Performed by: NURSE PRACTITIONER

## 2023-11-29 PROCEDURE — 4010F ACE/ARB THERAPY RXD/TAKEN: CPT | Mod: CPTII,S$GLB,, | Performed by: NURSE PRACTITIONER

## 2023-11-29 PROCEDURE — 99215 PR OFFICE/OUTPT VISIT, EST, LEVL V, 40-54 MIN: ICD-10-PCS | Mod: S$GLB,,, | Performed by: NURSE PRACTITIONER

## 2023-11-29 PROCEDURE — 3008F PR BODY MASS INDEX (BMI) DOCUMENTED: ICD-10-PCS | Mod: CPTII,S$GLB,, | Performed by: NURSE PRACTITIONER

## 2023-11-29 PROCEDURE — 1160F RVW MEDS BY RX/DR IN RCRD: CPT | Mod: CPTII,S$GLB,, | Performed by: NURSE PRACTITIONER

## 2023-11-29 PROCEDURE — 3008F BODY MASS INDEX DOCD: CPT | Mod: CPTII,S$GLB,, | Performed by: NURSE PRACTITIONER

## 2023-11-29 PROCEDURE — 4010F PR ACE/ARB THEARPY RXD/TAKEN: ICD-10-PCS | Mod: CPTII,S$GLB,, | Performed by: NURSE PRACTITIONER

## 2023-11-29 PROCEDURE — 1160F PR REVIEW ALL MEDS BY PRESCRIBER/CLIN PHARMACIST DOCUMENTED: ICD-10-PCS | Mod: CPTII,S$GLB,, | Performed by: NURSE PRACTITIONER

## 2023-11-29 PROCEDURE — 99215 OFFICE O/P EST HI 40 MIN: CPT | Mod: S$GLB,,, | Performed by: NURSE PRACTITIONER

## 2023-11-29 NOTE — PROGRESS NOTES
Subjective:      Patient ID: Jasson Mayo is a 68 y.o. male.    Chief Complaint: arthritis pain    HPI: 68 year old male who presents today for continued monitoring of thrombocytopenia.  He has previously been followed in the clinic by Dr. Frank Rinaldi.  Today is the first time I am evaluating/assessing the patient.  He has been found with diffuse fatty liver  -found with mild steatosis on fibroscan followed by hepatology, degenerative arthritis with radiculopathy, gout and is currently being treated with co-benemid and allopurinol.     Interval History:  8/30/2023 Presents today to review labs with continue stable thrombocytopenia.  Only complaint is chronic arthralgias.  Denies any abnormal bleeding.       Interval History:  9/27/2023 Presents today to review results of iron studies and HH studies.  Found with elevated iron and carrier of H63D gene.           : C282Y: Not detected.   H63D: One copy of the H63D variant was identified.    Interpretation SEE BELOW    Comment: This individual is heterozygous for H63D. This result is   consistent with at minimum a carrier status for hereditary   hemochromatosis (HH).     The presence of a single H63D variant is unlikely to be of   clinical significance in the absence of other   disease-causing variants (1, 2).    Denies eating a diet high in iron content or drinking alcohol.     Interval History:  11/29/2023 Has received 2  - 1 unit phlebotomies prbc since last visit with last done on 10/30/2023.  Has responded well with a reduction in ferritin. Presents today to evaluate labs and to determine if phlebotomy is needed.      Social History     Socioeconomic History    Marital status:     Number of children: 1   Occupational History    Occupation:      Employer: [employer]     Comment: Jimmy pandey   Tobacco Use    Smoking status: Never    Smokeless tobacco: Former   Substance and Sexual Activity    Alcohol use: No    Drug use: Never     Sexual activity: Not Currently     Partners: Female     Birth control/protection: None     Social Determinants of Health     Financial Resource Strain: Low Risk  (7/17/2023)    Overall Financial Resource Strain (CARDIA)     Difficulty of Paying Living Expenses: Not hard at all   Food Insecurity: No Food Insecurity (7/17/2023)    Hunger Vital Sign     Worried About Running Out of Food in the Last Year: Never true     Ran Out of Food in the Last Year: Never true   Transportation Needs: No Transportation Needs (7/17/2023)    PRAPARE - Transportation     Lack of Transportation (Medical): No     Lack of Transportation (Non-Medical): No   Physical Activity: Insufficiently Active (7/17/2023)    Exercise Vital Sign     Days of Exercise per Week: 3 days     Minutes of Exercise per Session: 30 min   Stress: No Stress Concern Present (7/17/2023)    Citizen of [place] of Occupational Health - Occupational Stress Questionnaire     Feeling of Stress : Not at all   Social Connections: Unknown (7/17/2023)    Social Connection and Isolation Panel [NHANES]     Frequency of Communication with Friends and Family: More than three times a week     Frequency of Social Gatherings with Friends and Family: More than three times a week     Active Member of Clubs or Organizations: Yes     Attends Club or Organization Meetings: 1 to 4 times per year     Marital Status:    Housing Stability: Low Risk  (7/17/2023)    Housing Stability Vital Sign     Unable to Pay for Housing in the Last Year: No     Number of Places Lived in the Last Year: 1     Unstable Housing in the Last Year: No       Family History   Problem Relation Age of Onset    COPD Mother     Heart attacks under age 50 Mother     Cancer Father         PROSTATE CA       Past Surgical History:   Procedure Laterality Date    COLONOSCOPY W/ POLYPECTOMY  10, 13 no polyp    KNEE ARTHROSCOPY Right     Meniscus repair    LIVER BIOPSY  2010    VASECTOMY         Past Medical History:    Diagnosis Date    Decreased platelet count     Fatty liver     Gout     HTN (hypertension) 2000    Non-A non-B hepatitis 1990    Obesity (BMI 35.0-39.9 without comorbidity)     Prediabetes 2015       Review of Systems   Constitutional:  Negative for appetite change and unexpected weight change.   HENT:  Negative for mouth sores.    Eyes:  Negative for visual disturbance.   Respiratory:  Negative for cough and shortness of breath.    Cardiovascular:  Negative for chest pain.   Gastrointestinal:  Negative for diarrhea.   Endocrine: Negative.    Genitourinary:  Negative for frequency.   Musculoskeletal:  Positive for arthralgias. Negative for back pain.   Skin:  Negative for rash.   Allergic/Immunologic: Negative.    Neurological:  Negative for headaches.   Hematological:  Negative for adenopathy.   Psychiatric/Behavioral:  The patient is not nervous/anxious.           Medication List with Changes/Refills   Current Medications    ALLOPURINOL (ZYLOPRIM) 100 MG TABLET    TAKE 1 TABLET (100 MG TOTAL) BY MOUTH ONCE DAILY.    BENAZEPRIL (LOTENSIN) 20 MG TABLET    Take 1 tablet (20 mg total) by mouth once daily.    COLCHICINE-PROBENECID 0.5-500 MG (CO-BENEMID) 500-0.5 MG TAB    TAKE 1 TABLET BY MOUTH EVERY DAY    VITAMIN E 1000 UNIT CAPSULE    Take 1,000 Units by mouth once daily.        Objective:     Vitals:    11/29/23 0905   Pulse: 61       Physical Exam  Vitals reviewed.   Constitutional:       Appearance: Normal appearance. He is obese.   HENT:      Head: Normocephalic and atraumatic.      Right Ear: External ear normal.      Left Ear: External ear normal.   Cardiovascular:      Rate and Rhythm: Normal rate and regular rhythm.      Heart sounds: Normal heart sounds, S1 normal and S2 normal.   Pulmonary:      Effort: Pulmonary effort is normal.      Breath sounds: Normal breath sounds.   Abdominal:      General: There is no distension.   Musculoskeletal:         General: Normal range of motion.      Cervical back:  Normal range of motion.   Skin:     General: Skin is warm and dry.   Neurological:      General: No focal deficit present.      Mental Status: He is alert and oriented to person, place, and time.   Psychiatric:         Attention and Perception: Attention and perception normal.         Mood and Affect: Mood and affect normal.         Speech: Speech normal.         Behavior: Behavior normal. Behavior is cooperative.         Thought Content: Thought content normal.         Cognition and Memory: Cognition and memory normal.         Judgment: Judgment normal.         Assessment:     Problem List Items Addressed This Visit          Hematology    Thrombocytopenia    Relevant Orders    CBC Auto Differential       GI    NAFLD (nonalcoholic fatty liver disease)    Hereditary hemochromatosis    Relevant Orders    CBC Auto Differential    Comprehensive Metabolic Panel    Ferritin    Iron and TIBC    Increased storage iron - Primary    Relevant Orders    CBC Auto Differential    Comprehensive Metabolic Panel    Ferritin    Iron and TIBC     Other Visit Diagnoses       Elevated ferritin        Relevant Orders    Ferritin    Splenomegaly        Relevant Orders    CBC Auto Differential            Lab Results   Component Value Date    WBC 5.83 11/27/2023    RBC 5.21 11/27/2023    HGB 16.3 11/27/2023    HCT 47.6 11/27/2023    MCV 91 11/27/2023    MCH 31.3 (H) 11/27/2023    MCHC 34.2 11/27/2023    RDW 12.7 11/27/2023     (L) 11/27/2023    MPV 10.7 11/27/2023    GRAN 3.0 11/27/2023    GRAN 50.5 11/27/2023    LYMPH 1.9 11/27/2023    LYMPH 32.1 11/27/2023    MONO 0.6 11/27/2023    MONO 10.5 11/27/2023    EOS 0.4 11/27/2023    BASO 0.04 11/27/2023    EOSINOPHIL 6.2 11/27/2023    BASOPHIL 0.7 11/27/2023      Lab Results   Component Value Date     11/27/2023    K 4.3 11/27/2023     11/27/2023    CO2 27 11/27/2023    BUN 20 11/27/2023    CREATININE 1.0 11/27/2023    CALCIUM 9.7 11/27/2023    ANIONGAP 10 11/27/2023     ESTGFRAFRICA >60.0 06/10/2022    EGFRNONAA >60.0 06/10/2022     Lab Results   Component Value Date    ALT 44 11/27/2023    AST 35 11/27/2023    ALKPHOS 103 11/27/2023    BILITOT 0.6 11/27/2023     Lab Results   Component Value Date    IRON 99 11/27/2023    TRANSFERRIN 237 11/27/2023    TIBC 351 11/27/2023    FESATURATED 28 11/27/2023      Lab Results   Component Value Date    FERRITIN 366 (H) 11/27/2023         Plan:   Increased storage iron  -     CBC Auto Differential; Future; Expected date: 11/29/2023  -     Comprehensive Metabolic Panel; Future; Expected date: 11/29/2023  -     Ferritin; Future; Expected date: 11/29/2023  -     Iron and TIBC; Future; Expected date: 11/29/2023    Elevated ferritin  -     Ferritin; Future; Expected date: 11/29/2023    NAFLD (nonalcoholic fatty liver disease)    Thrombocytopenia  -     CBC Auto Differential; Future; Expected date: 11/29/2023    Splenomegaly  -     CBC Auto Differential; Future; Expected date: 11/29/2023    Hereditary hemochromatosis  -     CBC Auto Differential; Future; Expected date: 11/29/2023  -     Comprehensive Metabolic Panel; Future; Expected date: 11/29/2023  -     Ferritin; Future; Expected date: 11/29/2023  -     Iron and TIBC; Future; Expected date: 11/29/2023      Med Onc Chart Routing      Follow up with physician    Follow up with [facility] 2 months. in person visit in [place]   Infusion scheduling note   schedule for phlebotomy this week and then again [facility]   Injection scheduling note n/a   Labs   Scheduling:  Preferred lab: [facility]  Lab interval:  cbc, cmp, iron studies   Imaging   N/a   Pharmacy appointment No pharmacy appointment needed      Other referrals       No additional referrals needed  n/a         Goal ferritin - phlebotomy needed - 1 unit this week and then again in 2 weeks.  F/u in 2 months with labs prior in [place]. - in person visit in [place] - possible phlebotomy.      Total time spent on encounter:  45 minutes    Collaborating Provider:  Dr. Olegario Malone    Thank You,  Leonardo Lisa, RAYP-C  Hematology Oncology

## 2023-12-01 ENCOUNTER — TELEPHONE (OUTPATIENT)
Dept: HEMATOLOGY/ONCOLOGY | Facility: CLINIC | Age: 68
End: 2023-12-01
Payer: MEDICARE

## 2023-12-01 NOTE — TELEPHONE ENCOUNTER
Nurse spoke with pts spouse in regards to the scheduling of the pts infusion. Nurse informed pt that they are a little bagged with scheduling. Someone  should contact them sometime next week.

## 2023-12-01 NOTE — TELEPHONE ENCOUNTER
----- Message from Estelle Gupta sent at 12/1/2023 10:57 AM CST -----    Patient Returning Call        Who Called:pt wife  Does the patient know what this is regarding?:asking about infusion that was to be set up for pt please call  Would the patient rather a call back or a response via [facility]? call  Best Call Back Number:[phone]  Additional Information: call back

## 2023-12-11 ENCOUNTER — TELEPHONE (OUTPATIENT)
Dept: INFUSION THERAPY | Facility: HOSPITAL | Age: 68
End: 2023-12-11
Payer: MEDICARE

## 2023-12-11 NOTE — TELEPHONE ENCOUNTER
----- Message from Melissa Vallejo MA sent at 12/11/2023  3:03 PM CST -----    ----- Message -----  From: Cipriano Harper  Sent: 12/11/2023  11:40 AM CST  To: Sloan Blandon Staff    Name Of Caller:Vonnie        Provider Name: Jamia Lisa         Does patient feel the need to be seen today? no        Relationship to the Pt?: spouse        Contact Preference?:[phone]        What is the nature of the call?: Patient's wife states that they needs to speak with someone in the office in regards to an infusion that her  needs to schedule.

## 2023-12-13 ENCOUNTER — INFUSION (OUTPATIENT)
Dept: INFUSION THERAPY | Facility: HOSPITAL | Age: 68
End: 2023-12-13
Attending: INTERNAL MEDICINE
Payer: MEDICARE

## 2023-12-13 VITALS
TEMPERATURE: 98 F | HEART RATE: 68 BPM | SYSTOLIC BLOOD PRESSURE: 105 MMHG | OXYGEN SATURATION: 95 % | DIASTOLIC BLOOD PRESSURE: 78 MMHG | RESPIRATION RATE: 18 BRPM

## 2023-12-13 DIAGNOSIS — E83.19 INCREASED STORAGE IRON: ICD-10-CM

## 2023-12-13 DIAGNOSIS — E83.110 HEREDITARY HEMOCHROMATOSIS: Primary | ICD-10-CM

## 2023-12-13 PROCEDURE — 99195 PHLEBOTOMY: CPT

## 2023-12-13 RX ORDER — LIDOCAINE HYDROCHLORIDE 10 MG/ML
1 INJECTION, SOLUTION EPIDURAL; INFILTRATION; INTRACAUDAL; PERINEURAL ONCE
Status: CANCELLED | OUTPATIENT
Start: 2023-12-20 | End: 2023-12-20

## 2023-12-13 NOTE — NURSING
One unit therapeutic phlebotomy performed via right A/C without difficulty.  Upon completion, needle dc'd, pressure dressing applied, and blood discarded in appropriate container.   Juice given/snack refused. Pt denies weakness/dizziness.

## 2023-12-15 ENCOUNTER — IMMUNIZATION (OUTPATIENT)
Dept: FAMILY MEDICINE | Facility: CLINIC | Age: 68
End: 2023-12-15
Payer: MEDICARE

## 2023-12-15 DIAGNOSIS — Z23 NEED FOR VACCINATION: Primary | ICD-10-CM

## 2023-12-15 PROCEDURE — 90694 FLU VACCINE - QUADRIVALENT - ADJUVANTED: ICD-10-PCS | Mod: S$GLB,,, | Performed by: STUDENT IN AN ORGANIZED HEALTH CARE EDUCATION/TRAINING PROGRAM

## 2023-12-15 PROCEDURE — 90694 VACC AIIV4 NO PRSRV 0.5ML IM: CPT | Mod: S$GLB,,, | Performed by: STUDENT IN AN ORGANIZED HEALTH CARE EDUCATION/TRAINING PROGRAM

## 2023-12-15 PROCEDURE — G0008 ADMIN INFLUENZA VIRUS VAC: HCPCS | Mod: S$GLB,,, | Performed by: STUDENT IN AN ORGANIZED HEALTH CARE EDUCATION/TRAINING PROGRAM

## 2023-12-15 PROCEDURE — G0008 FLU VACCINE - QUADRIVALENT - ADJUVANTED: ICD-10-PCS | Mod: S$GLB,,, | Performed by: STUDENT IN AN ORGANIZED HEALTH CARE EDUCATION/TRAINING PROGRAM

## 2023-12-27 ENCOUNTER — INFUSION (OUTPATIENT)
Dept: INFUSION THERAPY | Facility: HOSPITAL | Age: 68
End: 2023-12-27
Attending: INTERNAL MEDICINE
Payer: MEDICARE

## 2023-12-27 VITALS
HEART RATE: 65 BPM | OXYGEN SATURATION: 95 % | RESPIRATION RATE: 18 BRPM | DIASTOLIC BLOOD PRESSURE: 74 MMHG | TEMPERATURE: 98 F | SYSTOLIC BLOOD PRESSURE: 116 MMHG

## 2023-12-27 DIAGNOSIS — E83.110 HEREDITARY HEMOCHROMATOSIS: Primary | ICD-10-CM

## 2023-12-27 DIAGNOSIS — E83.19 INCREASED STORAGE IRON: ICD-10-CM

## 2023-12-27 PROCEDURE — 99195 PHLEBOTOMY: CPT

## 2023-12-27 RX ORDER — LIDOCAINE HYDROCHLORIDE 10 MG/ML
1 INJECTION, SOLUTION EPIDURAL; INFILTRATION; INTRACAUDAL; PERINEURAL ONCE
Status: DISCONTINUED | OUTPATIENT
Start: 2023-12-27 | End: 2023-12-27 | Stop reason: HOSPADM

## 2023-12-27 RX ORDER — LIDOCAINE HYDROCHLORIDE 10 MG/ML
1 INJECTION, SOLUTION EPIDURAL; INFILTRATION; INTRACAUDAL; PERINEURAL ONCE
Status: CANCELLED | OUTPATIENT
Start: 2024-01-03 | End: 2024-01-03

## 2023-12-27 NOTE — DISCHARGE INSTRUCTIONS
[facility]  [address] [facility] [facility]  [address]  [phone] phone     [phone] fax  Hours of Operation: Monday- Friday 8:00am- 5:00pm  After hours phone  [phone]  Hematology / Oncology Physicians on call    Dr. Faisal Parks      Nurse Practitioners:    Denise Benites, PARIS Lisa, PARIS Hayes, PARIS Easley, PARIS Simon, PARIS Lopez, PA      Please don't hesitate to call if you have any concerns.    .FALL PREVENTION   Falls often occur due to slipping, tripping or losing your balance. Here are ways to reduce your risk of falling again.   Was there anything that caused your fall that can be fixed, removed or replaced?   Make your home safe by keeping walkways clear of objects you may trip over.   Use non-slip pads under rugs.   Do not walk in poorly lit areas.   Do not stand on chairs or wobbly ladders.   Use caution when reaching overhead or looking upward. This position can cause a loss of balance.   Be sure your shoes fit properly, have non-slip bottoms and are in good condition.   Be cautious when going up and down stairs, curbs, and when walking on uneven sidewalks.   If your balance is poor, consider using a cane or walker.   If your fall was related to alcohol use, stop or limit alcohol intake.   If your fall was related to use of sleeping medicines, talk to your doctor about this. You may need to reduce your dosage at bedtime if you awaken during the night to go to the bathroom.   To reduce the need for nighttime bathroom trips:   Avoid drinking fluids for several hours before going to bed   Empty your bladder before going to bed   Men can keep a urinal at the bedside   © 5715-3661 [employer]ately[employer], [address] [place] [address], [place] [address]. All rights reserved. This information is not intended as a substitute for professional medical care. Always follow your healthcare  professional's instructions.  .WAYS TO HELP PREVENT INFECTION        WASH YOUR HANDS OFTEN DURING THE DAY, ESPECIALLY BEFORE YOU EAT, AFTER USING THE BATHROOM, AND AFTER TOUCHING ANIMALS    STAY AWAY FROM PEOPLE WHO HAVE ILLNESSES YOU CAN CATCH; SUCH AS COLDS, FLU, CHICKEN POX    TRY TO AVOID CROWDS    STAY AWAY FROM CHILDREN WHO RECENTLY HAVE RECEIVED LIVE VIRUS VACCINES    MAINTAIN GOOD MOUTH CARE    DO NOT SQUEEZE OR SCRATCH PIMPLES    CLEAN CUTS & SCRAPES RIGHT AWAY AND DAILY UNTIL HEALED WITH WARM WATER, SOAP & AN ANTISEPTIC    AVOID CONTACT WITH LITTER BOXES, BIRD CAGES, & FISH TANKS    AVOID STANDING WATER, IE., BIRD BATHS, FLOWER POTS/VASES, OR HUMIDIFIERS    WEAR GLOVES WHEN GARDENING OR CLEANING UP AFTER OTHERS, ESPECIALLY BABIES & SMALL CHILDREN    DO NOT EAT RAW FISH, SEAFOOD, MEAT, OR EGGS

## 2023-12-27 NOTE — NURSING
One unit therapeutic phlebotomy performed via left A/C without difficulty.  Upon completion, needle dc'd, pressure dressing applied, and blood discarded in appropriate container.  Sprite given /snack refused. Pt denies weakness/dizziness.  Pt discharged with next appt scheduled.

## 2024-01-22 ENCOUNTER — OFFICE VISIT (OUTPATIENT)
Dept: INTERNAL MEDICINE | Facility: CLINIC | Age: 69
End: 2024-01-22
Payer: MEDICARE

## 2024-01-22 VITALS
OXYGEN SATURATION: 97 % | SYSTOLIC BLOOD PRESSURE: 124 MMHG | BODY MASS INDEX: 36.66 KG/M2 | WEIGHT: 241.88 LBS | DIASTOLIC BLOOD PRESSURE: 70 MMHG | HEIGHT: 68 IN | TEMPERATURE: 99 F | HEART RATE: 78 BPM

## 2024-01-22 DIAGNOSIS — K76.0 NAFLD (NONALCOHOLIC FATTY LIVER DISEASE): ICD-10-CM

## 2024-01-22 DIAGNOSIS — D69.6 THROMBOCYTOPENIA: ICD-10-CM

## 2024-01-22 DIAGNOSIS — E83.110 HEREDITARY HEMOCHROMATOSIS: ICD-10-CM

## 2024-01-22 DIAGNOSIS — E78.5 HYPERLIPIDEMIA, UNSPECIFIED HYPERLIPIDEMIA TYPE: ICD-10-CM

## 2024-01-22 DIAGNOSIS — I10 ESSENTIAL HYPERTENSION: Primary | ICD-10-CM

## 2024-01-22 PROCEDURE — 99214 OFFICE O/P EST MOD 30 MIN: CPT | Mod: S$GLB,,, | Performed by: FAMILY MEDICINE

## 2024-01-22 PROCEDURE — 99999 PR PBB SHADOW E&M-EST. PATIENT-LVL III: CPT | Mod: PBBFAC,,, | Performed by: FAMILY MEDICINE

## 2024-01-22 NOTE — PROGRESS NOTES
Subjective:       Patient ID: Jasson Mayo is a 68 y.o. male.    Chief Complaint: Follow-up    He is being followed for fatty liver disease.  He is also being followed by hemochromatosis with several phlebotomies being done.  His ferritin iron levels have improved.  Medical history also includes hypertension hyperlipidemia osteoarthritis thrombocytopenia chronic gout.  He denies headache chest pain palpitations shortness of breath edema.  He reports arthritic discomfort of his hands seems improved.    Follow-up  Pertinent negatives include no abdominal pain, chest pain, chills, coughing, diaphoresis, fever, headaches, nausea, vomiting or weakness.     Review of Systems   Constitutional:  Negative for activity change, appetite change, chills, diaphoresis, fever and unexpected weight change.   Respiratory:  Negative for cough, chest tightness, shortness of breath and wheezing.    Cardiovascular:  Negative for chest pain, palpitations and leg swelling.   Gastrointestinal:  Negative for abdominal distention, abdominal pain, diarrhea, nausea and vomiting.   Genitourinary:  Negative for difficulty urinating.   Neurological:  Negative for dizziness, weakness, light-headedness and headaches.       Objective:      Physical Exam  Constitutional:       General: He is not in acute distress.     Appearance: He is not ill-appearing or diaphoretic.   Cardiovascular:      Rate and Rhythm: Normal rate and regular rhythm.      Heart sounds: No murmur heard.     No gallop.   Pulmonary:      Effort: Pulmonary effort is normal. No respiratory distress.      Breath sounds: No wheezing, rhonchi or rales.   Abdominal:      General: There is no distension.      Palpations: Abdomen is soft. There is no mass.      Tenderness: There is no abdominal tenderness.   Lymphadenopathy:      Cervical: No cervical adenopathy.   Skin:     General: Skin is warm and dry.   Neurological:      Mental Status: He is alert.   Psychiatric:          Mood and Affect: Mood normal.         Behavior: Behavior normal.         Lab Visit on 11/27/2023   Component Date Value Ref Range Status    WBC 11/27/2023 5.83  3.90 - 12.70 K/uL Final    RBC 11/27/2023 5.21  4.60 - 6.20 M/uL Final    Hemoglobin 11/27/2023 16.3  14.0 - 18.0 g/dL Final    Hematocrit 11/27/2023 47.6  40.0 - 54.0 % Final    MCV 11/27/2023 91  82 - 98 fL Final    MCH 11/27/2023 31.3 (H)  27.0 - 31.0 pg Final    MCHC 11/27/2023 34.2  32.0 - 36.0 g/dL Final    RDW 11/27/2023 12.7  11.5 - 14.5 % Final    Platelets 11/27/2023 146 (L)  150 - 450 K/uL Final    MPV 11/27/2023 10.7  9.2 - 12.9 fL Final    Immature Granulocytes 11/27/2023 0.2  0.0 - 0.5 % Final    Gran # (ANC) 11/27/2023 3.0  1.8 - 7.7 K/uL Final    Immature Grans (Abs) 11/27/2023 0.01  0.00 - 0.04 K/uL Final    Lymph # 11/27/2023 1.9  1.0 - 4.8 K/uL Final    Mono # 11/27/2023 0.6  0.3 - 1.0 K/uL Final    Eos # 11/27/2023 0.4  0.0 - 0.5 K/uL Final    Baso # 11/27/2023 0.04  0.00 - 0.20 K/uL Final    nRBC 11/27/2023 0  0 /100 WBC Final    Gran % 11/27/2023 50.5  38.0 - 73.0 % Final    Lymph % 11/27/2023 32.1  18.0 - 48.0 % Final    Mono % 11/27/2023 10.5  4.0 - 15.0 % Final    Eosinophil % 11/27/2023 6.2  0.0 - 8.0 % Final    Basophil % 11/27/2023 0.7  0.0 - 1.9 % Final    Differential Method 11/27/2023 Automated   Final    Sodium 11/27/2023 140  136 - 145 mmol/L Final    Potassium 11/27/2023 4.3  3.5 - 5.1 mmol/L Final    Chloride 11/27/2023 103  95 - 110 mmol/L Final    CO2 11/27/2023 27  23 - 29 mmol/L Final    Glucose 11/27/2023 114 (H)  70 - 110 mg/dL Final    BUN 11/27/2023 20  8 - 23 mg/dL Final    Creatinine 11/27/2023 1.0  0.5 - 1.4 mg/dL Final    Calcium 11/27/2023 9.7  8.7 - 10.5 mg/dL Final    Total Protein 11/27/2023 7.5  6.0 - 8.4 g/dL Final    Albumin 11/27/2023 3.9  3.5 - 5.2 g/dL Final    Total Bilirubin 11/27/2023 0.6  0.1 - 1.0 mg/dL Final    Alkaline Phosphatase 11/27/2023 103  55 - 135 U/L Final    AST 11/27/2023 35  10 - 40  U/L Final    ALT 11/27/2023 44  10 - 44 U/L Final    eGFR 11/27/2023 >60  >60 mL/min/1.73 m^2 Final    Anion Gap 11/27/2023 10  8 - 16 mmol/L Final    Ferritin 11/27/2023 366 (H)  20.0 - 300.0 ng/mL Final    Iron 11/27/2023 99  45 - 160 ug/dL Final    Transferrin 11/27/2023 237  200 - 375 mg/dL Final    TIBC 11/27/2023 351  250 - 450 ug/dL Final    Saturated Iron 11/27/2023 28  20 - 50 % Final     Assessment:       1. Essential hypertension    2. Hyperlipidemia, unspecified hyperlipidemia type    3. Thrombocytopenia    4. BMI 36.0-36.9,adult    5. Hereditary hemochromatosis    6. NAFLD (nonalcoholic fatty liver disease)        Plan:   Blood pressure controlled up-to-date lab was reviewed medications reviewed health maintenance reviewed RSV was recommended.  Follow-up in 6 months.      Essential hypertension    Hyperlipidemia, unspecified hyperlipidemia type    Thrombocytopenia    BMI 36.0-36.9,adult    Hereditary hemochromatosis    NAFLD (nonalcoholic fatty liver disease)

## 2024-01-29 ENCOUNTER — LAB VISIT (OUTPATIENT)
Dept: LAB | Facility: HOSPITAL | Age: 69
End: 2024-01-29
Attending: NURSE PRACTITIONER
Payer: MEDICARE

## 2024-01-29 ENCOUNTER — TELEPHONE (OUTPATIENT)
Dept: HEMATOLOGY/ONCOLOGY | Facility: CLINIC | Age: 69
End: 2024-01-29
Payer: MEDICARE

## 2024-01-29 DIAGNOSIS — D69.6 THROMBOCYTOPENIA: ICD-10-CM

## 2024-01-29 DIAGNOSIS — E83.19 INCREASED STORAGE IRON: ICD-10-CM

## 2024-01-29 DIAGNOSIS — R16.1 SPLENOMEGALY: ICD-10-CM

## 2024-01-29 DIAGNOSIS — E83.110 HEREDITARY HEMOCHROMATOSIS: ICD-10-CM

## 2024-01-29 DIAGNOSIS — R79.89 ELEVATED FERRITIN: ICD-10-CM

## 2024-01-29 DIAGNOSIS — R74.01 TRANSAMINITIS: Primary | ICD-10-CM

## 2024-01-29 LAB
ALBUMIN SERPL BCP-MCNC: 3.6 G/DL (ref 3.5–5.2)
ALP SERPL-CCNC: 93 U/L (ref 55–135)
ALT SERPL W/O P-5'-P-CCNC: 93 U/L (ref 10–44)
ANION GAP SERPL CALC-SCNC: 6 MMOL/L (ref 8–16)
AST SERPL-CCNC: 55 U/L (ref 10–40)
BASOPHILS # BLD AUTO: 0.06 K/UL (ref 0–0.2)
BASOPHILS NFR BLD: 1.1 % (ref 0–1.9)
BILIRUB SERPL-MCNC: 0.4 MG/DL (ref 0.1–1)
BUN SERPL-MCNC: 16 MG/DL (ref 8–23)
CALCIUM SERPL-MCNC: 9.4 MG/DL (ref 8.7–10.5)
CHLORIDE SERPL-SCNC: 106 MMOL/L (ref 95–110)
CO2 SERPL-SCNC: 26 MMOL/L (ref 23–29)
CREAT SERPL-MCNC: 0.9 MG/DL (ref 0.5–1.4)
DIFFERENTIAL METHOD BLD: ABNORMAL
EOSINOPHIL # BLD AUTO: 0.4 K/UL (ref 0–0.5)
EOSINOPHIL NFR BLD: 6.9 % (ref 0–8)
ERYTHROCYTE [DISTWIDTH] IN BLOOD BY AUTOMATED COUNT: 12.8 % (ref 11.5–14.5)
EST. GFR  (NO RACE VARIABLE): >60 ML/MIN/1.73 M^2
FERRITIN SERPL-MCNC: 337 NG/ML (ref 20–300)
GLUCOSE SERPL-MCNC: 112 MG/DL (ref 70–110)
HCT VFR BLD AUTO: 43.2 % (ref 40–54)
HGB BLD-MCNC: 14.7 G/DL (ref 14–18)
IMM GRANULOCYTES # BLD AUTO: 0.02 K/UL (ref 0–0.04)
IMM GRANULOCYTES NFR BLD AUTO: 0.4 % (ref 0–0.5)
IRON SERPL-MCNC: 92 UG/DL (ref 45–160)
LYMPHOCYTES # BLD AUTO: 2.1 K/UL (ref 1–4.8)
LYMPHOCYTES NFR BLD: 39 % (ref 18–48)
MCH RBC QN AUTO: 30.6 PG (ref 27–31)
MCHC RBC AUTO-ENTMCNC: 34 G/DL (ref 32–36)
MCV RBC AUTO: 90 FL (ref 82–98)
MONOCYTES # BLD AUTO: 0.5 K/UL (ref 0.3–1)
MONOCYTES NFR BLD: 10.1 % (ref 4–15)
NEUTROPHILS # BLD AUTO: 2.3 K/UL (ref 1.8–7.7)
NEUTROPHILS NFR BLD: 42.9 % (ref 38–73)
NRBC BLD-RTO: 0 /100 WBC
PLATELET # BLD AUTO: 144 K/UL (ref 150–450)
PMV BLD AUTO: 10.9 FL (ref 9.2–12.9)
POTASSIUM SERPL-SCNC: 4.4 MMOL/L (ref 3.5–5.1)
PROT SERPL-MCNC: 7.2 G/DL (ref 6–8.4)
RBC # BLD AUTO: 4.81 M/UL (ref 4.6–6.2)
SATURATED IRON: 27 % (ref 20–50)
SODIUM SERPL-SCNC: 138 MMOL/L (ref 136–145)
TOTAL IRON BINDING CAPACITY: 340 UG/DL (ref 250–450)
TRANSFERRIN SERPL-MCNC: 230 MG/DL (ref 200–375)
WBC # BLD AUTO: 5.34 K/UL (ref 3.9–12.7)

## 2024-01-29 PROCEDURE — 36415 COLL VENOUS BLD VENIPUNCTURE: CPT | Mod: PO | Performed by: NURSE PRACTITIONER

## 2024-01-29 PROCEDURE — 80053 COMPREHEN METABOLIC PANEL: CPT | Performed by: NURSE PRACTITIONER

## 2024-01-29 PROCEDURE — 82728 ASSAY OF FERRITIN: CPT | Performed by: NURSE PRACTITIONER

## 2024-01-29 PROCEDURE — 83540 ASSAY OF IRON: CPT | Performed by: NURSE PRACTITIONER

## 2024-01-29 PROCEDURE — 85025 COMPLETE CBC W/AUTO DIFF WBC: CPT | Mod: PO | Performed by: NURSE PRACTITIONER

## 2024-01-29 NOTE — TELEPHONE ENCOUNTER
----- Message from Jamia Lisa NP sent at 1/29/2024  2:23 PM CST -----  Patient has elevated liver enzyme.  Please make sure that he avoids tylenol, alcohol or any otc products containing tylenol.  We should repeat lft on 1/31.    Leonardo Bernard

## 2024-01-29 NOTE — PROGRESS NOTES
Patient has elevated liver enzyme.  Please make sure that he avoids tylenol, alcohol or any otc products containing tylenol.  We should repeat lft on 1/31.    Leonardo Bernard

## 2024-01-31 ENCOUNTER — OFFICE VISIT (OUTPATIENT)
Dept: HEMATOLOGY/ONCOLOGY | Facility: CLINIC | Age: 69
End: 2024-01-31
Payer: MEDICARE

## 2024-01-31 ENCOUNTER — LAB VISIT (OUTPATIENT)
Dept: LAB | Facility: HOSPITAL | Age: 69
End: 2024-01-31
Attending: NURSE PRACTITIONER
Payer: MEDICARE

## 2024-01-31 ENCOUNTER — PATIENT MESSAGE (OUTPATIENT)
Dept: HEMATOLOGY/ONCOLOGY | Facility: CLINIC | Age: 69
End: 2024-01-31

## 2024-01-31 ENCOUNTER — TELEPHONE (OUTPATIENT)
Dept: HEMATOLOGY/ONCOLOGY | Facility: CLINIC | Age: 69
End: 2024-01-31

## 2024-01-31 VITALS
DIASTOLIC BLOOD PRESSURE: 94 MMHG | HEIGHT: 68 IN | WEIGHT: 238.19 LBS | SYSTOLIC BLOOD PRESSURE: 135 MMHG | OXYGEN SATURATION: 96 % | BODY MASS INDEX: 36.1 KG/M2 | HEART RATE: 73 BPM

## 2024-01-31 DIAGNOSIS — R16.1 SPLENOMEGALY: ICD-10-CM

## 2024-01-31 DIAGNOSIS — E83.110 HEREDITARY HEMOCHROMATOSIS: ICD-10-CM

## 2024-01-31 DIAGNOSIS — K76.0 NAFLD (NONALCOHOLIC FATTY LIVER DISEASE): ICD-10-CM

## 2024-01-31 DIAGNOSIS — D69.6 THROMBOCYTOPENIA: ICD-10-CM

## 2024-01-31 DIAGNOSIS — R74.01 TRANSAMINITIS: Primary | ICD-10-CM

## 2024-01-31 DIAGNOSIS — R74.01 TRANSAMINITIS: ICD-10-CM

## 2024-01-31 DIAGNOSIS — K72.00 ACUTE LIVER FAILURE WITHOUT HEPATIC COMA: Primary | ICD-10-CM

## 2024-01-31 LAB
ALBUMIN SERPL BCP-MCNC: 3.9 G/DL (ref 3.5–5.2)
ALP SERPL-CCNC: 89 U/L (ref 55–135)
ALT SERPL W/O P-5'-P-CCNC: 111 U/L (ref 10–44)
AST SERPL-CCNC: 72 U/L (ref 10–40)
BILIRUB DIRECT SERPL-MCNC: 0.2 MG/DL (ref 0.1–0.3)
BILIRUB SERPL-MCNC: 0.7 MG/DL (ref 0.1–1)
PROT SERPL-MCNC: 7.5 G/DL (ref 6–8.4)

## 2024-01-31 PROCEDURE — 36415 COLL VENOUS BLD VENIPUNCTURE: CPT | Mod: PO | Performed by: NURSE PRACTITIONER

## 2024-01-31 PROCEDURE — 99215 OFFICE O/P EST HI 40 MIN: CPT | Mod: S$GLB,,, | Performed by: NURSE PRACTITIONER

## 2024-01-31 PROCEDURE — 80076 HEPATIC FUNCTION PANEL: CPT | Mod: PO | Performed by: NURSE PRACTITIONER

## 2024-01-31 PROCEDURE — 99999 PR PBB SHADOW E&M-EST. PATIENT-LVL III: CPT | Mod: PBBFAC,,, | Performed by: NURSE PRACTITIONER

## 2024-01-31 NOTE — TELEPHONE ENCOUNTER
Spoke with pts spouse in regards to scheduling ct scan. Pt has been scheduled. Verbalized understanding.

## 2024-01-31 NOTE — PROGRESS NOTES
Carla Brambila,    I saw this patient today and he is schedule to see alyx at the end of Feb.  His liver enzymes have increased over the past couple of days.  I am going to do a cT liver triple phase with contrast and AFP.  Not an alcohol drinker and is not taking tylenol.  Anyway yall can see him sooner?    Leonardo Bernard

## 2024-01-31 NOTE — PROGRESS NOTES
Subjective:      Patient ID: Jasson Mayo is a 68 y.o. male.    Chief Complaint:     HPI: 68 year old male who presents today for continued monitoring of thrombocytopenia.  He has previously been followed in the clinic by Dr. Frank Rinaldi.  Today is the first time I am evaluating/assessing the patient.  He has been found with diffuse fatty liver  -found with mild steatosis on fibroscan followed by hepatology, degenerative arthritis with radiculopathy, gout and is currently being treated with co-benemid and allopurinol.     Interval History:  8/30/2023 Presents today to review labs with continue stable thrombocytopenia.  Only complaint is chronic arthralgias.  Denies any abnormal bleeding.       Interval History:  9/27/2023 Presents today to review results of iron studies and HH studies.  Found with elevated iron and carrier of H63D gene.             : C282Y: Not detected.   H63D: One copy of the H63D variant was identified.    Interpretation SEE BELOW    Comment: This individual is heterozygous for H63D. This result is   consistent with at minimum a carrier status for hereditary   hemochromatosis (HH).     The presence of a single H63D variant is unlikely to be of   clinical significance in the absence of other   disease-causing variants (1, 2).    Denies eating a diet high in iron content or drinking alcohol.      Interval History:  11/29/2023 Has received 2  - 1 unit phlebotomies prbc since last visit with last done on 10/30/2023.  Has responded well with a reduction in ferritin. Presents today to evaluate labs and to determine if phlebotomy is needed.      Interval History:  1/31/2024 With mild thrombocytopenia due to splenomegaly.  With mild fatty liver disease per fibroscan.  I have reviewed all of the patient's relevant lab work available in the medical record and have utilized this in my evaluation and management recommendations today. Elevated liver enzymes.  Has been taking allopurinol daily 100 mg  for years.  States since having phlebotomies has noticed that his hands joints do not ache as much.  Is taking apple cider vinegar and zinc.     Social History     Socioeconomic History    Marital status:     Number of children: 1   Occupational History    Occupation: [employer]     Employer: [employer]     Comment: [employer]   Tobacco Use    Smoking status: Never    Smokeless tobacco: Former   Substance and Sexual Activity    Alcohol use: No    Drug use: Never    Sexual activity: Not Currently     Partners: Female     Birth control/protection: None     Social Determinants of Health     Financial Resource Strain: Low Risk  (1/15/2024)    Overall Financial Resource Strain (CARDIA)     Difficulty of Paying Living Expenses: Not hard at all   Food Insecurity: No Food Insecurity (1/15/2024)    Hunger Vital Sign     Worried About Running Out of Food in the Last Year: Never true     Ran Out of Food in the Last Year: Never true   Transportation Needs: No Transportation Needs (1/15/2024)    PRAPARE - Transportation     Lack of Transportation (Medical): No     Lack of Transportation (Non-Medical): No   Physical Activity: Insufficiently Active (1/15/2024)    Exercise Vital Sign     Days of Exercise per Week: 3 days     Minutes of Exercise per Session: 10 min   Stress: No Stress Concern Present (1/15/2024)    [employer] - Occupational Stress Questionnaire     Feeling of Stress : Not at all   Social Connections: Unknown (1/15/2024)    Social Connection and Isolation Panel [NHANES]     Frequency of Communication with Friends and Family: More than three times a week     Frequency of Social Gatherings with Friends and Family: More than three times a week     Active Member of Clubs or Organizations: Yes     Attends Club or Organization Meetings: 1 to 4 times per year     Marital Status:    Housing Stability: Low Risk  (1/15/2024)    Housing Stability Vital Sign     Unable to Pay  for Housing in the Last Year: No     Number of Places Lived in the Last Year: 1     Unstable Housing in the Last Year: No       Family History   Problem Relation Age of Onset    COPD Mother     Heart attacks under age 50 Mother     Cancer Father         PROSTATE CA       Past Surgical History:   Procedure Laterality Date    COLONOSCOPY W/ POLYPECTOMY  10, 14 no polyp    KNEE ARTHROSCOPY Right     Meniscus repair    LIVER BIOPSY  2010    VASECTOMY         Past Medical History:   Diagnosis Date    Decreased platelet count     Fatty liver     Gout     HTN (hypertension) 2000    Non-A non-B hepatitis 1990    Obesity (BMI 35.0-39.9 without comorbidity)     Prediabetes 2015       Review of Systems   Constitutional:  Negative for appetite change and unexpected weight change.   HENT:  Negative for mouth sores.    Eyes:  Negative for visual disturbance.   Respiratory:  Negative for cough and shortness of breath.    Cardiovascular:  Negative for chest pain.   Gastrointestinal:  Negative for abdominal pain and diarrhea.   Endocrine: Negative.    Genitourinary:  Negative for frequency.   Musculoskeletal:  Positive for arthralgias and back pain.   Skin:  Negative for rash.   Allergic/Immunologic: Negative.    Neurological:  Negative for headaches.   Hematological:  Negative for adenopathy.   Psychiatric/Behavioral:  The patient is not nervous/anxious.           Medication List with Changes/Refills   Current Medications    ALLOPURINOL (ZYLOPRIM) 100 MG TABLET    TAKE 1 TABLET (100 MG TOTAL) BY MOUTH ONCE DAILY.    BENAZEPRIL (LOTENSIN) 20 MG TABLET    Take 1 tablet (20 mg total) by mouth once daily.    COLCHICINE-PROBENECID 0.5-500 MG (CO-BENEMID) 500-0.5 MG TAB    TAKE 1 TABLET BY MOUTH EVERY DAY    RSVPREF3 ANTIGEN-AS01E, PF, (AREXVY, PF,) 120 MCG/0.5 ML SUSR VACCINE    Inject into the muscle.    VITAMIN E 1000 UNIT CAPSULE    Take 1,000 Units by mouth once daily.        Objective:     Vitals:    01/31/24 0846   BP: (!) 135/94    Pulse: 73       Physical Exam  Vitals reviewed.   Constitutional:       Appearance: Normal appearance. He is obese.   HENT:      Head: Normocephalic and atraumatic.      Right Ear: External ear normal.      Left Ear: External ear normal.   Cardiovascular:      Rate and Rhythm: Normal rate and regular rhythm.      Heart sounds: Normal heart sounds, S1 normal and S2 normal.   Pulmonary:      Effort: Pulmonary effort is normal.      Breath sounds: Normal breath sounds.   Abdominal:      General: There is no distension.   Musculoskeletal:         General: Normal range of motion.      Cervical back: Normal range of motion.   Skin:     General: Skin is warm and dry.   Neurological:      General: No focal deficit present.      Mental Status: He is alert and oriented to person, place, and time.   Psychiatric:         Attention and Perception: Attention and perception normal.         Mood and Affect: Mood and affect normal.         Speech: Speech normal.         Behavior: Behavior normal. Behavior is cooperative.         Thought Content: Thought content normal.         Cognition and Memory: Cognition and memory normal.         Judgment: Judgment normal.         Assessment:     Problem List Items Addressed This Visit          Hematology    Thrombocytopenia    Relevant Orders    CBC Auto Differential       GI    NAFLD (nonalcoholic fatty liver disease)    Relevant Orders    Comprehensive Metabolic Panel    Hereditary hemochromatosis    Relevant Orders    CBC Auto Differential    Comprehensive Metabolic Panel    Ferritin    Iron and TIBC     Other Visit Diagnoses       Transaminitis    -  Primary    Relevant Orders    Comprehensive Metabolic Panel    Splenomegaly        Relevant Orders    CBC Auto Differential            Lab Results   Component Value Date    WBC 5.34 01/29/2024    RBC 4.81 01/29/2024    HGB 14.7 01/29/2024    HCT 43.2 01/29/2024    MCV 90 01/29/2024    MCH 30.6 01/29/2024    MCHC 34.0 01/29/2024    RDW 12.8  01/29/2024     (L) 01/29/2024    MPV 10.9 01/29/2024    GRAN 2.3 01/29/2024    GRAN 42.9 01/29/2024    LYMPH 2.1 01/29/2024    LYMPH 39.0 01/29/2024    MONO 0.5 01/29/2024    MONO 10.1 01/29/2024    EOS 0.4 01/29/2024    BASO 0.06 01/29/2024    EOSINOPHIL 6.9 01/29/2024    BASOPHIL 1.1 01/29/2024      Lab Results   Component Value Date     01/29/2024    K 4.4 01/29/2024     01/29/2024    CO2 26 01/29/2024    BUN 16 01/29/2024    CREATININE 0.9 01/29/2024    CALCIUM 9.4 01/29/2024    ANIONGAP 6 (L) 01/29/2024    ESTGFRAFRICA >60.0 06/10/2022    EGFRNONAA >60.0 06/10/2022     Lab Results   Component Value Date    ALT 93 (H) 01/29/2024    AST 55 (H) 01/29/2024    ALKPHOS 93 01/29/2024    BILITOT 0.4 01/29/2024     Lab Results   Component Value Date    IRON 92 01/29/2024    TRANSFERRIN 230 01/29/2024    TIBC 340 01/29/2024    FESATURATED 27 01/29/2024    FERRITIN 337 (H) 01/29/2024        Plan:   Transaminitis  -     Comprehensive Metabolic Panel; Future; Expected date: 01/31/2024    NAFLD (nonalcoholic fatty liver disease)  -     Comprehensive Metabolic Panel; Future; Expected date: 01/31/2024    Hereditary hemochromatosis  -     CBC Auto Differential; Future; Expected date: 01/31/2024  -     Comprehensive Metabolic Panel; Future; Expected date: 01/31/2024  -     Ferritin; Future; Expected date: 01/31/2024  -     Iron and TIBC; Future; Expected date: 01/31/2024    Thrombocytopenia  -     CBC Auto Differential; Future; Expected date: 01/31/2024    Splenomegaly  -     CBC Auto Differential; Future; Expected date: 01/31/2024    1 unit phlebotomy tomorrow.  F/u in 1 month with labs prior - cbc, cmp, iron studies  Goal ferritin - phlebotomy needed    Collaborating Provider:  Dr. Olegario Malone      Med Onc Chart Routing      Follow up with physician    Follow up with LUIS 2 months. in person in [place]   Infusion scheduling note   schedule for 1 unit phelbotomy tomorrow at [facility]   Injection scheduling  note n/a   Labs   Scheduling:  Preferred lab: [facility]  Lab interval:  cbc, cmp, iron studies   Imaging   N/a   Pharmacy appointment No pharmacy appointment needed      Other referrals       No additional referrals needed  n/a          Total time spent on encounter: 45 minutes    Thank You,  Leonardo Lisa, RAYP-C  [facility] Hematology

## 2024-02-01 ENCOUNTER — HOSPITAL ENCOUNTER (OUTPATIENT)
Dept: RADIOLOGY | Facility: HOSPITAL | Age: 69
Discharge: HOME | End: 2024-02-01
Attending: NURSE PRACTITIONER
Payer: MEDICARE

## 2024-02-01 ENCOUNTER — INFUSION (OUTPATIENT)
Dept: INFUSION THERAPY | Facility: HOSPITAL | Age: 69
End: 2024-02-01
Attending: INTERNAL MEDICINE
Payer: MEDICARE

## 2024-02-01 ENCOUNTER — PATIENT MESSAGE (OUTPATIENT)
Dept: HEMATOLOGY/ONCOLOGY | Facility: CLINIC | Age: 69
End: 2024-02-01
Payer: MEDICARE

## 2024-02-01 ENCOUNTER — TELEPHONE (OUTPATIENT)
Dept: HEMATOLOGY/ONCOLOGY | Facility: CLINIC | Age: 69
End: 2024-02-01
Payer: MEDICARE

## 2024-02-01 VITALS
HEART RATE: 66 BPM | DIASTOLIC BLOOD PRESSURE: 81 MMHG | OXYGEN SATURATION: 98 % | TEMPERATURE: 98 F | SYSTOLIC BLOOD PRESSURE: 136 MMHG | RESPIRATION RATE: 18 BRPM

## 2024-02-01 DIAGNOSIS — K72.00 ACUTE LIVER FAILURE WITHOUT HEPATIC COMA: ICD-10-CM

## 2024-02-01 DIAGNOSIS — E83.110 HEREDITARY HEMOCHROMATOSIS: Primary | ICD-10-CM

## 2024-02-01 DIAGNOSIS — E83.19 INCREASED STORAGE IRON: ICD-10-CM

## 2024-02-01 DIAGNOSIS — I70.90 ARTERIOSCLEROSIS: Primary | ICD-10-CM

## 2024-02-01 PROCEDURE — 25500020 PHARM REV CODE 255: Performed by: NURSE PRACTITIONER

## 2024-02-01 PROCEDURE — 74160 CT ABDOMEN W/CONTRAST: CPT | Mod: TC

## 2024-02-01 PROCEDURE — 99195 PHLEBOTOMY: CPT

## 2024-02-01 PROCEDURE — 74160 CT ABDOMEN W/CONTRAST: CPT | Mod: 26,,, | Performed by: STUDENT IN AN ORGANIZED HEALTH CARE EDUCATION/TRAINING PROGRAM

## 2024-02-01 RX ORDER — LIDOCAINE HYDROCHLORIDE 10 MG/ML
1 INJECTION, SOLUTION EPIDURAL; INFILTRATION; INTRACAUDAL; PERINEURAL ONCE
Status: CANCELLED | OUTPATIENT
Start: 2024-02-08 | End: 2024-02-08

## 2024-02-01 RX ADMIN — IOHEXOL 100 ML: 350 INJECTION, SOLUTION INTRAVENOUS at 08:02

## 2024-02-01 NOTE — NURSING
One unit therapeutic phlebotomy performed via right a/c without difficulty. Upon completion, needle dc'd, pressure dressing applied, and blood discarded in appropriate container. No Juice/snack offered pt NPO for CT w/ contrast.  Vitals documented in flowsheets.  Pt denies weakness/dizziness.  Pt discharged with next appt scheduled.

## 2024-02-01 NOTE — PROGRESS NOTES
1670) Bedside shift change report given to Lawanda Viramontes RN (oncoming nurse) by Ana Jackson RN (offgoing nurse). Report included the following information SBAR, Kardex and MAR.  
3857) pt refuse tylenol at this time. Pt request clippers for shave 
1600) pt sleeping.  
1920) Bedside shift change report given to GISELLE Alexander (oncoming nurse) by Lawanda Viramontes RN (offgoing nurse). Report included the following information SBAR, Kardex and MAR. Please schedule patient to see cardiology for atherosclerosis of coronary arteries and aorta. Also needs to do AFP labs in [place] today or tomorrow. Please let patient or his wife know.    Leonardo Bernard

## 2024-02-01 NOTE — TELEPHONE ENCOUNTER
Carlosr was consulted to draft jury duty excuse for pt., as pt is requiring further workup for his dx and is not currently stable for civic obligation scheduled 2/6/24. Carlosr called pt. and spoke to pt's spouse, Vonnie ([phone]). Vonnie provided pts juror number and will email swer copy of pt.'s juror letter. Swer to draft letter for provider signature. Once obtained, swer to email copy to pt.'s spouse and will mail original. Swer will remain available.

## 2024-02-05 ENCOUNTER — OFFICE VISIT (OUTPATIENT)
Dept: CARDIOLOGY | Facility: CLINIC | Age: 69
End: 2024-02-05
Payer: MEDICARE

## 2024-02-05 VITALS
HEIGHT: 68 IN | SYSTOLIC BLOOD PRESSURE: 120 MMHG | WEIGHT: 243.19 LBS | OXYGEN SATURATION: 97 % | BODY MASS INDEX: 36.86 KG/M2 | DIASTOLIC BLOOD PRESSURE: 68 MMHG | HEART RATE: 88 BPM

## 2024-02-05 DIAGNOSIS — I70.0 AORTIC ATHEROSCLEROSIS: Primary | ICD-10-CM

## 2024-02-05 DIAGNOSIS — I70.90 ARTERIOSCLEROSIS: ICD-10-CM

## 2024-02-05 DIAGNOSIS — E66.01 SEVERE OBESITY WITH BODY MASS INDEX (BMI) OF 35.0 TO 39.9 WITH SERIOUS COMORBIDITY: ICD-10-CM

## 2024-02-05 DIAGNOSIS — I10 ESSENTIAL HYPERTENSION: ICD-10-CM

## 2024-02-05 DIAGNOSIS — E78.00 PURE HYPERCHOLESTEROLEMIA: ICD-10-CM

## 2024-02-05 PROCEDURE — 99204 OFFICE O/P NEW MOD 45 MIN: CPT | Mod: S$GLB,,, | Performed by: INTERNAL MEDICINE

## 2024-02-05 PROCEDURE — 99999 PR PBB SHADOW E&M-EST. PATIENT-LVL IV: CPT | Mod: PBBFAC,,, | Performed by: INTERNAL MEDICINE

## 2024-02-05 NOTE — PROGRESS NOTES
Subjective:   Patient ID:  Jasson Mayo is a 68 y.o. male who presents for follow-up of No chief complaint on file.  Pt referred for CAD/Coronary artery atherosclerosis  per CT scan of abdomen.  Patient denies CP, angina or anginal equivalent.EKG 8-23 nml    CRF- HTN, weight, age/male    Hypertension  This is a chronic problem. The current episode started more than 1 year ago. The problem has been gradually improving since onset. The problem is controlled. Pertinent negatives include no chest pain, palpitations or shortness of breath. Risk factors for coronary artery disease include male gender and obesity. Past treatments include ACE inhibitors. The current treatment provides moderate improvement. There are no compliance problems.        Review of Systems   Constitutional: Negative. Negative for weight gain.   HENT: Negative.     Eyes: Negative.    Cardiovascular: Negative.  Negative for chest pain, leg swelling and palpitations.   Respiratory: Negative.  Negative for shortness of breath.    Endocrine: Negative.    Hematologic/Lymphatic: Negative.    Skin: Negative.    Musculoskeletal:  Negative for muscle weakness.   Gastrointestinal: Negative.    Genitourinary: Negative.    Neurological: Negative.  Negative for dizziness.   Psychiatric/Behavioral: Negative.     Allergic/Immunologic: Negative.    All other systems reviewed and are negative.    Family History   Problem Relation Age of Onset    COPD Mother     Heart attacks under age 50 Mother     Cancer Father         PROSTATE CA     Past Medical History:   Diagnosis Date    Decreased platelet count     Fatty liver     Gout     HTN (hypertension) 2000    Non-A non-B hepatitis 1990    Obesity (BMI 35.0-39.9 without comorbidity)     Prediabetes 2015     Social History     Socioeconomic History    Marital status:     Number of children: 1   Occupational History    Occupation: [employer]     Employer: [employer]     Comment: [employer]    Tobacco Use    Smoking status: Never    Smokeless tobacco: Former   Substance and Sexual Activity    Alcohol use: No    Drug use: Never    Sexual activity: Not Currently     Partners: Female     Birth control/protection: None     Social Determinants of Health     Financial Resource Strain: Low Risk  (1/15/2024)    Overall Financial Resource Strain (CARDIA)     Difficulty of Paying Living Expenses: Not hard at all   Food Insecurity: No Food Insecurity (1/15/2024)    Hunger Vital Sign     Worried About Running Out of Food in the Last Year: Never true     Ran Out of Food in the Last Year: Never true   Transportation Needs: No Transportation Needs (1/15/2024)    PRAPARE - Transportation     Lack of Transportation (Medical): No     Lack of Transportation (Non-Medical): No   Physical Activity: Insufficiently Active (1/15/2024)    Exercise Vital Sign     Days of Exercise per Week: 3 days     Minutes of Exercise per Session: 10 min   Stress: No Stress Concern Present (1/15/2024)    [employer] [place] [employer] - Occupational Stress Questionnaire     Feeling of Stress : Not at all   Social Connections: Unknown (1/15/2024)    Social Connection and Isolation Panel [NHANES]     Frequency of Communication with Friends and Family: More than three times a week     Frequency of Social Gatherings with Friends and Family: More than three times a week     Active Member of Clubs or Organizations: Yes     Attends Club or Organization Meetings: 1 to 4 times per year     Marital Status:    Housing Stability: Low Risk  (1/15/2024)    Housing Stability Vital Sign     Unable to Pay for Housing in the Last Year: No     Number of Places Lived in the Last Year: 1     Unstable Housing in the Last Year: No     Current Outpatient Medications on File Prior to Visit   Medication Sig Dispense Refill    allopurinoL (ZYLOPRIM) 100 MG tablet TAKE 1 TABLET (100 MG TOTAL) BY MOUTH ONCE DAILY. 90 tablet 2    benazepriL (LOTENSIN) 20 MG  tablet Take 1 tablet (20 mg total) by mouth once daily. 90 tablet 3    colchicine-probenecid 0.5-500 mg (CO-BENEMID) 500-0.5 mg Tab TAKE 1 TABLET BY MOUTH EVERY DAY 90 tablet 3    vitamin E 1000 UNIT capsule Take 1,000 Units by mouth once daily.      RSVPreF3 antigen-AS01E, PF, (AREXVY, PF,) 120 mcg/0.5 mL SusR vaccine Inject into the muscle. 0.5 mL 0     No current facility-administered medications on file prior to visit.     Review of patient's allergies indicates:  No Known Allergies    Objective:     Physical Exam  Vitals and nursing note reviewed.   Constitutional:       Appearance: He is well-developed.   HENT:      Head: Normocephalic and atraumatic.   Eyes:      Conjunctiva/sclera: Conjunctivae normal.      Pupils: Pupils are equal, round, and reactive to light.   Cardiovascular:      Rate and Rhythm: Normal rate and regular rhythm.      Pulses: Intact distal pulses.      Heart sounds: Normal heart sounds.   Pulmonary:      Effort: Pulmonary effort is normal.      Breath sounds: Normal breath sounds.   Abdominal:      General: Bowel sounds are normal.      Palpations: Abdomen is soft.   Musculoskeletal:      Cervical back: Normal range of motion and neck supple.   Skin:     General: Skin is warm and dry.   Neurological:      Mental Status: He is alert and oriented to person, place, and time.         Assessment:     1. Aortic atherosclerosis    2. Arteriosclerosis    3. Severe obesity with body mass index (BMI) of 35.0 to 39.9 with serious comorbidity    4. Essential hypertension    5. Pure hypercholesterolemia        Plan:     Aortic atherosclerosis    Arteriosclerosis  -     Ambulatory referral/consult to Cardiology    Severe obesity with body mass index (BMI) of 35.0 to 39.9 with serious comorbidity    Essential hypertension    Pure hypercholesterolemia      NMT/stress test  Continue lotensin- HTN  Check lipids

## 2024-02-06 DIAGNOSIS — M1A.0720 IDIOPATHIC CHRONIC GOUT OF LEFT FOOT WITHOUT TOPHUS: ICD-10-CM

## 2024-02-06 RX ORDER — ALLOPURINOL 100 MG/1
100 TABLET ORAL DAILY
Qty: 90 TABLET | Refills: 2 | Status: SHIPPED | OUTPATIENT
Start: 2024-02-06

## 2024-02-09 ENCOUNTER — LAB VISIT (OUTPATIENT)
Dept: LAB | Facility: HOSPITAL | Age: 69
End: 2024-02-09
Attending: INTERNAL MEDICINE
Payer: MEDICARE

## 2024-02-09 DIAGNOSIS — K76.0 NAFLD (NONALCOHOLIC FATTY LIVER DISEASE): ICD-10-CM

## 2024-02-09 LAB
ALBUMIN SERPL BCP-MCNC: 3.7 G/DL (ref 3.5–5.2)
ALP SERPL-CCNC: 84 U/L (ref 55–135)
ALT SERPL W/O P-5'-P-CCNC: 56 U/L (ref 10–44)
ANION GAP SERPL CALC-SCNC: 9 MMOL/L (ref 8–16)
AST SERPL-CCNC: 47 U/L (ref 10–40)
BILIRUB SERPL-MCNC: 1 MG/DL (ref 0.1–1)
BUN SERPL-MCNC: 19 MG/DL (ref 8–23)
CALCIUM SERPL-MCNC: 9.3 MG/DL (ref 8.7–10.5)
CHLORIDE SERPL-SCNC: 105 MMOL/L (ref 95–110)
CO2 SERPL-SCNC: 26 MMOL/L (ref 23–29)
CREAT SERPL-MCNC: 1 MG/DL (ref 0.5–1.4)
EST. GFR  (NO RACE VARIABLE): >60 ML/MIN/1.73 M^2
GLUCOSE SERPL-MCNC: 123 MG/DL (ref 70–110)
POTASSIUM SERPL-SCNC: 4.3 MMOL/L (ref 3.5–5.1)
PROT SERPL-MCNC: 6.5 G/DL (ref 6–8.4)
SODIUM SERPL-SCNC: 140 MMOL/L (ref 136–145)

## 2024-02-09 PROCEDURE — 36415 COLL VENOUS BLD VENIPUNCTURE: CPT | Mod: PO | Performed by: INTERNAL MEDICINE

## 2024-02-09 PROCEDURE — 80053 COMPREHEN METABOLIC PANEL: CPT | Performed by: INTERNAL MEDICINE

## 2024-02-21 ENCOUNTER — OFFICE VISIT (OUTPATIENT)
Dept: HEPATOLOGY | Facility: CLINIC | Age: 69
End: 2024-02-21
Payer: MEDICARE

## 2024-02-21 VITALS
HEART RATE: 83 BPM | WEIGHT: 237.19 LBS | HEIGHT: 68 IN | BODY MASS INDEX: 35.95 KG/M2 | SYSTOLIC BLOOD PRESSURE: 116 MMHG | DIASTOLIC BLOOD PRESSURE: 72 MMHG

## 2024-02-21 DIAGNOSIS — E83.119 HEMOCHROMATOSIS, UNSPECIFIED HEMOCHROMATOSIS TYPE: ICD-10-CM

## 2024-02-21 DIAGNOSIS — K76.0 NAFLD (NONALCOHOLIC FATTY LIVER DISEASE): Primary | ICD-10-CM

## 2024-02-21 DIAGNOSIS — R16.1 SPLENOMEGALY: ICD-10-CM

## 2024-02-21 DIAGNOSIS — D69.6 THROMBOCYTOPENIA: ICD-10-CM

## 2024-02-21 PROCEDURE — 99999 PR PBB SHADOW E&M-EST. PATIENT-LVL III: CPT | Mod: PBBFAC,,, | Performed by: NURSE PRACTITIONER

## 2024-02-21 PROCEDURE — 99214 OFFICE O/P EST MOD 30 MIN: CPT | Mod: S$GLB,,, | Performed by: NURSE PRACTITIONER

## 2024-02-21 NOTE — PROGRESS NOTES
Clinic Follow Up:  [facility] Follow Up Note    Reason for Follow Up:  The primary encounter diagnosis was NAFLD (nonalcoholic fatty liver disease). Diagnoses of Splenomegaly, Thrombocytopenia, and Hemochromatosis, unspecified hemochromatosis type were also pertinent to this visit.    PCP: Kenneth Jon       HPI:  This is a 68 y.o. male here for follow up of the above  Pt previously seen by Dr. Alexis.  Today is my first visit with him  He has a known dx of fatty liver without signigifcant fibrosis on fibroscan  Has chronic thrombocytopenia that is being managed by hematology  SInce his last visit, he was also diagnosed with hemochomatosis and had undergone phlebotomy per hematology  LFTs are overall stable       Review of Systems   Constitutional:  Negative for chills, fever, malaise/fatigue and weight loss.   Respiratory:  Negative for cough.    Cardiovascular:  Negative for chest pain.   Gastrointestinal:         Per HPI   Musculoskeletal:  Negative for myalgias.   Skin:  Negative for itching and rash.   Neurological:  Negative for headaches.   Psychiatric/Behavioral:  The patient is not nervous/anxious.        Medical History:  Past Medical History:   Diagnosis Date    Decreased platelet count     Fatty liver     Gout     HTN (hypertension) 2000    Non-A non-B hepatitis 1990    Obesity (BMI 35.0-39.9 without comorbidity)     Prediabetes 2015       Surgical History:   Past Surgical History:   Procedure Laterality Date    COLONOSCOPY W/ POLYPECTOMY  10, 14 no polyp    KNEE ARTHROSCOPY Right     Meniscus repair    LIVER BIOPSY  2010    VASECTOMY         Family History:   Family History   Problem Relation Age of Onset    COPD Mother     Heart attacks under age 50 Mother     Cancer Father         PROSTATE [place]       Social History:   Social History     Tobacco Use    Smoking status: Never    Smokeless tobacco: Former   Substance Use Topics    Alcohol use: No    Drug use: Never  "      Allergies: Reviewed    Home Medications:  Current Outpatient Medications on File Prior to Visit   Medication Sig Dispense Refill    allopurinoL (ZYLOPRIM) 100 MG tablet TAKE 1 TABLET (100 MG TOTAL) BY MOUTH ONCE DAILY. 90 tablet 2    benazepriL (LOTENSIN) 20 MG tablet Take 1 tablet (20 mg total) by mouth once daily. 90 tablet 3    colchicine-probenecid 0.5-500 mg (CO-BENEMID) 500-0.5 mg Tab TAKE 1 TABLET BY MOUTH EVERY DAY 90 tablet 3    RSVPreF3 antigen-AS01E, PF, (AREXVY, PF,) 120 mcg/0.5 mL SusR vaccine Inject into the muscle. 0.5 mL 0    vitamin E 1000 UNIT capsule Take 1,000 Units by mouth once daily.       No current facility-administered medications on file prior to visit.       Physical Exam:  Vital Signs:  /72 (BP Location: Right arm, Patient Position: Sitting, BP Method: Medium (Manual))   Pulse 83   Ht 5' 8" (1.727 m)   Wt 107.6 kg (237 lb 3.4 oz)   BMI 36.07 kg/m²   Body mass index is 36.07 kg/m².  Physical Exam  Vitals reviewed.   Constitutional:       Appearance: He is well-developed.   HENT:      Head: Normocephalic.   Cardiovascular:      Rate and Rhythm: Normal rate.   Pulmonary:      Effort: Pulmonary effort is normal.   Abdominal:      General: There is no distension.      Tenderness: There is no abdominal tenderness.   Musculoskeletal:         General: Normal range of motion.      Cervical back: Normal range of motion.   Skin:     General: Skin is warm and dry.   Neurological:      Mental Status: He is alert and oriented to person, place, and time.         Labs: Pertinent labs reviewed.      Assessment:  1. NAFLD (nonalcoholic fatty liver disease)    2. Splenomegaly    3. Thrombocytopenia    4. Hemochromatosis, unspecified hemochromatosis type        Recommendations:  Stable without new complaints.   Given the continued elevated LFTs, recommend continued monitoring every 6 months with labs and US yearly.  He had a recent CT that was unremarkable  Continued F/U with hematology "   Weight loss encouraged with improved nutrition and exercise     Return to Clinic:    6 months with pre-clinic labs

## 2024-02-29 ENCOUNTER — HOSPITAL ENCOUNTER (OUTPATIENT)
Dept: PULMONOLOGY | Facility: HOSPITAL | Age: 69
Discharge: HOME | End: 2024-02-29
Attending: INTERNAL MEDICINE
Payer: MEDICARE

## 2024-02-29 ENCOUNTER — HOSPITAL ENCOUNTER (OUTPATIENT)
Dept: RADIOLOGY | Facility: HOSPITAL | Age: 69
Discharge: HOME | End: 2024-02-29
Attending: INTERNAL MEDICINE
Payer: MEDICARE

## 2024-02-29 VITALS
HEIGHT: 68 IN | HEART RATE: 68 BPM | WEIGHT: 237 LBS | SYSTOLIC BLOOD PRESSURE: 128 MMHG | BODY MASS INDEX: 35.92 KG/M2 | DIASTOLIC BLOOD PRESSURE: 78 MMHG

## 2024-02-29 DIAGNOSIS — I70.0 AORTIC ATHEROSCLEROSIS: ICD-10-CM

## 2024-02-29 DIAGNOSIS — I70.90 ARTERIOSCLEROSIS: ICD-10-CM

## 2024-02-29 LAB
EJECTION FRACTION- HIGH: 73 %
END DIASTOLIC INDEX-HIGH: 165 ML/M2
END DIASTOLIC INDEX-LOW: 101 ML/M2
END SYSTOLIC INDEX-HIGH: 64 ML/M2
END SYSTOLIC INDEX-LOW: 28 ML/M2
NUC REST EJECTION FRACTION: 51
NUC STRESS EJECTION FRACTION: 55 %
OHS CV CPX 85 PERCENT MAX PREDICTED HEART RATE MALE: 128
OHS CV CPX MAX PREDICTED HEART RATE: 151
OHS CV CPX PATIENT IS FEMALE: 0
OHS CV CPX PATIENT IS MALE: 1
RETIRED EF AND QEF - SEE NOTES: 59 %

## 2024-02-29 PROCEDURE — 93017 CV STRESS TEST TRACING ONLY: CPT

## 2024-02-29 PROCEDURE — A9502 TC99M TETROFOSMIN: HCPCS | Performed by: INTERNAL MEDICINE

## 2024-02-29 PROCEDURE — 93016 CV STRESS TEST SUPVJ ONLY: CPT | Mod: ,,, | Performed by: INTERNAL MEDICINE

## 2024-02-29 PROCEDURE — 63600175 PHARM REV CODE 636 W HCPCS: Performed by: INTERNAL MEDICINE

## 2024-02-29 PROCEDURE — 93018 CV STRESS TEST I&R ONLY: CPT | Mod: ,,, | Performed by: INTERNAL MEDICINE

## 2024-02-29 PROCEDURE — 78452 HT MUSCLE IMAGE SPECT MULT: CPT | Mod: 26,,, | Performed by: INTERNAL MEDICINE

## 2024-02-29 RX ORDER — REGADENOSON 0.08 MG/ML
0.4 INJECTION, SOLUTION INTRAVENOUS ONCE
Status: COMPLETED | OUTPATIENT
Start: 2024-02-29 | End: 2024-02-29

## 2024-02-29 RX ADMIN — REGADENOSON 0.4 MG: 0.08 INJECTION, SOLUTION INTRAVENOUS at 09:02

## 2024-02-29 RX ADMIN — TETROFOSMIN 9.7 MILLICURIE: 1.38 INJECTION, POWDER, LYOPHILIZED, FOR SOLUTION INTRAVENOUS at 08:02

## 2024-02-29 RX ADMIN — TETROFOSMIN 32.2 MILLICURIE: 1.38 INJECTION, POWDER, LYOPHILIZED, FOR SOLUTION INTRAVENOUS at 09:02

## 2024-03-01 DIAGNOSIS — M1A.0720 IDIOPATHIC CHRONIC GOUT OF LEFT FOOT WITHOUT TOPHUS: ICD-10-CM

## 2024-03-01 RX ORDER — PROBENECID AND COLCHICINE .5; 5 MG/1; MG/1
TABLET ORAL
Qty: 90 TABLET | Refills: 3 | Status: SHIPPED | OUTPATIENT
Start: 2024-03-01

## 2024-03-04 ENCOUNTER — TELEPHONE (OUTPATIENT)
Dept: CARDIOLOGY | Facility: CLINIC | Age: 69
End: 2024-03-04
Payer: MEDICARE

## 2024-03-04 NOTE — TELEPHONE ENCOUNTER
Spoke with pt's spouse and discussed stress test results. Vonnie verbalized understanding and will call back with any further questions or concerns.     ----- Message from Ha Kaur MD sent at 3/3/2024  5:51 AM CST -----  Stress test is negative

## 2024-04-01 ENCOUNTER — LAB VISIT (OUTPATIENT)
Dept: LAB | Facility: HOSPITAL | Age: 69
End: 2024-04-01
Attending: NURSE PRACTITIONER
Payer: MEDICARE

## 2024-04-01 DIAGNOSIS — E83.110 HEREDITARY HEMOCHROMATOSIS: ICD-10-CM

## 2024-04-01 DIAGNOSIS — D69.6 THROMBOCYTOPENIA: ICD-10-CM

## 2024-04-01 DIAGNOSIS — R16.1 SPLENOMEGALY: ICD-10-CM

## 2024-04-01 DIAGNOSIS — K76.0 NAFLD (NONALCOHOLIC FATTY LIVER DISEASE): ICD-10-CM

## 2024-04-01 DIAGNOSIS — R74.01 TRANSAMINITIS: ICD-10-CM

## 2024-04-01 LAB
ALBUMIN SERPL BCP-MCNC: 3.6 G/DL (ref 3.5–5.2)
ALP SERPL-CCNC: 98 U/L (ref 55–135)
ALT SERPL W/O P-5'-P-CCNC: 64 U/L (ref 10–44)
ANION GAP SERPL CALC-SCNC: 12 MMOL/L (ref 8–16)
AST SERPL-CCNC: 40 U/L (ref 10–40)
BASOPHILS # BLD AUTO: ABNORMAL K/UL (ref 0–0.2)
BASOPHILS NFR BLD: 1 % (ref 0–1.9)
BILIRUB SERPL-MCNC: 0.6 MG/DL (ref 0.1–1)
BUN SERPL-MCNC: 18 MG/DL (ref 8–23)
CALCIUM SERPL-MCNC: 9.4 MG/DL (ref 8.7–10.5)
CHLORIDE SERPL-SCNC: 105 MMOL/L (ref 95–110)
CO2 SERPL-SCNC: 25 MMOL/L (ref 23–29)
CREAT SERPL-MCNC: 0.9 MG/DL (ref 0.5–1.4)
DIFFERENTIAL METHOD BLD: ABNORMAL
EOSINOPHIL # BLD AUTO: ABNORMAL K/UL (ref 0–0.5)
EOSINOPHIL NFR BLD: 7 % (ref 0–8)
ERYTHROCYTE [DISTWIDTH] IN BLOOD BY AUTOMATED COUNT: 12.9 % (ref 11.5–14.5)
EST. GFR  (NO RACE VARIABLE): >60 ML/MIN/1.73 M^2
FERRITIN SERPL-MCNC: 200 NG/ML (ref 20–300)
GLUCOSE SERPL-MCNC: 109 MG/DL (ref 70–110)
HCT VFR BLD AUTO: 44.1 % (ref 40–54)
HGB BLD-MCNC: 15 G/DL (ref 14–18)
IMM GRANULOCYTES # BLD AUTO: ABNORMAL K/UL (ref 0–0.04)
IMM GRANULOCYTES NFR BLD AUTO: ABNORMAL % (ref 0–0.5)
IRON SERPL-MCNC: 95 UG/DL (ref 45–160)
LYMPHOCYTES # BLD AUTO: ABNORMAL K/UL (ref 1–4.8)
LYMPHOCYTES NFR BLD: 38 % (ref 18–48)
MCH RBC QN AUTO: 30.9 PG (ref 27–31)
MCHC RBC AUTO-ENTMCNC: 34 G/DL (ref 32–36)
MCV RBC AUTO: 91 FL (ref 82–98)
MONOCYTES # BLD AUTO: ABNORMAL K/UL (ref 0.3–1)
MONOCYTES NFR BLD: 11 % (ref 4–15)
NEUTROPHILS NFR BLD: 43 % (ref 38–73)
NRBC BLD-RTO: 0 /100 WBC
PLATELET # BLD AUTO: 127 K/UL (ref 150–450)
PLATELET BLD QL SMEAR: ABNORMAL
PMV BLD AUTO: 10.8 FL (ref 9.2–12.9)
POTASSIUM SERPL-SCNC: 4.4 MMOL/L (ref 3.5–5.1)
PROT SERPL-MCNC: 6.6 G/DL (ref 6–8.4)
RBC # BLD AUTO: 4.85 M/UL (ref 4.6–6.2)
SATURATED IRON: 28 % (ref 20–50)
SODIUM SERPL-SCNC: 142 MMOL/L (ref 136–145)
TOTAL IRON BINDING CAPACITY: 343 UG/DL (ref 250–450)
TRANSFERRIN SERPL-MCNC: 232 MG/DL (ref 200–375)
WBC # BLD AUTO: 4.59 K/UL (ref 3.9–12.7)

## 2024-04-01 PROCEDURE — 82728 ASSAY OF FERRITIN: CPT | Performed by: NURSE PRACTITIONER

## 2024-04-01 PROCEDURE — 36415 COLL VENOUS BLD VENIPUNCTURE: CPT | Mod: PO | Performed by: NURSE PRACTITIONER

## 2024-04-01 PROCEDURE — 85027 COMPLETE CBC AUTOMATED: CPT | Mod: PO | Performed by: NURSE PRACTITIONER

## 2024-04-01 PROCEDURE — 80053 COMPREHEN METABOLIC PANEL: CPT | Performed by: NURSE PRACTITIONER

## 2024-04-01 PROCEDURE — 83540 ASSAY OF IRON: CPT | Performed by: NURSE PRACTITIONER

## 2024-04-01 PROCEDURE — 85007 BL SMEAR W/DIFF WBC COUNT: CPT | Mod: PO | Performed by: NURSE PRACTITIONER

## 2024-04-03 ENCOUNTER — OFFICE VISIT (OUTPATIENT)
Dept: HEMATOLOGY/ONCOLOGY | Facility: CLINIC | Age: 69
End: 2024-04-03
Payer: MEDICARE

## 2024-04-03 VITALS
WEIGHT: 238.5 LBS | DIASTOLIC BLOOD PRESSURE: 73 MMHG | BODY MASS INDEX: 37.43 KG/M2 | HEIGHT: 67 IN | HEART RATE: 73 BPM | OXYGEN SATURATION: 96 % | SYSTOLIC BLOOD PRESSURE: 119 MMHG

## 2024-04-03 DIAGNOSIS — E83.110 HEREDITARY HEMOCHROMATOSIS: Primary | ICD-10-CM

## 2024-04-03 DIAGNOSIS — K76.0 HEPATIC STEATOSIS: ICD-10-CM

## 2024-04-03 DIAGNOSIS — D69.6 THROMBOCYTOPENIA: ICD-10-CM

## 2024-04-03 DIAGNOSIS — R16.1 SPLENOMEGALY: ICD-10-CM

## 2024-04-03 PROCEDURE — 99215 OFFICE O/P EST HI 40 MIN: CPT | Mod: S$GLB,,, | Performed by: NURSE PRACTITIONER

## 2024-04-03 PROCEDURE — 1159F MED LIST DOCD IN RCRD: CPT | Mod: CPTII,S$GLB,, | Performed by: NURSE PRACTITIONER

## 2024-04-03 PROCEDURE — 1126F AMNT PAIN NOTED NONE PRSNT: CPT | Mod: CPTII,S$GLB,, | Performed by: NURSE PRACTITIONER

## 2024-04-03 PROCEDURE — 3288F FALL RISK ASSESSMENT DOCD: CPT | Mod: CPTII,S$GLB,, | Performed by: NURSE PRACTITIONER

## 2024-04-03 PROCEDURE — 1160F RVW MEDS BY RX/DR IN RCRD: CPT | Mod: CPTII,S$GLB,, | Performed by: NURSE PRACTITIONER

## 2024-04-03 PROCEDURE — 3008F BODY MASS INDEX DOCD: CPT | Mod: CPTII,S$GLB,, | Performed by: NURSE PRACTITIONER

## 2024-04-03 PROCEDURE — 1101F PT FALLS ASSESS-DOCD LE1/YR: CPT | Mod: CPTII,S$GLB,, | Performed by: NURSE PRACTITIONER

## 2024-04-03 PROCEDURE — 99999 PR PBB SHADOW E&M-EST. PATIENT-LVL III: CPT | Mod: PBBFAC,,, | Performed by: NURSE PRACTITIONER

## 2024-04-03 PROCEDURE — 4010F ACE/ARB THERAPY RXD/TAKEN: CPT | Mod: CPTII,S$GLB,, | Performed by: NURSE PRACTITIONER

## 2024-04-03 PROCEDURE — 3078F DIAST BP <80 MM HG: CPT | Mod: CPTII,S$GLB,, | Performed by: NURSE PRACTITIONER

## 2024-04-03 PROCEDURE — 3074F SYST BP LT 130 MM HG: CPT | Mod: CPTII,S$GLB,, | Performed by: NURSE PRACTITIONER

## 2024-04-03 RX ORDER — LIDOCAINE HYDROCHLORIDE 10 MG/ML
1 INJECTION, SOLUTION EPIDURAL; INFILTRATION; INTRACAUDAL; PERINEURAL ONCE
Status: CANCELLED | OUTPATIENT
Start: 2024-04-03 | End: 2024-04-03

## 2024-04-03 RX ORDER — AMOXICILLIN 500 MG
CAPSULE ORAL DAILY
COMMUNITY

## 2024-04-03 RX ORDER — ERGOCALCIFEROL 1.25 MG/1
50000 CAPSULE ORAL
COMMUNITY

## 2024-04-03 NOTE — PROGRESS NOTES
Subjective:      Patient ID: Jasson Mayo is a 68 y.o. male.    Chief Complaint: back pain    HPI:  68 year old male who presents today for continued monitoring of thrombocytopenia.  He has previously been followed in the clinic by Dr. Frank Rinaldi.  Today is the first time I am evaluating/assessing the patient.  He has been found with diffuse fatty liver  -found with mild steatosis on fibroscan followed by hepatology, degenerative arthritis with radiculopathy, gout and is currently being treated with co-benemid and allopurinol.     Interval History:  8/30/2023 Presents today to review labs with continue stable thrombocytopenia.  Only complaint is chronic arthralgias.  Denies any abnormal bleeding.       Interval History:  9/27/2023 Presents today to review results of iron studies and HH studies.  Found with elevated iron and carrier of H63D gene.             : C282Y: Not detected.   H63D: One copy of the H63D variant was identified.    Interpretation SEE BELOW    Comment: This individual is heterozygous for H63D. This result is   consistent with at minimum a carrier status for hereditary   hemochromatosis (HH).     The presence of a single H63D variant is unlikely to be of   clinical significance in the absence of other   disease-causing variants (1, 2).    Denies eating a diet high in iron content or drinking alcohol.      Interval History:  11/29/2023 Has received 2  - 1 unit phlebotomies prbc since last visit with last done on 10/30/2023.  Has responded well with a reduction in ferritin. Presents today to evaluate labs and to determine if phlebotomy is needed.      Interval History:  1/31/2024 With mild thrombocytopenia due to splenomegaly.  With mild fatty liver disease per fibroscan.  I have reviewed all of the patient's relevant lab work available in the medical record and have utilized this in my evaluation and management recommendations today. Elevated liver enzymes.  Has been taking allopurinol  daily 100 mg for years.  States since having phlebotomies has noticed that his hands joints do not ache as much.  Is taking apple cider vinegar and zinc.    Interval history:  4/3/2024 States that during and after eating some gumbo recently noticed swelling of left side of face that resolved about an hour or so after eating.  States that many years ago had swelling on right side under chin swelling after eating that resolved after an hour so.  Thrombocytopenia stable.  Noted elevated ALT however not as high as previous.  Ferritin > 150. Denies alcohol intake.  Denies increased high iron content food intake.     I have reviewed all of the patient's relevant lab work available in the medical record and have utilized this in my evaluation and management recommendations today.      Social History     Socioeconomic History    Marital status:     Number of children: 1   Occupational History    Occupation: "[employer]     Employer: [employer]     Comment: Jimmy pandey   Tobacco Use    Smoking status: Never    Smokeless tobacco: Former   Substance and Sexual Activity    Alcohol use: No    Drug use: Never    Sexual activity: Not Currently     Partners: Female     Birth control/protection: None     Social Determinants of Health     Financial Resource Strain: Low Risk  (1/15/2024)    Overall Financial Resource Strain (CARDIA)     Difficulty of Paying Living Expenses: Not hard at all   Food Insecurity: No Food Insecurity (1/15/2024)    Hunger Vital Sign     Worried About Running Out of Food in the Last Year: Never true     Ran Out of Food in the Last Year: Never true   Transportation Needs: No Transportation Needs (1/15/2024)    PRAPARE - Transportation     Lack of Transportation (Medical): No     Lack of Transportation (Non-Medical): No   Physical Activity: Insufficiently Active (1/15/2024)    Exercise Vital Sign     Days of Exercise per Week: 3 days     Minutes of Exercise per Session: 10 min   Stress: No Stress  Concern Present (1/15/2024)    [employer] [place] [employer] - Occupational Stress Questionnaire     Feeling of Stress : Not at all   Social Connections: Unknown (1/15/2024)    Social Connection and Isolation Panel [NHANES]     Frequency of Communication with Friends and Family: More than three times a week     Frequency of Social Gatherings with Friends and Family: More than three times a week     Active Member of Clubs or Organizations: Yes     Attends Club or Organization Meetings: 1 to 4 times per year     Marital Status:    Housing Stability: Low Risk  (1/15/2024)    Housing Stability Vital Sign     Unable to Pay for Housing in the Last Year: No     Number of Places Lived in the Last Year: 1     Unstable Housing in the Last Year: No       Family History   Problem Relation Age of Onset    COPD Mother     Heart attacks under age 50 Mother     Cancer Father         PROSTATE CA       Past Surgical History:   Procedure Laterality Date    COLONOSCOPY W/ POLYPECTOMY  10, 13 no polyp    KNEE ARTHROSCOPY Right     Meniscus repair    LIVER BIOPSY  2010    VASECTOMY         Past Medical History:   Diagnosis Date    Decreased platelet count     Fatty liver     Gout     HTN (hypertension) 2000    Non-A non-B hepatitis 1990    Obesity (BMI 35.0-39.9 without comorbidity)     Prediabetes 2015       Review of Systems   Constitutional:  Negative for appetite change and unexpected weight change.   HENT:  Negative for mouth sores.    Eyes:  Negative for visual disturbance.   Respiratory:  Negative for cough and shortness of breath.    Cardiovascular:  Negative for chest pain.   Gastrointestinal:  Negative for abdominal pain and diarrhea.   Endocrine: Negative.    Genitourinary:  Negative for frequency.   Musculoskeletal:  Positive for arthralgias and back pain.   Skin:  Negative for rash.   Allergic/Immunologic: Negative.    Neurological:  Negative for headaches.   Hematological:  Negative for adenopathy.    Psychiatric/Behavioral:  The patient is not nervous/anxious.           Medication List with Changes/Refills   Current Medications    ALLOPURINOL (ZYLOPRIM) 100 MG TABLET    TAKE 1 TABLET (100 MG TOTAL) BY MOUTH ONCE DAILY.    BENAZEPRIL (LOTENSIN) 20 MG TABLET    Take 1 tablet (20 mg total) by mouth once daily.    COLCHICINE-PROBENECID 0.5-500 MG (CO-BENEMID) 500-0.5 MG TAB    TAKE 1 TABLET BY MOUTH EVERY DAY    ERGOCALCIFEROL (VITAMIN D2) 50,000 UNIT CAP    Take 50,000 Units by mouth every 7 days.    OMEGA-3 FATTY ACIDS/FISH OIL (FISH OIL-OMEGA-3 FATTY ACIDS) 300-1,000 MG CAPSULE    Take by mouth once daily.    RSVPREF3 ANTIGEN-AS01E, PF, (AREXVY, PF,) 120 MCG/0.5 ML SUSR VACCINE    Inject into the muscle.    VITAMIN E 1000 UNIT CAPSULE    Take 1,000 Units by mouth once daily.        Objective:     Vitals:    04/03/24 0940   BP: 119/73   Pulse:        Physical Exam  Vitals reviewed.   Constitutional:       Appearance: Normal appearance. He is obese.   HENT:      Head: Normocephalic and atraumatic.      Right Ear: External ear normal.      Left Ear: External ear normal.   Cardiovascular:      Rate and Rhythm: Normal rate and regular rhythm.      Heart sounds: Normal heart sounds, S1 normal and S2 normal.   Pulmonary:      Effort: Pulmonary effort is normal.      Breath sounds: Normal breath sounds.   Abdominal:      General: There is no distension.   Musculoskeletal:         General: Normal range of motion.      Cervical back: Normal range of motion.   Skin:     General: Skin is warm and dry.   Neurological:      General: No focal deficit present.      Mental Status: He is alert and oriented to person, place, and time.   Psychiatric:         Attention and Perception: Attention and perception normal.         Mood and Affect: Mood and affect normal.         Speech: Speech normal.         Behavior: Behavior normal. Behavior is cooperative.         Thought Content: Thought content normal.         Cognition and Memory:  Cognition and memory normal.         Judgment: Judgment normal.         Assessment:     Problem List Items Addressed This Visit          Hematology    Thrombocytopenia    Relevant Orders    CBC Auto Differential    CBC Auto Differential    Comprehensive Metabolic Panel    CBC Auto Differential    Comprehensive Metabolic Panel       GI    Hereditary hemochromatosis - Primary    Relevant Orders    CBC Auto Differential    Comprehensive Metabolic Panel    Ferritin    Iron and TIBC    CBC Auto Differential    Comprehensive Metabolic Panel    Ferritin    Iron and TIBC    CBC Auto Differential    Comprehensive Metabolic Panel    Ferritin    Iron and TIBC     Other Visit Diagnoses       Splenomegaly        Relevant Orders    CBC Auto Differential    Comprehensive Metabolic Panel    CBC Auto Differential    Comprehensive Metabolic Panel    CBC Auto Differential    Comprehensive Metabolic Panel    Hepatic steatosis        Relevant Orders    CBC Auto Differential    Comprehensive Metabolic Panel    CBC Auto Differential    Comprehensive Metabolic Panel    CBC Auto Differential    Comprehensive Metabolic Panel            Lab Results   Component Value Date    WBC 4.59 04/01/2024    RBC 4.85 04/01/2024    HGB 15.0 04/01/2024    HCT 44.1 04/01/2024    MCV 91 04/01/2024    MCH 30.9 04/01/2024    MCHC 34.0 04/01/2024    RDW 12.9 04/01/2024     (L) 04/01/2024    MPV 10.8 04/01/2024    GRAN 43.0 04/01/2024    LYMPH CANCELED 04/01/2024    LYMPH 38.0 04/01/2024    MONO CANCELED 04/01/2024    MONO 11.0 04/01/2024    EOS CANCELED 04/01/2024    BASO CANCELED 04/01/2024    EOSINOPHIL 7.0 04/01/2024    BASOPHIL 1.0 04/01/2024      Lab Results   Component Value Date     04/01/2024    K 4.4 04/01/2024     04/01/2024    CO2 25 04/01/2024    BUN 18 04/01/2024    CREATININE 0.9 04/01/2024    CALCIUM 9.4 04/01/2024    ANIONGAP 12 04/01/2024    ESTGFRAFRICA >60.0 06/10/2022    EGFRNONAA >60.0 06/10/2022     Lab Results    Component Value Date    ALT 64 (H) 04/01/2024    AST 40 04/01/2024    ALKPHOS 98 04/01/2024    BILITOT 0.6 04/01/2024     Lab Results   Component Value Date    IRON 95 04/01/2024    TRANSFERRIN 232 04/01/2024    TIBC 343 04/01/2024    FESATURATED 28 04/01/2024    FERRITIN 200 04/01/2024        Plan:   Hereditary hemochromatosis  -     CBC Auto Differential; Future; Expected date: 04/03/2024  -     Comprehensive Metabolic Panel; Future; Expected date: 04/03/2024  -     Ferritin; Future; Expected date: 04/03/2024  -     Iron and TIBC; Future; Expected date: 04/03/2024  -     CBC Auto Differential; Future; Expected date: 04/03/2024  -     Comprehensive Metabolic Panel; Future; Expected date: 04/03/2024  -     Ferritin; Future; Expected date: 04/03/2024  -     Iron and TIBC; Future; Expected date: 04/03/2024  -     CBC Auto Differential; Future; Expected date: 04/03/2024  -     Comprehensive Metabolic Panel; Future; Expected date: 04/03/2024  -     Ferritin; Future; Expected date: 04/03/2024  -     Iron and TIBC; Future; Expected date: 04/03/2024    Thrombocytopenia  -     CBC Auto Differential; Future; Expected date: 04/03/2024  -     CBC Auto Differential; Future; Expected date: 04/03/2024  -     Comprehensive Metabolic Panel; Future; Expected date: 04/03/2024  -     CBC Auto Differential; Future; Expected date: 04/03/2024  -     Comprehensive Metabolic Panel; Future; Expected date: 04/03/2024    Splenomegaly  -     CBC Auto Differential; Future; Expected date: 04/03/2024  -     Comprehensive Metabolic Panel; Future; Expected date: 04/03/2024  -     CBC Auto Differential; Future; Expected date: 04/03/2024  -     Comprehensive Metabolic Panel; Future; Expected date: 04/03/2024  -     CBC Auto Differential; Future; Expected date: 04/03/2024  -     Comprehensive Metabolic Panel; Future; Expected date: 04/03/2024    Hepatic steatosis  -     CBC Auto Differential; Future; Expected date: 04/03/2024  -     Comprehensive  Metabolic Panel; Future; Expected date: 04/03/2024  -     CBC Auto Differential; Future; Expected date: 04/03/2024  -     Comprehensive Metabolic Panel; Future; Expected date: 04/03/2024  -     CBC Auto Differential; Future; Expected date: 04/03/2024  -     Comprehensive Metabolic Panel; Future; Expected date: 04/03/2024    Other orders  -     LIDOcaine (PF) 10 mg/ml (1%) injection 10 mg      Goal ferritin - phlebotomy needed       Med Onc Chart Routing      Follow up with physician    Follow up with LUIS . F/u in 3 months with labs prior - in person visit - possible phlebotomy   Infusion scheduling note   schedule for 1 unit phlebotomy at [facility] next week - early in the morning if possible   Injection scheduling note n/a   Labs   Scheduling:  Preferred lab: [facility]  Lab interval:  cbc, cmp, iron studies monthly   Imaging   N/a   Pharmacy appointment No pharmacy appointment needed      Other referrals       No additional referrals needed  n/a         Total time spent on encounter: 40 minutes       Collaborating Provider:  Dr. Olegario Malone    Thank You,  RAY BanuelosP-C  [facility]

## 2024-04-11 ENCOUNTER — INFUSION (OUTPATIENT)
Dept: INFUSION THERAPY | Facility: HOSPITAL | Age: 69
End: 2024-04-11
Attending: NURSE PRACTITIONER
Payer: MEDICARE

## 2024-04-11 VITALS
TEMPERATURE: 98 F | HEART RATE: 73 BPM | RESPIRATION RATE: 16 BRPM | OXYGEN SATURATION: 94 % | SYSTOLIC BLOOD PRESSURE: 122 MMHG | DIASTOLIC BLOOD PRESSURE: 77 MMHG

## 2024-04-11 DIAGNOSIS — E83.110 HEREDITARY HEMOCHROMATOSIS: Primary | ICD-10-CM

## 2024-04-11 DIAGNOSIS — E83.19 INCREASED STORAGE IRON: ICD-10-CM

## 2024-04-11 PROCEDURE — 99195 PHLEBOTOMY: CPT

## 2024-04-11 RX ORDER — LIDOCAINE HYDROCHLORIDE 10 MG/ML
1 INJECTION, SOLUTION EPIDURAL; INFILTRATION; INTRACAUDAL; PERINEURAL ONCE
Status: DISCONTINUED | OUTPATIENT
Start: 2024-04-11 | End: 2024-04-11

## 2024-04-11 RX ORDER — LIDOCAINE HYDROCHLORIDE 10 MG/ML
1 INJECTION, SOLUTION EPIDURAL; INFILTRATION; INTRACAUDAL; PERINEURAL ONCE
OUTPATIENT
Start: 2024-04-11 | End: 2024-04-11

## 2024-04-11 NOTE — DISCHARGE INSTRUCTIONS
..[facility]  [address] [facility]  [address]  [phone] phone     [phone] fax  Hours of Operation: Monday- Friday 8:00am- 5:00pm  After hours phone  [phone]  Hematology / Oncology Physicians on call    Dr. Faisal Dupont        Nurse Practitioners:    Denise Benites, PARIS Lisa, PARIS Hayes, PARIS Easley, PARIS Simon, PARIS Lopez, PA      Please don't hesitate to call if you have any concerns.    .FALL PREVENTION   Falls often occur due to slipping, tripping or losing your balance. Here are ways to reduce your risk of falling again.   Was there anything that caused your fall that can be fixed, removed or replaced?   Make your home safe by keeping walkways clear of objects you may trip over.   Use non-slip pads under rugs.   Do not walk in poorly lit areas.   Do not stand on chairs or wobbly ladders.   Use caution when reaching overhead or looking upward. This position can cause a loss of balance.   Be sure your shoes fit properly, have non-slip bottoms and are in good condition.   Be cautious when going up and down stairs, curbs, and when walking on uneven sidewalks.   If your balance is poor, consider using a cane or walker.   If your fall was related to alcohol use, stop or limit alcohol intake.   If your fall was related to use of sleeping medicines, talk to your doctor about this. You may need to reduce your dosage at bedtime if you awaken during the night to go to the bathroom.   To reduce the need for nighttime bathroom trips:   Avoid drinking fluids for several hours before going to bed   Empty your bladder before going to bed   Men can keep a urinal at the bedside   © 8625-0482 [employer]ately[employer], [address], [place] [address]. All rights reserved. This information is not intended as a substitute for professional medical care. Always follow your healthcare  professional's instructions.  .WAYS TO HELP PREVENT INFECTION        WASH YOUR HANDS OFTEN DURING THE DAY, ESPECIALLY BEFORE YOU EAT, AFTER USING THE BATHROOM, AND AFTER TOUCHING ANIMALS    STAY AWAY FROM PEOPLE WHO HAVE ILLNESSES YOU CAN CATCH; SUCH AS COLDS, FLU, CHICKEN POX    TRY TO AVOID CROWDS    STAY AWAY FROM CHILDREN WHO RECENTLY HAVE RECEIVED LIVE VIRUS VACCINES    MAINTAIN GOOD MOUTH CARE    DO NOT SQUEEZE OR SCRATCH PIMPLES    CLEAN CUTS & SCRAPES RIGHT AWAY AND DAILY UNTIL HEALED WITH WARM WATER, SOAP & AN ANTISEPTIC    AVOID CONTACT WITH LITTER BOXES, BIRD CAGES, & FISH TANKS    AVOID STANDING WATER, IE., BIRD BATHS, FLOWER POTS/VASES, OR HUMIDIFIERS    WEAR GLOVES WHEN GARDENING OR CLEANING UP AFTER OTHERS, ESPECIALLY BABIES & SMALL CHILDREN    DO NOT EAT RAW FISH, SEAFOOD, MEAT, OR EGGS

## 2024-04-11 NOTE — NURSING
.Phlebotomy performed to pt, 1 unit of blood drawn. Pressure dressing applied to site after phlebotomy. Pt tolerated well, no complaints. /77 reading taken after draw. Pt tolerated sprite zero after phlebotomy. Declined snack.

## 2024-07-01 ENCOUNTER — LAB VISIT (OUTPATIENT)
Dept: LAB | Facility: HOSPITAL | Age: 69
End: 2024-07-01
Attending: NURSE PRACTITIONER
Payer: MEDICARE

## 2024-07-01 DIAGNOSIS — K76.0 HEPATIC STEATOSIS: ICD-10-CM

## 2024-07-01 DIAGNOSIS — D69.6 THROMBOCYTOPENIA: ICD-10-CM

## 2024-07-01 DIAGNOSIS — E83.110 HEREDITARY HEMOCHROMATOSIS: ICD-10-CM

## 2024-07-01 DIAGNOSIS — R16.1 SPLENOMEGALY: ICD-10-CM

## 2024-07-01 LAB
ALBUMIN SERPL BCP-MCNC: 3.5 G/DL (ref 3.5–5.2)
ALP SERPL-CCNC: 93 U/L (ref 55–135)
ALT SERPL W/O P-5'-P-CCNC: 55 U/L (ref 10–44)
ANION GAP SERPL CALC-SCNC: 7 MMOL/L (ref 8–16)
AST SERPL-CCNC: 41 U/L (ref 10–40)
BASOPHILS # BLD AUTO: ABNORMAL K/UL (ref 0–0.2)
BASOPHILS NFR BLD: 2 % (ref 0–1.9)
BILIRUB SERPL-MCNC: 0.6 MG/DL (ref 0.1–1)
BUN SERPL-MCNC: 24 MG/DL (ref 8–23)
CALCIUM SERPL-MCNC: 9.2 MG/DL (ref 8.7–10.5)
CHLORIDE SERPL-SCNC: 106 MMOL/L (ref 95–110)
CO2 SERPL-SCNC: 24 MMOL/L (ref 23–29)
CREAT SERPL-MCNC: 0.9 MG/DL (ref 0.5–1.4)
DIFFERENTIAL METHOD BLD: ABNORMAL
EOSINOPHIL # BLD AUTO: ABNORMAL K/UL (ref 0–0.5)
EOSINOPHIL NFR BLD: 7 % (ref 0–8)
ERYTHROCYTE [DISTWIDTH] IN BLOOD BY AUTOMATED COUNT: 13.8 % (ref 11.5–14.5)
EST. GFR  (NO RACE VARIABLE): >60 ML/MIN/1.73 M^2
FERRITIN SERPL-MCNC: 169 NG/ML (ref 20–300)
GLUCOSE SERPL-MCNC: 114 MG/DL (ref 70–110)
HCT VFR BLD AUTO: 42 % (ref 40–54)
HGB BLD-MCNC: 14.3 G/DL (ref 14–18)
IMM GRANULOCYTES # BLD AUTO: ABNORMAL K/UL (ref 0–0.04)
IMM GRANULOCYTES NFR BLD AUTO: ABNORMAL % (ref 0–0.5)
IRON SERPL-MCNC: 94 UG/DL (ref 45–160)
LYMPHOCYTES # BLD AUTO: ABNORMAL K/UL (ref 1–4.8)
LYMPHOCYTES NFR BLD: 37 % (ref 18–48)
MCH RBC QN AUTO: 30.6 PG (ref 27–31)
MCHC RBC AUTO-ENTMCNC: 34 G/DL (ref 32–36)
MCV RBC AUTO: 90 FL (ref 82–98)
MONOCYTES # BLD AUTO: ABNORMAL K/UL (ref 0.3–1)
MONOCYTES NFR BLD: 9 % (ref 4–15)
NEUTROPHILS NFR BLD: 45 % (ref 38–73)
NRBC BLD-RTO: 0 /100 WBC
PLATELET # BLD AUTO: 132 K/UL (ref 150–450)
PLATELET BLD QL SMEAR: ABNORMAL
PMV BLD AUTO: 10.8 FL (ref 9.2–12.9)
POTASSIUM SERPL-SCNC: 3.9 MMOL/L (ref 3.5–5.1)
PROT SERPL-MCNC: 6.7 G/DL (ref 6–8.4)
RBC # BLD AUTO: 4.67 M/UL (ref 4.6–6.2)
SATURATED IRON: 29 % (ref 20–50)
SODIUM SERPL-SCNC: 137 MMOL/L (ref 136–145)
TOTAL IRON BINDING CAPACITY: 327 UG/DL (ref 250–450)
TRANSFERRIN SERPL-MCNC: 221 MG/DL (ref 200–375)
WBC # BLD AUTO: 4.73 K/UL (ref 3.9–12.7)

## 2024-07-01 PROCEDURE — 85027 COMPLETE CBC AUTOMATED: CPT | Mod: PO | Performed by: NURSE PRACTITIONER

## 2024-07-01 PROCEDURE — 82728 ASSAY OF FERRITIN: CPT | Performed by: NURSE PRACTITIONER

## 2024-07-01 PROCEDURE — 83540 ASSAY OF IRON: CPT | Performed by: NURSE PRACTITIONER

## 2024-07-01 PROCEDURE — 80053 COMPREHEN METABOLIC PANEL: CPT | Performed by: NURSE PRACTITIONER

## 2024-07-01 PROCEDURE — 85007 BL SMEAR W/DIFF WBC COUNT: CPT | Mod: PO | Performed by: NURSE PRACTITIONER

## 2024-07-01 PROCEDURE — 36415 COLL VENOUS BLD VENIPUNCTURE: CPT | Mod: PO | Performed by: NURSE PRACTITIONER

## 2024-07-03 ENCOUNTER — OFFICE VISIT (OUTPATIENT)
Dept: HEMATOLOGY/ONCOLOGY | Facility: CLINIC | Age: 69
End: 2024-07-03
Payer: MEDICARE

## 2024-07-03 VITALS
SYSTOLIC BLOOD PRESSURE: 107 MMHG | WEIGHT: 240.13 LBS | OXYGEN SATURATION: 96 % | DIASTOLIC BLOOD PRESSURE: 74 MMHG | HEIGHT: 68 IN | HEART RATE: 77 BPM | BODY MASS INDEX: 36.39 KG/M2

## 2024-07-03 DIAGNOSIS — E83.110 HEREDITARY HEMOCHROMATOSIS: ICD-10-CM

## 2024-07-03 DIAGNOSIS — D69.6 THROMBOCYTOPENIA: ICD-10-CM

## 2024-07-03 DIAGNOSIS — R79.89 ELEVATED LFTS: ICD-10-CM

## 2024-07-03 DIAGNOSIS — K76.0 NAFLD (NONALCOHOLIC FATTY LIVER DISEASE): Primary | ICD-10-CM

## 2024-07-03 PROCEDURE — 3008F BODY MASS INDEX DOCD: CPT | Mod: CPTII,S$GLB,, | Performed by: NURSE PRACTITIONER

## 2024-07-03 PROCEDURE — 99999 PR PBB SHADOW E&M-EST. PATIENT-LVL III: CPT | Mod: PBBFAC,,, | Performed by: NURSE PRACTITIONER

## 2024-07-03 PROCEDURE — 4010F ACE/ARB THERAPY RXD/TAKEN: CPT | Mod: CPTII,S$GLB,, | Performed by: NURSE PRACTITIONER

## 2024-07-03 PROCEDURE — 3078F DIAST BP <80 MM HG: CPT | Mod: CPTII,S$GLB,, | Performed by: NURSE PRACTITIONER

## 2024-07-03 PROCEDURE — 1160F RVW MEDS BY RX/DR IN RCRD: CPT | Mod: CPTII,S$GLB,, | Performed by: NURSE PRACTITIONER

## 2024-07-03 PROCEDURE — 99215 OFFICE O/P EST HI 40 MIN: CPT | Mod: S$GLB,,, | Performed by: NURSE PRACTITIONER

## 2024-07-03 PROCEDURE — 3288F FALL RISK ASSESSMENT DOCD: CPT | Mod: CPTII,S$GLB,, | Performed by: NURSE PRACTITIONER

## 2024-07-03 PROCEDURE — 3074F SYST BP LT 130 MM HG: CPT | Mod: CPTII,S$GLB,, | Performed by: NURSE PRACTITIONER

## 2024-07-03 PROCEDURE — 1101F PT FALLS ASSESS-DOCD LE1/YR: CPT | Mod: CPTII,S$GLB,, | Performed by: NURSE PRACTITIONER

## 2024-07-03 PROCEDURE — 1159F MED LIST DOCD IN RCRD: CPT | Mod: CPTII,S$GLB,, | Performed by: NURSE PRACTITIONER

## 2024-07-03 PROCEDURE — 1126F AMNT PAIN NOTED NONE PRSNT: CPT | Mod: CPTII,S$GLB,, | Performed by: NURSE PRACTITIONER

## 2024-07-03 NOTE — PROGRESS NOTES
Subjective:      Patient ID: Jasson Mayo is a 68 y.o. male.    Chief Complaint: lower back pain if standing long    HPI: 68 year old male who presents today for continued monitoring of thrombocytopenia.  He has previously been followed in the clinic by Dr. Frank Rinaldi.  Today is the first time I am evaluating/assessing the patient.  He has been found with diffuse fatty liver  -found with mild steatosis on fibroscan followed by hepatology, degenerative arthritis with radiculopathy, gout and is currently being treated with co-benemid and allopurinol.     Interval History:  8/30/2023 Presents today to review labs with continue stable thrombocytopenia.  Only complaint is chronic arthralgias.  Denies any abnormal bleeding.       Interval History:  9/27/2023 Presents today to review results of iron studies and HH studies.  Found with elevated iron and carrier of H63D gene.             : C282Y: Not detected.   H63D: One copy of the H63D variant was identified.    Interpretation SEE BELOW    Comment: This individual is heterozygous for H63D. This result is   consistent with at minimum a carrier status for hereditary   hemochromatosis (HH).     The presence of a single H63D variant is unlikely to be of   clinical significance in the absence of other   disease-causing variants (1, 2).    Denies eating a diet high in iron content or drinking alcohol.      Interval History:  11/29/2023 Has received 2  - 1 unit phlebotomies prbc since last visit with last done on 10/30/2023.  Has responded well with a reduction in ferritin. Presents today to evaluate labs and to determine if phlebotomy is needed.      Interval History:  1/31/2024 With mild thrombocytopenia due to splenomegaly.  With mild fatty liver disease per fibroscan.  I have reviewed all of the patient's relevant lab work available in the medical record and have utilized this in my evaluation and management recommendations today. Elevated liver enzymes.  Has been  taking allopurinol daily 100 mg for years.  States since having phlebotomies has noticed that his hands joints do not ache as much.  Is taking apple cider vinegar and zinc.     Interval history:  4/3/2024 States that during and after eating some gumbo recently noticed swelling of left side of face that resolved about an hour or so after eating.  States that many years ago had swelling on right side under chin swelling after eating that resolved after an hour so.  Thrombocytopenia stable.  Noted elevated ALT however not as high as previous.  Ferritin > 150. Denies alcohol intake.  Denies increased high iron content food intake.     Interval History:  7/3/2024  Only complaints are lower back pain when standing too long - being followed by neurosurgery IMPRESSION: Multilevel degenerative disc disease and facet arthropathy contributing to multilevel neural foraminal narrowing as above.  There is also mild spinal canal stenosis at L4-5. Last phlebotomy in 4/2024.  Is avoiding high iron content foods.    I have reviewed all of the patient's relevant lab work available in the medical record and have utilized this in my evaluation and management recommendations today.      Social History     Socioeconomic History    Marital status:     Number of children: 1   Occupational History    Occupation: [employer]     Employer: [employer]     Comment: Jimmy pandey   Tobacco Use    Smoking status: Never    Smokeless tobacco: Former   Substance and Sexual Activity    Alcohol use: No    Drug use: Never    Sexual activity: Not Currently     Partners: Female     Birth control/protection: None     Social Determinants of Health     Financial Resource Strain: Low Risk  (1/15/2024)    Overall Financial Resource Strain (CARDIA)     Difficulty of Paying Living Expenses: Not hard at all   Food Insecurity: No Food Insecurity (1/15/2024)    Hunger Vital Sign     Worried About Running Out of Food in the Last Year: Never true     Ran  Out of Food in the Last Year: Never true   Transportation Needs: No Transportation Needs (1/15/2024)    PRAPARE - Transportation     Lack of Transportation (Medical): No     Lack of Transportation (Non-Medical): No   Physical Activity: Insufficiently Active (1/15/2024)    Exercise Vital Sign     Days of Exercise per Week: 3 days     Minutes of Exercise per Session: 10 min   Stress: No Stress Concern Present (1/15/2024)    [employer] - Occupational Stress Questionnaire     Feeling of Stress : Not at all   Housing Stability: Low Risk  (1/15/2024)    Housing Stability Vital Sign     Unable to Pay for Housing in the Last Year: No     Number of Places Lived in the Last Year: 1     Unstable Housing in the Last Year: No       Family History   Problem Relation Name Age of Onset    COPD Mother Juan Mayo     Heart attacks under age 50 Mother Juan Mayo     Cancer Father Thiago Mayo         PROSTATE CA       Past Surgical History:   Procedure Laterality Date    COLONOSCOPY W/ POLYPECTOMY  10, 14 no polyp    KNEE ARTHROSCOPY Right     Meniscus repair    LIVER BIOPSY  2010    VASECTOMY         Past Medical History:   Diagnosis Date    Decreased platelet count     Fatty liver     Gout     HTN (hypertension) 2000    Non-A non-B hepatitis 1990    Obesity (BMI 35.0-39.9 without comorbidity)     Prediabetes 2015       Review of Systems   Constitutional:  Negative for appetite change and unexpected weight change.   HENT:  Negative for mouth sores.    Eyes:  Negative for visual disturbance.   Respiratory:  Negative for cough and shortness of breath.    Cardiovascular:  Negative for chest pain.   Gastrointestinal:  Negative for abdominal pain and diarrhea.   Genitourinary:  Negative for frequency.   Musculoskeletal:  Positive for back pain.   Skin:  Negative for rash.   Neurological:  Negative for headaches.   Hematological:  Negative for adenopathy.   Psychiatric/Behavioral:  The patient  is not nervous/anxious.           Medication List with Changes/Refills   Current Medications    ALLOPURINOL (ZYLOPRIM) 100 MG TABLET    TAKE 1 TABLET (100 MG TOTAL) BY MOUTH ONCE DAILY.    BENAZEPRIL (LOTENSIN) 20 MG TABLET    Take 1 tablet (20 mg total) by mouth once daily.    COLCHICINE-PROBENECID 0.5-500 MG (CO-BENEMID) 500-0.5 MG TAB    TAKE 1 TABLET BY MOUTH EVERY DAY    ERGOCALCIFEROL (VITAMIN D2) 50,000 UNIT CAP    Take 50,000 Units by mouth every 7 days.    OMEGA-3 FATTY ACIDS/FISH OIL (FISH OIL-OMEGA-3 FATTY ACIDS) 300-1,000 MG CAPSULE    Take by mouth once daily.    RSVPREF3 ANTIGEN-AS01E, PF, (AREXVY, PF,) 120 MCG/0.5 ML SUSR VACCINE    Inject into the muscle.    VITAMIN E 1000 UNIT CAPSULE    Take 1,000 Units by mouth once daily.        Objective:     Vitals:    07/03/24 1441   BP: 107/74   Pulse: 77       Physical Exam  Vitals reviewed.   Constitutional:       Appearance: Normal appearance. He is obese.   HENT:      Head: Normocephalic and atraumatic.      Right Ear: External ear normal.      Left Ear: External ear normal.   Cardiovascular:      Rate and Rhythm: Normal rate and regular rhythm.      Heart sounds: Normal heart sounds, S1 normal and S2 normal.   Pulmonary:      Effort: Pulmonary effort is normal.      Breath sounds: Normal breath sounds.   Abdominal:      General: There is no distension.   Musculoskeletal:         General: Normal range of motion.      Cervical back: Normal range of motion.   Skin:     General: Skin is warm and dry.   Neurological:      General: No focal deficit present.      Mental Status: He is alert and oriented to person, place, and time.   Psychiatric:         Attention and Perception: Attention and perception normal.         Mood and Affect: Mood and affect normal.         Speech: Speech normal.         Behavior: Behavior normal. Behavior is cooperative.         Thought Content: Thought content normal.         Cognition and Memory: Cognition and memory normal.          Judgment: Judgment normal.         Assessment:     Problem List Items Addressed This Visit          Hematology    Thrombocytopenia    Relevant Orders    CBC Auto Differential    Comprehensive Metabolic Panel    CBC Auto Differential       Endocrine    BMI 36.0-36.9,adult    Relevant Orders    CBC Auto Differential    Comprehensive Metabolic Panel    CBC Auto Differential    Comprehensive Metabolic Panel       GI    NAFLD (nonalcoholic fatty liver disease) - Primary    Relevant Orders    CBC Auto Differential    Comprehensive Metabolic Panel    Ferritin    Iron and TIBC    CBC Auto Differential    Comprehensive Metabolic Panel    Elevated LFTs    Relevant Orders    Comprehensive Metabolic Panel    CBC Auto Differential    Comprehensive Metabolic Panel    Hereditary hemochromatosis    Relevant Orders    CBC Auto Differential    Comprehensive Metabolic Panel    Ferritin    Iron and TIBC    CBC Auto Differential    Comprehensive Metabolic Panel    Ferritin    Iron and TIBC       Lab Results   Component Value Date    WBC 4.73 07/01/2024    RBC 4.67 07/01/2024    HGB 14.3 07/01/2024    HCT 42.0 07/01/2024    MCV 90 07/01/2024    MCH 30.6 07/01/2024    MCHC 34.0 07/01/2024    RDW 13.8 07/01/2024     (L) 07/01/2024    MPV 10.8 07/01/2024    GRAN 45.0 07/01/2024    LYMPH CANCELED 07/01/2024    LYMPH 37.0 07/01/2024    MONO CANCELED 07/01/2024    MONO 9.0 07/01/2024    EOS CANCELED 07/01/2024    BASO CANCELED 07/01/2024    EOSINOPHIL 7.0 07/01/2024    BASOPHIL 2.0 (H) 07/01/2024      Lab Results   Component Value Date     07/01/2024    K 3.9 07/01/2024     07/01/2024    CO2 24 07/01/2024    BUN 24 (H) 07/01/2024    CREATININE 0.9 07/01/2024    CALCIUM 9.2 07/01/2024    ANIONGAP 7 (L) 07/01/2024    ESTGFRAFRICA >60.0 06/10/2022    EGFRNONAA >60.0 06/10/2022     Lab Results   Component Value Date    ALT 55 (H) 07/01/2024    AST 41 (H) 07/01/2024    ALKPHOS 93 07/01/2024    BILITOT 0.6 07/01/2024     Lab  Results   Component Value Date    IRON 94 07/01/2024    TRANSFERRIN 221 07/01/2024    TIBC 327 07/01/2024    FESATURATED 29 07/01/2024    FERRITIN 169 07/01/2024        Plan:   NAFLD (nonalcoholic fatty liver disease)  -     CBC Auto Differential; Future; Expected date: 07/03/2024  -     Comprehensive Metabolic Panel; Future; Expected date: 07/03/2024  -     Ferritin; Future; Expected date: 07/03/2024  -     Iron and TIBC; Future; Expected date: 07/03/2024  -     CBC Auto Differential; Future; Expected date: 07/03/2024  -     Comprehensive Metabolic Panel; Future; Expected date: 07/03/2024    Elevated LFTs  -     Comprehensive Metabolic Panel; Future; Expected date: 07/03/2024  -     CBC Auto Differential; Future; Expected date: 07/03/2024  -     Comprehensive Metabolic Panel; Future; Expected date: 07/03/2024    BMI 36.0-36.9,adult  -     CBC Auto Differential; Future; Expected date: 07/03/2024  -     Comprehensive Metabolic Panel; Future; Expected date: 07/03/2024  -     CBC Auto Differential; Future; Expected date: 07/03/2024  -     Comprehensive Metabolic Panel; Future; Expected date: 07/03/2024    Thrombocytopenia  -     CBC Auto Differential; Future; Expected date: 07/03/2024  -     Comprehensive Metabolic Panel; Future; Expected date: 07/03/2024  -     CBC Auto Differential; Future; Expected date: 07/03/2024    Hereditary hemochromatosis  -     CBC Auto Differential; Future; Expected date: 07/03/2024  -     Comprehensive Metabolic Panel; Future; Expected date: 07/03/2024  -     Ferritin; Future; Expected date: 07/03/2024  -     Iron and TIBC; Future; Expected date: 07/03/2024  -     CBC Auto Differential; Future; Expected date: 07/03/2024  -     Comprehensive Metabolic Panel; Future; Expected date: 07/03/2024  -     Ferritin; Future; Expected date: 07/03/2024  -     Iron and TIBC; Future; Expected date: 07/03/2024    Patinet's iron is decreasing due to controlling dietary intake.  Will hold off on phlebotomy  for now.        Med Onc Chart Routing      Follow up with physician    Follow up with LUIS 6 months. in person visit - possible phlebotomy   Infusion scheduling note   phlebotomy every 3 months   Injection scheduling note n/a   Labs   Scheduling:  Preferred lab: [facility]  Lab interval:  cbc, cmp, iron studies in 3 months - cbc, cmp, iron studies in 6 months with visit   Imaging   N/a   Pharmacy appointment No pharmacy appointment needed      Other referrals       No additional referrals needed  n/a         F/u with pcp to discuss Monjuaro - qualifies due to increased bmi.  Continue to avoid iron rich foods, tylenol, alcohol.     Goal Ferritin between     Total time spent on encounter: 45 minutes    Collaborating Provider:  Dr. Olegario Malone    Thank You,  RAY BanuelosP-C  [facility]

## 2024-07-22 ENCOUNTER — LAB VISIT (OUTPATIENT)
Dept: LAB | Facility: HOSPITAL | Age: 69
End: 2024-07-22
Attending: FAMILY MEDICINE
Payer: MEDICARE

## 2024-07-22 ENCOUNTER — OFFICE VISIT (OUTPATIENT)
Dept: INTERNAL MEDICINE | Facility: CLINIC | Age: 69
End: 2024-07-22
Payer: MEDICARE

## 2024-07-22 VITALS
HEART RATE: 80 BPM | WEIGHT: 238.13 LBS | BODY MASS INDEX: 36.09 KG/M2 | TEMPERATURE: 99 F | HEIGHT: 68 IN | SYSTOLIC BLOOD PRESSURE: 120 MMHG | OXYGEN SATURATION: 98 % | DIASTOLIC BLOOD PRESSURE: 82 MMHG

## 2024-07-22 DIAGNOSIS — I10 ESSENTIAL HYPERTENSION: ICD-10-CM

## 2024-07-22 DIAGNOSIS — E78.49 OTHER HYPERLIPIDEMIA: ICD-10-CM

## 2024-07-22 DIAGNOSIS — K76.0 FATTY LIVER: ICD-10-CM

## 2024-07-22 DIAGNOSIS — Z12.5 SCREENING FOR PROSTATE CANCER: ICD-10-CM

## 2024-07-22 DIAGNOSIS — M1A.00X0 IDIOPATHIC CHRONIC GOUT WITHOUT TOPHUS, UNSPECIFIED SITE: ICD-10-CM

## 2024-07-22 DIAGNOSIS — I10 ESSENTIAL HYPERTENSION: Primary | ICD-10-CM

## 2024-07-22 DIAGNOSIS — M06.4 UNDIFFERENTIATED INFLAMMATORY ARTHRITIS: ICD-10-CM

## 2024-07-22 LAB
CHOLEST SERPL-MCNC: 176 MG/DL (ref 120–199)
CHOLEST/HDLC SERPL: 4.6 {RATIO} (ref 2–5)
COMPLEXED PSA SERPL-MCNC: 0.29 NG/ML (ref 0–4)
HDLC SERPL-MCNC: 38 MG/DL (ref 40–75)
HDLC SERPL: 21.6 % (ref 20–50)
LDLC SERPL CALC-MCNC: 111.8 MG/DL (ref 63–159)
NONHDLC SERPL-MCNC: 138 MG/DL
TRIGL SERPL-MCNC: 131 MG/DL (ref 30–150)
TSH SERPL DL<=0.005 MIU/L-ACNC: 2.7 UIU/ML (ref 0.4–4)
URATE SERPL-MCNC: 3.7 MG/DL (ref 3.4–7)

## 2024-07-22 PROCEDURE — 84153 ASSAY OF PSA TOTAL: CPT | Performed by: FAMILY MEDICINE

## 2024-07-22 PROCEDURE — 3074F SYST BP LT 130 MM HG: CPT | Mod: CPTII,S$GLB,, | Performed by: FAMILY MEDICINE

## 2024-07-22 PROCEDURE — 99999 PR PBB SHADOW E&M-EST. PATIENT-LVL III: CPT | Mod: PBBFAC,,, | Performed by: FAMILY MEDICINE

## 2024-07-22 PROCEDURE — 84443 ASSAY THYROID STIM HORMONE: CPT | Performed by: FAMILY MEDICINE

## 2024-07-22 PROCEDURE — 3008F BODY MASS INDEX DOCD: CPT | Mod: CPTII,S$GLB,, | Performed by: FAMILY MEDICINE

## 2024-07-22 PROCEDURE — 99214 OFFICE O/P EST MOD 30 MIN: CPT | Mod: S$GLB,,, | Performed by: FAMILY MEDICINE

## 2024-07-22 PROCEDURE — G2211 COMPLEX E/M VISIT ADD ON: HCPCS | Mod: S$GLB,,, | Performed by: FAMILY MEDICINE

## 2024-07-22 PROCEDURE — 1101F PT FALLS ASSESS-DOCD LE1/YR: CPT | Mod: CPTII,S$GLB,, | Performed by: FAMILY MEDICINE

## 2024-07-22 PROCEDURE — 1125F AMNT PAIN NOTED PAIN PRSNT: CPT | Mod: CPTII,S$GLB,, | Performed by: FAMILY MEDICINE

## 2024-07-22 PROCEDURE — 84550 ASSAY OF BLOOD/URIC ACID: CPT | Performed by: FAMILY MEDICINE

## 2024-07-22 PROCEDURE — 1159F MED LIST DOCD IN RCRD: CPT | Mod: CPTII,S$GLB,, | Performed by: FAMILY MEDICINE

## 2024-07-22 PROCEDURE — 80061 LIPID PANEL: CPT | Performed by: FAMILY MEDICINE

## 2024-07-22 PROCEDURE — 3288F FALL RISK ASSESSMENT DOCD: CPT | Mod: CPTII,S$GLB,, | Performed by: FAMILY MEDICINE

## 2024-07-22 PROCEDURE — 36415 COLL VENOUS BLD VENIPUNCTURE: CPT | Performed by: FAMILY MEDICINE

## 2024-07-22 PROCEDURE — 3079F DIAST BP 80-89 MM HG: CPT | Mod: CPTII,S$GLB,, | Performed by: FAMILY MEDICINE

## 2024-07-22 PROCEDURE — 4010F ACE/ARB THERAPY RXD/TAKEN: CPT | Mod: CPTII,S$GLB,, | Performed by: FAMILY MEDICINE

## 2024-07-22 RX ORDER — TIRZEPATIDE 5 MG/.5ML
5 INJECTION, SOLUTION SUBCUTANEOUS
Qty: 4 PEN | Refills: 0 | Status: SHIPPED | OUTPATIENT
Start: 2024-07-22

## 2024-07-22 RX ORDER — BENAZEPRIL HYDROCHLORIDE 20 MG/1
20 TABLET ORAL DAILY
Qty: 90 TABLET | Refills: 3 | Status: SHIPPED | OUTPATIENT
Start: 2024-07-22

## 2024-07-22 NOTE — PROGRESS NOTES
Subjective:       Patient ID: Jasson Mayo is a 68 y.o. male.    Chief Complaint: Follow-up    Six-month follow-up regards hypertension hyperlipidemia aortic atherosclerosis elevated BMI.  He is followed by Hematology for hemochromatosis.  He is doing phlebotomies periodically.  He is also followed by hepatology for elevated liver enzymes.  Has none none be hepatitis associated fatty liver.  He would like weight reduction and is is interested in Mounjaro.  He denies chest pain palpitations shortness breath or edema    Follow-up  Pertinent negatives include no abdominal pain, chest pain, chills, coughing, fever, headaches, nausea, vomiting or weakness.     Review of Systems   Constitutional:  Negative for activity change, appetite change, chills, fever and unexpected weight change.   Respiratory:  Negative for cough, chest tightness, shortness of breath and wheezing.    Cardiovascular:  Negative for chest pain, palpitations and leg swelling.   Gastrointestinal:  Negative for abdominal distention, abdominal pain, constipation, diarrhea, nausea and vomiting.   Neurological:  Negative for dizziness, weakness, light-headedness and headaches.       Objective:      Physical Exam  Constitutional:       General: He is not in acute distress.     Appearance: He is not ill-appearing or diaphoretic.   Cardiovascular:      Rate and Rhythm: Normal rate and regular rhythm.      Heart sounds: No murmur heard.     No gallop.   Pulmonary:      Effort: Pulmonary effort is normal. No respiratory distress.      Breath sounds: No wheezing, rhonchi or rales.   Abdominal:      General: There is no distension.      Palpations: Abdomen is soft. There is no mass.      Tenderness: There is no abdominal tenderness.   Lymphadenopathy:      Cervical: No cervical adenopathy.   Neurological:      Mental Status: He is alert.   Psychiatric:         Mood and Affect: Mood normal.         Behavior: Behavior normal.         Lab Visit on  07/01/2024   Component Date Value Ref Range Status    WBC 07/01/2024 4.73  3.90 - 12.70 K/uL Final    RBC 07/01/2024 4.67  4.60 - 6.20 M/uL Final    Hemoglobin 07/01/2024 14.3  14.0 - 18.0 g/dL Final    Hematocrit 07/01/2024 42.0  40.0 - 54.0 % Final    MCV 07/01/2024 90  82 - 98 fL Final    MCH 07/01/2024 30.6  27.0 - 31.0 pg Final    MCHC 07/01/2024 34.0  32.0 - 36.0 g/dL Final    RDW 07/01/2024 13.8  11.5 - 14.5 % Final    Platelets 07/01/2024 132 (L)  150 - 450 K/uL Final    MPV 07/01/2024 10.8  9.2 - 12.9 fL Final    Immature Granulocytes 07/01/2024 CANCELED  0.0 - 0.5 % Final    Immature Grans (Abs) 07/01/2024 CANCELED  0.00 - 0.04 K/uL Final    Lymph # 07/01/2024 CANCELED  1.0 - 4.8 K/uL Final    Mono # 07/01/2024 CANCELED  0.3 - 1.0 K/uL Final    Eos # 07/01/2024 CANCELED  0.0 - 0.5 K/uL Final    Baso # 07/01/2024 CANCELED  0.00 - 0.20 K/uL Final    nRBC 07/01/2024 0  0 /100 WBC Final    Gran % 07/01/2024 45.0  38.0 - 73.0 % Final    Lymph % 07/01/2024 37.0  18.0 - 48.0 % Final    Mono % 07/01/2024 9.0  4.0 - 15.0 % Final    Eosinophil % 07/01/2024 7.0  0.0 - 8.0 % Final    Basophil % 07/01/2024 2.0 (H)  0.0 - 1.9 % Final    Platelet Estimate 07/01/2024 Appears normal   Final    Differential Method 07/01/2024 Manual   Final    Sodium 07/01/2024 137  136 - 145 mmol/L Final    Potassium 07/01/2024 3.9  3.5 - 5.1 mmol/L Final    Chloride 07/01/2024 106  95 - 110 mmol/L Final    CO2 07/01/2024 24  23 - 29 mmol/L Final    Glucose 07/01/2024 114 (H)  70 - 110 mg/dL Final    BUN 07/01/2024 24 (H)  8 - 23 mg/dL Final    Creatinine 07/01/2024 0.9  0.5 - 1.4 mg/dL Final    Calcium 07/01/2024 9.2  8.7 - 10.5 mg/dL Final    Total Protein 07/01/2024 6.7  6.0 - 8.4 g/dL Final    Albumin 07/01/2024 3.5  3.5 - 5.2 g/dL Final    Total Bilirubin 07/01/2024 0.6  0.1 - 1.0 mg/dL Final    Alkaline Phosphatase 07/01/2024 93  55 - 135 U/L Final    AST 07/01/2024 41 (H)  10 - 40 U/L Final    ALT 07/01/2024 55 (H)  10 - 44 U/L Final     eGFR 07/01/2024 >60.0  >60 mL/min/1.73 m^2 Final    Anion Gap 07/01/2024 7 (L)  8 - 16 mmol/L Final    Ferritin 07/01/2024 169  20.0 - 300.0 ng/mL Final    Iron 07/01/2024 94  45 - 160 ug/dL Final    Transferrin 07/01/2024 221  200 - 375 mg/dL Final    TIBC 07/01/2024 327  250 - 450 ug/dL Final    Saturated Iron 07/01/2024 29  20 - 50 % Final     Assessment:       1. Essential hypertension    2. Other hyperlipidemia    3. Screening for prostate cancer    4. Idiopathic chronic gout without tophus, unspecified site    5. Undifferentiated inflammatory arthritis    6. Fatty liver    7. BMI 36.0-36.9,adult        Plan:     Blood pressure controlled BMI 36.20.  Benazepril refilled.  Lab was ordered.  CBC CMP is up-to-date.  Prescription for Mounjaro 5 mg weekly.  Side effects discussed.  Follow-up one-month.  He is also followed by Rheumatology for gout with no current attacks.  And for inflammatory arthritis.    Essential hypertension  -     TSH; Future; Expected date: 07/22/2024  -     benazepriL (LOTENSIN) 20 MG tablet; Take 1 tablet (20 mg total) by mouth once daily.  Dispense: 90 tablet; Refill: 3    Other hyperlipidemia  -     Lipid Panel; Future; Expected date: 07/22/2024    Screening for prostate cancer  -     PSA, Screening; Future; Expected date: 07/22/2024    Idiopathic chronic gout without tophus, unspecified site  -     Uric Acid; Future; Expected date: 07/22/2024    Undifferentiated inflammatory arthritis    Fatty liver  -     tirzepatide (MOUNJARO) 5 mg/0.5 mL PnIj; Inject 5 mg into the skin every 7 days.  Dispense: 4 Pen; Refill: 0    BMI 36.0-36.9,adult  -     tirzepatide (MOUNJARO) 5 mg/0.5 mL PnIj; Inject 5 mg into the skin every 7 days.  Dispense: 4 Pen; Refill: 0

## 2024-08-02 ENCOUNTER — TELEPHONE (OUTPATIENT)
Dept: INTERNAL MEDICINE | Facility: CLINIC | Age: 69
End: 2024-08-02
Payer: MEDICARE

## 2024-08-05 ENCOUNTER — PATIENT MESSAGE (OUTPATIENT)
Dept: INTERNAL MEDICINE | Facility: CLINIC | Age: 69
End: 2024-08-05
Payer: MEDICARE

## 2024-08-06 ENCOUNTER — PATIENT MESSAGE (OUTPATIENT)
Dept: HEMATOLOGY/ONCOLOGY | Facility: CLINIC | Age: 69
End: 2024-08-06
Payer: MEDICARE

## 2024-08-26 ENCOUNTER — LAB VISIT (OUTPATIENT)
Dept: LAB | Facility: HOSPITAL | Age: 69
End: 2024-08-26
Payer: MEDICARE

## 2024-08-26 DIAGNOSIS — E83.119 HEMOCHROMATOSIS, UNSPECIFIED HEMOCHROMATOSIS TYPE: ICD-10-CM

## 2024-08-26 DIAGNOSIS — R16.1 SPLENOMEGALY: ICD-10-CM

## 2024-08-26 DIAGNOSIS — D69.6 THROMBOCYTOPENIA: ICD-10-CM

## 2024-08-26 DIAGNOSIS — K76.0 NAFLD (NONALCOHOLIC FATTY LIVER DISEASE): ICD-10-CM

## 2024-08-26 LAB
ALBUMIN SERPL BCP-MCNC: 3.5 G/DL (ref 3.5–5.2)
ALP SERPL-CCNC: 87 U/L (ref 55–135)
ALT SERPL W/O P-5'-P-CCNC: 50 U/L (ref 10–44)
AST SERPL-CCNC: 42 U/L (ref 10–40)
BILIRUB DIRECT SERPL-MCNC: 0.2 MG/DL (ref 0.1–0.3)
BILIRUB SERPL-MCNC: 0.7 MG/DL (ref 0.1–1)
PROT SERPL-MCNC: 6.7 G/DL (ref 6–8.4)

## 2024-08-26 PROCEDURE — 36415 COLL VENOUS BLD VENIPUNCTURE: CPT | Mod: PO | Performed by: NURSE PRACTITIONER

## 2024-08-26 PROCEDURE — 80076 HEPATIC FUNCTION PANEL: CPT | Performed by: NURSE PRACTITIONER

## 2024-09-30 ENCOUNTER — TELEPHONE (OUTPATIENT)
Dept: INTERNAL MEDICINE | Facility: CLINIC | Age: 69
End: 2024-09-30
Payer: MEDICARE

## 2024-09-30 NOTE — TELEPHONE ENCOUNTER
----- Message from Marlin sent at 9/30/2024  9:50 AM CDT -----  Contact: Mom/ Vonnie [phone]  The follow up was for a medication he was supposed to start but, he never started it so she wants to know what to do next    Thank you

## 2024-09-30 NOTE — TELEPHONE ENCOUNTER
Vonnie patient's wife states that patient mounjaro was denied. She wants to know if follow up is still needed since its to see how the medication was going. Patient wife would like to know if there are other options for him to take that maybe approved for patient?

## 2024-09-30 NOTE — TELEPHONE ENCOUNTER
----- Message from Cassidy sent at 9/27/2024  5:56 PM CDT -----  Type:  Patient Returning Call    Who Called: pt wife   Who Left Message for Patient:pt   Does the patient know what this is regarding?: pt wife want to see if she can get a call from the nurse   Would the patient rather a call back or a response via [facility]? call  Best Call Back Number:[phone] wife   Additional Information: call back

## 2024-10-02 ENCOUNTER — OFFICE VISIT (OUTPATIENT)
Dept: HEPATOLOGY | Facility: CLINIC | Age: 69
End: 2024-10-02
Payer: MEDICARE

## 2024-10-02 VITALS
BODY MASS INDEX: 37.89 KG/M2 | SYSTOLIC BLOOD PRESSURE: 146 MMHG | HEART RATE: 73 BPM | WEIGHT: 241.38 LBS | HEIGHT: 67 IN | DIASTOLIC BLOOD PRESSURE: 94 MMHG | OXYGEN SATURATION: 96 %

## 2024-10-02 DIAGNOSIS — K76.0 NAFLD (NONALCOHOLIC FATTY LIVER DISEASE): ICD-10-CM

## 2024-10-02 DIAGNOSIS — E83.110 HEREDITARY HEMOCHROMATOSIS: ICD-10-CM

## 2024-10-02 DIAGNOSIS — R74.8 ELEVATED LIVER ENZYMES: ICD-10-CM

## 2024-10-02 DIAGNOSIS — K76.0 METABOLIC DYSFUNCTION-ASSOCIATED STEATOTIC LIVER DISEASE (MASLD): ICD-10-CM

## 2024-10-02 DIAGNOSIS — E78.5 HYPERLIPIDEMIA, UNSPECIFIED HYPERLIPIDEMIA TYPE: ICD-10-CM

## 2024-10-02 DIAGNOSIS — K76.0 FATTY LIVER: ICD-10-CM

## 2024-10-02 DIAGNOSIS — D69.6 THROMBOCYTOPENIA: ICD-10-CM

## 2024-10-02 DIAGNOSIS — E66.01 SEVERE OBESITY WITH BODY MASS INDEX (BMI) OF 35.0 TO 39.9 WITH SERIOUS COMORBIDITY: ICD-10-CM

## 2024-10-02 PROCEDURE — 99999 PR PBB SHADOW E&M-EST. PATIENT-LVL III: CPT | Mod: PBBFAC,,, | Performed by: INTERNAL MEDICINE

## 2024-10-02 PROCEDURE — 3080F DIAST BP >= 90 MM HG: CPT | Mod: CPTII,S$GLB,, | Performed by: INTERNAL MEDICINE

## 2024-10-02 PROCEDURE — 1101F PT FALLS ASSESS-DOCD LE1/YR: CPT | Mod: CPTII,S$GLB,, | Performed by: INTERNAL MEDICINE

## 2024-10-02 PROCEDURE — 1160F RVW MEDS BY RX/DR IN RCRD: CPT | Mod: CPTII,S$GLB,, | Performed by: INTERNAL MEDICINE

## 2024-10-02 PROCEDURE — 1159F MED LIST DOCD IN RCRD: CPT | Mod: CPTII,S$GLB,, | Performed by: INTERNAL MEDICINE

## 2024-10-02 PROCEDURE — 3077F SYST BP >= 140 MM HG: CPT | Mod: CPTII,S$GLB,, | Performed by: INTERNAL MEDICINE

## 2024-10-02 PROCEDURE — 99204 OFFICE O/P NEW MOD 45 MIN: CPT | Mod: S$GLB,,, | Performed by: INTERNAL MEDICINE

## 2024-10-02 PROCEDURE — 4010F ACE/ARB THERAPY RXD/TAKEN: CPT | Mod: CPTII,S$GLB,, | Performed by: INTERNAL MEDICINE

## 2024-10-02 PROCEDURE — 1126F AMNT PAIN NOTED NONE PRSNT: CPT | Mod: CPTII,S$GLB,, | Performed by: INTERNAL MEDICINE

## 2024-10-02 PROCEDURE — 3008F BODY MASS INDEX DOCD: CPT | Mod: CPTII,S$GLB,, | Performed by: INTERNAL MEDICINE

## 2024-10-02 PROCEDURE — 3288F FALL RISK ASSESSMENT DOCD: CPT | Mod: CPTII,S$GLB,, | Performed by: INTERNAL MEDICINE

## 2024-10-02 NOTE — PATIENT INSTRUCTIONS
MAFLD    Website with information about fatty liver and inflammation related to fatty liver (GRIMES) = [email or web address]  AND [email or web address]     Limit alcohol consumption  2.  Weight loss goal & 7-10%, referral for [facility] if interested. Also, if interested in a dietician visit to create a weight loss plan, contact the dietician team at [facility] at [email or web address] to schedule a visit to you can call [facility] in [place]: [phone] and the  will transfer the call to one of the dieticians to schedule an appointment. Or you can also call [phone] to schedule. They do offer video visits   3. Low carb/sugar, high fiber and protein diet.Try to limit your carb intake to LESS than 30-45 grams of carbs with a meal or LESS than 5-10 grams with any snack (total of any snack foods eaten during that time). Use MyFitness Pal audra to add up your carbs through the day. Do NOT drink any beverages with calories or carbs (this can lead to high blood sugar and weight gain). Also, some of our patients have been very successful with weight loss using the pre made/planned meal planning services that limit calories and portion size (one example is Sensible Meals but there are many out there)  4. Exercise, 5 days per week, 30 minutes per day, as tolerated  5. Recommend good cholesterol, blood pressure, blood sugar levels .      Management of patients with MASLD includes optimization of blood glucose control in patients with diabetes and treatment of hyperlipidemia. If after a year of adhering to these measures and effectively reducing your Body Mass Index plus decreasing the insulin resistance, you continue to have symptoms, we may need to start medications to treat MASLD if there is liver fibrosis. These medications are used to treat diabetes and work well for insulin resistance. Unfortunately, these medications can have side effects.      In some people, fatty liver can  progress to steatohepatitis (inflamatory fatty liver) and possibly to cirrhosis, putting one at increased risk for liver cancer, liver failure, and death. Therefore, the lifestyle changes are very important to decrease this risk.        Once again, we extend our sincere appreciation for giving us the opportunity to serve you. If there is anything else we can do to improve your experience or assist you further, please do not hesitate to reach out to us.       Thanks for trusting us with your healthcare needs and using [facility]. If you want to ask us a question, you can do so by replying to this message or by calling [phone]

## 2024-10-02 NOTE — PROGRESS NOTES
Hepatology Note    PATIENT: Jasson Mayo  MRN: [id number]  DATE: 10/2/2024    Provider: Hepatologist - Dr Wallis  Urgency of review: non-urgent  Referring provider: No ref. provider found    Diagnosis:   1. BMI 36.0-36.9,adult    2. Hyperlipidemia, unspecified hyperlipidemia type    3. NAFLD (nonalcoholic fatty liver disease)    4. Thrombocytopenia    5. Metabolic dysfunction-associated steatotic liver disease (MASLD)    6. Elevated liver enzymes    7. Fatty liver    8. Hereditary hemochromatosis    9. Severe obesity with body mass index (BMI) of 35.0 to 39.9 with serious comorbidity        Chief complaint:   Chief Complaint   Patient presents with    Fatty Liver    Hepatic Disease       Subjective:    Initial History: Jasson Mayo is a 69 y.o. male who was referred to Hepatology Clinic for consultation of MASLD       10/02/2024   Patient is new to me and previously seen by Patty Davies  Patient does not have any new complains from liver standpoint. Liver enzymes are stable    Duration of abnormality- 2014  Medications/OTC/Herbal-  Vitamin C and E  ETOH- NO  Metabolic issues-HTN  BMI- 36  Family Hx-no     Has chronic thrombocytopenia that is being managed by hematology   He has history of osteoarthritis, gout for which he takes combination of colchicine and probenecid,   His fibro scan without signigifcant fibrosis       As regards to liver disease,  - The patient reports no symptoms of hepatitis including malaise or flu-like symptoms to suggest a flare.  - The patient reports no new manifestations of portal hypertension including ascites, edema, GI bleeding, or hepatic encephalopathy to suggest liver decompensation.  - The patient reports no fevers/chills or pruritis to suggest biliary disease.    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     Prior Relevant History:    He  denies hepatotoxic medication    Review of  systems:  A review of 12+ systems was conducted with pertinent positive and negative findings documented in HPI with all other systems reviewed and negative.      PFSH:  Past medical, family, and social history reviewed as documented in chart with pertinent positive medical, family, and social history detailed in HPI.    Past Medical History:   Past Medical History:   Diagnosis Date    Decreased platelet count     Fatty liver     Gout     HTN (hypertension) 2000    Non-A non-B hepatitis 1990    Obesity (BMI 35.0-39.9 without comorbidity)     Prediabetes 2015       Past Surgical HIstory:   Past Surgical History:   Procedure Laterality Date    COLONOSCOPY W/ POLYPECTOMY  10, 14 no polyp    KNEE ARTHROSCOPY Right     Meniscus repair    LIVER BIOPSY  2010    VASECTOMY         Family History:   Family History   Problem Relation Name Age of Onset    COPD Mother Juan Mayo     Heart attacks under age 50 Mother Juan Mayo     Cancer Father Thiago Mayo         PROSTATE CA     He has no known family history of liver disease.     Social History:  reports that he has never smoked. He has been exposed to tobacco smoke. He has quit using smokeless tobacco. He reports that he does not drink alcohol and does not use drugs.    He has no history of Alcohol     He denies history of IV drug use/Tatto  He  denies high-risk sexual contacts, no raw seafood, no sick contacts      Allergies:  Review of patient's allergies indicates:  No Known Allergies    Medications:  Current Outpatient Medications   Medication Sig Dispense Refill    allopurinoL (ZYLOPRIM) 100 MG tablet TAKE 1 TABLET (100 MG TOTAL) BY MOUTH ONCE DAILY. 90 tablet 2    benazepriL (LOTENSIN) 20 MG tablet Take 1 tablet (20 mg total) by mouth once daily. 90 tablet 3    colchicine-probenecid 0.5-500 mg (CO-BENEMID) 500-0.5 mg Tab TAKE 1 TABLET BY MOUTH EVERY DAY 90 tablet 3    omega-3 fatty acids/fish oil (FISH OIL-OMEGA-3 FATTY ACIDS) 300-1,000 mg capsule Take  "by mouth once daily.      vitamin E 1000 UNIT capsule Take 1,000 Units by mouth once daily.      tirzepatide (MOUNJARO) 5 mg/0.5 mL PnIj Inject 5 mg into the skin every 7 days. 4 Pen 0     No current facility-administered medications for this visit.       Review of Systems   Constitutional:  Negative for fatigue, fever and unexpected weight change.   HENT:  Negative for ear pain, nosebleeds and trouble swallowing.    Eyes:  Negative for discharge and redness.   Respiratory:  Negative for cough and shortness of breath.    Cardiovascular:  Negative for palpitations and leg swelling.   Gastrointestinal:  Negative for abdominal distention, abdominal pain, diarrhea and vomiting.   Endocrine: Negative for cold intolerance and polyuria.   Genitourinary:  Negative for flank pain and hematuria.   Musculoskeletal:  Negative for back pain.   Skin:  Negative for pallor.   Neurological:  Negative for seizures and headaches.   Hematological:  Does not bruise/bleed easily.   Psychiatric/Behavioral:  Negative for confusion and hallucinations.               Objective:      Vitals:   Vitals:    10/02/24 1002   BP: (!) 146/94   BP Location: Right arm   Patient Position: Sitting   Pulse: 73   SpO2: 96%   Weight: 109.5 kg (241 lb 6.5 oz)   Height: 5' 7.25" (1.708 m)       Physical Exam  Constitutional:       Appearance: Normal appearance.   HENT:      Head: Normocephalic and atraumatic.      Right Ear: Tympanic membrane and external ear normal.      Left Ear: Tympanic membrane and external ear normal.      Mouth/Throat:      Mouth: Mucous membranes are moist.   Eyes:      Extraocular Movements: Extraocular movements intact.      Pupils: Pupils are equal, round, and reactive to light.   Cardiovascular:      Rate and Rhythm: Normal rate and regular rhythm.      Pulses: Normal pulses.      Heart sounds: Normal heart sounds.   Pulmonary:      Effort: Pulmonary effort is normal.      Breath sounds: Normal breath sounds.   Abdominal:      " "General: Bowel sounds are normal. There is no distension.      Palpations: Abdomen is soft. There is no mass.      Tenderness: There is no abdominal tenderness.   Musculoskeletal:         General: No swelling or deformity. Normal range of motion.      Cervical back: Normal range of motion and neck supple.   Skin:     Coloration: Skin is not jaundiced.   Neurological:      General: No focal deficit present.      Mental Status: He is alert and oriented to person, place, and time.      Cranial Nerves: No cranial nerve deficit.   Psychiatric:         Mood and Affect: Mood normal.         Behavior: Behavior normal.         Laboratory Data:  No visits with results within 1 Week(s) from this visit.   Latest known visit with results is:   Lab Visit on 08/26/2024   Component Date Value Ref Range Status    Total Protein 08/26/2024 6.7  6.0 - 8.4 g/dL Final    Albumin 08/26/2024 3.5  3.5 - 5.2 g/dL Final    Total Bilirubin 08/26/2024 0.7  0.1 - 1.0 mg/dL Final    Comment: For infants and newborns, interpretation of results should be based  on gestational age, weight and in agreement with clinical  observations.    Premature Infant recommended reference ranges:  Up to 24 hours.............<8.0 mg/dL  Up to 48 hours............<12.0 mg/dL  3-5 days..................<15.0 mg/dL  6-29 days.................<15.0 mg/dL      Bilirubin, Direct 08/26/2024 0.2  0.1 - 0.3 mg/dL Final    AST 08/26/2024 42 (H)  10 - 40 U/L Final    ALT 08/26/2024 50 (H)  10 - 44 U/L Final    Alkaline Phosphatase 08/26/2024 87  55 - 135 U/L Final       No results found for: "INR", "PROTIME"    Lab Results   Component Value Date    SMOOTHMUSCAB Negative 1:40 06/29/2022     Lab Results   Component Value Date    IRON 94 07/01/2024    TIBC 327 07/01/2024    FERRITIN 169 07/01/2024     Lab Results   Component Value Date    HEPCAB Negative 07/12/2017     Lab Results   Component Value Date    TSH 2.704 07/22/2024     No results found for: "MERE"    No results found " "for: "ABORH"        Lab Results   Component Value Date    HGBA1C 5.5 11/06/2019     Lab Results   Component Value Date    CHOL 176 07/22/2024    CHOL 151 07/18/2023    CHOL 161 07/07/2022     Lab Results   Component Value Date    HDL 38 (L) 07/22/2024    HDL 32 (L) 07/18/2023    HDL 33 (L) 07/07/2022     Lab Results   Component Value Date    LDLCALC 111.8 07/22/2024    LDLCALC 94.0 07/18/2023    LDLCALC 94.0 07/07/2022     Lab Results   Component Value Date    TRIG 131 07/22/2024    TRIG 125 07/18/2023    TRIG 170 (H) 07/07/2022     Lab Results   Component Value Date    CHOLHDL 21.6 07/22/2024    CHOLHDL 21.2 07/18/2023    CHOLHDL 20.5 07/07/2022         I personally reviewed imaging studies and outside records..      Assessment:       1. BMI 36.0-36.9,adult    2. Hyperlipidemia, unspecified hyperlipidemia type    3. NAFLD (nonalcoholic fatty liver disease)    4. Thrombocytopenia    5. Metabolic dysfunction-associated steatotic liver disease (MASLD)    6. Elevated liver enzymes    7. Fatty liver    8. Hereditary hemochromatosis    9. Severe obesity with body mass index (BMI) of 35.0 to 39.9 with serious comorbidity             The patient's risk factors for MAFLD include:    Obesity/overweight                     yes There is no height or weight on file to calculate BMI.  Dyslipidemia                                yes  Insulin resistance/Diabetes         no  Lab Results   Component Value Date    HGBA1C 5.5 11/06/2019       Plan:     Problem List Items Addressed This Visit          Cardiac/Vascular    Hyperlipidemia       Hematology    Thrombocytopenia       Endocrine    BMI 36.0-36.9,adult - Primary    Relevant Orders    Comprehensive Metabolic Panel    Hemoglobin A1C    Iron and TIBC    Ferritin    Severe obesity with body mass index (BMI) of 35.0 to 39.9 with serious comorbidity       GI    Fatty liver    Hereditary hemochromatosis    Relevant Orders    Comprehensive Metabolic Panel    Hemoglobin A1C    Iron and " TIBC    Ferritin    NAFLD (nonalcoholic fatty liver disease)    Relevant Orders    Comprehensive Metabolic Panel    Hemoglobin A1C    Iron and TIBC    Ferritin    FibroScan (Vibration Controlled Transient Elastography)     Other Visit Diagnoses       Metabolic dysfunction-associated steatotic liver disease (MASLD)        Elevated liver enzymes                Jasson was seen today for fatty liver and hepatic disease.    Diagnoses and all orders for this visit:    BMI 36.0-36.9,adult  -     Comprehensive Metabolic Panel; Standing  -     Hemoglobin A1C; Future  -     Iron and TIBC; Future  -     Ferritin; Future    Hyperlipidemia, unspecified hyperlipidemia type    NAFLD (nonalcoholic fatty liver disease)  -     Comprehensive Metabolic Panel; Standing  -     Hemoglobin A1C; Future  -     Iron and TIBC; Future  -     Ferritin; Future  -     FibroScan (Vibration Controlled Transient Elastography); Future    Thrombocytopenia    Metabolic dysfunction-associated steatotic liver disease (MASLD)    Elevated liver enzymes    Fatty liver    Hereditary hemochromatosis  -     Comprehensive Metabolic Panel; Standing  -     Hemoglobin A1C; Future  -     Iron and TIBC; Future  -     Ferritin; Future    Severe obesity with body mass index (BMI) of 35.0 to 39.9 with serious comorbidity            MAFLD  Fatty liver disease is a diagnosis of exclusion, will obtain additional labs to exclude autoimmune/infectitious etiology  Educated patient on spectrum of fatty liver disease and potential for cirrhosis if GRIMES present  -Will obtain fibroscan for evaluation of fibrosis  -Discussed new Medication for MASLD. GLP-1 is not currently approved    I recommended her a more pragmatic approach to her medical problems which would include the following:  - life-style modifications: weight loss, daily exercise (30 mins of HIIT or cardio), low carb/low fat diet         Educated patient on spectrum of fatty liver disease and potential for cirrhosis if  KIRK present        Explored dietary habits and discussed dietary recommendations- Low calorie, low carbohydrate, high protein diet mediterranean with goal of loosing >3-5% to improve steatosis and >7-10% to improve histological changes if present  - control of diabetes or insulin resistance   - control of high cholesterol, if needed with statin drugs or other cholesterol-lowering agents  - coffee consumption (low caloric): 2-3 cups per day may reduce liver fat  -I will defer the management of the patient's dyslipidemia and diabetes to patient's primary care physician and/or endocrinologist   -Reduction of risk factors for CVD    Obesity  Consider GLP-1 as patient is prediabetic range  Patient would benefit from weight loss and has tried to set realistic goals to achieve success. Lifestyle changes were discussed on eating healthy, exercising at least 150 minutes weekly, and reducing sedentary behavior.   Discussed the risk factors associated with obesity: Arthritis/KIKI/Diabetes/Fatty Liver/Cardiovascular disease/GERD/HTN/HLP.     HTN  Chronic; stable on current treatment plan; Encourage to discuss with     HLD  Cont statins    Hemochromatosis  Iron studies    Return to clinic in 12 months.    I have sent communication to the referring physician and/or primary care provider.      Time Statement  A total time spent includes time preparing to see patient, reviewing  diagnostic studies and records, direct face-to-face visit, completing orders, medications , reconciliation, prescription management, and care coordination    We discussed in depth the nature of the patient's disease, the management plan in details. I have provided the patient with an opportunity to ask questions and have all questions answered to his satisfaction.     Discussed with patient that it is likely that she will see results before Myself or my nurse are able to view them and report results due to the Cures Act passed 4/1/21. Results will be sent  immediately to the patient who are enrolled in the patient portal. If results come through after business hours or on weekend, we will not see them until the next business day that we are in the office. If resulted during the business day, we will likely not be able to review them until after completing all patient visits in office that day.       Montana Wallis MD  Transplant Hepatologist and Gastroenterologist  [facility] [place]

## 2024-10-14 ENCOUNTER — LAB VISIT (OUTPATIENT)
Dept: LAB | Facility: HOSPITAL | Age: 69
End: 2024-10-14
Attending: NURSE PRACTITIONER
Payer: MEDICARE

## 2024-10-14 DIAGNOSIS — E83.110 HEREDITARY HEMOCHROMATOSIS: ICD-10-CM

## 2024-10-14 DIAGNOSIS — D69.6 THROMBOCYTOPENIA: ICD-10-CM

## 2024-10-14 DIAGNOSIS — K76.0 NAFLD (NONALCOHOLIC FATTY LIVER DISEASE): ICD-10-CM

## 2024-10-14 DIAGNOSIS — R79.89 ELEVATED LFTS: ICD-10-CM

## 2024-10-14 LAB
ALBUMIN SERPL BCP-MCNC: 3.8 G/DL (ref 3.5–5.2)
ALP SERPL-CCNC: 86 U/L (ref 55–135)
ALT SERPL W/O P-5'-P-CCNC: 48 U/L (ref 10–44)
ANION GAP SERPL CALC-SCNC: 6 MMOL/L (ref 8–16)
AST SERPL-CCNC: 47 U/L (ref 10–40)
BASOPHILS # BLD AUTO: 0.04 K/UL (ref 0–0.2)
BASOPHILS NFR BLD: 0.9 % (ref 0–1.9)
BILIRUB SERPL-MCNC: 0.9 MG/DL (ref 0.1–1)
BUN SERPL-MCNC: 16 MG/DL (ref 8–23)
CALCIUM SERPL-MCNC: 9.6 MG/DL (ref 8.7–10.5)
CHLORIDE SERPL-SCNC: 106 MMOL/L (ref 95–110)
CO2 SERPL-SCNC: 26 MMOL/L (ref 23–29)
CREAT SERPL-MCNC: 0.9 MG/DL (ref 0.5–1.4)
DIFFERENTIAL METHOD BLD: NORMAL
EOSINOPHIL # BLD AUTO: 0.3 K/UL (ref 0–0.5)
EOSINOPHIL NFR BLD: 7.1 % (ref 0–8)
ERYTHROCYTE [DISTWIDTH] IN BLOOD BY AUTOMATED COUNT: 13.5 % (ref 11.5–14.5)
EST. GFR  (NO RACE VARIABLE): >60 ML/MIN/1.73 M^2
ESTIMATED AVG GLUCOSE: 108 MG/DL (ref 68–131)
FERRITIN SERPL-MCNC: 156 NG/ML (ref 20–300)
GLUCOSE SERPL-MCNC: 123 MG/DL (ref 70–110)
HBA1C MFR BLD: 5.4 % (ref 4–5.6)
HCT VFR BLD AUTO: 44.2 % (ref 40–54)
HGB BLD-MCNC: 14.9 G/DL (ref 14–18)
IMM GRANULOCYTES # BLD AUTO: 0.02 K/UL (ref 0–0.04)
IMM GRANULOCYTES NFR BLD AUTO: 0.4 % (ref 0–0.5)
IRON SERPL-MCNC: 167 UG/DL (ref 45–160)
IRON SERPL-MCNC: 167 UG/DL (ref 45–160)
LYMPHOCYTES # BLD AUTO: 1.7 K/UL (ref 1–4.8)
LYMPHOCYTES NFR BLD: 37.4 % (ref 18–48)
MCH RBC QN AUTO: 30.7 PG (ref 27–31)
MCHC RBC AUTO-ENTMCNC: 33.7 G/DL (ref 32–36)
MCV RBC AUTO: 91 FL (ref 82–98)
MONOCYTES # BLD AUTO: 0.5 K/UL (ref 0.3–1)
MONOCYTES NFR BLD: 10.2 % (ref 4–15)
NEUTROPHILS # BLD AUTO: 2 K/UL (ref 1.8–7.7)
NEUTROPHILS NFR BLD: 44.4 % (ref 38–73)
NRBC BLD-RTO: 0 /100 WBC
PLATELET # BLD AUTO: 150 K/UL (ref 150–450)
PMV BLD AUTO: 10.9 FL (ref 9.2–12.9)
POTASSIUM SERPL-SCNC: 4.2 MMOL/L (ref 3.5–5.1)
PROT SERPL-MCNC: 7.3 G/DL (ref 6–8.4)
RBC # BLD AUTO: 4.85 M/UL (ref 4.6–6.2)
SATURATED IRON: 46 % (ref 20–50)
SATURATED IRON: 46 % (ref 20–50)
SODIUM SERPL-SCNC: 138 MMOL/L (ref 136–145)
TOTAL IRON BINDING CAPACITY: 360 UG/DL (ref 250–450)
TOTAL IRON BINDING CAPACITY: 360 UG/DL (ref 250–450)
TRANSFERRIN SERPL-MCNC: 243 MG/DL (ref 200–375)
TRANSFERRIN SERPL-MCNC: 243 MG/DL (ref 200–375)
WBC # BLD AUTO: 4.49 K/UL (ref 3.9–12.7)

## 2024-10-14 PROCEDURE — 80053 COMPREHEN METABOLIC PANEL: CPT | Performed by: NURSE PRACTITIONER

## 2024-10-14 PROCEDURE — 83540 ASSAY OF IRON: CPT | Performed by: NURSE PRACTITIONER

## 2024-10-14 PROCEDURE — 85025 COMPLETE CBC W/AUTO DIFF WBC: CPT | Mod: PO | Performed by: NURSE PRACTITIONER

## 2024-10-14 PROCEDURE — 83036 HEMOGLOBIN GLYCOSYLATED A1C: CPT | Performed by: INTERNAL MEDICINE

## 2024-10-14 PROCEDURE — 82728 ASSAY OF FERRITIN: CPT | Performed by: NURSE PRACTITIONER

## 2024-10-14 PROCEDURE — 36415 COLL VENOUS BLD VENIPUNCTURE: CPT | Mod: PO | Performed by: INTERNAL MEDICINE

## 2024-10-17 DIAGNOSIS — M1A.0720 IDIOPATHIC CHRONIC GOUT OF LEFT FOOT WITHOUT TOPHUS: ICD-10-CM

## 2024-10-17 RX ORDER — ALLOPURINOL 100 MG/1
100 TABLET ORAL DAILY
Qty: 90 TABLET | Refills: 2 | OUTPATIENT
Start: 2024-10-17

## 2024-10-18 DIAGNOSIS — E83.110 HEREDITARY HEMOCHROMATOSIS: Primary | ICD-10-CM

## 2024-10-18 NOTE — PROGRESS NOTES
Can we please schedule him for 1 unit phlebotomy since ferritin is > 150. F/u in 6 weeks with cbc, cmp,iron studies.- possible phlebotomy.

## 2024-10-21 ENCOUNTER — PATIENT MESSAGE (OUTPATIENT)
Dept: INFUSION THERAPY | Facility: HOSPITAL | Age: 69
End: 2024-10-21
Payer: MEDICARE

## 2024-10-24 ENCOUNTER — PROCEDURE VISIT (OUTPATIENT)
Dept: HEPATOLOGY | Facility: CLINIC | Age: 69
End: 2024-10-24
Payer: MEDICARE

## 2024-10-24 VITALS — HEIGHT: 67 IN | BODY MASS INDEX: 37.89 KG/M2 | WEIGHT: 241.38 LBS

## 2024-10-24 DIAGNOSIS — K76.0 NAFLD (NONALCOHOLIC FATTY LIVER DISEASE): ICD-10-CM

## 2024-10-24 PROCEDURE — 91200 LIVER ELASTOGRAPHY: CPT | Mod: S$GLB,,, | Performed by: INTERNAL MEDICINE

## 2024-10-24 NOTE — PROCEDURES
FibroScan (Vibration Controlled Transient Elastography)    Date/Time: 10/24/2024 1:00 PM    Performed by: Therese Morales RN  Authorized by: Montana Wallis MD    Diagnosis:  NAFLD    Probe:  XL    Universal Protocol: Patient's identity, procedure and site were verified, confirmatory pause was performed.  Discussed procedure including risks and potential complications.  Questions answered.  Patient verbalizes understanding and wishes to proceed with VCTE.     Procedure: After providing explanations of the procedure, patient was placed in the supine position with right arm in maximum abduction to allow optimal exposure of right lateral abdomen.  Patient was briefly assessed, Testing was performed in the mid-axillary location, 50Hz Shear Wave pulses were applied and the resulting Shear Wave and Propagation Speed detected with a 3.5 MHz ultrasonic signal, using the FibroScan probe, Skin to liver capsule distance and liver parenchyma were accessed during the entire examination with the FibroScan probe, Patient was instructed to breathe normally and to abstain from sudden movements during the procedure, allowing for random measurements of liver stiffness. At least 10 Shear Waves were produced, Individual measurements of each Shear Wave were calculated.  Patient tolerated the procedure well with no complications.  Meets discharge criteria as was dismissed.  Rates pain 0 out of 10.  Patient will follow up with ordering provider to review results.    Findings  Median liver stiffness score:  8.2  CAP Reading: dB/m:  319    IQR/med %:  23  Interpretation  Fibrosis interpretation is based on medial liver stiffness - Kilopascal (kPa).    Fibrosis Stage:  F2  Steatosis interpretation is based on controlled attenuation parameter - (dB/m).    Steatosis Grade:  S3  Comments/Plan:  Moderate fibrosis with severe steatosis

## 2024-10-28 ENCOUNTER — INFUSION (OUTPATIENT)
Dept: INFUSION THERAPY | Facility: HOSPITAL | Age: 69
End: 2024-10-28
Attending: FAMILY MEDICINE
Payer: MEDICARE

## 2024-10-28 VITALS
HEART RATE: 81 BPM | RESPIRATION RATE: 16 BRPM | DIASTOLIC BLOOD PRESSURE: 73 MMHG | OXYGEN SATURATION: 97 % | TEMPERATURE: 98 F | SYSTOLIC BLOOD PRESSURE: 119 MMHG

## 2024-10-28 DIAGNOSIS — E83.19 INCREASED STORAGE IRON: ICD-10-CM

## 2024-10-28 DIAGNOSIS — E83.110 HEREDITARY HEMOCHROMATOSIS: Primary | ICD-10-CM

## 2024-10-28 PROCEDURE — 99195 PHLEBOTOMY: CPT

## 2024-10-28 RX ORDER — LIDOCAINE HYDROCHLORIDE 10 MG/ML
1 INJECTION, SOLUTION EPIDURAL; INFILTRATION; INTRACAUDAL; PERINEURAL ONCE
OUTPATIENT
Start: 2024-10-28 | End: 2024-10-28

## 2024-11-13 DIAGNOSIS — M1A.0720 IDIOPATHIC CHRONIC GOUT OF LEFT FOOT WITHOUT TOPHUS: ICD-10-CM

## 2024-11-13 NOTE — TELEPHONE ENCOUNTER
No care due was identified.  [facility] Embedded Care Due Messages. Reference number: [id number].   11/13/2024 12:07:05 PM CST

## 2024-11-14 RX ORDER — ALLOPURINOL 100 MG/1
100 TABLET ORAL DAILY
Qty: 90 TABLET | Refills: 2 | Status: SHIPPED | OUTPATIENT
Start: 2024-11-14

## 2024-11-19 ENCOUNTER — OFFICE VISIT (OUTPATIENT)
Dept: CARDIOLOGY | Facility: CLINIC | Age: 69
End: 2024-11-19
Payer: MEDICARE

## 2024-11-19 VITALS
OXYGEN SATURATION: 98 % | WEIGHT: 237.81 LBS | SYSTOLIC BLOOD PRESSURE: 110 MMHG | HEART RATE: 78 BPM | BODY MASS INDEX: 37.33 KG/M2 | DIASTOLIC BLOOD PRESSURE: 76 MMHG | HEIGHT: 67 IN

## 2024-11-19 DIAGNOSIS — E66.01 SEVERE OBESITY WITH BODY MASS INDEX (BMI) OF 35.0 TO 39.9 WITH SERIOUS COMORBIDITY: ICD-10-CM

## 2024-11-19 DIAGNOSIS — I10 ESSENTIAL HYPERTENSION: ICD-10-CM

## 2024-11-19 DIAGNOSIS — I70.0 AORTIC ATHEROSCLEROSIS: ICD-10-CM

## 2024-11-19 DIAGNOSIS — E78.00 PURE HYPERCHOLESTEROLEMIA: Primary | ICD-10-CM

## 2024-11-19 PROCEDURE — 99999 PR PBB SHADOW E&M-EST. PATIENT-LVL III: CPT | Mod: PBBFAC,,, | Performed by: INTERNAL MEDICINE

## 2024-11-19 PROCEDURE — 1160F RVW MEDS BY RX/DR IN RCRD: CPT | Mod: CPTII,S$GLB,, | Performed by: INTERNAL MEDICINE

## 2024-11-19 PROCEDURE — 99214 OFFICE O/P EST MOD 30 MIN: CPT | Mod: S$GLB,,, | Performed by: INTERNAL MEDICINE

## 2024-11-19 PROCEDURE — 3074F SYST BP LT 130 MM HG: CPT | Mod: CPTII,S$GLB,, | Performed by: INTERNAL MEDICINE

## 2024-11-19 PROCEDURE — 3044F HG A1C LEVEL LT 7.0%: CPT | Mod: CPTII,S$GLB,, | Performed by: INTERNAL MEDICINE

## 2024-11-19 PROCEDURE — 3008F BODY MASS INDEX DOCD: CPT | Mod: CPTII,S$GLB,, | Performed by: INTERNAL MEDICINE

## 2024-11-19 PROCEDURE — 3288F FALL RISK ASSESSMENT DOCD: CPT | Mod: CPTII,S$GLB,, | Performed by: INTERNAL MEDICINE

## 2024-11-19 PROCEDURE — 1125F AMNT PAIN NOTED PAIN PRSNT: CPT | Mod: CPTII,S$GLB,, | Performed by: INTERNAL MEDICINE

## 2024-11-19 PROCEDURE — 1159F MED LIST DOCD IN RCRD: CPT | Mod: CPTII,S$GLB,, | Performed by: INTERNAL MEDICINE

## 2024-11-19 PROCEDURE — 1101F PT FALLS ASSESS-DOCD LE1/YR: CPT | Mod: CPTII,S$GLB,, | Performed by: INTERNAL MEDICINE

## 2024-11-19 PROCEDURE — 4010F ACE/ARB THERAPY RXD/TAKEN: CPT | Mod: CPTII,S$GLB,, | Performed by: INTERNAL MEDICINE

## 2024-11-19 PROCEDURE — 3078F DIAST BP <80 MM HG: CPT | Mod: CPTII,S$GLB,, | Performed by: INTERNAL MEDICINE

## 2024-11-19 NOTE — PROGRESS NOTES
Subjective:   Patient ID:  Jasson Mayo is a 69 y.o. male who presents for follow-up of Follow-up  NMT/stress wnl ( reveiwed myself)  Patient denies CP, angina or anginal equivalent.  Lipids wnl  Follow-up  Pertinent negatives include no chest pain.   Hypertension  This is a chronic problem. The current episode started more than 1 year ago. The problem has been gradually improving since onset. The problem is controlled. Pertinent negatives include no chest pain, palpitations or shortness of breath. Risk factors for coronary artery disease include male gender and obesity. Past treatments include ACE inhibitors. The current treatment provides moderate improvement. There are no compliance problems.        Review of Systems   Constitutional: Negative. Negative for weight gain.   HENT: Negative.     Eyes: Negative.    Cardiovascular: Negative.  Negative for chest pain, leg swelling and palpitations.   Respiratory: Negative.  Negative for shortness of breath.    Endocrine: Negative.    Hematologic/Lymphatic: Negative.    Skin: Negative.    Musculoskeletal:  Negative for muscle weakness.   Gastrointestinal: Negative.    Genitourinary: Negative.    Neurological: Negative.  Negative for dizziness.   Psychiatric/Behavioral: Negative.     Allergic/Immunologic: Negative.    All other systems reviewed and are negative.    Family History   Problem Relation Name Age of Onset    COPD Mother Juan Mayo     Heart attacks under age 50 Mother Juan Mayo     Cancer Father Thiago Mayo         PROSTATE CA     Past Medical History:   Diagnosis Date    Decreased platelet count     Fatty liver     Gout     HTN (hypertension) 2000    Non-A non-B hepatitis 1990    Obesity (BMI 35.0-39.9 without comorbidity)     Prediabetes 2015     Social History     Socioeconomic History    Marital status:     Number of children: 1   Occupational History    Occupation:      Employer: [employer]     Comment: [employer]  [employer]   Tobacco Use    Smoking status: Never     Passive exposure: Past    Smokeless tobacco: Former   Substance and Sexual Activity    Alcohol use: No    Drug use: Never    Sexual activity: Not Currently     Partners: Female     Birth control/protection: None     Social Drivers of Health     Financial Resource Strain: Low Risk  (1/15/2024)    Overall Financial Resource Strain (CARDIA)     Difficulty of Paying Living Expenses: Not hard at all   Food Insecurity: No Food Insecurity (1/15/2024)    Hunger Vital Sign     Worried About Running Out of Food in the Last Year: Never true     Ran Out of Food in the Last Year: Never true   Transportation Needs: No Transportation Needs (1/15/2024)    PRAPARE - Transportation     Lack of Transportation (Medical): No     Lack of Transportation (Non-Medical): No   Physical Activity: Insufficiently Active (1/15/2024)    Exercise Vital Sign     Days of Exercise per Week: 3 days     Minutes of Exercise per Session: 10 min   Stress: No Stress Concern Present (1/15/2024)    [employer] - Occupational Stress Questionnaire     Feeling of Stress : Not at all   Housing Stability: Low Risk  (1/15/2024)    Housing Stability Vital Sign     Unable to Pay for Housing in the Last Year: No     Number of Places Lived in the Last Year: 1     Unstable Housing in the Last Year: No     Current Outpatient Medications on File Prior to Visit   Medication Sig Dispense Refill    allopurinoL (ZYLOPRIM) 100 MG tablet Take 1 tablet (100 mg total) by mouth once daily. 90 tablet 2    benazepriL (LOTENSIN) 20 MG tablet Take 1 tablet (20 mg total) by mouth once daily. 90 tablet 3    colchicine-probenecid 0.5-500 mg (CO-BENEMID) 500-0.5 mg Tab TAKE 1 TABLET BY MOUTH EVERY DAY 90 tablet 3    omega-3 fatty acids/fish oil (FISH OIL-OMEGA-3 FATTY ACIDS) 300-1,000 mg capsule Take by mouth once daily.      vitamin E 1000 UNIT capsule Take 1,000 Units by mouth once daily.       tirzepatide (MOUNJARO) 5 mg/0.5 mL PnIj Inject 5 mg into the skin every 7 days. 4 Pen 0     No current facility-administered medications on file prior to visit.     Review of patient's allergies indicates:  No Known Allergies    Objective:     Physical Exam  Vitals and nursing note reviewed.   Constitutional:       Appearance: He is well-developed.   HENT:      Head: Normocephalic and atraumatic.   Eyes:      Conjunctiva/sclera: Conjunctivae normal.      Pupils: Pupils are equal, round, and reactive to light.   Cardiovascular:      Rate and Rhythm: Normal rate and regular rhythm.      Pulses: Intact distal pulses.      Heart sounds: Normal heart sounds.   Pulmonary:      Effort: Pulmonary effort is normal.      Breath sounds: Normal breath sounds.   Abdominal:      General: Bowel sounds are normal.      Palpations: Abdomen is soft.   Musculoskeletal:      Cervical back: Normal range of motion and neck supple.   Skin:     General: Skin is warm and dry.   Neurological:      Mental Status: He is alert and oriented to person, place, and time.         Assessment:     1. Pure hypercholesterolemia    2. Essential hypertension    3. Aortic atherosclerosis    4. Severe obesity with body mass index (BMI) of 35.0 to 39.9 with serious comorbidity        Plan:     Pure hypercholesterolemia    Essential hypertension    Aortic atherosclerosis    Severe obesity with body mass index (BMI) of 35.0 to 39.9 with serious comorbidity      Continue lotensin- HTN  Exercise and weight loss

## 2024-12-02 ENCOUNTER — TELEPHONE (OUTPATIENT)
Dept: HEMATOLOGY/ONCOLOGY | Facility: CLINIC | Age: 69
End: 2024-12-02
Payer: MEDICARE

## 2024-12-02 NOTE — TELEPHONE ENCOUNTER
Nurse spoke with pts spouse in regards to insurance coverage. Nurse informed the pt that she would have to contact his insurance company to get verification on the coverage for seeing provider. Pt spouse verbalized understanding. Call ended well

## 2024-12-02 NOTE — TELEPHONE ENCOUNTER
----- Message from NotesFirst sent at 12/2/2024 12:16 PM CST -----    Patient Returning Call        Who Called:pt  Does the patient know what this is regarding?:need nurse to please call pt wife about insurance coverage  Would the patient rather a call back or a response via [facility]? call  Best Call Back Number:[phone]  Additional Information: call back

## 2024-12-02 NOTE — TELEPHONE ENCOUNTER
----- Message from Celine sent at 12/2/2024  2:14 PM CST -----  Type:  Needs Medical Advice    Who Called: Wife of patient    Would the patient rather a call back or a response via [facility]? Call back    Best Call Back Number: [phone]     Additional Information: Patients wife states she would like a call back to clarify the doctors name that [facility] Lisa works with. Patient would like to ensure patient can still be seen once he changes insurance. Patient wife would like a call back as soon as possible with further information and assistance.

## 2025-01-02 ENCOUNTER — OFFICE VISIT (OUTPATIENT)
Dept: FAMILY MEDICINE | Facility: CLINIC | Age: 70
End: 2025-01-02
Payer: MEDICARE

## 2025-01-02 VITALS
HEART RATE: 72 BPM | OXYGEN SATURATION: 98 % | WEIGHT: 242.69 LBS | SYSTOLIC BLOOD PRESSURE: 102 MMHG | BODY MASS INDEX: 38.09 KG/M2 | HEIGHT: 67 IN | DIASTOLIC BLOOD PRESSURE: 80 MMHG

## 2025-01-02 DIAGNOSIS — E66.01 SEVERE OBESITY (BMI 35.0-39.9) WITH COMORBIDITY: ICD-10-CM

## 2025-01-02 DIAGNOSIS — M1A.0720 IDIOPATHIC CHRONIC GOUT OF LEFT FOOT WITHOUT TOPHUS: ICD-10-CM

## 2025-01-02 DIAGNOSIS — K76.0 NAFLD (NONALCOHOLIC FATTY LIVER DISEASE): ICD-10-CM

## 2025-01-02 DIAGNOSIS — M06.4 UNDIFFERENTIATED INFLAMMATORY ARTHRITIS: ICD-10-CM

## 2025-01-02 DIAGNOSIS — Z23 NEED FOR VACCINATION: Primary | ICD-10-CM

## 2025-01-02 DIAGNOSIS — E83.110 HEREDITARY HEMOCHROMATOSIS: ICD-10-CM

## 2025-01-02 DIAGNOSIS — E66.01 SEVERE OBESITY WITH BODY MASS INDEX (BMI) OF 35.0 TO 39.9 WITH SERIOUS COMORBIDITY: ICD-10-CM

## 2025-01-02 DIAGNOSIS — I10 ESSENTIAL HYPERTENSION: ICD-10-CM

## 2025-01-02 PROBLEM — N20.0 KIDNEY STONE: Status: RESOLVED | Noted: 2017-03-28 | Resolved: 2025-01-02

## 2025-01-02 PROBLEM — Z12.5 SCREENING FOR PROSTATE CANCER: Status: RESOLVED | Noted: 2017-11-02 | Resolved: 2025-01-02

## 2025-01-02 PROBLEM — R79.89 ELEVATED LFTS: Status: RESOLVED | Noted: 2017-07-12 | Resolved: 2025-01-02

## 2025-01-02 PROBLEM — M1A.9XX0 CHRONIC GOUT WITHOUT TOPHUS: Status: RESOLVED | Noted: 2023-07-18 | Resolved: 2025-01-02

## 2025-01-02 PROBLEM — U07.1 COVID-19: Status: RESOLVED | Noted: 2020-07-20 | Resolved: 2025-01-02

## 2025-01-02 PROCEDURE — 90653 IIV ADJUVANT VACCINE IM: CPT | Mod: S$GLB,,, | Performed by: INTERNAL MEDICINE

## 2025-01-02 PROCEDURE — G0008 ADMIN INFLUENZA VIRUS VAC: HCPCS | Mod: S$GLB,,, | Performed by: INTERNAL MEDICINE

## 2025-01-02 PROCEDURE — 99999 PR PBB SHADOW E&M-EST. PATIENT-LVL IV: CPT | Mod: PBBFAC,,, | Performed by: INTERNAL MEDICINE

## 2025-01-02 PROCEDURE — G2211 COMPLEX E/M VISIT ADD ON: HCPCS | Mod: S$GLB,,, | Performed by: INTERNAL MEDICINE

## 2025-01-02 PROCEDURE — 1126F AMNT PAIN NOTED NONE PRSNT: CPT | Mod: CPTII,S$GLB,, | Performed by: INTERNAL MEDICINE

## 2025-01-02 PROCEDURE — 3288F FALL RISK ASSESSMENT DOCD: CPT | Mod: CPTII,S$GLB,, | Performed by: INTERNAL MEDICINE

## 2025-01-02 PROCEDURE — 3074F SYST BP LT 130 MM HG: CPT | Mod: CPTII,S$GLB,, | Performed by: INTERNAL MEDICINE

## 2025-01-02 PROCEDURE — 1159F MED LIST DOCD IN RCRD: CPT | Mod: CPTII,S$GLB,, | Performed by: INTERNAL MEDICINE

## 2025-01-02 PROCEDURE — 99214 OFFICE O/P EST MOD 30 MIN: CPT | Mod: S$GLB,,, | Performed by: INTERNAL MEDICINE

## 2025-01-02 PROCEDURE — 3079F DIAST BP 80-89 MM HG: CPT | Mod: CPTII,S$GLB,, | Performed by: INTERNAL MEDICINE

## 2025-01-02 PROCEDURE — 3008F BODY MASS INDEX DOCD: CPT | Mod: CPTII,S$GLB,, | Performed by: INTERNAL MEDICINE

## 2025-01-02 PROCEDURE — 1101F PT FALLS ASSESS-DOCD LE1/YR: CPT | Mod: CPTII,S$GLB,, | Performed by: INTERNAL MEDICINE

## 2025-01-02 RX ORDER — SEMAGLUTIDE 0.25 MG/.5ML
0.25 INJECTION, SOLUTION SUBCUTANEOUS
Qty: 2 ML | Refills: 1 | Status: SHIPPED | OUTPATIENT
Start: 2025-01-02

## 2025-01-02 RX ORDER — ASCORBIC ACID 250 MG
250 TABLET ORAL DAILY
COMMUNITY

## 2025-01-02 NOTE — PATIENT INSTRUCTIONS
Bo Spaulding,     If you are due for any health screening(s) below please notify me so we can arrange them to be ordered and scheduled. Most healthy patients at your age complete them, but you are free to accept or refuse.     If you can't do it, I'll definitely understand. If you can, I'd certainly appreciate it!    All of your core healthy metrics are met.

## 2025-01-14 ENCOUNTER — PATIENT MESSAGE (OUTPATIENT)
Dept: FAMILY MEDICINE | Facility: CLINIC | Age: 70
End: 2025-01-14
Payer: MEDICARE

## 2025-01-14 NOTE — PROGRESS NOTES
Assessment/Plan:    1. Need for vaccination  -     influenza (adjuvanted) (Fluad) 45 mcg/0.5 mL IM vaccine (> or = 66 yo) 0.5 mL    2. Essential hypertension  Overview:  Hypertension Medications               benazepriL (LOTENSIN) 20 MG tablet Take 1 tablet (20 mg total) by mouth once daily.     -at goal today  -continue lifestyle modification with low sodium diet and exercise   -discussed hypertension disease course and importance of treating high blood pressure  -patient understood and advised of risk of untreated blood pressure.  ER precautions were given   for symptoms of hypertensive urgency and emergency.    Orders:  -     semaglutide, weight loss, (WEGOVY) 0.25 mg/0.5 mL PnIj; Inject 0.25 mg into the skin every 7 days.  Dispense: 2 mL; Refill: 1    3. Idiopathic chronic gout of left foot without tophus    4. Severe obesity (BMI 35.0-39.9) with comorbidity  -     semaglutide, weight loss, (WEGOVY) 0.25 mg/0.5 mL PnIj; Inject 0.25 mg into the skin every 7 days.  Dispense: 2 mL; Refill: 1    5. NAFLD (nonalcoholic fatty liver disease)  Overview:  -managed by hepatology  -fibroscan Oct 2024- Moderate fibrosis with severe steatosis   -attempting to start on GLP1a to aid in weight loss    Orders:  -     semaglutide, weight loss, (WEGOVY) 0.25 mg/0.5 mL PnIj; Inject 0.25 mg into the skin every 7 days.  Dispense: 2 mL; Refill: 1    6. Severe obesity with body mass index (BMI) of 35.0 to 39.9 with serious comorbidity  Overview:  General weight loss/lifestyle modification strategies discussed (elicit support from others; identify saboteurs; non-food rewards, etc).  Informal exercise measures discussed, e.g. taking stairs instead of elevator.  Regular aerobic exercise program discussed.  Medication: attempting to start on GLP1a. Discussed risks and benefits of GLP1a therapy.  Reviewed patient's family history and personal history for thyroid C cell type tumor and MEN syndrome.  Patient denies a history of these  disorders or syndromes.  Patient was aware of increased risk of pancreatitis and worsening of these conditions in  animal studies.  Also discussed side effects of nausea vomiting diarrhea and abdominal pain.  Patient should notify me immediately if having any of these symptoms.  ER precautions were given.         7. Undifferentiated inflammatory arthritis  Overview:  -managed by rheumatology  -previously required steroid injections for treatment but reports symptoms much improved since starting treatment of hemochromatosis with phlebotomies       8. Hereditary hemochromatosis  Overview:  -managed by hematology  -receiving phlebotomies PRN with improvement of symptoms          Visit today included increased complexity associated with the care of the episodic problem(s) addressed and managing the longitudinal care of the patient due to the serious and/or complex managed problem(s). See above assessment/plan.    Follow up in about 6 months (around 7/2/2025), or if symptoms worsen or fail to improve.    Flor Jones MD  ______________________________________________________________________________________________________________________________    CC: Establish care    History of Present Illness  The patient is a 69-year-old male here to establish care.    He is currently under the care of hematology for hemochromatosis, with a scheduled appointment on 01/27/2025. He has been receiving phlebotomies, which have significantly improved his arthritis symptoms. He follows with rheumatology, Dr. TAO,  for arthritis and required injections in the past. However, since starting the phlebotomies, he has not required these injections. He continues to see Dr. TAO annually.    He has a history of gout and is on a low dose of allopurinol. He also takes colchicine, which he obtains from [facility] due to its high cost.    He is under the care of Dr. Kaur for cardiac checkups, with no reported issues. He is not on any cholesterol  medication but takes omega-3 fish oil supplements. His last lab work was conducted in 10/2024.    He is also under the care of a liver specialist, Dr. Wallis, and had a FibroScan performed. He was informed that he qualifies for Mounjaro due to elevated levels of an unspecified parameter, but the prescription was denied when last written. Discussed trying to obtain medication again.    He has a history of hypertension and is on benazepril, which appears to be effective.     No other new complaints today. Remaining chronic conditions have been reviewed and remain stable. Further detail as stated above.     Health Maintenance reviewed.    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    Obesity (BMI 35.0-39.9 without comorbidity)     Prediabetes 01/01/2015     Past Surgical History:   Procedure Laterality Date    COLONOSCOPY W/ POLYPECTOMY  10, 14 no polyp    KNEE ARTHROSCOPY Right     Meniscus repair    LIVER BIOPSY  2010    VASECTOMY       Review of patient's allergies indicates:  No Known Allergies  Social History     Tobacco Use    Smoking status: Never     Passive exposure: Past    Smokeless tobacco: Former   Substance Use Topics    Alcohol use: No    Drug use: Never     Family History   Problem Relation Name Age of Onset    COPD Mother Juan Mayo     Heart attacks under age 50 Mother Juan Mayo     Cancer Father Thiago Mayo         PROSTATE CA     Current Outpatient Medications on File Prior to Visit   Medication Sig Dispense Refill    allopurinoL (ZYLOPRIM) 100 MG tablet Take 1 tablet (100 mg total) by mouth once daily. 90 tablet 2    APPLE CIDER VINEGAR ORAL Take by mouth.      ascorbic acid, vitamin C, (VITAMIN C) 250 MG tablet Take 250 mg by mouth once daily.      benazepriL (LOTENSIN) 20 MG tablet Take 1  "tablet (20 mg total) by mouth once daily. 90 tablet 3    colchicine-probenecid 0.5-500 mg (CO-BENEMID) 500-0.5 mg Tab TAKE 1 TABLET BY MOUTH EVERY DAY 90 tablet 3    omega-3 fatty acids/fish oil (FISH OIL-OMEGA-3 FATTY ACIDS) 300-1,000 mg capsule Take by mouth once daily.      vitamin E 1000 UNIT capsule Take 1,000 Units by mouth once daily.      ZINC ACETATE ORAL Take by mouth.       No current facility-administered medications on file prior to visit.       Review of Systems    Vitals:    01/02/25 0924   BP: 102/80   Pulse: 72   SpO2: 98%   Weight: 110.1 kg (242 lb 11.2 oz)   Height: 5' 7" (1.702 m)       Wt Readings from Last 3 Encounters:   01/02/25 110.1 kg (242 lb 11.2 oz)   11/19/24 107.9 kg (237 lb 12.8 oz)   10/24/24 109.5 kg (241 lb 6.5 oz)       Physical Exam      DISCLAIMER: This note was compiled by using a speech recognition dictation system and therefore please be aware that typographical / speech recognition errors can and do occur.  Please contact me if you see any errors specifically.  Consent was obtained for MICHELLE recording system prior to the visit.  "

## 2025-01-20 ENCOUNTER — PATIENT MESSAGE (OUTPATIENT)
Dept: HEMATOLOGY/ONCOLOGY | Facility: CLINIC | Age: 70
End: 2025-01-20
Payer: MEDICARE

## 2025-01-24 ENCOUNTER — LAB VISIT (OUTPATIENT)
Dept: LAB | Facility: HOSPITAL | Age: 70
End: 2025-01-24
Payer: MEDICARE

## 2025-01-24 DIAGNOSIS — E83.110 HEREDITARY HEMOCHROMATOSIS: ICD-10-CM

## 2025-01-24 LAB
ALBUMIN SERPL BCP-MCNC: 3.7 G/DL (ref 3.5–5.2)
ALP SERPL-CCNC: 92 U/L (ref 40–150)
ALT SERPL W/O P-5'-P-CCNC: 63 U/L (ref 10–44)
ANION GAP SERPL CALC-SCNC: 12 MMOL/L (ref 8–16)
AST SERPL-CCNC: 51 U/L (ref 10–40)
BASOPHILS # BLD AUTO: 0.04 K/UL (ref 0–0.2)
BASOPHILS NFR BLD: 0.8 % (ref 0–1.9)
BILIRUB SERPL-MCNC: 0.6 MG/DL (ref 0.1–1)
BUN SERPL-MCNC: 22 MG/DL (ref 8–23)
CALCIUM SERPL-MCNC: 9.6 MG/DL (ref 8.7–10.5)
CHLORIDE SERPL-SCNC: 105 MMOL/L (ref 95–110)
CO2 SERPL-SCNC: 22 MMOL/L (ref 23–29)
CREAT SERPL-MCNC: 1.1 MG/DL (ref 0.5–1.4)
DIFFERENTIAL METHOD BLD: ABNORMAL
EOSINOPHIL # BLD AUTO: 0.4 K/UL (ref 0–0.5)
EOSINOPHIL NFR BLD: 8.4 % (ref 0–8)
ERYTHROCYTE [DISTWIDTH] IN BLOOD BY AUTOMATED COUNT: 13.2 % (ref 11.5–14.5)
EST. GFR  (NO RACE VARIABLE): >60 ML/MIN/1.73 M^2
GLUCOSE SERPL-MCNC: 117 MG/DL (ref 70–110)
HCT VFR BLD AUTO: 44.6 % (ref 40–54)
HGB BLD-MCNC: 15.2 G/DL (ref 14–18)
IMM GRANULOCYTES # BLD AUTO: 0.01 K/UL (ref 0–0.04)
IMM GRANULOCYTES NFR BLD AUTO: 0.2 % (ref 0–0.5)
LYMPHOCYTES # BLD AUTO: 1.9 K/UL (ref 1–4.8)
LYMPHOCYTES NFR BLD: 37.5 % (ref 18–48)
MCH RBC QN AUTO: 31 PG (ref 27–31)
MCHC RBC AUTO-ENTMCNC: 34.1 G/DL (ref 32–36)
MCV RBC AUTO: 91 FL (ref 82–98)
MONOCYTES # BLD AUTO: 0.6 K/UL (ref 0.3–1)
MONOCYTES NFR BLD: 12 % (ref 4–15)
NEUTROPHILS # BLD AUTO: 2.1 K/UL (ref 1.8–7.7)
NEUTROPHILS NFR BLD: 41.1 % (ref 38–73)
NRBC BLD-RTO: 0 /100 WBC
PLATELET # BLD AUTO: 147 K/UL (ref 150–450)
PMV BLD AUTO: 10.7 FL (ref 9.2–12.9)
POTASSIUM SERPL-SCNC: 4.6 MMOL/L (ref 3.5–5.1)
PROT SERPL-MCNC: 7.3 G/DL (ref 6–8.4)
RBC # BLD AUTO: 4.9 M/UL (ref 4.6–6.2)
SODIUM SERPL-SCNC: 139 MMOL/L (ref 136–145)
WBC # BLD AUTO: 5.01 K/UL (ref 3.9–12.7)

## 2025-01-24 PROCEDURE — 80053 COMPREHEN METABOLIC PANEL: CPT | Performed by: NURSE PRACTITIONER

## 2025-01-24 PROCEDURE — 36415 COLL VENOUS BLD VENIPUNCTURE: CPT | Mod: PO | Performed by: NURSE PRACTITIONER

## 2025-01-24 PROCEDURE — 83540 ASSAY OF IRON: CPT | Performed by: NURSE PRACTITIONER

## 2025-01-24 PROCEDURE — 82728 ASSAY OF FERRITIN: CPT | Performed by: NURSE PRACTITIONER

## 2025-01-24 PROCEDURE — 85025 COMPLETE CBC W/AUTO DIFF WBC: CPT | Mod: PO | Performed by: NURSE PRACTITIONER

## 2025-01-25 LAB
FERRITIN SERPL-MCNC: 150 NG/ML (ref 20–300)
IRON SERPL-MCNC: 151 UG/DL (ref 45–160)
SATURATED IRON: 43 % (ref 20–50)
TOTAL IRON BINDING CAPACITY: 349 UG/DL (ref 250–450)
TRANSFERRIN SERPL-MCNC: 236 MG/DL (ref 200–375)

## 2025-01-27 ENCOUNTER — OFFICE VISIT (OUTPATIENT)
Dept: HEMATOLOGY/ONCOLOGY | Facility: CLINIC | Age: 70
End: 2025-01-27
Payer: MEDICARE

## 2025-01-27 ENCOUNTER — INFUSION (OUTPATIENT)
Dept: INFUSION THERAPY | Facility: HOSPITAL | Age: 70
End: 2025-01-27
Attending: FAMILY MEDICINE
Payer: MEDICARE

## 2025-01-27 VITALS
TEMPERATURE: 98 F | HEART RATE: 84 BPM | SYSTOLIC BLOOD PRESSURE: 144 MMHG | DIASTOLIC BLOOD PRESSURE: 88 MMHG | OXYGEN SATURATION: 96 % | RESPIRATION RATE: 17 BRPM

## 2025-01-27 VITALS
TEMPERATURE: 98 F | WEIGHT: 244.19 LBS | DIASTOLIC BLOOD PRESSURE: 78 MMHG | HEIGHT: 67 IN | SYSTOLIC BLOOD PRESSURE: 119 MMHG | HEART RATE: 78 BPM | BODY MASS INDEX: 38.33 KG/M2 | OXYGEN SATURATION: 96 %

## 2025-01-27 DIAGNOSIS — E66.01 SEVERE OBESITY WITH BODY MASS INDEX (BMI) OF 35.0 TO 39.9 WITH SERIOUS COMORBIDITY: ICD-10-CM

## 2025-01-27 DIAGNOSIS — D69.6 THROMBOCYTOPENIA: ICD-10-CM

## 2025-01-27 DIAGNOSIS — E83.110 HEREDITARY HEMOCHROMATOSIS: Primary | ICD-10-CM

## 2025-01-27 DIAGNOSIS — E83.19 INCREASED STORAGE IRON: ICD-10-CM

## 2025-01-27 PROCEDURE — 1160F RVW MEDS BY RX/DR IN RCRD: CPT | Mod: CPTII,S$GLB,, | Performed by: NURSE PRACTITIONER

## 2025-01-27 PROCEDURE — 3008F BODY MASS INDEX DOCD: CPT | Mod: CPTII,S$GLB,, | Performed by: NURSE PRACTITIONER

## 2025-01-27 PROCEDURE — 3074F SYST BP LT 130 MM HG: CPT | Mod: CPTII,S$GLB,, | Performed by: NURSE PRACTITIONER

## 2025-01-27 PROCEDURE — 3288F FALL RISK ASSESSMENT DOCD: CPT | Mod: CPTII,S$GLB,, | Performed by: NURSE PRACTITIONER

## 2025-01-27 PROCEDURE — 99999 PR PBB SHADOW E&M-EST. PATIENT-LVL III: CPT | Mod: PBBFAC,,, | Performed by: NURSE PRACTITIONER

## 2025-01-27 PROCEDURE — 1159F MED LIST DOCD IN RCRD: CPT | Mod: CPTII,S$GLB,, | Performed by: NURSE PRACTITIONER

## 2025-01-27 PROCEDURE — 99214 OFFICE O/P EST MOD 30 MIN: CPT | Mod: 25,S$GLB,, | Performed by: NURSE PRACTITIONER

## 2025-01-27 PROCEDURE — 3078F DIAST BP <80 MM HG: CPT | Mod: CPTII,S$GLB,, | Performed by: NURSE PRACTITIONER

## 2025-01-27 PROCEDURE — 99195 PHLEBOTOMY: CPT

## 2025-01-27 PROCEDURE — 1101F PT FALLS ASSESS-DOCD LE1/YR: CPT | Mod: CPTII,S$GLB,, | Performed by: NURSE PRACTITIONER

## 2025-01-27 PROCEDURE — 1125F AMNT PAIN NOTED PAIN PRSNT: CPT | Mod: CPTII,S$GLB,, | Performed by: NURSE PRACTITIONER

## 2025-01-27 RX ORDER — LIDOCAINE HYDROCHLORIDE 10 MG/ML
1 INJECTION, SOLUTION EPIDURAL; INFILTRATION; INTRACAUDAL; PERINEURAL ONCE
Status: DISCONTINUED | OUTPATIENT
Start: 2025-01-27 | End: 2025-01-27

## 2025-01-27 RX ORDER — LIDOCAINE HYDROCHLORIDE 10 MG/ML
1 INJECTION, SOLUTION EPIDURAL; INFILTRATION; INTRACAUDAL; PERINEURAL ONCE
OUTPATIENT
Start: 2025-01-27 | End: 2025-01-27

## 2025-01-27 NOTE — NURSING
One unit therapeutic phlebotomy performed via left A/C without difficulty.  Upon completion, needle dc'd, pressure dressing applied, and blood discarded in appropriate container.   Juice/snack refused.   Pt denies weakness/dizziness.  Pt discharged with next appt scheduled.

## 2025-01-27 NOTE — PROGRESS NOTES
Subjective:       Patient ID: Jasson Mayo is a 69 y.o. male.    Chief Complaint: review labs. phlebotomy    HPI: 69 y.o male with medical history fatty liver, thrombocytopenia, hemochromatosis heterozygous for H63D presents today for consideration of phlebotomy. He receives phlebotomy Q 3 months  Social History     Socioeconomic History    Marital status:     Number of children: 1   Occupational History    Occupation: mydala     Employer: [employer]     Comment: [employer]   Tobacco Use    Smoking status: Never     Passive exposure: Past    Smokeless tobacco: Former   Substance and Sexual Activity    Alcohol use: No    Drug use: Never    Sexual activity: Not Currently     Partners: Female     Birth control/protection: None     Social Drivers of Health     Financial Resource Strain: Low Risk  (1/15/2024)    Overall Financial Resource Strain (CARDIA)     Difficulty of Paying Living Expenses: Not hard at all   Food Insecurity: No Food Insecurity (1/15/2024)    Hunger Vital Sign     Worried About Running Out of Food in the Last Year: Never true     Ran Out of Food in the Last Year: Never true   Transportation Needs: No Transportation Needs (1/15/2024)    PRAPARE - Transportation     Lack of Transportation (Medical): No     Lack of Transportation (Non-Medical): No   Physical Activity: Insufficiently Active (1/15/2024)    Exercise Vital Sign     Days of Exercise per Week: 3 days     Minutes of Exercise per Session: 10 min   Stress: No Stress Concern Present (1/15/2024)    [employer] - Occupational Stress Questionnaire     Feeling of Stress : Not at all   Housing Stability: Low Risk  (1/15/2024)    Housing Stability Vital Sign     Unable to Pay for Housing in the Last Year: No     Number of Places Lived in the Last Year: 1     Unstable Housing in the Last Year: No       Past Medical History:   Diagnosis Date    COVID-19 07/20/2020     Decreased platelet count     Fatty liver     Gout     HTN (hypertension) 01/01/2000    Kidney stone 03/28/2017    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   HENT:  Negative for congestion, mouth sores, nosebleeds, sore throat, trouble swallowing and voice change.    Respiratory:  Negative for cough, chest tightness, shortness of breath and wheezing.    Cardiovascular:  Negative for chest pain and leg swelling.   Gastrointestinal:  Negative for abdominal distention, abdominal pain, blood in stool, constipation, diarrhea, nausea and vomiting.   Genitourinary:  Negative for difficulty urinating, dysuria and hematuria.   Musculoskeletal:  Negative for arthralgias, back pain and myalgias.   Skin:  Negative for pallor, rash and wound.   Neurological:  Negative for dizziness, syncope, weakness and headaches.   Hematological:  Negative for adenopathy. Does not bruise/bleed easily.   Psychiatric/Behavioral:  The patient is not nervous/anxious.          Medication List with Changes/Refills   Current Medications    ALLOPURINOL (ZYLOPRIM) 100 MG TABLET    Take 1 tablet (100 mg total) by mouth once daily.    APPLE CIDER VINEGAR ORAL    Take by mouth.    ASCORBIC ACID, VITAMIN C, (VITAMIN C) 250 MG TABLET    Take 250 mg by mouth once daily.    BENAZEPRIL (LOTENSIN) 20 MG TABLET    Take 1 tablet (20 mg total) by mouth once daily.     COLCHICINE-PROBENECID 0.5-500 MG (CO-BENEMID) 500-0.5 MG TAB    TAKE 1 TABLET BY MOUTH EVERY DAY    OMEGA-3 FATTY ACIDS/FISH OIL (FISH OIL-OMEGA-3 FATTY ACIDS) 300-1,000 MG CAPSULE    Take by mouth once daily.    SEMAGLUTIDE, WEIGHT LOSS, (WEGOVY) 0.25 MG/0.5 ML PNIJ    Inject 0.25 mg into the skin every 7 days.    VITAMIN E 1000 UNIT CAPSULE    Take 1,000 Units by mouth once daily.    ZINC ACETATE ORAL    Take by mouth.     Objective:     Vitals:    01/27/25 1252   BP: 119/78   Pulse: 78   Temp: 97.6 °F (36.4 °C)     Lab Results   Component Value Date    WBC 5.01 01/24/2025    HGB 15.2 01/24/2025    HCT 44.6 01/24/2025    MCV 91 01/24/2025     (L) 01/24/2025       Lab Results   Component Value Date    IRON 151 01/24/2025    TRANSFERRIN 236 01/24/2025    TIBC 349 01/24/2025    FESATURATED 43 01/24/2025      BMP  Lab Results   Component Value Date     01/24/2025    K 4.6 01/24/2025     01/24/2025    CO2 22 (L) 01/24/2025    BUN 22 01/24/2025    CREATININE 1.1 01/24/2025    CALCIUM 9.6 01/24/2025    ANIONGAP 12 01/24/2025    EGFRNORACEVR >60 01/24/2025     Lab Results   Component Value Date    ALT 63 (H) 01/24/2025    AST 51 (H) 01/24/2025    ALKPHOS 92 01/24/2025    BILITOT 0.6 01/24/2025         Physical Exam  HENT:      Right Ear: External ear normal.      Left Ear: External ear normal.   Eyes:      Conjunctiva/sclera: Conjunctivae normal.   Pulmonary:      Effort: Pulmonary effort is normal. No respiratory distress.   Musculoskeletal:         General: Normal range of motion.      Cervical back: Normal range of motion.   Neurological:      Mental Status: He is alert and oriented to person, place, and time.        Assessment:     Problem List Items Addressed This Visit          Hematology    Thrombocytopenia - Primary     Attributed to liver cirrhosis. Mild. No S&S bleeding. Monitor             Endocrine    Severe obesity with body mass index (BMI) of 35.0 to 39.9 with serious comorbidity      Encourage healthy nutrition along with portion control. Encourage daily exercise            GI    Hereditary hemochromatosis     Ferritin 150. Hg normal    Proceed with 1 unit phlebotomy today. Repeat in 3 months at [facility]. F/u 6 months with Jamia with labs 1-2 days prior and phlebotomy planned in [place]              Plan:     Thrombocytopenia    Severe obesity with body mass index (BMI) of 35.0 to 39.9 with serious comorbidity    Hereditary hemochromatosis          Med Onc Chart Routing      Follow up with physician    Follow up with [facility]IS 6 months. [facility] in [place]   Infusion scheduling note   3 months phlebotomy. 6 months phlebotomy. [facility]   Injection scheduling note    Labs CBC, CMP, ferritin and iron and TIBC   Scheduling:  Preferred lab:  Lab interval:  [facility]   Imaging None      Pharmacy appointment No pharmacy appointment needed      Other referrals       No additional referrals needed           SANDRA LozanoC

## 2025-01-27 NOTE — ASSESSMENT & PLAN NOTE
Ferritin 150. Hg normal    Proceed with 1 unit phlebotomy today. Repeat in 3 months at [place] location. F/u 6 months with Jamia with labs 1-2 days prior and phlebotomy planned in [place]

## 2025-01-28 ENCOUNTER — OFFICE VISIT (OUTPATIENT)
Dept: URGENT CARE | Facility: CLINIC | Age: 70
End: 2025-01-28
Payer: MEDICARE

## 2025-01-28 VITALS
BODY MASS INDEX: 38.3 KG/M2 | OXYGEN SATURATION: 97 % | HEIGHT: 67 IN | RESPIRATION RATE: 18 BRPM | SYSTOLIC BLOOD PRESSURE: 114 MMHG | DIASTOLIC BLOOD PRESSURE: 74 MMHG | WEIGHT: 244 LBS | TEMPERATURE: 98 F | HEART RATE: 65 BPM

## 2025-01-28 DIAGNOSIS — U07.1 COVID-19: Primary | ICD-10-CM

## 2025-01-28 DIAGNOSIS — R05.9 COUGH, UNSPECIFIED TYPE: ICD-10-CM

## 2025-01-28 DIAGNOSIS — U07.1 COVID-19 VIRUS DETECTED: ICD-10-CM

## 2025-01-28 LAB
CTP QC/QA: YES
CTP QC/QA: YES
POC MOLECULAR INFLUENZA A AGN: NEGATIVE
POC MOLECULAR INFLUENZA B AGN: NEGATIVE
SARS-COV-2 AG RESP QL IA.RAPID: POSITIVE

## 2025-01-28 PROCEDURE — 87502 INFLUENZA DNA AMP PROBE: CPT | Mod: QW,S$GLB,, | Performed by: EMERGENCY MEDICINE

## 2025-01-28 PROCEDURE — 87811 SARS-COV-2 COVID19 W/OPTIC: CPT | Mod: QW,S$GLB,, | Performed by: EMERGENCY MEDICINE

## 2025-01-28 PROCEDURE — 99213 OFFICE O/P EST LOW 20 MIN: CPT | Mod: S$GLB,,, | Performed by: EMERGENCY MEDICINE

## 2025-01-28 RX ORDER — AZELASTINE 1 MG/ML
1 SPRAY, METERED NASAL 2 TIMES DAILY PRN
Qty: 30 ML | Refills: 0 | Status: SHIPPED | OUTPATIENT
Start: 2025-01-28 | End: 2025-02-27

## 2025-01-28 RX ORDER — NIRMATRELVIR AND RITONAVIR 300-100 MG
KIT ORAL
Qty: 30 TABLET | Refills: 0 | Status: SHIPPED | OUTPATIENT
Start: 2025-01-28

## 2025-01-28 RX ORDER — CHLOPHEDIANOL HCL AND PYRILAMINE MALEATE 12.5; 12.5 MG/5ML; MG/5ML
10 SOLUTION ORAL 3 TIMES DAILY PRN
Qty: 180 ML | Refills: 0 | Status: SHIPPED | OUTPATIENT
Start: 2025-01-28

## 2025-01-28 NOTE — PATIENT INSTRUCTIONS
You have been diagnosed with COVID-19 infection today. This is a virus.     Current CDC guidelines recommend isolation until you have had BOTH of the following occur:  1) No fever for 24 hours WITHOUT the use of fever medications/reducers.  2) Improvement in symptoms.   In the first 5 days of returning to normal activities, please exercise caution around others, consider masking, and limit exposure to any frail or high risk individuals.    Manage fever with alternating Tylenol and Ibuprofen, unless you have contraindications to either of those medications.     Consider over the counter medications such as Dayquil/Nyquil or Mucinex Fast Max products to manage symptoms. If you have high blood pressure, avoid decongestants and consider medications such as Coricidin to manage symptoms.    Increase fluid intake. Rest.     Vitamin D, Vitamin C, and zinc may be useful for immune support and recovery. These can be purchased over the counter.     Return immediately for worsening symptoms or any new concerns.     Call 911 or go immediately to the ER for any sudden or severe shortness of breath, profound fatigue or any sudden adverse change.       You must understand that you've received an Urgent Care treatment only and that you may be released before all your medical problems are known or treated. You, the patient, will arrange for follow up care as instructed.    Follow up with your PCP or specialty clinic as directed if not improved or as needed. You can call [phone] to schedule an appointment with the appropriate provider.      You, the patient, will arrange for follow up care as instructed.     If your condition worsens or fails to improve we recommend that you receive another evaluation at the ER immediately or contact your PCP to discuss your concerns.     Patient aware of treatment plan and verbalized understanding.

## 2025-01-28 NOTE — PROGRESS NOTES
"Subjective:      Patient ID: Jasson Mayo is a 69 y.o. male.    Vitals:  height is 5' 7" (1.702 m) and weight is 110.7 kg (244 lb). His oral temperature is 98.3 °F (36.8 °C). His blood pressure is 114/74 and his pulse is 65. His respiration is 18 and oxygen saturation is 97%.     Chief Complaint: Cough    Pt stated that he has been having nasal congestion, post nasal drip ,and mild cough for about 2 days now. Pt stated that he has not taken any OTC medications. Mild fatigue. Biggest issues is moderate cough. No SOB. No fever or body aches.     Cough  This is a new problem. The current episode started in the past 7 days. The problem has been unchanged. The cough is Non-productive. Associated symptoms include nasal congestion and postnasal drip. Pertinent negatives include no chest pain, chills, fever, headaches, myalgias, rash, sore throat or shortness of breath. The treatment provided no relief.       Constitution: Negative for chills, fatigue and fever.   HENT:  Positive for congestion and postnasal drip. Negative for sinus pain, sinus pressure and sore throat.    Cardiovascular:  Negative for chest pain and palpitations.   Eyes:  Negative for blurred vision.   Respiratory:  Positive for cough. Negative for shortness of breath.    Gastrointestinal:  Negative for abdominal pain, nausea and diarrhea.   Genitourinary:  Negative for dysuria and urgency.   Musculoskeletal:  Negative for muscle cramps and muscle ache.   Skin:  Negative for rash and hives.   Allergic/Immunologic: Negative for hives.   Neurological:  Negative for headaches.      Objective:     Physical Exam   Constitutional: He is oriented to person, place, and time. He appears well-developed. He is cooperative.  Non-toxic appearance. He does not appear ill. No distress.   HENT:   Head: Normocephalic and atraumatic.   Ears:   Right Ear: Hearing, tympanic membrane, external ear and ear canal normal.   Left Ear: Hearing, tympanic membrane, external " ear and ear canal normal.   Nose: Rhinorrhea and congestion present. No mucosal edema or nasal deformity. No epistaxis. Right sinus exhibits no maxillary sinus tenderness and no frontal sinus tenderness. Left sinus exhibits no maxillary sinus tenderness and no frontal sinus tenderness.   Mouth/Throat: Uvula is midline, oropharynx is clear and moist and mucous membranes are normal. Mucous membranes are moist. No trismus in the jaw. Normal dentition. No uvula swelling. No oropharyngeal exudate, posterior oropharyngeal edema or posterior oropharyngeal erythema.   Eyes: Conjunctivae and lids are normal. No scleral icterus.   Neck: Trachea normal and phonation normal. Neck supple. No edema present. No erythema present. No neck rigidity present.   Cardiovascular: Normal rate, regular rhythm, normal heart sounds and normal pulses.   Pulmonary/Chest: Effort normal and breath sounds normal. No respiratory distress. He has no decreased breath sounds. He has no rhonchi.   Abdominal: Normal appearance.   Musculoskeletal: Normal range of motion.         General: No deformity. Normal range of motion.   Neurological: He is alert and oriented to person, place, and time. He exhibits normal muscle tone. Coordination normal.   Skin: Skin is warm, dry, intact, not diaphoretic and not pale.   Psychiatric: His speech is normal and behavior is normal. Judgment and thought content normal.   Nursing note and vitals reviewed.    Results for orders placed or performed in visit on 01/28/25   SARS Coronavirus 2 Antigen, POCT Manual Read    Collection Time: 01/28/25  9:21 AM   Result Value Ref Range    SARS Coronavirus 2 Antigen Positive (A) Negative     Acceptable Yes    POCT Influenza A/B MOLECULAR    Collection Time: 01/28/25  9:22 AM   Result Value Ref Range    POC Molecular Influenza A Ag Negative Negative    POC Molecular Influenza B Ag Negative Negative     Acceptable Yes         Assessment:     1. COVID-19     2. Cough, unspecified type        Plan:     COVID+. He is a candidate for antivirals. Paxlovid prescribed (only daily meds Benazepril, Colchicine and Allopurinol). No renal adjustment needed.     Rest. Increase fluids.     Isolation and return precautions discussed.     Recheck prn.    COVID-19  -     PAXLOVID 300 mg (150 mg x 2)-100 mg copackaged tablets (EUA); Take 3 tablets by mouth 2 (two) times daily. Each dose contains 2 nirmatrelvir (pink tablets) and 1 ritonavir (white tablet). Take all 3 tablets together  Dispense: 30 tablet; Refill: 0  -     azelastine (ASTELIN) 137 mcg (0.1 %) nasal spray; 1 spray (137 mcg total) by Nasal route 2 (two) times daily as needed for Rhinitis (nasal congestion).  Dispense: 30 mL; Refill: 0    Cough, unspecified type  -     SARS Coronavirus 2 Antigen, POCT Manual Read  -     POCT Influenza A/B MOLECULAR  -     pyrilamine-chlophedianoL (NINJACOF) 12.5-12.5 mg/5 mL Liqd; Take 10 mLs by mouth 3 (three) times daily as needed (cough).  Dispense: 180 mL; Refill: 0             Patient Instructions   You have been diagnosed with COVID-19 infection today. This is a virus.     Current CDC guidelines recommend isolation until you have had BOTH of the following occur:  1) No fever for 24 hours WITHOUT the use of fever medications/reducers.  2) Improvement in symptoms.   In the first 5 days of returning to normal activities, please exercise caution around others, consider masking, and limit exposure to any frail or high risk individuals.    Manage fever with alternating Tylenol and Ibuprofen, unless you have contraindications to either of those medications.     Consider over the counter medications such as Dayquil/Nyquil or Mucinex Fast Max products to manage symptoms. If you have high blood pressure, avoid decongestants and consider medications such as Coricidin to manage symptoms.    Increase fluid intake. Rest.     Vitamin D, Vitamin C, and zinc may be useful for immune support and  recovery. These can be purchased over the counter.     Return immediately for worsening symptoms or any new concerns.     Call 911 or go immediately to the ER for any sudden or severe shortness of breath, profound fatigue or any sudden adverse change.       You must understand that you've received an Urgent Care treatment only and that you may be released before all your medical problems are known or treated. You, the patient, will arrange for follow up care as instructed.    Follow up with your PCP or specialty clinic as directed if not improved or as needed. You can call [phone] to schedule an appointment with the appropriate provider.      You, the patient, will arrange for follow up care as instructed.     If your condition worsens or fails to improve we recommend that you receive another evaluation at the ER immediately or contact your PCP to discuss your concerns.     Patient aware of treatment plan and verbalized understanding.

## 2025-01-31 ENCOUNTER — NURSE TRIAGE (OUTPATIENT)
Dept: ADMINISTRATIVE | Facility: CLINIC | Age: 70
End: 2025-01-31
Payer: MEDICARE

## 2025-01-31 NOTE — TELEPHONE ENCOUNTER
Patient has questions about retesting for covid. All questions and concerns were addressed.  Advised the patient to call back with any further questions or if symptoms worsen.        Reason for Disposition   Health information question, no triage required and triager able to answer question    Protocols used: Information Only Call - No Triage-A-[place]

## 2025-02-07 DIAGNOSIS — E83.19 INCREASED STORAGE IRON: Primary | ICD-10-CM

## 2025-02-09 ENCOUNTER — NURSE TRIAGE (OUTPATIENT)
Dept: ADMINISTRATIVE | Facility: CLINIC | Age: 70
End: 2025-02-09
Payer: MEDICARE

## 2025-02-09 NOTE — TELEPHONE ENCOUNTER
[place]    PCP:  Dr. Flro Jones    Spoke with Wife, Vonnie Mayo, on pts behalf.  Wife reports she accidentally responded to text when she was trying to respond stop to the [facility] text message.  Wife reports pt is doing well, on a fishing trip right now, and no longer needs monitoring.  Wife reports she did respond stop to the [facility] text message.  Advised to call for worsening/questions/concerns.  VU.  Information only call.    Reason for Disposition   [1] Follow-up call to recent contact AND [2] information only call, no triage required    Additional Information   Negative: Triager needs further essential information from caller in order to complete triage   Negative: [1] Caller requesting NON-URGENT health information AND [2] PCP's office is the best resource   Negative: Requesting regular office appointment   Negative: Health information question, no triage required and triager able to answer question   Negative: General information question, no triage required and triager able to answer question   Negative: Question about upcoming scheduled surgery, procedure or test, no triage required, and triager able to answer question   Negative: [1] Caller is not with the adult (patient) AND [2] probable NON-URGENT symptoms    Protocols used: Information Only Call - No Triage-A-

## 2025-02-12 ENCOUNTER — TELEPHONE (OUTPATIENT)
Dept: HEMATOLOGY/ONCOLOGY | Facility: CLINIC | Age: 70
End: 2025-02-12
Payer: MEDICARE

## 2025-02-12 NOTE — TELEPHONE ENCOUNTER
Nurse spoke with pts spouse Vonnie in regards to confirming his scheduled visit on 2/24. Pts lab appt was rescheduled per pts spouse. Verbalized understanding of the scheduled appt. Call ended well

## 2025-02-12 NOTE — TELEPHONE ENCOUNTER
----- Message from Angela sent at 2/11/2025  6:17 PM CST -----  Type: General Call Back     Name of Caller:pt   Reason pt wife would like someone to give her a call as soon as so,eone can   Would the patient rather a call back or a response via [facility]? Call   Best Call Back Number:[phone]  Additional Information: thank you

## 2025-02-21 ENCOUNTER — RESULTS FOLLOW-UP (OUTPATIENT)
Dept: HEMATOLOGY/ONCOLOGY | Facility: CLINIC | Age: 70
End: 2025-02-21

## 2025-02-21 ENCOUNTER — TELEPHONE (OUTPATIENT)
Dept: HEMATOLOGY/ONCOLOGY | Facility: CLINIC | Age: 70
End: 2025-02-21
Payer: MEDICARE

## 2025-02-21 ENCOUNTER — LAB VISIT (OUTPATIENT)
Dept: LAB | Facility: HOSPITAL | Age: 70
End: 2025-02-21
Attending: NURSE PRACTITIONER
Payer: MEDICARE

## 2025-02-21 DIAGNOSIS — D69.6 THROMBOCYTOPENIA: ICD-10-CM

## 2025-02-21 DIAGNOSIS — E83.110 HEREDITARY HEMOCHROMATOSIS: ICD-10-CM

## 2025-02-21 DIAGNOSIS — K76.0 HEPATIC STEATOSIS: ICD-10-CM

## 2025-02-21 DIAGNOSIS — E83.110 HEREDITARY HEMOCHROMATOSIS: Primary | ICD-10-CM

## 2025-02-21 DIAGNOSIS — R16.1 SPLENOMEGALY: ICD-10-CM

## 2025-02-21 LAB
ALBUMIN SERPL BCP-MCNC: 3.8 G/DL (ref 3.5–5.2)
ALP SERPL-CCNC: 125 U/L (ref 40–150)
ALT SERPL W/O P-5'-P-CCNC: 59 U/L (ref 10–44)
ANION GAP SERPL CALC-SCNC: 9 MMOL/L (ref 8–16)
AST SERPL-CCNC: 50 U/L (ref 10–40)
BASOPHILS # BLD AUTO: 0.07 K/UL (ref 0–0.2)
BASOPHILS NFR BLD: 1.5 % (ref 0–1.9)
BILIRUB SERPL-MCNC: 0.6 MG/DL (ref 0.1–1)
BUN SERPL-MCNC: 20 MG/DL (ref 8–23)
CALCIUM SERPL-MCNC: 9.6 MG/DL (ref 8.7–10.5)
CHLORIDE SERPL-SCNC: 104 MMOL/L (ref 95–110)
CO2 SERPL-SCNC: 26 MMOL/L (ref 23–29)
CREAT SERPL-MCNC: 1 MG/DL (ref 0.5–1.4)
DIFFERENTIAL METHOD BLD: ABNORMAL
EOSINOPHIL # BLD AUTO: 0.5 K/UL (ref 0–0.5)
EOSINOPHIL NFR BLD: 10.2 % (ref 0–8)
ERYTHROCYTE [DISTWIDTH] IN BLOOD BY AUTOMATED COUNT: 13.3 % (ref 11.5–14.5)
EST. GFR  (NO RACE VARIABLE): >60 ML/MIN/1.73 M^2
FERRITIN SERPL-MCNC: 119 NG/ML (ref 20–300)
GLUCOSE SERPL-MCNC: 112 MG/DL (ref 70–110)
HCT VFR BLD AUTO: 43.2 % (ref 40–54)
HGB BLD-MCNC: 14.6 G/DL (ref 14–18)
IMM GRANULOCYTES # BLD AUTO: 0.01 K/UL (ref 0–0.04)
IMM GRANULOCYTES NFR BLD AUTO: 0.2 % (ref 0–0.5)
IRON SERPL-MCNC: 115 UG/DL (ref 45–160)
LYMPHOCYTES # BLD AUTO: 1.5 K/UL (ref 1–4.8)
LYMPHOCYTES NFR BLD: 30.8 % (ref 18–48)
MCH RBC QN AUTO: 30.9 PG (ref 27–31)
MCHC RBC AUTO-ENTMCNC: 33.8 G/DL (ref 32–36)
MCV RBC AUTO: 92 FL (ref 82–98)
MONOCYTES # BLD AUTO: 0.5 K/UL (ref 0.3–1)
MONOCYTES NFR BLD: 11 % (ref 4–15)
NEUTROPHILS # BLD AUTO: 2.2 K/UL (ref 1.8–7.7)
NEUTROPHILS NFR BLD: 46.3 % (ref 38–73)
NRBC BLD-RTO: 0 /100 WBC
PLATELET # BLD AUTO: 157 K/UL (ref 150–450)
PMV BLD AUTO: 11 FL (ref 9.2–12.9)
POTASSIUM SERPL-SCNC: 4.5 MMOL/L (ref 3.5–5.1)
PROT SERPL-MCNC: 7.2 G/DL (ref 6–8.4)
RBC # BLD AUTO: 4.72 M/UL (ref 4.6–6.2)
SATURATED IRON: 32 % (ref 20–50)
SODIUM SERPL-SCNC: 139 MMOL/L (ref 136–145)
TOTAL IRON BINDING CAPACITY: 364 UG/DL (ref 250–450)
TRANSFERRIN SERPL-MCNC: 246 MG/DL (ref 200–375)
WBC # BLD AUTO: 4.71 K/UL (ref 3.9–12.7)

## 2025-02-21 PROCEDURE — 36415 COLL VENOUS BLD VENIPUNCTURE: CPT | Mod: PO | Performed by: NURSE PRACTITIONER

## 2025-02-21 PROCEDURE — 85025 COMPLETE CBC W/AUTO DIFF WBC: CPT | Mod: PO | Performed by: NURSE PRACTITIONER

## 2025-02-21 PROCEDURE — 80053 COMPREHEN METABOLIC PANEL: CPT | Performed by: NURSE PRACTITIONER

## 2025-02-21 PROCEDURE — 82728 ASSAY OF FERRITIN: CPT | Performed by: NURSE PRACTITIONER

## 2025-02-21 PROCEDURE — 84466 ASSAY OF TRANSFERRIN: CPT | Performed by: NURSE PRACTITIONER

## 2025-02-21 NOTE — TELEPHONE ENCOUNTER
Spoke with patient's wife, she requested infusion be rescheduled next week on Tuesday, Wednesday or Friday at [facility].

## 2025-02-21 NOTE — TELEPHONE ENCOUNTER
----- Message from Selina sent at 2/21/2025 10:57 AM CST -----  Type: General Call Back Name of Caller:Wife ShereeReason calling to inquire about labs pt had today 02/21 she is trying to see if his  infusion for Monday would need to be cancelled or not she does not want to hold a spot if someone else can take it if her  does not need itWould the patient rather a call back or a response via [facility]? Call Best Call Back Number:[phone] Information: please give a call back in regards, thank you.

## 2025-02-21 NOTE — PROGRESS NOTES
Can you please add iron studies on to labs that were were drawn today.  Also schedule for possible phlebotomy

## 2025-02-26 ENCOUNTER — TELEPHONE (OUTPATIENT)
Dept: HEMATOLOGY/ONCOLOGY | Facility: CLINIC | Age: 70
End: 2025-02-26
Payer: MEDICARE

## 2025-02-26 DIAGNOSIS — E83.110 HEREDITARY HEMOCHROMATOSIS: Primary | ICD-10-CM

## 2025-02-26 DIAGNOSIS — E83.19 INCREASED STORAGE IRON: ICD-10-CM

## 2025-03-01 DIAGNOSIS — M1A.0720 IDIOPATHIC CHRONIC GOUT OF LEFT FOOT WITHOUT TOPHUS: ICD-10-CM

## 2025-03-03 RX ORDER — PROBENECID AND COLCHICINE .5; 5 MG/1; MG/1
1 TABLET ORAL
Qty: 90 TABLET | Refills: 3 | OUTPATIENT
Start: 2025-03-03

## 2025-03-11 DIAGNOSIS — M1A.0720 IDIOPATHIC CHRONIC GOUT OF LEFT FOOT WITHOUT TOPHUS: ICD-10-CM

## 2025-03-12 ENCOUNTER — PATIENT MESSAGE (OUTPATIENT)
Dept: FAMILY MEDICINE | Facility: CLINIC | Age: 70
End: 2025-03-12
Payer: MEDICARE

## 2025-03-12 DIAGNOSIS — M1A.0720 IDIOPATHIC CHRONIC GOUT OF LEFT FOOT WITHOUT TOPHUS: ICD-10-CM

## 2025-03-12 RX ORDER — PROBENECID AND COLCHICINE .5; 5 MG/1; MG/1
1 TABLET ORAL DAILY
Qty: 90 TABLET | Refills: 3 | Status: SHIPPED | OUTPATIENT
Start: 2025-03-12

## 2025-03-12 RX ORDER — PROBENECID AND COLCHICINE .5; 5 MG/1; MG/1
1 TABLET ORAL DAILY
Qty: 90 TABLET | Refills: 3 | OUTPATIENT
Start: 2025-03-12

## 2025-03-12 NOTE — TELEPHONE ENCOUNTER
No care due was identified.  Health [facility] Embedded Care Due Messages. Reference number: [id number].   3/12/2025 1:01:40 PM CDT

## 2025-03-24 DIAGNOSIS — Z00.00 ENCOUNTER FOR MEDICARE ANNUAL WELLNESS EXAM: ICD-10-CM

## 2025-04-01 ENCOUNTER — LAB VISIT (OUTPATIENT)
Dept: LAB | Facility: HOSPITAL | Age: 70
End: 2025-04-01
Attending: INTERNAL MEDICINE
Payer: MEDICARE

## 2025-04-01 ENCOUNTER — RESULTS FOLLOW-UP (OUTPATIENT)
Dept: HEPATOLOGY | Facility: CLINIC | Age: 70
End: 2025-04-01

## 2025-04-01 DIAGNOSIS — K76.0 NAFLD (NONALCOHOLIC FATTY LIVER DISEASE): ICD-10-CM

## 2025-04-01 DIAGNOSIS — E83.110 HEREDITARY HEMOCHROMATOSIS: ICD-10-CM

## 2025-04-01 DIAGNOSIS — D69.6 THROMBOCYTOPENIA: ICD-10-CM

## 2025-04-01 LAB
ABSOLUTE EOSINOPHIL (OHS): 0.3 K/UL
ABSOLUTE MONOCYTE (OHS): 0.48 K/UL (ref 0.3–1)
ABSOLUTE NEUTROPHIL COUNT (OHS): 2.15 K/UL (ref 1.8–7.7)
ALBUMIN SERPL BCP-MCNC: 3.6 G/DL (ref 3.5–5.2)
ALP SERPL-CCNC: 154 UNIT/L (ref 40–150)
ALT SERPL W/O P-5'-P-CCNC: 60 UNIT/L (ref 10–44)
ANION GAP (OHS): 7 MMOL/L (ref 8–16)
AST SERPL-CCNC: 50 UNIT/L (ref 11–45)
BASOPHILS # BLD AUTO: 0.05 K/UL
BASOPHILS NFR BLD AUTO: 1.1 %
BILIRUB SERPL-MCNC: 0.7 MG/DL (ref 0.1–1)
BUN SERPL-MCNC: 22 MG/DL (ref 8–23)
CALCIUM SERPL-MCNC: 9.4 MG/DL (ref 8.7–10.5)
CHLORIDE SERPL-SCNC: 107 MMOL/L (ref 95–110)
CO2 SERPL-SCNC: 26 MMOL/L (ref 23–29)
CREAT SERPL-MCNC: 1 MG/DL (ref 0.5–1.4)
ERYTHROCYTE [DISTWIDTH] IN BLOOD BY AUTOMATED COUNT: 13.7 % (ref 11.5–14.5)
FERRITIN SERPL-MCNC: 95 NG/ML (ref 20–300)
GFR SERPLBLD CREATININE-BSD FMLA CKD-EPI: >60 ML/MIN/1.73/M2
GLUCOSE SERPL-MCNC: 125 MG/DL (ref 70–110)
HCT VFR BLD AUTO: 42 % (ref 40–54)
HGB BLD-MCNC: 14.5 GM/DL (ref 14–18)
IMM GRANULOCYTES # BLD AUTO: 0.01 K/UL (ref 0–0.04)
IMM GRANULOCYTES NFR BLD AUTO: 0.2 % (ref 0–0.5)
IRON SATN MFR SERPL: 35 % (ref 20–50)
IRON SERPL-MCNC: 135 UG/DL (ref 45–160)
LYMPHOCYTES # BLD AUTO: 1.51 K/UL (ref 1–4.8)
MCH RBC QN AUTO: 31.3 PG (ref 27–31)
MCHC RBC AUTO-ENTMCNC: 34.5 G/DL (ref 32–36)
MCV RBC AUTO: 91 FL (ref 82–98)
NUCLEATED RBC (/100WBC) (OHS): 0 /100 WBC
PLATELET # BLD AUTO: 136 K/UL (ref 150–450)
PMV BLD AUTO: 11.2 FL (ref 9.2–12.9)
POTASSIUM SERPL-SCNC: 4.3 MMOL/L (ref 3.5–5.1)
PROT SERPL-MCNC: 7.4 GM/DL (ref 6–8.4)
RBC # BLD AUTO: 4.63 M/UL (ref 4.6–6.2)
RELATIVE EOSINOPHIL (OHS): 6.7 %
RELATIVE LYMPHOCYTE (OHS): 33.6 % (ref 18–48)
RELATIVE MONOCYTE (OHS): 10.7 % (ref 4–15)
RELATIVE NEUTROPHIL (OHS): 47.7 % (ref 38–73)
SODIUM SERPL-SCNC: 140 MMOL/L (ref 136–145)
TIBC SERPL-MCNC: 389 UG/DL (ref 250–450)
TRANSFERRIN SERPL-MCNC: 263 MG/DL (ref 200–375)
WBC # BLD AUTO: 4.5 K/UL (ref 3.9–12.7)

## 2025-04-01 PROCEDURE — 82728 ASSAY OF FERRITIN: CPT

## 2025-04-01 PROCEDURE — 85025 COMPLETE CBC W/AUTO DIFF WBC: CPT | Mod: PO

## 2025-04-01 PROCEDURE — 83540 ASSAY OF IRON: CPT

## 2025-04-01 PROCEDURE — 36415 COLL VENOUS BLD VENIPUNCTURE: CPT | Mod: PO

## 2025-04-01 PROCEDURE — 82040 ASSAY OF SERUM ALBUMIN: CPT

## 2025-04-09 ENCOUNTER — OFFICE VISIT (OUTPATIENT)
Dept: HEMATOLOGY/ONCOLOGY | Facility: CLINIC | Age: 70
End: 2025-04-09
Payer: MEDICARE

## 2025-04-09 ENCOUNTER — LAB VISIT (OUTPATIENT)
Dept: LAB | Facility: HOSPITAL | Age: 70
End: 2025-04-09
Attending: NURSE PRACTITIONER
Payer: MEDICARE

## 2025-04-09 VITALS
BODY MASS INDEX: 36.93 KG/M2 | HEIGHT: 68 IN | WEIGHT: 243.69 LBS | SYSTOLIC BLOOD PRESSURE: 116 MMHG | HEART RATE: 76 BPM | DIASTOLIC BLOOD PRESSURE: 78 MMHG

## 2025-04-09 DIAGNOSIS — G62.9 NEUROPATHY: ICD-10-CM

## 2025-04-09 DIAGNOSIS — E83.110 HEREDITARY HEMOCHROMATOSIS: ICD-10-CM

## 2025-04-09 DIAGNOSIS — R74.01 TRANSAMINITIS: ICD-10-CM

## 2025-04-09 DIAGNOSIS — R79.89 ELEVATED LFTS: ICD-10-CM

## 2025-04-09 DIAGNOSIS — D69.6 THROMBOCYTOPENIA: Primary | ICD-10-CM

## 2025-04-09 DIAGNOSIS — M48.54XS NONTRAUMATIC COMPRESSION FRACTURE OF T12 VERTEBRA, SEQUELA: ICD-10-CM

## 2025-04-09 LAB
HAV IGM SERPL QL IA: NORMAL
HBV CORE IGM SERPL QL IA: NORMAL
HBV SURFACE AG SERPL QL IA: NORMAL
HCV AB SERPL QL IA: NORMAL

## 2025-04-09 PROCEDURE — 3288F FALL RISK ASSESSMENT DOCD: CPT | Mod: CPTII,S$GLB,, | Performed by: NURSE PRACTITIONER

## 2025-04-09 PROCEDURE — 99215 OFFICE O/P EST HI 40 MIN: CPT | Mod: S$GLB,,, | Performed by: NURSE PRACTITIONER

## 2025-04-09 PROCEDURE — 1160F RVW MEDS BY RX/DR IN RCRD: CPT | Mod: CPTII,S$GLB,, | Performed by: NURSE PRACTITIONER

## 2025-04-09 PROCEDURE — 4010F ACE/ARB THERAPY RXD/TAKEN: CPT | Mod: CPTII,S$GLB,, | Performed by: NURSE PRACTITIONER

## 2025-04-09 PROCEDURE — 3074F SYST BP LT 130 MM HG: CPT | Mod: CPTII,S$GLB,, | Performed by: NURSE PRACTITIONER

## 2025-04-09 PROCEDURE — G2211 COMPLEX E/M VISIT ADD ON: HCPCS | Mod: S$GLB,,, | Performed by: NURSE PRACTITIONER

## 2025-04-09 PROCEDURE — 99999 PR PBB SHADOW E&M-EST. PATIENT-LVL IV: CPT | Mod: PBBFAC,,, | Performed by: NURSE PRACTITIONER

## 2025-04-09 PROCEDURE — 1125F AMNT PAIN NOTED PAIN PRSNT: CPT | Mod: CPTII,S$GLB,, | Performed by: NURSE PRACTITIONER

## 2025-04-09 PROCEDURE — 3008F BODY MASS INDEX DOCD: CPT | Mod: CPTII,S$GLB,, | Performed by: NURSE PRACTITIONER

## 2025-04-09 PROCEDURE — 1101F PT FALLS ASSESS-DOCD LE1/YR: CPT | Mod: CPTII,S$GLB,, | Performed by: NURSE PRACTITIONER

## 2025-04-09 PROCEDURE — 80074 ACUTE HEPATITIS PANEL: CPT

## 2025-04-09 PROCEDURE — 3078F DIAST BP <80 MM HG: CPT | Mod: CPTII,S$GLB,, | Performed by: NURSE PRACTITIONER

## 2025-04-09 PROCEDURE — 36415 COLL VENOUS BLD VENIPUNCTURE: CPT | Mod: PO

## 2025-04-09 PROCEDURE — 1159F MED LIST DOCD IN RCRD: CPT | Mod: CPTII,S$GLB,, | Performed by: NURSE PRACTITIONER

## 2025-04-10 ENCOUNTER — HOSPITAL ENCOUNTER (OUTPATIENT)
Dept: RADIOLOGY | Facility: HOSPITAL | Age: 70
Discharge: HOME | End: 2025-04-10
Attending: NURSE PRACTITIONER
Payer: MEDICARE

## 2025-04-10 DIAGNOSIS — D69.6 THROMBOCYTOPENIA: ICD-10-CM

## 2025-04-10 DIAGNOSIS — R74.01 TRANSAMINITIS: ICD-10-CM

## 2025-04-10 DIAGNOSIS — R79.89 ELEVATED LFTS: ICD-10-CM

## 2025-04-10 PROCEDURE — 76700 US EXAM ABDOM COMPLETE: CPT | Mod: TC,PO

## 2025-04-10 PROCEDURE — 76700 US EXAM ABDOM COMPLETE: CPT | Mod: 26,,, | Performed by: RADIOLOGY

## 2025-04-11 ENCOUNTER — RESULTS FOLLOW-UP (OUTPATIENT)
Dept: HEMATOLOGY/ONCOLOGY | Facility: CLINIC | Age: 70
End: 2025-04-11

## 2025-04-11 ENCOUNTER — E-CONSULT (OUTPATIENT)
Dept: NEUROSURGERY | Facility: CLINIC | Age: 70
End: 2025-04-11
Payer: MEDICARE

## 2025-04-11 DIAGNOSIS — K76.0 HEPATIC STEATOSIS: Primary | ICD-10-CM

## 2025-04-11 DIAGNOSIS — R74.8 ELEVATED LIVER ENZYMES: ICD-10-CM

## 2025-04-11 DIAGNOSIS — M54.42 CHRONIC LEFT-SIDED LOW BACK PAIN WITH LEFT-SIDED SCIATICA: Primary | ICD-10-CM

## 2025-04-11 DIAGNOSIS — G89.29 CHRONIC LEFT-SIDED LOW BACK PAIN WITH LEFT-SIDED SCIATICA: Primary | ICD-10-CM

## 2025-04-11 NOTE — CONSULTS
Александр Murillo - Neurosurgery 8th Fl  Response for E-Consult     Patient Name: Jasson Mayo  MRN: [id number]  Primary Care Provider: Flor Jones MD   Requesting Provider: Jamia Lisa NP  E-Consult to Neurosurgery  Consult performed by: Ta Stewart MD  Consult ordered by: Jamia Lisa NP          Recommendation: MRI thoracic and lumbar spine    Additional future steps to consider: virtual neurosurgery visit if patient desires.    Imaging review: I reviewed MRI abd/pelvis showing a mild T12 compression deformity. To me, this appears chronic so I do not think it explains a complaint of back pain. I would recommend proceeding with MRI thoracic and lumbar spine. That will tell us whether or not the fracture is acute. Happy to review if/when completed.    Total time of Consultation: 10 minute    I did not speak to the requesting provider verbally about this.     *This eConsult is based on the clinical data available to me and is furnished without benefit of a physical examination. The eConsult will need to be interpreted in light of any clinical issues or changes in patient status not available to me at the time of filing this eConsults. Significant changes in patient condition or level of acuity should result in immediate formal consultation and reevaluation. Please alert me if you have further questions.    Thank you for this eConsult referral.     MD Александр Moura - Neurosurgery [facility]

## 2025-04-17 DIAGNOSIS — S22.080A COMPRESSION FRACTURE OF T12 VERTEBRA, INITIAL ENCOUNTER: ICD-10-CM

## 2025-04-17 DIAGNOSIS — M54.9 DORSALGIA, UNSPECIFIED: Primary | ICD-10-CM

## 2025-04-17 NOTE — TELEPHONE ENCOUNTER
Can you please schedule him for MRI thoracic and lumbar spine?  He may want to do in [place].    Thank you,   Leonardo MARCANO. Sloan - FNP -C  Benign Hematology

## 2025-04-23 ENCOUNTER — LAB VISIT (OUTPATIENT)
Dept: LAB | Facility: HOSPITAL | Age: 70
End: 2025-04-23
Attending: INTERNAL MEDICINE
Payer: MEDICARE

## 2025-04-23 ENCOUNTER — RESULTS FOLLOW-UP (OUTPATIENT)
Dept: HEPATOLOGY | Facility: CLINIC | Age: 70
End: 2025-04-23

## 2025-04-23 ENCOUNTER — TELEPHONE (OUTPATIENT)
Dept: HEPATOLOGY | Facility: CLINIC | Age: 70
End: 2025-04-23

## 2025-04-23 ENCOUNTER — OFFICE VISIT (OUTPATIENT)
Dept: HEPATOLOGY | Facility: CLINIC | Age: 70
End: 2025-04-23
Payer: MEDICARE

## 2025-04-23 VITALS
SYSTOLIC BLOOD PRESSURE: 113 MMHG | BODY MASS INDEX: 36.66 KG/M2 | WEIGHT: 241.88 LBS | DIASTOLIC BLOOD PRESSURE: 82 MMHG | HEIGHT: 68 IN | HEART RATE: 72 BPM

## 2025-04-23 DIAGNOSIS — Z78.9 HEPATITIS VACCINATION STATUS UNKNOWN: ICD-10-CM

## 2025-04-23 DIAGNOSIS — R74.8 ELEVATED LIVER ENZYMES: Primary | ICD-10-CM

## 2025-04-23 DIAGNOSIS — R74.8 ELEVATED LIVER ENZYMES: ICD-10-CM

## 2025-04-23 DIAGNOSIS — E66.812 CLASS 2 OBESITY WITH BODY MASS INDEX (BMI) OF 37.0 TO 37.9 IN ADULT, UNSPECIFIED OBESITY TYPE, UNSPECIFIED WHETHER SERIOUS COMORBIDITY PRESENT: ICD-10-CM

## 2025-04-23 DIAGNOSIS — K74.00 LIVER FIBROSIS: ICD-10-CM

## 2025-04-23 DIAGNOSIS — K76.0 HEPATIC STEATOSIS: ICD-10-CM

## 2025-04-23 LAB
ALBUMIN SERPL BCP-MCNC: 3.6 G/DL (ref 3.5–5.2)
ALP SERPL-CCNC: 128 UNIT/L (ref 40–150)
ALT SERPL W/O P-5'-P-CCNC: 54 UNIT/L (ref 10–44)
ANION GAP (OHS): 9 MMOL/L (ref 8–16)
AST SERPL-CCNC: 53 UNIT/L (ref 11–45)
BILIRUB SERPL-MCNC: 0.8 MG/DL (ref 0.1–1)
BUN SERPL-MCNC: 17 MG/DL (ref 8–23)
CALCIUM SERPL-MCNC: 9.4 MG/DL (ref 8.7–10.5)
CHLORIDE SERPL-SCNC: 104 MMOL/L (ref 95–110)
CO2 SERPL-SCNC: 26 MMOL/L (ref 23–29)
CREAT SERPL-MCNC: 0.8 MG/DL (ref 0.5–1.4)
GFR SERPLBLD CREATININE-BSD FMLA CKD-EPI: >60 ML/MIN/1.73/M2
GLUCOSE SERPL-MCNC: 107 MG/DL (ref 70–110)
HAV AB SER QL IA: NORMAL
HBV CORE AB SERPL QL IA: NORMAL
HBV SURFACE AG SERPL QL IA: NORMAL
POTASSIUM SERPL-SCNC: 4.1 MMOL/L (ref 3.5–5.1)
PROT SERPL-MCNC: 7.2 GM/DL (ref 6–8.4)
SODIUM SERPL-SCNC: 139 MMOL/L (ref 136–145)

## 2025-04-23 PROCEDURE — 1101F PT FALLS ASSESS-DOCD LE1/YR: CPT | Mod: CPTII,S$GLB,, | Performed by: NURSE PRACTITIONER

## 2025-04-23 PROCEDURE — 3079F DIAST BP 80-89 MM HG: CPT | Mod: CPTII,S$GLB,, | Performed by: NURSE PRACTITIONER

## 2025-04-23 PROCEDURE — 99999 PR PBB SHADOW E&M-EST. PATIENT-LVL IV: CPT | Mod: PBBFAC,,, | Performed by: NURSE PRACTITIONER

## 2025-04-23 PROCEDURE — 3288F FALL RISK ASSESSMENT DOCD: CPT | Mod: CPTII,S$GLB,, | Performed by: NURSE PRACTITIONER

## 2025-04-23 PROCEDURE — 1159F MED LIST DOCD IN RCRD: CPT | Mod: CPTII,S$GLB,, | Performed by: NURSE PRACTITIONER

## 2025-04-23 PROCEDURE — 3008F BODY MASS INDEX DOCD: CPT | Mod: CPTII,S$GLB,, | Performed by: NURSE PRACTITIONER

## 2025-04-23 PROCEDURE — 1160F RVW MEDS BY RX/DR IN RCRD: CPT | Mod: CPTII,S$GLB,, | Performed by: NURSE PRACTITIONER

## 2025-04-23 PROCEDURE — 36415 COLL VENOUS BLD VENIPUNCTURE: CPT

## 2025-04-23 PROCEDURE — 3074F SYST BP LT 130 MM HG: CPT | Mod: CPTII,S$GLB,, | Performed by: NURSE PRACTITIONER

## 2025-04-23 PROCEDURE — 87340 HEPATITIS B SURFACE AG IA: CPT

## 2025-04-23 PROCEDURE — 86704 HEP B CORE ANTIBODY TOTAL: CPT

## 2025-04-23 PROCEDURE — 1125F AMNT PAIN NOTED PAIN PRSNT: CPT | Mod: CPTII,S$GLB,, | Performed by: NURSE PRACTITIONER

## 2025-04-23 PROCEDURE — 4010F ACE/ARB THERAPY RXD/TAKEN: CPT | Mod: CPTII,S$GLB,, | Performed by: NURSE PRACTITIONER

## 2025-04-23 PROCEDURE — 86790 VIRUS ANTIBODY NOS: CPT

## 2025-04-23 PROCEDURE — 99214 OFFICE O/P EST MOD 30 MIN: CPT | Mod: S$GLB,,, | Performed by: NURSE PRACTITIONER

## 2025-04-23 PROCEDURE — 80053 COMPREHEN METABOLIC PANEL: CPT

## 2025-04-23 NOTE — PROGRESS NOTES
Hepatology Note    PATIENT: Jasson Mayo  MRN: [id number]  DATE: 4/23/2025    Urgency of review: non-urgent  Referring provider: Jamia Lisa    Diagnosis:   1. Elevated liver enzymes    2. Liver fibrosis    3. Hepatic steatosis    4. Class 2 obesity with body mass index (BMI) of 37.0 to 37.9 in adult, unspecified obesity type, unspecified whether serious comorbidity present    5. Hepatitis vaccination status unknown        Chief complaint:   Chief Complaint   Patient presents with    Follow-up     Follow up for elevated LFT, iron level       Subjective:    Initial History: Jasson Mayo is a 69 y.o. male who was referred to Hepatology Clinic for consultation of  elevated LFTs.     The patient reports medical history of HLD, HTN, aortic atherosclerosis, hemochromatosis, colon polyps, NAFLD.    10/02/2024   Patient is new to me and previously seen by Patty Davies  Patient does not have any new complains from liver standpoint. Liver enzymes are stable.     Duration of abnormality- 2014  Medications/OTC/Herbal-  Vitamin C and E  ETOH- NO  Metabolic issues-HTN  BMI- 36  Family Hx-no      Has chronic thrombocytopenia that is being managed by hematology   He has history of osteoarthritis, gout for which he takes combination of colchicine and probenecid,   His fibro scan without signigifcant fibrosis         As regards to liver disease,  - The patient reports no symptoms of hepatitis including malaise or flu-like symptoms to suggest a flare.  - The patient reports no new manifestations of portal hypertension including ascites, edema, GI bleeding, or hepatic encephalopathy to suggest liver decompensation.  - The patient reports no fevers/chills or pruritis to suggest biliary disease.     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     04/23/2025   Patient does not have any new complains from liver standpoint.     Liver  enzymes were elevated on 4/23/2025.    He denied recent hematemesis, coffee ground emesis, melena, hematochezia, jaundice, confusion, LE edema, abdominal distension.    The patient reported that he does not drink alcohol.    He reported no history of autoimmune disease, hypothyroidism, IBD, cancer, IV drug, blood transfusion, HIV, viral hepatitis, hemochromatosis.    He has 1 tattoo.     He reported no family history of autoimmune disease, hypothyroidism, IBD, cancer, IV drug, blood transfusion, HIV, viral hepatitis, nor hemochromatosis.      FIB-4 Calculation: 3.32 at 4/1/2025  9:33 AM  Calculated from:  SGOT/AST: 50 unit/L at 4/1/2025  9:33 AM  SGPT/ALT: 60 unit/L at 4/1/2025  9:33 AM  Platelets: 136 K/uL at 4/1/2025  9:33 AM  Age: 69 years       FIB-4 below 1.30 is considered as low-risk for advanced fibrosis  FIB-4 over 2.67 is considered as high-risk for advanced fibrosis  FIB-4 values between 1.30 and 2.67 are considered as intermediate-risk of advanced fibrosis for ages 36-64.     For ages > 64 the cut-off for low-risk goes to < 2.  This is a screening tool and clinical judgement should be used in the interpretation of these results.      Prior Relevant History:    He  denies hepatotoxic medication.    Review of systems:  A review of 12+ systems was conducted with pertinent positive and negative findings documented in HPI with all other systems reviewed and negative.      PFSH:  Past medical, family, and social history reviewed as documented in chart with pertinent positive medical, family, and social history detailed in HPI.    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    Obesity (BMI 35.0-39.9 without comorbidity)     Prediabetes 01/01/2015       Past Surgical HIstory:   Past Surgical History:   Procedure Laterality  Date    COLONOSCOPY W/ POLYPECTOMY  10, 13 no polyp    KNEE ARTHROSCOPY Right     Meniscus repair    LIVER BIOPSY  2010    VASECTOMY         Family History:   Family History   Problem Relation Name Age of Onset    COPD Mother Juan Mayo     Heart attacks under age 50 Mother Juan Mayo     Lung cancer Mother Juan Mayo     Cancer Father Thiago Mayo         PROSTATE CA    Inflammatory bowel disease Neg Hx       He has no known family history of liver disease.     Social History:  reports that he has never smoked. He has been exposed to tobacco smoke. He has quit using smokeless tobacco. He reports that he does not drink alcohol and does not use drugs.    He has no significant history of alcohol consumption.    He denies history of IV drug use.  He has one tattoo.    Allergies:  Review of patient's allergies indicates:  No Known Allergies    Medications:  Current Medications[1]    Review of Systems   Constitutional:  Negative for chills, fatigue, fever and unexpected weight change.   HENT:  Negative for facial swelling and nosebleeds.    Eyes:  Negative for pain and redness.   Respiratory:  Negative for cough, chest tightness and shortness of breath.    Cardiovascular:  Negative for chest pain and leg swelling.        HLD, HTN, aortic atherosclerosis.   Gastrointestinal:  Negative for abdominal distention, abdominal pain, blood in stool, constipation, diarrhea, nausea and vomiting.        Colon polyps, NAFLD.       Genitourinary:  Negative for hematuria.   Musculoskeletal:  Negative for gait problem and joint swelling.   Skin:  Negative for color change and rash.        Tattoo.   Allergic/Immunologic: Negative for food allergies.   Neurological:  Negative for tremors, seizures, syncope and speech difficulty.   Hematological:  Does not bruise/bleed easily.        Hemochromatosis.     Psychiatric/Behavioral:  Negative for behavioral problems and confusion.         Objective:      Vitals:  "  Vitals:    04/23/25 0757   BP: 113/82   Pulse: 72   Weight: 109.7 kg (241 lb 13.5 oz)   Height: 5' 8" (1.727 m)       Physical Exam    Laboratory Data:  Lab Visit on 04/23/2025   Component Date Value Ref Range Status    Sodium 04/23/2025 139  136 - 145 mmol/L Final    Potassium 04/23/2025 4.1  3.5 - 5.1 mmol/L Final    Chloride 04/23/2025 104  95 - 110 mmol/L Final    CO2 04/23/2025 26  23 - 29 mmol/L Final    Glucose 04/23/2025 107  70 - 110 mg/dL Final    BUN 04/23/2025 17  8 - 23 mg/dL Final    Creatinine 04/23/2025 0.8  0.5 - 1.4 mg/dL Final    Calcium 04/23/2025 9.4  8.7 - 10.5 mg/dL Final    Protein Total 04/23/2025 7.2  6.0 - 8.4 gm/dL Final    Albumin 04/23/2025 3.6  3.5 - 5.2 g/dL Final    Bilirubin Total 04/23/2025 0.8  0.1 - 1.0 mg/dL Final    For infants and newborns, interpretation of results should be based   on gestational age, weight and in agreement with clinical   observations.    Premature Infant recommended reference ranges:   0-24 hours:  <8.0 mg/dL   24-48 hours: <12.0 mg/dL   3-5 days:    <15.0 mg/dL   6-29 days:   <15.0 mg/dL    ALP 04/23/2025 128  40 - 150 unit/L Final    AST 04/23/2025 53 (H)  11 - 45 unit/L Final    ALT 04/23/2025 54 (H)  10 - 44 unit/L Final    Anion Gap 04/23/2025 9  8 - 16 mmol/L Final    eGFR 04/23/2025 >60  >60 mL/min/1.73/m2 Final    Estimated GFR calculated using the CKD-EPI creatinine (2021) equation.    Hep A IgG Interp 04/23/2025 Non-Reactive    Final    IgG anti-HAV not detected.     Hep BcAb Interp 04/23/2025 Non-Reactive  Non-Reactive Final    Hep BsAg Interp 04/23/2025 Non-Reactive  Non-Reactive Final   Lab Visit on 04/09/2025   Component Date Value Ref Range Status    Hep BsAg Interp 04/09/2025 Non-Reactive  Non-Reactive Final    Hep B Core IgM Interp 04/09/2025 Non-Reactive  Non-Reactive Final    Hep A IgM Interp 04/09/2025 Non-Reactive  Non-Reactive Final    Hep C Ab Interp 04/09/2025 Non-Reactive  Non-Reactive Final   Lab Visit on 04/01/2025   Component " Date Value Ref Range Status    Sodium 04/01/2025 140  136 - 145 mmol/L Final    Potassium 04/01/2025 4.3  3.5 - 5.1 mmol/L Final    Chloride 04/01/2025 107  95 - 110 mmol/L Final    CO2 04/01/2025 26  23 - 29 mmol/L Final    Glucose 04/01/2025 125 (H)  70 - 110 mg/dL Final    BUN 04/01/2025 22  8 - 23 mg/dL Final    Creatinine 04/01/2025 1.0  0.5 - 1.4 mg/dL Final    Calcium 04/01/2025 9.4  8.7 - 10.5 mg/dL Final    Protein Total 04/01/2025 7.4  6.0 - 8.4 gm/dL Final    Albumin 04/01/2025 3.6  3.5 - 5.2 g/dL Final    Bilirubin Total 04/01/2025 0.7  0.1 - 1.0 mg/dL Final    For infants and newborns, interpretation of results should be based   on gestational age, weight and in agreement with clinical   observations.    Premature Infant recommended reference ranges:   0-24 hours:  <8.0 mg/dL   24-48 hours: <12.0 mg/dL   3-5 days:    <15.0 mg/dL   6-29 days:   <15.0 mg/dL    ALP 04/01/2025 154 (H)  40 - 150 unit/L Final    AST 04/01/2025 50 (H)  11 - 45 unit/L Final    ALT 04/01/2025 60 (H)  10 - 44 unit/L Final    Anion Gap 04/01/2025 7 (L)  8 - 16 mmol/L Final    eGFR 04/01/2025 >60  >60 mL/min/1.73/m2 Final    Estimated GFR calculated using the CKD-EPI creatinine (2021) equation.    Iron Level 04/01/2025 135  45 - 160 ug/dL Final    Transferrin 04/01/2025 263  200 - 375 mg/dL Final    Iron Binding Capacity Total 04/01/2025 389  250 - 450 ug/dL Final    Iron Saturation 04/01/2025 35  20 - 50 % Final    Ferritin 04/01/2025 95.0  20.0 - 300.0 ng/mL Final    WBC 04/01/2025 4.50  3.90 - 12.70 K/uL Final    RBC 04/01/2025 4.63  4.60 - 6.20 M/uL Final    HGB 04/01/2025 14.5  14.0 - 18.0 gm/dL Final    HCT 04/01/2025 42.0  40.0 - 54.0 % Final    MCV 04/01/2025 91  82 - 98 fL Final    MCH 04/01/2025 31.3 (H)  27.0 - 31.0 pg Final    MCHC 04/01/2025 34.5  32.0 - 36.0 g/dL Final    RDW 04/01/2025 13.7  11.5 - 14.5 % Final    Platelet Count 04/01/2025 136 (L)  150 - 450 K/uL Final    MPV 04/01/2025 11.2  9.2 - 12.9 fL Final     "Nucleated RBC 04/01/2025 0  <=0 /100 WBC Final    Neut % 04/01/2025 47.7  38 - 73 % Final    Lymph % 04/01/2025 33.6  18 - 48 % Final    Mono % 04/01/2025 10.7  4 - 15 % Final    Eos % 04/01/2025 6.7  <=8 % Final    Basophil % 04/01/2025 1.1  <=1.9 % Final    Imm Grans % 04/01/2025 0.2  0.0 - 0.5 % Final    Neut # 04/01/2025 2.15  1.8 - 7.7 K/uL Final    Lymph # 04/01/2025 1.51  1 - 4.8 K/uL Final    Mono # 04/01/2025 0.48  0.3 - 1 K/uL Final    Eos # 04/01/2025 0.30  <=0.5 K/uL Final    Baso # 04/01/2025 0.05  <=0.2 K/uL Final    Imm Grans # 04/01/2025 0.01  0.00 - 0.04 K/uL Final    Mild elevation in immature granulocytes is non specific and can be seen in a variety of conditions including stress response, acute inflammation, trauma and pregnancy. Correlation with other laboratory and clinical findings is essential.       No results found for: "INR", "PROTIME"    Lab Results   Component Value Date    SMOOTHMUSCAB Negative 1:40 06/29/2022       Lab Results   Component Value Date    IRON 135 04/01/2025    TIBC 389 04/01/2025    FERRITIN 95.0 04/01/2025    FESATURATED 32 02/21/2025       Lab Results   Component Value Date    HEPAIGG Non-Reactive 04/23/2025    HEPAIGM Non-Reactive 04/09/2025    HEPBIGM Non-Reactive 04/09/2025    HEPBSAG Non-Reactive 04/23/2025    HEPBCAB Non-Reactive 04/23/2025    HEPCAB Non-Reactive 04/09/2025       Lab Results   Component Value Date    TSH 2.704 07/22/2024       Lab Results   Component Value Date    TSH 2.704 07/22/2024     (L) 04/01/2025    ALKPHOS 128 04/23/2025    AST 53 (H) 04/23/2025    ALT 54 (H) 04/23/2025    BILITOT 0.8 04/23/2025    ANASCREEN Positive (A) 06/29/2022    SMOOTHMUSCAB Negative 1:40 06/29/2022    AMAIFA Negative 1:40 06/29/2022    ANTILIVERKID 2.7 06/29/2022    IGGSERUM 1264 06/29/2022    O1DKAQAYWJ 144 06/29/2022     No results found for: "ABORH"    No results found for: "RETIC", "LDH", "HAPTOGLOBIN", "UGTFGSUMMARY"    Lab Results   Component Value Date "    ANASCREEN Positive (A) 06/29/2022    SMOOTHMUSCAB Negative 1:40 06/29/2022    AMAIFA Negative 1:40 06/29/2022    HEPAIGG Non-Reactive 04/23/2025    HEPBCAB Non-Reactive 04/23/2025    T0NCHWNXNS 144 06/29/2022     Lab Results   Component Value Date    CERULOPLSM 26.0 06/29/2022        Lab Results   Component Value Date    HGBA1C 5.4 10/14/2024     Lab Results   Component Value Date    CHOL 176 07/22/2024    CHOL 151 07/18/2023    CHOL 161 07/07/2022     Lab Results   Component Value Date    HDL 38 (L) 07/22/2024    HDL 32 (L) 07/18/2023    HDL 33 (L) 07/07/2022     Lab Results   Component Value Date    LDLCALC 111.8 07/22/2024    LDLCALC 94.0 07/18/2023    LDLCALC 94.0 07/07/2022     Lab Results   Component Value Date    TRIG 131 07/22/2024    TRIG 125 07/18/2023    TRIG 170 (H) 07/07/2022     Lab Results   Component Value Date    CHOLHDL 21.6 07/22/2024    CHOLHDL 21.2 07/18/2023    CHOLHDL 20.5 07/07/2022         I personally reviewed imaging studies and outside records..      Assessment:       1. Elevated liver enzymes    2. Liver fibrosis    3. Hepatic steatosis    4. Class 2 obesity with body mass index (BMI) of 37.0 to 37.9 in adult, unspecified obesity type, unspecified whether serious comorbidity present    5. Hepatitis vaccination status unknown           Plan:     Problem List Items Addressed This Visit    None  Visit Diagnoses         Elevated liver enzymes    -  Primary    Relevant Orders    Comprehensive Metabolic Panel      Liver fibrosis        Relevant Orders    FibroScan (Vibration Controlled Transient Elastography)      Hepatic steatosis        Relevant Orders    FibroScan (Vibration Controlled Transient Elastography)      Class 2 obesity with body mass index (BMI) of 37.0 to 37.9 in adult, unspecified obesity type, unspecified whether serious comorbidity present        Relevant Orders    FibroScan (Vibration Controlled Transient Elastography)      Hepatitis vaccination status unknown         Relevant Orders    Hepatitis A antibody, IgG (Completed)    Hepatitis B Core Antibody, Total (Completed)    Hepatitis B Surface Antigen (Completed)            Jasson was seen today for follow-up.    Diagnoses and all orders for this visit:    Elevated liver enzymes  -     Comprehensive Metabolic Panel; Standing  -     Ambulatory referral/consult to Hepatology    Liver fibrosis  -     FibroScan (Vibration Controlled Transient Elastography); Future    Hepatic steatosis  -     FibroScan (Vibration Controlled Transient Elastography); Future  -     Ambulatory referral/consult to Hepatology    Class 2 obesity with body mass index (BMI) of 37.0 to 37.9 in adult, unspecified obesity type, unspecified whether serious comorbidity present  -     FibroScan (Vibration Controlled Transient Elastography); Future    Hepatitis vaccination status unknown  -     Hepatitis A antibody, IgG; Future  -     Hepatitis B Core Antibody, Total; Future  -     Hepatitis B Surface Antigen; Future        Recommendations  Etiological workup for viral ( Hepatitis A/B/C), autoimmune, genetic liver disease ( [place], A1AT etc).  CMP today. Then CMP q 3 months before next visit.   US abd was done on 4/10/2025.  Fibroscan.   Avoid hepatotoxic drugs.    Pending hep A Ab, hep B S Ab labs, the patient might need   to have hep A & B vaccines. The patient can get vaccines at [facility] or [facility]. [facility] do not need prescription for hep A or B vaccines.    I recommended a more pragmatic approach to the patient's medical problems which would include the following:  - life-style modifications: weight loss, daily exercise (30 mins of HIIT or cardio), low carb/low fat diet.  - Educated patient on spectrum of fatty liver disease and potential for cirrhosis if MASH present.  - Explored dietary habits and discussed dietary recommendations.  - Low calorie, low carbohydrate, high protein mediterranean diet with goal of loosing >3-5% to  improve steatosis and >7-10% to improve histological changes  Recommend alcohol abstinence.    Return to clinic in 6 month(s).  Further recommendation pending test results.    I have sent communication to the referring physician and/or primary care provider.    Time Statement      A total 38 minutes spent includes time preparing to see patient, reviewing  diagnostic studies and records, direct face-to-face visit, completing orders, medications, reconciliation, prescription management, and care coordination.    We discussed in depth the nature of the patient's disease, the management plan in details. I have provided the patient with an opportunity to ask questions and have all questions answered.    Discussed with the patient that it is likely that the patient will see results before myself or my nurse are able to view them and report results due to the Cures Act passed 4/1/21. Results will be sent immediately to the patient who are enrolled in the patient portal. If results come through after business hours or on weekend, we will not see them until the next business day that we are in the office. If resulted during the business day, we will likely not be able to review them until after completing all patient visits in office that day.     Olimpia Mata, P  [facility]         [1]   Current Outpatient Medications   Medication Sig Dispense Refill    allopurinoL (ZYLOPRIM) 100 MG tablet Take 1 tablet (100 mg total) by mouth once daily. 90 tablet 2    APPLE CIDER VINEGAR ORAL Take by mouth.      ascorbic acid, vitamin C, (VITAMIN C) 250 MG tablet Take 250 mg by mouth once daily.      benazepriL (LOTENSIN) 20 MG tablet Take 1 tablet (20 mg total) by mouth once daily. 90 tablet 3    colchicine-probenecid 0.5-500 mg (CO-BENEMID) 500-0.5 mg Tab Take 1 tablet by mouth once daily. 90 tablet 3    omega-3 fatty acids/fish oil (FISH OIL-OMEGA-3 FATTY ACIDS) 300-1,000 mg capsule Take by mouth once daily.       vitamin E 1000 UNIT capsule Take 1,000 Units by mouth once daily.      ZINC ACETATE ORAL Take by mouth.       No current facility-administered medications for this visit.

## 2025-05-08 ENCOUNTER — HOSPITAL ENCOUNTER (OUTPATIENT)
Dept: RADIOLOGY | Facility: HOSPITAL | Age: 70
Discharge: HOME | End: 2025-05-08
Attending: NURSE PRACTITIONER
Payer: MEDICARE

## 2025-05-08 DIAGNOSIS — M54.9 DORSALGIA, UNSPECIFIED: ICD-10-CM

## 2025-05-08 DIAGNOSIS — S22.080A COMPRESSION FRACTURE OF T12 VERTEBRA, INITIAL ENCOUNTER: ICD-10-CM

## 2025-05-08 PROCEDURE — 25500020 PHARM REV CODE 255: Mod: PO | Performed by: NURSE PRACTITIONER

## 2025-05-08 PROCEDURE — A9585 GADOBUTROL INJECTION: HCPCS | Mod: PO | Performed by: NURSE PRACTITIONER

## 2025-05-08 PROCEDURE — 72158 MRI LUMBAR SPINE W/O & W/DYE: CPT | Mod: 26,,, | Performed by: RADIOLOGY

## 2025-05-08 PROCEDURE — 72157 MRI CHEST SPINE W/O & W/DYE: CPT | Mod: 26,,, | Performed by: RADIOLOGY

## 2025-05-08 PROCEDURE — 72158 MRI LUMBAR SPINE W/O & W/DYE: CPT | Mod: TC,PO

## 2025-05-08 PROCEDURE — 72157 MRI CHEST SPINE W/O & W/DYE: CPT | Mod: TC,PO

## 2025-05-08 RX ORDER — GADOBUTROL 604.72 MG/ML
10 INJECTION INTRAVENOUS
Status: COMPLETED | OUTPATIENT
Start: 2025-05-08 | End: 2025-05-08

## 2025-05-08 RX ADMIN — GADOBUTROL 10 ML: 604.72 INJECTION INTRAVENOUS at 09:05

## 2025-05-26 DIAGNOSIS — G89.29 CHRONIC LEFT-SIDED LOW BACK PAIN WITH LEFT-SIDED SCIATICA: Primary | ICD-10-CM

## 2025-05-26 DIAGNOSIS — M54.42 CHRONIC LEFT-SIDED LOW BACK PAIN WITH LEFT-SIDED SCIATICA: Primary | ICD-10-CM

## 2025-05-28 ENCOUNTER — HOSPITAL ENCOUNTER (OUTPATIENT)
Dept: RADIOLOGY | Facility: HOSPITAL | Age: 70
Discharge: HOME | End: 2025-05-28
Attending: STUDENT IN AN ORGANIZED HEALTH CARE EDUCATION/TRAINING PROGRAM
Payer: MEDICARE

## 2025-05-28 DIAGNOSIS — M54.42 CHRONIC LEFT-SIDED LOW BACK PAIN WITH LEFT-SIDED SCIATICA: ICD-10-CM

## 2025-05-28 DIAGNOSIS — G89.29 CHRONIC LEFT-SIDED LOW BACK PAIN WITH LEFT-SIDED SCIATICA: ICD-10-CM

## 2025-05-28 PROCEDURE — 72100 X-RAY EXAM L-S SPINE 2/3 VWS: CPT | Mod: 26,,, | Performed by: STUDENT IN AN ORGANIZED HEALTH CARE EDUCATION/TRAINING PROGRAM

## 2025-05-28 PROCEDURE — 72100 X-RAY EXAM L-S SPINE 2/3 VWS: CPT | Mod: TC,PO

## 2025-06-06 ENCOUNTER — PATIENT MESSAGE (OUTPATIENT)
Dept: HEMATOLOGY/ONCOLOGY | Facility: CLINIC | Age: 70
End: 2025-06-06
Payer: MEDICARE

## 2025-06-09 ENCOUNTER — LAB VISIT (OUTPATIENT)
Dept: LAB | Facility: HOSPITAL | Age: 70
End: 2025-06-09
Attending: NURSE PRACTITIONER
Payer: MEDICARE

## 2025-06-09 DIAGNOSIS — M48.54XS NONTRAUMATIC COMPRESSION FRACTURE OF T12 VERTEBRA, SEQUELA: ICD-10-CM

## 2025-06-09 DIAGNOSIS — R79.89 ELEVATED LFTS: ICD-10-CM

## 2025-06-09 DIAGNOSIS — G62.9 NEUROPATHY: ICD-10-CM

## 2025-06-09 DIAGNOSIS — R74.01 TRANSAMINITIS: ICD-10-CM

## 2025-06-09 DIAGNOSIS — D69.6 THROMBOCYTOPENIA: ICD-10-CM

## 2025-06-09 DIAGNOSIS — E83.110 HEREDITARY HEMOCHROMATOSIS: ICD-10-CM

## 2025-06-09 DIAGNOSIS — Z78.9 HEPATITIS VACCINATION STATUS UNKNOWN: ICD-10-CM

## 2025-06-09 LAB
ABSOLUTE EOSINOPHIL (OHS): 0.34 K/UL
ABSOLUTE MONOCYTE (OHS): 0.8 K/UL (ref 0.3–1)
ABSOLUTE NEUTROPHIL COUNT (OHS): 4.25 K/UL (ref 1.8–7.7)
ALBUMIN SERPL BCP-MCNC: 3.7 G/DL (ref 3.5–5.2)
ALP SERPL-CCNC: 150 UNIT/L (ref 40–150)
ALT SERPL W/O P-5'-P-CCNC: 43 UNIT/L (ref 10–44)
ANION GAP (OHS): 8 MMOL/L (ref 8–16)
AST SERPL-CCNC: 38 UNIT/L (ref 11–45)
BASOPHILS # BLD AUTO: 0.06 K/UL
BASOPHILS NFR BLD AUTO: 0.9 %
BILIRUB SERPL-MCNC: 0.4 MG/DL (ref 0.1–1)
BUN SERPL-MCNC: 18 MG/DL (ref 8–23)
CALCIUM SERPL-MCNC: 8.7 MG/DL (ref 8.7–10.5)
CHLORIDE SERPL-SCNC: 107 MMOL/L (ref 95–110)
CO2 SERPL-SCNC: 22 MMOL/L (ref 23–29)
CREAT SERPL-MCNC: 0.9 MG/DL (ref 0.5–1.4)
ERYTHROCYTE [DISTWIDTH] IN BLOOD BY AUTOMATED COUNT: 13.2 % (ref 11.5–14.5)
FERRITIN SERPL-MCNC: 87 NG/ML (ref 20–300)
GFR SERPLBLD CREATININE-BSD FMLA CKD-EPI: >60 ML/MIN/1.73/M2
GLUCOSE SERPL-MCNC: 108 MG/DL (ref 70–110)
HBV SURFACE AB SER-ACNC: <3 MIU/ML
HBV SURFACE AB SERPL IA-ACNC: NORMAL M[IU]/ML
HCT VFR BLD AUTO: 43.2 % (ref 40–54)
HGB BLD-MCNC: 15 GM/DL (ref 14–18)
IMM GRANULOCYTES # BLD AUTO: 0.02 K/UL (ref 0–0.04)
IMM GRANULOCYTES NFR BLD AUTO: 0.3 % (ref 0–0.5)
IRON SATN MFR SERPL: 17 % (ref 20–50)
IRON SERPL-MCNC: 66 UG/DL (ref 45–160)
LYMPHOCYTES # BLD AUTO: 1.49 K/UL (ref 1–4.8)
MCH RBC QN AUTO: 31 PG (ref 27–31)
MCHC RBC AUTO-ENTMCNC: 34.7 G/DL (ref 32–36)
MCV RBC AUTO: 89 FL (ref 82–98)
NUCLEATED RBC (/100WBC) (OHS): 0 /100 WBC
PLATELET # BLD AUTO: 140 K/UL (ref 150–450)
PMV BLD AUTO: 10.7 FL (ref 9.2–12.9)
POTASSIUM SERPL-SCNC: 3.8 MMOL/L (ref 3.5–5.1)
PROT SERPL-MCNC: 7.6 GM/DL (ref 6–8.4)
RBC # BLD AUTO: 4.84 M/UL (ref 4.6–6.2)
RELATIVE EOSINOPHIL (OHS): 4.9 %
RELATIVE LYMPHOCYTE (OHS): 21.4 % (ref 18–48)
RELATIVE MONOCYTE (OHS): 11.5 % (ref 4–15)
RELATIVE NEUTROPHIL (OHS): 61 % (ref 38–73)
SODIUM SERPL-SCNC: 137 MMOL/L (ref 136–145)
TIBC SERPL-MCNC: 380 UG/DL (ref 250–450)
TRANSFERRIN SERPL-MCNC: 257 MG/DL (ref 200–375)
WBC # BLD AUTO: 6.96 K/UL (ref 3.9–12.7)

## 2025-06-09 PROCEDURE — 84466 ASSAY OF TRANSFERRIN: CPT

## 2025-06-09 PROCEDURE — 86706 HEP B SURFACE ANTIBODY: CPT | Mod: 59

## 2025-06-09 PROCEDURE — 82728 ASSAY OF FERRITIN: CPT

## 2025-06-09 PROCEDURE — 80053 COMPREHEN METABOLIC PANEL: CPT

## 2025-06-09 PROCEDURE — 85025 COMPLETE CBC W/AUTO DIFF WBC: CPT | Mod: PO

## 2025-06-09 PROCEDURE — 36415 COLL VENOUS BLD VENIPUNCTURE: CPT | Mod: PO

## 2025-06-11 DIAGNOSIS — E83.19 INCREASED STORAGE IRON: Primary | ICD-10-CM

## 2025-06-18 ENCOUNTER — TELEPHONE (OUTPATIENT)
Dept: INFUSION THERAPY | Facility: HOSPITAL | Age: 70
End: 2025-06-18
Payer: MEDICARE

## 2025-06-18 NOTE — TELEPHONE ENCOUNTER
Spoke with patient's spouse who states that provider called and canceled phlebotomy for today. I acknowledged call ended well.

## 2025-07-07 ENCOUNTER — OFFICE VISIT (OUTPATIENT)
Dept: FAMILY MEDICINE | Facility: CLINIC | Age: 70
End: 2025-07-07
Payer: MEDICARE

## 2025-07-07 VITALS
DIASTOLIC BLOOD PRESSURE: 85 MMHG | SYSTOLIC BLOOD PRESSURE: 139 MMHG | TEMPERATURE: 98 F | HEIGHT: 67 IN | BODY MASS INDEX: 37.83 KG/M2 | HEART RATE: 77 BPM | WEIGHT: 241 LBS

## 2025-07-07 DIAGNOSIS — M1A.0720 IDIOPATHIC CHRONIC GOUT OF LEFT FOOT WITHOUT TOPHUS: ICD-10-CM

## 2025-07-07 DIAGNOSIS — Z13.220 ENCOUNTER FOR LIPID SCREENING FOR CARDIOVASCULAR DISEASE: ICD-10-CM

## 2025-07-07 DIAGNOSIS — S32.040A CLOSED COMPRESSION FRACTURE OF L4 LUMBAR VERTEBRA, INITIAL ENCOUNTER: ICD-10-CM

## 2025-07-07 DIAGNOSIS — K76.0 NAFLD (NONALCOHOLIC FATTY LIVER DISEASE): ICD-10-CM

## 2025-07-07 DIAGNOSIS — I10 ESSENTIAL HYPERTENSION: Primary | ICD-10-CM

## 2025-07-07 DIAGNOSIS — Z12.5 SCREENING PSA (PROSTATE SPECIFIC ANTIGEN): ICD-10-CM

## 2025-07-07 DIAGNOSIS — M51.362 DEGENERATION OF INTERVERTEBRAL DISC OF LUMBAR REGION WITH DISCOGENIC BACK PAIN AND LOWER EXTREMITY PAIN: ICD-10-CM

## 2025-07-07 DIAGNOSIS — S22.000A COMPRESSION FRACTURE OF BODY OF THORACIC VERTEBRA: ICD-10-CM

## 2025-07-07 DIAGNOSIS — M54.16 LUMBAR RADICULOPATHY: ICD-10-CM

## 2025-07-07 DIAGNOSIS — E78.2 MIXED HYPERLIPIDEMIA: ICD-10-CM

## 2025-07-07 DIAGNOSIS — Z79.899 ENCOUNTER FOR LONG-TERM (CURRENT) USE OF HIGH-RISK MEDICATION: ICD-10-CM

## 2025-07-07 DIAGNOSIS — E66.01 SEVERE OBESITY WITH BODY MASS INDEX (BMI) OF 35.0 TO 39.9 WITH SERIOUS COMORBIDITY: ICD-10-CM

## 2025-07-07 DIAGNOSIS — Z13.6 ENCOUNTER FOR LIPID SCREENING FOR CARDIOVASCULAR DISEASE: ICD-10-CM

## 2025-07-07 PROBLEM — R05.9 COUGH: Status: RESOLVED | Noted: 2018-09-24 | Resolved: 2025-07-07

## 2025-07-07 PROCEDURE — 1101F PT FALLS ASSESS-DOCD LE1/YR: CPT | Mod: CPTII,S$GLB,, | Performed by: INTERNAL MEDICINE

## 2025-07-07 PROCEDURE — 3075F SYST BP GE 130 - 139MM HG: CPT | Mod: CPTII,S$GLB,, | Performed by: INTERNAL MEDICINE

## 2025-07-07 PROCEDURE — 3008F BODY MASS INDEX DOCD: CPT | Mod: CPTII,S$GLB,, | Performed by: INTERNAL MEDICINE

## 2025-07-07 PROCEDURE — 1126F AMNT PAIN NOTED NONE PRSNT: CPT | Mod: CPTII,S$GLB,, | Performed by: INTERNAL MEDICINE

## 2025-07-07 PROCEDURE — 4010F ACE/ARB THERAPY RXD/TAKEN: CPT | Mod: CPTII,S$GLB,, | Performed by: INTERNAL MEDICINE

## 2025-07-07 PROCEDURE — 99214 OFFICE O/P EST MOD 30 MIN: CPT | Mod: S$GLB,,, | Performed by: INTERNAL MEDICINE

## 2025-07-07 PROCEDURE — 1159F MED LIST DOCD IN RCRD: CPT | Mod: CPTII,S$GLB,, | Performed by: INTERNAL MEDICINE

## 2025-07-07 PROCEDURE — G2211 COMPLEX E/M VISIT ADD ON: HCPCS | Mod: S$GLB,,, | Performed by: INTERNAL MEDICINE

## 2025-07-07 PROCEDURE — 3288F FALL RISK ASSESSMENT DOCD: CPT | Mod: CPTII,S$GLB,, | Performed by: INTERNAL MEDICINE

## 2025-07-07 PROCEDURE — 3079F DIAST BP 80-89 MM HG: CPT | Mod: CPTII,S$GLB,, | Performed by: INTERNAL MEDICINE

## 2025-07-07 PROCEDURE — 99999 PR PBB SHADOW E&M-EST. PATIENT-LVL IV: CPT | Mod: PBBFAC,,, | Performed by: INTERNAL MEDICINE

## 2025-07-07 RX ORDER — TIRZEPATIDE 2.5 MG/.5ML
2.5 INJECTION, SOLUTION SUBCUTANEOUS
Qty: 2 ML | Refills: 5 | Status: SHIPPED | OUTPATIENT
Start: 2025-07-07

## 2025-07-07 NOTE — PROGRESS NOTES
Assessment/Plan:    1. Essential hypertension  Overview:  Hypertension Medications               benazepriL (LOTENSIN) 20 MG tablet Take 1 tablet (20 mg total) by mouth once daily.     -at goal today  -continue lifestyle modification with low sodium diet and exercise   -discussed hypertension disease course and importance of treating high blood pressure  -patient understood and advised of risk of untreated blood pressure.  ER precautions were given   for symptoms of hypertensive urgency and emergency.    Orders:  -     tirzepatide, weight loss, (ZEPBOUND) 2.5 mg/0.5 mL PnIj; Inject 2.5 mg into the skin every 7 days.  Dispense: 2 mL; Refill: 5    2. Screening PSA (prostate specific antigen)  -     Cancel: PSA, Screening; Future; Expected date: 07/07/2025  -     PSA, Screening; Future; Expected date: 07/23/2025    3. Encounter for long-term (current) use of high-risk medication  -     Cancel: PSA, Screening; Future; Expected date: 07/07/2025  -     PSA, Screening; Future; Expected date: 07/23/2025    4. Encounter for lipid screening for cardiovascular disease  -     Lipid Panel; Standing    5. Compression fracture of body of thoracic vertebra    6. Closed compression fracture of L4 lumbar vertebra, initial encounter  -     Ambulatory referral/consult to Neurosurgery; Future; Expected date: 07/14/2025  -     DXA Bone Density Axial Skeleton 1 or more sites; Future; Expected date: 07/07/2025    7. Severe obesity with body mass index (BMI) of 35.0 to 39.9 with serious comorbidity  Overview:  General weight loss/lifestyle modification strategies discussed (elicit support from others; identify saboteurs; non-food rewards, etc).  Informal exercise measures discussed, e.g. taking stairs instead of elevator.  Regular aerobic exercise program discussed.  Medication: attempting to start on GLP1a. Discussed risks and benefits of GLP1a therapy.  Reviewed patient's family history and personal history for thyroid C cell type tumor  FMLA or Disability Forms were received and faxed to the Forms Completion Department today at [phone]. (Please include all appropriate authorization forms with your fax).    Did you have the patient complete (in full) and sign the “Authorization For Disclosure of Health Information Forms Completion” form? Yes, from previous forms    Have you communicated that the Forms Completion Process may take up to 14 days? No    If you have questions about this encounter, please contact the Forms Completion Dept at [phone], Option 3.   and MEN syndrome.  Patient denies a history of these disorders or syndromes.  Patient was aware of increased risk of pancreatitis and worsening of these conditions in  animal studies.  Also discussed side effects of nausea vomiting diarrhea and abdominal pain.  Patient should notify me immediately if having any of these symptoms.  ER precautions were given.       Orders:  -     Hemoglobin A1C; Standing  -     tirzepatide, weight loss, (ZEPBOUND) 2.5 mg/0.5 mL PnIj; Inject 2.5 mg into the skin every 7 days.  Dispense: 2 mL; Refill: 5    8. NAFLD (nonalcoholic fatty liver disease)  Overview:  -managed by hepatology  -fibroscan Oct 2024- Moderate fibrosis with severe steatosis   -attempting to start on GLP1a to aid in weight loss    Orders:  -     Hemoglobin A1C; Standing  -     tirzepatide, weight loss, (ZEPBOUND) 2.5 mg/0.5 mL PnIj; Inject 2.5 mg into the skin every 7 days.  Dispense: 2 mL; Refill: 5    9. Mixed hyperlipidemia  -     tirzepatide, weight loss, (ZEPBOUND) 2.5 mg/0.5 mL PnIj; Inject 2.5 mg into the skin every 7 days.  Dispense: 2 mL; Refill: 5    10. Lumbar radiculopathy  Overview:  -chronic lower back pain with radiculopathy symptoms  -recent MRI is the following findings: 1. Acute mild L4 anterior wedge compression fracture/Schmorl's node with moderate reactive marrow edema and enhancement, new in comparison to the prior CT exam dated 02/01/2024.  2. Chronic moderate T7 and mild T12 compression fractures.  3. Multilevel degenerative changes of the thoracolumbar spine, as detailed above, most pronounced at the L4-5 level and resulting in moderate neural foraminal and spinal canal with moderate to severe lateral recess stenosis and possible impingement upon the descending L5 nerve roots.  -will refer to neurosurgery for further evaluation      11. Degeneration of intervertebral disc of lumbar region with discogenic back pain and lower extremity pain  -     tirzepatide, weight loss, (ZEPBOUND) 2.5 mg/0.5  "mL PnIj; Inject 2.5 mg into the skin every 7 days.  Dispense: 2 mL; Refill: 5    12. Idiopathic chronic gout of left foot without tophus  Overview:  -chronic  -remains on allopurinol 100 mg QD  -denies any gout flares within the past year  -will check uric acid with annual labs    Orders:  -     Uric Acid; Future; Expected date: 07/07/2025        Visit today included increased complexity associated with the care of the episodic problem(s) addressed and managing the longitudinal care of the patient due to the serious and/or complex managed problem(s). See above assessment/plan.    Follow up for Add labs to 7/23: a1c,lipid,psa,uric acid; DEXA same day if possible; neurosurg.    Flor Jones MD  _____________________________________________________________________________________________________________________________________________________    CC: follow up of chronic medical conditions     Patient is in clinic today as an established patient.    History of Present Illness    CHIEF COMPLAINT:  Patient presents today for follow up of chronic medical problems.    BACK PAIN AND RADICULOPATHY:  He reports chronic back pain with morning exacerbation that improves with movement. Pain is most intense upon waking, with significant relief after initial mobility. He is unable to  one place for more than 20 minutes due to discomfort. He experiences leg pain and numbness, particularly after prolonged standing, describing the leg pain as "on fire" after standing 20-25 minutes. A previous back injection was ineffective. Current pain and numbness limits physical activity, including treadmill walking. He denies leg weakness. No recent physical therapy or specialized spine treatment has been pursued. Hematology did order an MRI of his thoracic and lumbar spine recently which was completed but has not discussed results with anyone yet.     UPPER EXTREMITY:  He reports numbness in the left pinky fingertip for approximately " "one month, denying associated pain or other neurological symptoms. No recent trauma or injury to the hand or arm.       No other new complaints today.  Remaining chronic conditions have been reviewed and remain stable. Further detail as stated above.    Health Maintenance reviewed - Annual labs ordered.     No recent changes to medical/surgical history.    Medications Ordered Prior to Encounter[1]    Review of Systems   Constitutional:  Negative for chills, diaphoresis, fatigue and fever.   HENT:  Negative for congestion, ear pain, postnasal drip, sinus pain and sore throat.    Eyes:  Negative for pain and redness.   Respiratory:  Negative for cough, chest tightness and shortness of breath.    Cardiovascular:  Negative for chest pain and leg swelling.   Gastrointestinal:  Negative for abdominal pain, constipation, diarrhea, nausea and vomiting.   Genitourinary:  Negative for dysuria and hematuria.   Musculoskeletal:  Positive for arthralgias and back pain. Negative for joint swelling.   Skin:  Negative for rash.   Neurological:  Positive for numbness. Negative for dizziness, syncope and headaches.   Psychiatric/Behavioral:  Negative for dysphoric mood. The patient is not nervous/anxious.        Vitals:    07/07/25 0829   BP: 139/85   BP Location: Right arm   Patient Position: Sitting   Pulse: 77   Temp: 97.8 °F (36.6 °C)   Weight: 109.3 kg (241 lb)   Height: 5' 7" (1.702 m)       Wt Readings from Last 3 Encounters:   07/07/25 109.3 kg (241 lb)   04/23/25 109.7 kg (241 lb 13.5 oz)   04/09/25 110.5 kg (243 lb 11.2 oz)       Physical Exam  Constitutional:       General: He is not in acute distress.     Appearance: Normal appearance. He is well-developed.   HENT:      Head: Normocephalic and atraumatic.   Eyes:      Conjunctiva/sclera: Conjunctivae normal.   Cardiovascular:      Rate and Rhythm: Normal rate and regular rhythm.      Pulses: Normal pulses.      Heart sounds: Normal heart sounds. No murmur " heard.  Pulmonary:      Effort: Pulmonary effort is normal. No respiratory distress.      Breath sounds: Normal breath sounds.   Abdominal:      General: Bowel sounds are normal. There is no distension.      Palpations: Abdomen is soft.      Tenderness: There is no abdominal tenderness.   Musculoskeletal:         General: Normal range of motion.      Cervical back: Normal range of motion and neck supple.   Skin:     General: Skin is warm and dry.      Findings: No rash.   Neurological:      General: No focal deficit present.      Mental Status: He is alert and oriented to person, place, and time.      Sensory: No sensory deficit.      Motor: No weakness.      Gait: Gait normal.      Deep Tendon Reflexes: Reflexes normal.   Psychiatric:         Mood and Affect: Mood normal.         Behavior: Behavior normal.       This note was generated with the assistance of ambient listening technology. Verbal consent was obtained by the patient and accompanying visitor(s) for the recording of patient appointment to facilitate this note. I attest to having reviewed and edited the generated note for accuracy, though some syntax or spelling errors may persist. Please contact the author of this note for any clarification.           [1]  Current Outpatient Medications on File Prior to Visit   Medication Sig Dispense Refill    allopurinoL (ZYLOPRIM) 100 MG tablet Take 1 tablet (100 mg total) by mouth once daily. 90 tablet 2    APPLE CIDER VINEGAR ORAL Take by mouth.      ascorbic acid, vitamin C, (VITAMIN C) 250 MG tablet Take 250 mg by mouth once daily.      benazepriL (LOTENSIN) 20 MG tablet Take 1 tablet (20 mg total) by mouth once daily. 90 tablet 3    colchicine-probenecid 0.5-500 mg (CO-BENEMID) 500-0.5 mg Tab Take 1 tablet by mouth once daily. 90 tablet 3    omega-3 fatty acids/fish oil (FISH OIL-OMEGA-3 FATTY ACIDS) 300-1,000 mg capsule Take by mouth once daily.      vitamin E 1000 UNIT capsule Take 1,000 Units by mouth  once daily.      ZINC ACETATE ORAL Take by mouth.       No current facility-administered medications on file prior to visit.

## 2025-07-23 ENCOUNTER — HOSPITAL ENCOUNTER (OUTPATIENT)
Dept: RADIOLOGY | Facility: HOSPITAL | Age: 70
Discharge: HOME | End: 2025-07-23
Attending: INTERNAL MEDICINE
Payer: MEDICARE

## 2025-07-23 DIAGNOSIS — S32.040A CLOSED COMPRESSION FRACTURE OF L4 LUMBAR VERTEBRA, INITIAL ENCOUNTER: ICD-10-CM

## 2025-07-23 PROCEDURE — 77080 DXA BONE DENSITY AXIAL: CPT | Mod: 26,,, | Performed by: RADIOLOGY

## 2025-07-23 PROCEDURE — 77080 DXA BONE DENSITY AXIAL: CPT | Mod: TC,PO

## 2025-08-08 ENCOUNTER — OFFICE VISIT (OUTPATIENT)
Dept: NEUROSURGERY | Facility: CLINIC | Age: 70
End: 2025-08-08
Payer: MEDICARE

## 2025-08-08 VITALS
WEIGHT: 240.31 LBS | SYSTOLIC BLOOD PRESSURE: 116 MMHG | DIASTOLIC BLOOD PRESSURE: 72 MMHG | BODY MASS INDEX: 37.72 KG/M2 | HEIGHT: 67 IN | HEART RATE: 84 BPM

## 2025-08-08 DIAGNOSIS — M43.00 SPONDYLOLYSIS: ICD-10-CM

## 2025-08-08 DIAGNOSIS — M53.3 CHRONIC LEFT SI JOINT PAIN: Primary | ICD-10-CM

## 2025-08-08 DIAGNOSIS — G89.29 CHRONIC LEFT SI JOINT PAIN: Primary | ICD-10-CM

## 2025-08-08 DIAGNOSIS — S32.040A CLOSED COMPRESSION FRACTURE OF L4 LUMBAR VERTEBRA, INITIAL ENCOUNTER: ICD-10-CM

## 2025-08-08 PROCEDURE — 99999 PR PBB SHADOW E&M-EST. PATIENT-LVL III: CPT | Mod: PBBFAC,,, | Performed by: NEUROLOGICAL SURGERY

## 2025-08-08 RX ORDER — METHOCARBAMOL 750 MG/1
750 TABLET, FILM COATED ORAL 3 TIMES DAILY PRN
Qty: 60 TABLET | Refills: 0 | Status: SHIPPED | OUTPATIENT
Start: 2025-08-08

## 2025-08-08 NOTE — PROGRESS NOTES
Subjective:      Patient ID: Jasson Mayo is a 70 y.o. male.    Chief Complaint: Lumbar Spine Pain (L-Spine) (Pt reports for eval of lumbar spine pain/compression fx; Pt reports LBP that radiates to RT Le with numbness;Pt reports pain is exacerbated with sitting/standing; Pt reports dipika feet burning sensation; Pt also reports RT pinky numb at tip; Pt has MR Lumber performed 05/08/25 for review)    Patient here today with low back pain and imaging for review. Hx of lumbar ADRIANNA in 2019.   C/o N/T in L LE, radiates on lateral side of leg from hip to foot. Denies symptoms on right side.   Burning sensation in Dipika feet. Prolonged standing produces pain. Denies LOB. Concerned about inflammation in left ankle.   Pain = 4/10  Not currently taking any medications for symptoms.      Objective:   Physical Exam               Physical Exam:  Nursing note and vitals reviewed.    Constitutional: He appears well-nourished. He is not diaphoretic. No distress.     Eyes: Pupils are equal, round, and reactive to light. EOM are normal.     Cardiovascular: Normal rate and regular rhythm.     Psych/Behavior: He is alert. He is oriented to person, place, and time. He has a normal mood and affect.     Musculoskeletal:        Back: Range of motion is limited. There is tenderness. Muscle strength is 5/5.       Right Lower Extremities: Range of motion is full. There is no tenderness. Muscle strength is 5/5. Tone is normal.        Left Lower Extremities: There is no tenderness. Muscle strength is 5/5. Tone is normal.     Neurological:        Sensory: There is no sensory deficit in the trunk. There is no sensory deficit in the extremities.        Cranial nerves: Cranial nerve(s) II, III, IV, V, VI, VII, VIII, IX, X, XI and XII are intact.     General    Nursing note and vitals reviewed.  Constitutional: He is oriented to person, place, and time. He appears well-nourished. No distress.   Eyes: EOM are normal. Pupils are equal, round, and  reactive to light.   Cardiovascular:  Normal rate and regular rhythm.            Neurological: He is alert and oriented to person, place, and time.   Psychiatric: He has a normal mood and affect.             Ortho Exam        Results for orders placed in visit on 11/05/18    MRI Lumbar Spine Without Contrast    Narrative  EXAMINATION:  MRI LUMBAR SPINE WITHOUT CONTRAST    CLINICAL HISTORY:  Low back pain, >6wks conservative tx, persistent-progressive sx, surgical candidate; Sciatica, left side    TECHNIQUE:  Multiplanar, multisequence MR images were acquired from the thoracolumbar junction to the sacrum without the administration of contrast.    COMPARISON:  None.    FINDINGS:  Alignment: Normal.    Vertebrae: Degenerative endplate changes noted surrounding L4-5 and L5-S1.  Vertebral body heights are well maintained.  No evidence of fracture or marrow replacement process.    Discs: Moderate disc height loss noted at L4-5.  There is mild disc height loss at L3-4 and L5-S1.    Cord: Normal.  Conus terminates at L1-2    Degenerative findings:    T12-L1:  No spinal canal or neuroforaminal stenosis.    L1-L2:  No spinal canal or neuroforaminal stenosis.    L2-L3: Mild generalized disc bulge.  Mild-to-moderate left neural foraminal narrowing.  No spinal canal stenosis.    L3-L4: Mild generalized disc bulge.  Mild bilateral facet arthropathy.  Small annular fissure noted in the right paracentral region.  Mild right worse than left neural foraminal narrowing.  Mild crowding of the right lateral recess, otherwise no definite spinal canal stenosis.    L4-L5: Generalized disc bulge with mild bilateral facet hypertrophy and ligamentum flavum thickening.  Mild spinal canal stenosis with moderate bilateral neural foraminal narrowing.    L5-S1: Mild generalized disc bulge, slightly asymmetric to left.  Moderate bilateral facet hypertrophy.  Moderate bilateral neural foraminal narrowing.  No spinal canal stenosis.    Paraspinal  muscles & soft tissues: Unremarkable.    IMPRESSION:    Multilevel degenerative disc disease and facet arthropathy contributing to multilevel neural foraminal narrowing as above.  There is also mild spinal canal stenosis at L4-5.      Electronically signed by: Leonel Lal MD  Date:    11/08/2018  Time:    08:44     No results found for this or any previous visit.    Results for orders placed during the hospital encounter of 05/28/25    X-Ray Lumbar Spine AP And Lateral    Narrative  EXAMINATION:  XR LUMBAR SPINE AP AND LATERAL    CLINICAL HISTORY:  Lumbago with sciatica, left side    TECHNIQUE:  XR LUMBAR SPINE AP AND LATERAL    COMPARISON:  None    FINDINGS:  Normal alignment.    Compression fracture of T12.    Multilevel disc height loss spanning L3-S1.  Multilevel spondylosis and facet arthropathy.    Atherosclerotic calcifications.    Impression  As above.      Electronically signed by: Christ Cordova  Date:    05/28/2025  Time:    15:23     DXA   Impression:    1.  Lumbar spine and left femoral neck BMD is in the osteopenic range with moderate fracture risk.     Finalized on: 7/23/2025 8:54 AM By:  Rj Randolph MD      Assessment:   Assessment & Plan              1. Spondylolysis    2. Closed compression fracture of L4 lumbar vertebra, initial encounter    3. Chronic left SI joint pain      Plan:     Spondylolysis    Closed compression fracture of L4 lumbar vertebra, initial encounter  -     Ambulatory referral/consult to Neurosurgery    Chronic left SI joint pain    Pt pain seem to be more realted to left SI joint   Pain lateral to midline and radiates down left stopping at the knee   Also with symptoms consistent with musculoligamentous strain  He does have chronic compression of L4  No apparent central or foraminal stenosis identified       Will refer for SI injection and try      Thank you for the referral   Please call with any questions    Vlad Jay MD  Neurosurgery     Disclaimer: This note was  prepared using a voice recognition system and is likely to have sound alike errors within the text.

## 2025-08-13 DIAGNOSIS — M53.3 SACROILIAC JOINT PAIN: Primary | ICD-10-CM

## 2025-08-25 ENCOUNTER — PATIENT MESSAGE (OUTPATIENT)
Dept: NEUROSURGERY | Facility: CLINIC | Age: 70
End: 2025-08-25
Payer: MEDICARE

## 2025-09-02 ENCOUNTER — PATIENT MESSAGE (OUTPATIENT)
Dept: FAMILY MEDICINE | Facility: CLINIC | Age: 70
End: 2025-09-02
Payer: MEDICARE

## 2025-09-02 DIAGNOSIS — I10 ESSENTIAL HYPERTENSION: ICD-10-CM

## 2025-09-02 DIAGNOSIS — M1A.0720 IDIOPATHIC CHRONIC GOUT OF LEFT FOOT WITHOUT TOPHUS: ICD-10-CM

## 2025-09-02 RX ORDER — BENAZEPRIL HYDROCHLORIDE 20 MG/1
20 TABLET ORAL DAILY
Qty: 90 TABLET | Refills: 3 | Status: SHIPPED | OUTPATIENT
Start: 2025-09-02

## 2025-09-02 RX ORDER — ALLOPURINOL 100 MG/1
100 TABLET ORAL DAILY
Qty: 90 TABLET | Refills: 3 | Status: SHIPPED | OUTPATIENT
Start: 2025-09-02

## 2025-09-04 ENCOUNTER — OFFICE VISIT (OUTPATIENT)
Dept: FAMILY MEDICINE | Facility: CLINIC | Age: 70
End: 2025-09-04
Payer: MEDICARE

## 2025-09-04 VITALS
HEART RATE: 88 BPM | WEIGHT: 237 LBS | SYSTOLIC BLOOD PRESSURE: 110 MMHG | HEIGHT: 67 IN | OXYGEN SATURATION: 96 % | BODY MASS INDEX: 37.2 KG/M2 | DIASTOLIC BLOOD PRESSURE: 74 MMHG

## 2025-09-04 DIAGNOSIS — E78.5 HYPERLIPIDEMIA, UNSPECIFIED HYPERLIPIDEMIA TYPE: ICD-10-CM

## 2025-09-04 DIAGNOSIS — M54.16 LUMBAR RADICULOPATHY: ICD-10-CM

## 2025-09-04 DIAGNOSIS — Z86.0100 HISTORY OF COLON POLYPS: ICD-10-CM

## 2025-09-04 DIAGNOSIS — M19.072 OSTEOARTHRITIS OF LEFT ANKLE AND FOOT: ICD-10-CM

## 2025-09-04 DIAGNOSIS — I10 ESSENTIAL HYPERTENSION: ICD-10-CM

## 2025-09-04 DIAGNOSIS — M06.4 INFLAMMATORY POLYARTHROPATHY: ICD-10-CM

## 2025-09-04 DIAGNOSIS — G89.29 CHRONIC LEFT SI JOINT PAIN: ICD-10-CM

## 2025-09-04 DIAGNOSIS — E66.01 SEVERE OBESITY WITH BODY MASS INDEX (BMI) OF 35.0 TO 39.9 WITH SERIOUS COMORBIDITY: ICD-10-CM

## 2025-09-04 DIAGNOSIS — Z00.00 ENCOUNTER FOR MEDICARE ANNUAL WELLNESS EXAM: Primary | ICD-10-CM

## 2025-09-04 DIAGNOSIS — K76.0 NAFLD (NONALCOHOLIC FATTY LIVER DISEASE): ICD-10-CM

## 2025-09-04 DIAGNOSIS — M19.90 UNDIFFERENTIATED INFLAMMATORY ARTHRITIS: ICD-10-CM

## 2025-09-04 DIAGNOSIS — M54.42 CHRONIC LEFT-SIDED LOW BACK PAIN WITH LEFT-SIDED SCIATICA: ICD-10-CM

## 2025-09-04 DIAGNOSIS — D69.6 THROMBOCYTOPENIA: ICD-10-CM

## 2025-09-04 DIAGNOSIS — E83.110 HEREDITARY HEMOCHROMATOSIS: ICD-10-CM

## 2025-09-04 DIAGNOSIS — M53.3 CHRONIC LEFT SI JOINT PAIN: ICD-10-CM

## 2025-09-04 DIAGNOSIS — I70.0 AORTIC ATHEROSCLEROSIS: ICD-10-CM

## 2025-09-04 DIAGNOSIS — M1A.0720 IDIOPATHIC CHRONIC GOUT OF LEFT FOOT WITHOUT TOPHUS: ICD-10-CM

## 2025-09-04 DIAGNOSIS — G89.29 CHRONIC LEFT-SIDED LOW BACK PAIN WITH LEFT-SIDED SCIATICA: ICD-10-CM

## 2025-09-04 DIAGNOSIS — G57.12 MERALGIA PARESTHETICA OF LEFT SIDE: ICD-10-CM

## 2025-09-04 PROCEDURE — 99999 PR PBB SHADOW E&M-EST. PATIENT-LVL IV: CPT | Mod: PBBFAC,,, | Performed by: PHYSICIAN ASSISTANT
